# Patient Record
Sex: FEMALE | Race: WHITE | Employment: OTHER | URBAN - METROPOLITAN AREA
[De-identification: names, ages, dates, MRNs, and addresses within clinical notes are randomized per-mention and may not be internally consistent; named-entity substitution may affect disease eponyms.]

---

## 2017-08-24 PROBLEM — M17.0 PRIMARY OSTEOARTHRITIS OF BOTH KNEES: Status: ACTIVE | Noted: 2017-08-24

## 2018-05-03 ENCOUNTER — OFFICE VISIT (OUTPATIENT)
Dept: ORTHOPEDIC SURGERY | Age: 83
End: 2018-05-03
Payer: MEDICARE

## 2018-05-03 VITALS — HEIGHT: 64 IN | BODY MASS INDEX: 29.19 KG/M2 | WEIGHT: 171 LBS | TEMPERATURE: 97.9 F

## 2018-05-03 DIAGNOSIS — M17.0 PRIMARY OSTEOARTHRITIS OF BOTH KNEES: Primary | ICD-10-CM

## 2018-05-03 PROCEDURE — 99213 OFFICE O/P EST LOW 20 MIN: CPT | Performed by: ORTHOPAEDIC SURGERY

## 2018-05-03 RX ORDER — GABAPENTIN 400 MG/1
CAPSULE ORAL
Refills: 2 | COMMUNITY
Start: 2018-04-17 | End: 2019-12-05 | Stop reason: DRUGHIGH

## 2018-05-03 RX ORDER — AMOXICILLIN 500 MG/1
TABLET, FILM COATED ORAL
Refills: 0 | COMMUNITY
Start: 2018-05-01 | End: 2019-12-05 | Stop reason: ALTCHOICE

## 2018-06-08 ENCOUNTER — NURSE ONLY (OUTPATIENT)
Dept: ORTHOPEDIC SURGERY | Age: 83
End: 2018-06-08
Payer: MEDICARE

## 2018-06-08 DIAGNOSIS — M17.0 PRIMARY OSTEOARTHRITIS OF BOTH KNEES: Primary | ICD-10-CM

## 2018-06-08 PROCEDURE — 20610 DRAIN/INJ JOINT/BURSA W/O US: CPT | Performed by: NURSE PRACTITIONER

## 2018-06-08 RX ORDER — HYALURONATE SODIUM 10 MG/ML
20 SYRINGE (ML) INTRAARTICULAR ONCE
Status: COMPLETED | OUTPATIENT
Start: 2018-06-08 | End: 2018-06-08

## 2018-06-08 RX ADMIN — Medication 20 MG: at 11:24

## 2018-06-15 ENCOUNTER — NURSE ONLY (OUTPATIENT)
Dept: ORTHOPEDIC SURGERY | Age: 83
End: 2018-06-15
Payer: MEDICARE

## 2018-06-15 DIAGNOSIS — M17.0 PRIMARY OSTEOARTHRITIS OF BOTH KNEES: Primary | ICD-10-CM

## 2018-06-15 PROCEDURE — 20610 DRAIN/INJ JOINT/BURSA W/O US: CPT | Performed by: NURSE PRACTITIONER

## 2018-06-15 RX ORDER — HYALURONATE SODIUM 10 MG/ML
20 SYRINGE (ML) INTRAARTICULAR ONCE
Status: COMPLETED | OUTPATIENT
Start: 2018-06-15 | End: 2018-06-15

## 2018-06-15 RX ADMIN — Medication 20 MG: at 10:30

## 2018-06-15 RX ADMIN — Medication 20 MG: at 10:31

## 2018-06-22 ENCOUNTER — NURSE ONLY (OUTPATIENT)
Dept: ORTHOPEDIC SURGERY | Age: 83
End: 2018-06-22
Payer: MEDICARE

## 2018-06-22 DIAGNOSIS — M17.0 PRIMARY OSTEOARTHRITIS OF BOTH KNEES: Primary | ICD-10-CM

## 2018-06-22 PROCEDURE — 20610 DRAIN/INJ JOINT/BURSA W/O US: CPT | Performed by: NURSE PRACTITIONER

## 2018-06-22 RX ORDER — HYALURONATE SODIUM 10 MG/ML
20 SYRINGE (ML) INTRAARTICULAR ONCE
Status: COMPLETED | OUTPATIENT
Start: 2018-06-22 | End: 2018-06-22

## 2018-06-22 RX ADMIN — Medication 20 MG: at 11:13

## 2018-11-21 ENCOUNTER — HOSPITAL ENCOUNTER (EMERGENCY)
Age: 83
Discharge: HOME | End: 2018-11-21
Attending: EMERGENCY MEDICINE
Payer: MEDICARE

## 2018-11-21 VITALS
SYSTOLIC BLOOD PRESSURE: 161 MMHG | OXYGEN SATURATION: 97 % | HEIGHT: 64 IN | BODY MASS INDEX: 28.68 KG/M2 | WEIGHT: 168 LBS | HEART RATE: 88 BPM | TEMPERATURE: 98.1 F | RESPIRATION RATE: 20 BRPM | DIASTOLIC BLOOD PRESSURE: 70 MMHG

## 2018-11-21 DIAGNOSIS — I83.892 BLEEDING FROM VARICOSE VEINS OF LEFT LOWER EXTREMITY: Primary | ICD-10-CM

## 2018-11-21 PROCEDURE — 99283 EMERGENCY DEPT VISIT LOW MDM: CPT

## 2018-11-21 PROCEDURE — 12001 RPR S/N/AX/GEN/TRNK 2.5CM/<: CPT

## 2018-11-21 PROCEDURE — 6370000000 HC RX 637 (ALT 250 FOR IP): Performed by: EMERGENCY MEDICINE

## 2018-11-21 RX ADMIN — Medication: at 09:36

## 2018-11-21 ASSESSMENT — ENCOUNTER SYMPTOMS: SHORTNESS OF BREATH: 0

## 2019-04-15 ENCOUNTER — TELEPHONE (OUTPATIENT)
Dept: ADMINISTRATIVE | Age: 84
End: 2019-04-15

## 2019-04-17 ENCOUNTER — OFFICE VISIT (OUTPATIENT)
Dept: ORTHOPEDIC SURGERY | Age: 84
End: 2019-04-17
Payer: MEDICARE

## 2019-04-17 VITALS — BODY MASS INDEX: 29.02 KG/M2 | WEIGHT: 170 LBS | HEIGHT: 64 IN | TEMPERATURE: 98 F

## 2019-04-17 DIAGNOSIS — M17.0 PRIMARY OSTEOARTHRITIS OF BOTH KNEES: Primary | ICD-10-CM

## 2019-04-17 PROCEDURE — 99213 OFFICE O/P EST LOW 20 MIN: CPT | Performed by: NURSE PRACTITIONER

## 2019-04-17 NOTE — PROGRESS NOTES
Chief Complaint   Patient presents with    Knee Pain     Left Knee, had a fall x 1 month ago. Finished Euflexxa series on 6/22/18 with good results. Subjective:     Patient ID: Iman Desai is a 80 y.o..  female    Knee Pain  Patient complains of left knee pain. This is evaluated as a personal injury. Patient state she fell out of bed 3 weeks ago onto both knees. She states she does not have pain to right knee at this time. She did have good results with euiflexxa which finished in June 2018. Past Medical History:   Diagnosis Date    Dental caries     Diabetes mellitus ([address]     Gall bladder stones     Hernia     Hypertension     Kidney stone      Past Surgical History:   Procedure Laterality Date    APPENDECTOMY         Current Outpatient Medications:     Amoxicillin 500 MG TABS, TAKE ONE TABLET BY MOUTH EVERY 8 HOURS, Disp: , Rfl: 0    gabapentin (NEURONTIN) 400 MG capsule, TAKE ONE CAPSULE BY MOUTH FOUR TIMES A DAY, Disp: , Rfl: 2    oxyCODONE-acetaminophen (PERCOCET) 5-325 MG per tablet, Take 1 tablet by mouth every 4 hours as needed for Pain ., Disp: , Rfl:     docusate sodium (COLACE) 100 MG capsule, Take 100 mg by mouth 2 times daily, Disp: , Rfl:     carvedilol (COREG) 3.125 MG tablet, Take 3.125 mg by mouth 2 times daily (with meals), Disp: , Rfl:     fluticasone (FLONASE) 50 MCG/ACT nasal spray, Two sprays to each nostril daily x 1 week (for congestion). , Disp: 1 Bottle, Rfl: 0    diphenhydrAMINE (BENADRYL) 25 MG tablet, Take 25 mg by mouth every 6 hours as needed.   , Disp: , Rfl:   Allergies   Allergen Reactions    Alginate [Alginic Acid]     Novocain [Procaine]     Tape Rapides Ditto Tape]      Social History     Socioeconomic History    Marital status:      Spouse name: Not on file    Number of children: Not on file    Years of education: Not on file    Highest education level: Not on file   Occupational History    Not on file   Social Needs    Financial resource strain: Not on file    Food insecurity:     Worry: Not on file     Inability: Not on file    Transportation needs:     Medical: Not on file     Non-medical: Not on file   Tobacco Use    Smoking status: Never Smoker    Smokeless tobacco: Never Used   Substance and Sexual Activity    Alcohol use: No    Drug use: Not on file    Sexual activity: Not on file   Lifestyle    Physical activity:     Days per week: Not on file     Minutes per session: Not on file    Stress: Not on file   Relationships    Social connections:     Talks on phone: Not on file     Gets together: Not on file     Attends Gnosticist service: Not on file     Active member of club or organization: Not on file     Attends meetings of clubs or organizations: Not on file     Relationship status: Not on file    Intimate partner violence:     Fear of current or ex partner: Not on file     Emotionally abused: Not on file     Physically abused: Not on file     Forced sexual activity: Not on file   Other Topics Concern    Not on file   Social History Narrative    Not on file     History reviewed. No pertinent family history. REVIEW OF SYSTEMS:     General/Constitution:  (-)weight loss, (-)fever, (-)chills, (-)weakness. Skin: (-) rash,(-) psoriasis,(-) eczema, (-)skin cancer. Musculoskeletal: (-) fractures,  (-) dislocations,(-) collagen vascular disease, (-) fibromyalgia, (-) multiple sclerosis, (-) muscular dystrophy, (-) RSD,(-) joint pain (-)swelling, (-) joint pain,swelling. Neurologic: (-) epilepsy, (-)seizures,(-) brain tumor,(-) TIA, (-)stroke, (-)headaches, (-)Parkinson disease,(-) memory loss, (-) LOC. Cardiovascular: (-) Chest pain, (-) swelling in legs/feet, (-) SOB, (-) cramping in legs/feet with walking. Respiratory: (-) SOB, (-) Coughing, (-) night sweats. GI: (-) nausea, (-) vomiting, (-) diarrhea, (-) blood in stool, (-) gastric ulcer.   Psychiatric: (-) Depression, (-) Anxiety, (-) bipolar disease, (-) Alzheimer's Disease  Allergic/Immunologic: (-) allergies latex, (-) allergies metal, (-) skin sensitivity. Hematlogic: (-) anemia, (-) blood transfusion, (-) DVT/PE, (-) Clotting disorders      Subjective:    Constitution:    Temp 98 °F (36.7 °C)   Ht 5' 4\" (1.626 m)   Wt 170 lb (77.1 kg)   BMI 29.18 kg/m²       Psycihatric:    The patient is alert and oriented x 3, appears to be stated age and in no distress. Respiratory:    Respiratory effort is not labored. Patient is not gasping. Palpation of the chest reveals no tactile fremitus. Skin:    Upon inspection: the skin appears warm, dry and intact. There is  a previous scar over the affected area. There is any cellulitis, lymphedema or cutaneous lesions noted in the lower extremities. Upon palpation there is no induration noted. Neurologic:    Gait: normal;  Motor exam of the lower extremities show ; quadriceps, hamstrings, foot dorsi and plantar flexors intact R.  5/5 and L. 5/5. Deep tendon reflexes are 2/4 at the knees and 2/4 at the ankles with strong extensor hallicus longus motor strength bilaterally. Sensory to both feet is intact to all sensory root. Cardiovascular: The vascular exam is normal and is well perfused to distal extremities. Distal pulses DP/PT: R. 2+; L. 2+. There is cap refill noted less than two seconds in all digits. There is not edema of the bilateral lower extremities. There is not varicosities noted in the distal extremities. Lymph:    Upon palpation,  there is no lymphadenopathy noted in bilateral lower extremities. Musculoskeletal:      Gait: antalgic; examination of the nails and digits reveal no cyanosis or clubbing. Lumbar exam:    On visual inspection, there is not deformity of the spine. full range of motion, no tenderness, palpable spasm or pain on motion. Special tests: Straight Leg Raise negative, Basil test negative.       Hip exam-     Upon inspection, there is not

## 2019-05-07 ENCOUNTER — OFFICE VISIT (OUTPATIENT)
Dept: ORTHOPEDIC SURGERY | Age: 84
End: 2019-05-07
Payer: MEDICARE

## 2019-05-07 VITALS — BODY MASS INDEX: 29.02 KG/M2 | HEIGHT: 64 IN | WEIGHT: 170 LBS | TEMPERATURE: 98 F

## 2019-05-07 DIAGNOSIS — M17.0 PRIMARY OSTEOARTHRITIS OF BOTH KNEES: Primary | ICD-10-CM

## 2019-05-07 PROCEDURE — 99213 OFFICE O/P EST LOW 20 MIN: CPT | Performed by: ORTHOPAEDIC SURGERY

## 2019-05-07 NOTE — PROGRESS NOTES
Chief Complaint   Patient presents with    Knee Pain     Bilateral Knee, x 1 week,        Subjective:     Patient ID: Jae Rodrigues is a 80 y.o..  female    Knee Pain  Patient complains of bilateral knee pain. She had euflexxa in the past with good results. Past Medical History:   Diagnosis Date    Dental caries     Diabetes mellitus ([address]     Gall bladder stones     Hernia     Hypertension     Kidney stone      Past Surgical History:   Procedure Laterality Date    APPENDECTOMY         Current Outpatient Medications:     Amoxicillin 500 MG TABS, TAKE ONE TABLET BY MOUTH EVERY 8 HOURS, Disp: , Rfl: 0    gabapentin (NEURONTIN) 400 MG capsule, TAKE ONE CAPSULE BY MOUTH FOUR TIMES A DAY, Disp: , Rfl: 2    oxyCODONE-acetaminophen (PERCOCET) 5-325 MG per tablet, Take 1 tablet by mouth every 4 hours as needed for Pain ., Disp: , Rfl:     docusate sodium (COLACE) 100 MG capsule, Take 100 mg by mouth 2 times daily, Disp: , Rfl:     carvedilol (COREG) 3.125 MG tablet, Take 3.125 mg by mouth 2 times daily (with meals), Disp: , Rfl:     fluticasone (FLONASE) 50 MCG/ACT nasal spray, Two sprays to each nostril daily x 1 week (for congestion). , Disp: 1 Bottle, Rfl: 0    diphenhydrAMINE (BENADRYL) 25 MG tablet, Take 25 mg by mouth every 6 hours as needed.   , Disp: , Rfl:   Allergies   Allergen Reactions    Alginate [Alginic Acid]     Novocain [Procaine]     Tape Stephany Buerger Tape]      Social History     Socioeconomic History    Marital status:      Spouse name: Not on file    Number of children: Not on file    Years of education: Not on file    Highest education level: Not on file   Occupational History    Not on file   Social Needs    Financial resource strain: Not on file    Food insecurity:     Worry: Not on file     Inability: Not on file    Transportation needs:     Medical: Not on file     Non-medical: Not on file   Tobacco Use    Smoking status: Never Smoker    Smokeless tobacco: Never Used   Substance and Sexual Activity    Alcohol use: No    Drug use: Not on file    Sexual activity: Not on file   Lifestyle    Physical activity:     Days per week: Not on file     Minutes per session: Not on file    Stress: Not on file   Relationships    Social connections:     Talks on phone: Not on file     Gets together: Not on file     Attends Latter-day service: Not on file     Active member of club or organization: Not on file     Attends meetings of clubs or organizations: Not on file     Relationship status: Not on file    Intimate partner violence:     Fear of current or ex partner: Not on file     Emotionally abused: Not on file     Physically abused: Not on file     Forced sexual activity: Not on file   Other Topics Concern    Not on file   Social History Narrative    Not on file     No family history on file. REVIEW OF SYSTEMS:     General/Constitution:  (-)weight loss, (-)fever, (-)chills, (-)weakness. Skin: (-) rash,(-) psoriasis,(-) eczema, (-)skin cancer. Musculoskeletal: (-) fractures,  (-) dislocations,(-) collagen vascular disease, (-) fibromyalgia, (-) multiple sclerosis, (-) muscular dystrophy, (-) RSD,(-) joint pain (-)swelling, (-) joint pain,swelling. Neurologic: (-) epilepsy, (-)seizures,(-) brain tumor,(-) TIA, (-)stroke, (-)headaches, (-)Parkinson disease,(-) memory loss, (-) LOC. Cardiovascular: (-) Chest pain, (-) swelling in legs/feet, (-) SOB, (-) cramping in legs/feet with walking. Respiratory: (-) SOB, (-) Coughing, (-) night sweats. GI: (-) nausea, (-) vomiting, (-) diarrhea, (-) blood in stool, (-) gastric ulcer. Psychiatric: (-) Depression, (-) Anxiety, (-) bipolar disease, (-) Alzheimer's Disease  Allergic/Immunologic: (-) allergies latex, (-) allergies metal, (-) skin sensitivity.   Hematlogic: (-) anemia, (-) blood transfusion, (-) DVT/PE, (-) Clotting disorders      Subjective:    Constitution:    Temp 98 °F (36.7 °C)   Ht 5' 4\" (1.626 m) Wt 170 lb (77.1 kg)   BMI 29.18 kg/m²       Psycihatric:    The patient is alert and oriented x 3, appears to be stated age and in no distress. Respiratory:    Respiratory effort is not labored. Patient is not gasping. Palpation of the chest reveals no tactile fremitus. Skin:    Upon inspection: the skin appears warm, dry and intact. There is  a previous scar over the affected area. There is any cellulitis, lymphedema or cutaneous lesions noted in the lower extremities. Upon palpation there is no induration noted. Neurologic:    Gait: normal;  Motor exam of the lower extremities show ; quadriceps, hamstrings, foot dorsi and plantar flexors intact R.  5/5 and L. 5/5. Deep tendon reflexes are 2/4 at the knees and 2/4 at the ankles with strong extensor hallicus longus motor strength bilaterally. Sensory to both feet is intact to all sensory root. Cardiovascular: The vascular exam is normal and is well perfused to distal extremities. Distal pulses DP/PT: R. 2+; L. 2+. There is cap refill noted less than two seconds in all digits. There is not edema of the bilateral lower extremities. There is not varicosities noted in the distal extremities. Lymph:    Upon palpation,  there is no lymphadenopathy noted in bilateral lower extremities. Musculoskeletal:      Gait: antalgic; examination of the nails and digits reveal no cyanosis or clubbing. Lumbar exam:    On visual inspection, there is not deformity of the spine. full range of motion, no tenderness, palpable spasm or pain on motion. Special tests: Straight Leg Raise negative, Basil test negative. Hip exam-     Upon inspection, there is not deformity noted. Upon palpation there is not tenderness. ROM: is  full and symmetrical.   Strength: Hip Flexors 5/5; Hip Abductors 5/5; Hip Adduction 5/5. Knee exam    Left knee exam shows;  range of motion of R. Knee is 0 to 110, and L. Knee is 0 to 110.  The patient does have  pain on motion, effusion is moderate, there is tenderness over the  global region, there are not any masses, there is not ligamentous instability, there is  deformity noted. Knee exam: bilateral positive for moderate crepitations, some mild tenderness laxity is not noted with any stress. There is not a popliteal cyst.    R. Knee:  Lachman's negative, Anterior Drawer negative, Posterior Drawer negative  Yareli's negative, Thallasy  negative,   PF grind test positive, Apprehension test negative, Patellar J sign  negative  L. Knee:  Lachman's negative, Anterior Drawer negative, Posterior Drawer negative  Yareli's negative, Thallasy  negative,   PF grind test positive, Apprehension test negative,  Patellar J sign  negative      Xray Exam:    There is diffuse demineralization osseous structures. There is no   evidence of acute fracture or dislocation. There is advanced medial   and mild patellofemoral compartment joint space narrowing. There is no   large joint effusion. Overlying soft tissues are grossly unremarkable. Radiographic findings reviewed with patient      Assessment:  Encounter Diagnoses   Name Primary?  Primary osteoarthritis of both knees Yes           Plan: Will obtain new xr today as we have not had any since 2017  I had a lengthy discussion with the patient regarding their diagnosis. I explained treatment options including surgical vs non surgical treatment. I reviewed in detail the risks and benefits and outlined the procedure in detail with expected outcomes and possible complications. I also discussed non surgical treatment such as injections (CSI and visco supplementation), physical therapy, topical creams and NSAID's. They have elected for conservative management at this time. The patient has failed conservative measures such as NSAIDS, HEP, and cortisone injections.   She is an excellent candidate for viscous supplementation injections including euflexxa in the

## 2019-05-16 ENCOUNTER — NURSE ONLY (OUTPATIENT)
Dept: ORTHOPEDIC SURGERY | Age: 84
End: 2019-05-16
Payer: MEDICARE

## 2019-05-16 DIAGNOSIS — M17.0 PRIMARY OSTEOARTHRITIS OF BOTH KNEES: Primary | ICD-10-CM

## 2019-05-16 PROCEDURE — 99999 PR OFFICE/OUTPT VISIT,PROCEDURE ONLY: CPT | Performed by: NURSE PRACTITIONER

## 2019-05-16 PROCEDURE — 20610 DRAIN/INJ JOINT/BURSA W/O US: CPT | Performed by: NURSE PRACTITIONER

## 2019-05-16 RX ORDER — HYALURONATE SODIUM 10 MG/ML
20 SYRINGE (ML) INTRAARTICULAR ONCE
Status: COMPLETED | OUTPATIENT
Start: 2019-05-16 | End: 2019-05-16

## 2019-05-16 RX ADMIN — Medication 20 MG: at 15:25

## 2019-05-23 ENCOUNTER — TELEPHONE (OUTPATIENT)
Dept: ADMINISTRATIVE | Age: 84
End: 2019-05-23

## 2019-06-03 ENCOUNTER — TELEPHONE (OUTPATIENT)
Dept: ADMINISTRATIVE | Age: 84
End: 2019-06-03

## 2019-12-05 ENCOUNTER — APPOINTMENT (OUTPATIENT)
Dept: GENERAL RADIOLOGY | Age: 84
DRG: 871 | End: 2019-12-05
Payer: MEDICARE

## 2019-12-05 ENCOUNTER — HOSPITAL ENCOUNTER (INPATIENT)
Age: 84
LOS: 18 days | Discharge: NURSING, REHAB OR LONG-TERM CARE | DRG: 871 | End: 2019-12-23
Attending: EMERGENCY MEDICINE | Admitting: INTERNAL MEDICINE
Payer: MEDICARE

## 2019-12-05 ENCOUNTER — APPOINTMENT (OUTPATIENT)
Dept: CT IMAGING | Age: 84
DRG: 871 | End: 2019-12-05
Payer: MEDICARE

## 2019-12-05 ENCOUNTER — APPOINTMENT (OUTPATIENT)
Dept: INTERVENTIONAL RADIOLOGY/VASCULAR | Age: 84
DRG: 871 | End: 2019-12-05
Payer: MEDICARE

## 2019-12-05 PROBLEM — G93.41 ACUTE METABOLIC ENCEPHALOPATHY: Status: ACTIVE | Noted: 2019-12-05

## 2019-12-05 PROBLEM — A41.9 SEPSIS DUE TO PNEUMONIA (HCC): Status: ACTIVE | Noted: 2019-12-05

## 2019-12-05 PROBLEM — N17.9 AKI (ACUTE KIDNEY INJURY) (HCC): Status: ACTIVE | Noted: 2019-12-05

## 2019-12-05 PROBLEM — R79.89 ELEVATED BRAIN NATRIURETIC PEPTIDE (BNP) LEVEL: Status: ACTIVE | Noted: 2019-12-05

## 2019-12-05 PROBLEM — J18.9 SEPSIS DUE TO PNEUMONIA (HCC): Status: ACTIVE | Noted: 2019-12-05

## 2019-12-05 PROBLEM — J96.01 ACUTE RESPIRATORY FAILURE WITH HYPOXIA (HCC): Status: ACTIVE | Noted: 2019-12-05

## 2019-12-05 LAB
ALBUMIN SERPL-MCNC: 3.1 G/DL (ref 3.5–5.2)
ALP BLD-CCNC: 90 U/L (ref 35–104)
ALT SERPL-CCNC: 14 U/L (ref 0–32)
ANION GAP SERPL CALCULATED.3IONS-SCNC: 19 MMOL/L (ref 7–16)
AST SERPL-CCNC: 26 U/L (ref 0–31)
B.E.: -8 MMOL/L (ref -3–3)
BASOPHILS ABSOLUTE: 0 E9/L (ref 0–0.2)
BASOPHILS RELATIVE PERCENT: 0 % (ref 0–2)
BILIRUB SERPL-MCNC: 0.7 MG/DL (ref 0–1.2)
BILIRUBIN URINE: ABNORMAL
BLOOD, URINE: NEGATIVE
BUN BLDV-MCNC: 47 MG/DL (ref 8–23)
CALCIUM SERPL-MCNC: 8.4 MG/DL (ref 8.6–10.2)
CHLORIDE BLD-SCNC: 95 MMOL/L (ref 98–107)
CLARITY: CLEAR
CO2: 20 MMOL/L (ref 22–29)
COHB: 0.6 % (ref 0–1.5)
COLOR: ABNORMAL
CREAT SERPL-MCNC: 2.6 MG/DL (ref 0.5–1)
CRITICAL: ABNORMAL
DATE ANALYZED: ABNORMAL
DATE OF COLLECTION: ABNORMAL
EOSINOPHILS ABSOLUTE: 0 E9/L (ref 0.05–0.5)
EOSINOPHILS RELATIVE PERCENT: 0 % (ref 0–6)
GFR AFRICAN AMERICAN: 21
GFR NON-AFRICAN AMERICAN: 18 ML/MIN/1.73
GLUCOSE BLD-MCNC: 69 MG/DL (ref 74–99)
GLUCOSE URINE: NEGATIVE MG/DL
HCO3: 17.1 MMOL/L (ref 22–26)
HCT VFR BLD CALC: 42 % (ref 34–48)
HEMOGLOBIN: 13.4 G/DL (ref 11.5–15.5)
HHB: 11.3 % (ref 0–5)
INFLUENZA A BY PCR: NOT DETECTED
INFLUENZA B BY PCR: NOT DETECTED
INR BLD: 1.6
KETONES, URINE: ABNORMAL MG/DL
LAB: ABNORMAL
LACTIC ACID: 2.9 MMOL/L (ref 0.5–2.2)
LEUKOCYTE ESTERASE, URINE: NEGATIVE
LYMPHOCYTES ABSOLUTE: 0.5 E9/L (ref 1.5–4)
LYMPHOCYTES RELATIVE PERCENT: 2 % (ref 20–42)
Lab: ABNORMAL
MCH RBC QN AUTO: 29.5 PG (ref 26–35)
MCHC RBC AUTO-ENTMCNC: 31.9 % (ref 32–34.5)
MCV RBC AUTO: 92.5 FL (ref 80–99.9)
METAMYELOCYTES RELATIVE PERCENT: 1 % (ref 0–1)
METHB: 0.3 % (ref 0–1.5)
MODE: ABNORMAL
MONOCYTES ABSOLUTE: 0.25 E9/L (ref 0.1–0.95)
MONOCYTES RELATIVE PERCENT: 1 % (ref 2–12)
NEUTROPHILS ABSOLUTE: 24.06 E9/L (ref 1.8–7.3)
NEUTROPHILS RELATIVE PERCENT: 96 % (ref 43–80)
NITRITE, URINE: NEGATIVE
O2 CONTENT: 17 ML/DL
O2 SATURATION: 88.6 % (ref 92–98.5)
O2HB: 87.8 % (ref 94–97)
OPERATOR ID: 377
PATIENT TEMP: 37 C
PCO2: 34 MMHG (ref 35–45)
PDW BLD-RTO: 13.4 FL (ref 11.5–15)
PH BLOOD GAS: 7.32 (ref 7.35–7.45)
PH UA: 5 (ref 5–9)
PLATELET # BLD: 232 E9/L (ref 130–450)
PMV BLD AUTO: 11.6 FL (ref 7–12)
PO2: 57.1 MMHG (ref 60–100)
POTASSIUM REFLEX MAGNESIUM: 4 MMOL/L (ref 3.5–5)
PRO-BNP: ABNORMAL PG/ML (ref 0–450)
PROTEIN UA: NEGATIVE MG/DL
PROTHROMBIN TIME: 17.9 SEC (ref 9.3–12.4)
RBC # BLD: 4.54 E12/L (ref 3.5–5.5)
REASON FOR REJECTION: NORMAL
REASON FOR REJECTION: NORMAL
REJECTED TEST: NORMAL
REJECTED TEST: NORMAL
SODIUM BLD-SCNC: 134 MMOL/L (ref 132–146)
SOURCE, BLOOD GAS: ABNORMAL
SPECIFIC GRAVITY UA: 1.02 (ref 1–1.03)
THB: 13.8 G/DL (ref 11.5–16.5)
TIME ANALYZED: 1714
TOTAL PROTEIN: 6.7 G/DL (ref 6.4–8.3)
TROPONIN: <0.01 NG/ML (ref 0–0.03)
UROBILINOGEN, URINE: 0.2 E.U./DL
WBC # BLD: 24.8 E9/L (ref 4.5–11.5)

## 2019-12-05 PROCEDURE — 72125 CT NECK SPINE W/O DYE: CPT

## 2019-12-05 PROCEDURE — 81003 URINALYSIS AUTO W/O SCOPE: CPT

## 2019-12-05 PROCEDURE — 6370000000 HC RX 637 (ALT 250 FOR IP): Performed by: EMERGENCY MEDICINE

## 2019-12-05 PROCEDURE — 80053 COMPREHEN METABOLIC PANEL: CPT

## 2019-12-05 PROCEDURE — 6360000002 HC RX W HCPCS: Performed by: NURSE PRACTITIONER

## 2019-12-05 PROCEDURE — 36556 INSERT NON-TUNNEL CV CATH: CPT | Performed by: INTERNAL MEDICINE

## 2019-12-05 PROCEDURE — 85610 PROTHROMBIN TIME: CPT

## 2019-12-05 PROCEDURE — 2580000003 HC RX 258: Performed by: NURSE PRACTITIONER

## 2019-12-05 PROCEDURE — 93005 ELECTROCARDIOGRAM TRACING: CPT | Performed by: EMERGENCY MEDICINE

## 2019-12-05 PROCEDURE — 99223 1ST HOSP IP/OBS HIGH 75: CPT | Performed by: INTERNAL MEDICINE

## 2019-12-05 PROCEDURE — 06HM33Z INSERTION OF INFUSION DEVICE INTO RIGHT FEMORAL VEIN, PERCUTANEOUS APPROACH: ICD-10-PCS | Performed by: INTERNAL MEDICINE

## 2019-12-05 PROCEDURE — 83880 ASSAY OF NATRIURETIC PEPTIDE: CPT

## 2019-12-05 PROCEDURE — 94640 AIRWAY INHALATION TREATMENT: CPT

## 2019-12-05 PROCEDURE — 2580000003 HC RX 258: Performed by: INTERNAL MEDICINE

## 2019-12-05 PROCEDURE — 6360000002 HC RX W HCPCS: Performed by: INTERNAL MEDICINE

## 2019-12-05 PROCEDURE — 87502 INFLUENZA DNA AMP PROBE: CPT

## 2019-12-05 PROCEDURE — 94760 N-INVAS EAR/PLS OXIMETRY 1: CPT

## 2019-12-05 PROCEDURE — 6370000000 HC RX 637 (ALT 250 FOR IP): Performed by: NURSE PRACTITIONER

## 2019-12-05 PROCEDURE — 2500000003 HC RX 250 WO HCPCS: Performed by: INTERNAL MEDICINE

## 2019-12-05 PROCEDURE — 2000000000 HC ICU R&B

## 2019-12-05 PROCEDURE — 82805 BLOOD GASES W/O2 SATURATION: CPT

## 2019-12-05 PROCEDURE — 71046 X-RAY EXAM CHEST 2 VIEWS: CPT

## 2019-12-05 PROCEDURE — 85025 COMPLETE CBC W/AUTO DIFF WBC: CPT

## 2019-12-05 PROCEDURE — 84484 ASSAY OF TROPONIN QUANT: CPT

## 2019-12-05 PROCEDURE — APPSS45 APP SPLIT SHARED TIME 31-45 MINUTES: Performed by: NURSE PRACTITIONER

## 2019-12-05 PROCEDURE — 2580000003 HC RX 258: Performed by: EMERGENCY MEDICINE

## 2019-12-05 PROCEDURE — 6360000002 HC RX W HCPCS: Performed by: EMERGENCY MEDICINE

## 2019-12-05 PROCEDURE — 87450 HC DIRECT STREP B ANTIGEN: CPT

## 2019-12-05 PROCEDURE — 36415 COLL VENOUS BLD VENIPUNCTURE: CPT

## 2019-12-05 PROCEDURE — 70450 CT HEAD/BRAIN W/O DYE: CPT

## 2019-12-05 PROCEDURE — 71250 CT THORAX DX C-: CPT

## 2019-12-05 PROCEDURE — 99285 EMERGENCY DEPT VISIT HI MDM: CPT

## 2019-12-05 PROCEDURE — 83605 ASSAY OF LACTIC ACID: CPT

## 2019-12-05 PROCEDURE — 6370000000 HC RX 637 (ALT 250 FOR IP): Performed by: INTERNAL MEDICINE

## 2019-12-05 PROCEDURE — 87088 URINE BACTERIA CULTURE: CPT

## 2019-12-05 RX ORDER — HEPARIN SODIUM 5000 [USP'U]/ML
5000 INJECTION, SOLUTION INTRAVENOUS; SUBCUTANEOUS EVERY 8 HOURS SCHEDULED
Status: DISCONTINUED | OUTPATIENT
Start: 2019-12-05 | End: 2019-12-07

## 2019-12-05 RX ORDER — IBUPROFEN 200 MG
400 TABLET ORAL EVERY 6 HOURS PRN
Status: ON HOLD | COMMUNITY
End: 2019-12-23 | Stop reason: HOSPADM

## 2019-12-05 RX ORDER — SODIUM CHLORIDE 9 MG/ML
INJECTION, SOLUTION INTRAVENOUS CONTINUOUS
Status: DISCONTINUED | OUTPATIENT
Start: 2019-12-05 | End: 2019-12-05

## 2019-12-05 RX ORDER — FLUTICASONE PROPIONATE 50 MCG
1 SPRAY, SUSPENSION (ML) NASAL DAILY
COMMUNITY

## 2019-12-05 RX ORDER — IPRATROPIUM BROMIDE AND ALBUTEROL SULFATE 2.5; .5 MG/3ML; MG/3ML
1 SOLUTION RESPIRATORY (INHALATION) EVERY 4 HOURS
Status: DISCONTINUED | OUTPATIENT
Start: 2019-12-05 | End: 2019-12-08

## 2019-12-05 RX ORDER — DEXTROSE, SODIUM CHLORIDE, SODIUM LACTATE, POTASSIUM CHLORIDE, AND CALCIUM CHLORIDE 5; .6; .31; .03; .02 G/100ML; G/100ML; G/100ML; G/100ML; G/100ML
INJECTION, SOLUTION INTRAVENOUS CONTINUOUS
Status: DISCONTINUED | OUTPATIENT
Start: 2019-12-05 | End: 2019-12-13

## 2019-12-05 RX ORDER — SODIUM CHLORIDE 0.9 % (FLUSH) 0.9 %
10 SYRINGE (ML) INJECTION EVERY 12 HOURS SCHEDULED
Status: DISCONTINUED | OUTPATIENT
Start: 2019-12-05 | End: 2019-12-23 | Stop reason: SDUPTHER

## 2019-12-05 RX ORDER — IPRATROPIUM BROMIDE AND ALBUTEROL SULFATE 2.5; .5 MG/3ML; MG/3ML
1 SOLUTION RESPIRATORY (INHALATION)
Status: DISCONTINUED | OUTPATIENT
Start: 2019-12-05 | End: 2019-12-05

## 2019-12-05 RX ORDER — LEVOFLOXACIN 5 MG/ML
500 INJECTION, SOLUTION INTRAVENOUS
Status: DISCONTINUED | OUTPATIENT
Start: 2019-12-05 | End: 2019-12-09

## 2019-12-05 RX ORDER — CARVEDILOL 6.25 MG/1
6.25 TABLET ORAL 2 TIMES DAILY WITH MEALS
Status: DISCONTINUED | OUTPATIENT
Start: 2019-12-05 | End: 2019-12-09

## 2019-12-05 RX ORDER — SODIUM CHLORIDE 0.9 % (FLUSH) 0.9 %
10 SYRINGE (ML) INJECTION PRN
Status: DISCONTINUED | OUTPATIENT
Start: 2019-12-05 | End: 2019-12-23 | Stop reason: SDUPTHER

## 2019-12-05 RX ORDER — GABAPENTIN 600 MG/1
600 TABLET ORAL 4 TIMES DAILY
Status: ON HOLD | COMMUNITY
End: 2019-12-23 | Stop reason: HOSPADM

## 2019-12-05 RX ORDER — AZITHROMYCIN 250 MG/1
500 TABLET, FILM COATED ORAL ONCE
Status: COMPLETED | OUTPATIENT
Start: 2019-12-05 | End: 2019-12-05

## 2019-12-05 RX ORDER — ACETAMINOPHEN 325 MG/1
650 TABLET ORAL EVERY 4 HOURS PRN
Status: DISCONTINUED | OUTPATIENT
Start: 2019-12-05 | End: 2019-12-23 | Stop reason: HOSPADM

## 2019-12-05 RX ORDER — 0.9 % SODIUM CHLORIDE 0.9 %
1000 INTRAVENOUS SOLUTION INTRAVENOUS ONCE
Status: COMPLETED | OUTPATIENT
Start: 2019-12-05 | End: 2019-12-05

## 2019-12-05 RX ORDER — CARVEDILOL 6.25 MG/1
6.25 TABLET ORAL 2 TIMES DAILY WITH MEALS
Status: ON HOLD | COMMUNITY
End: 2019-12-23 | Stop reason: HOSPADM

## 2019-12-05 RX ORDER — ONDANSETRON 2 MG/ML
4 INJECTION INTRAMUSCULAR; INTRAVENOUS EVERY 6 HOURS PRN
Status: DISCONTINUED | OUTPATIENT
Start: 2019-12-05 | End: 2019-12-23 | Stop reason: HOSPADM

## 2019-12-05 RX ORDER — SODIUM CHLORIDE 9 MG/ML
INJECTION, SOLUTION INTRAVENOUS CONTINUOUS
Status: DISCONTINUED | OUTPATIENT
Start: 2019-12-05 | End: 2019-12-05 | Stop reason: SDUPTHER

## 2019-12-05 RX ADMIN — Medication 10 ML: at 22:12

## 2019-12-05 RX ADMIN — SODIUM CHLORIDE 1000 ML: 900 INJECTION, SOLUTION INTRAVENOUS at 13:55

## 2019-12-05 RX ADMIN — AZITHROMYCIN 500 MG: 250 TABLET, FILM COATED ORAL at 13:55

## 2019-12-05 RX ADMIN — SODIUM CHLORIDE, SODIUM LACTATE, POTASSIUM CHLORIDE, CALCIUM CHLORIDE AND DEXTROSE MONOHYDRATE: 5; 600; 310; 30; 20 INJECTION, SOLUTION INTRAVENOUS at 22:08

## 2019-12-05 RX ADMIN — IPRATROPIUM BROMIDE AND ALBUTEROL SULFATE 1 AMPULE: .5; 3 SOLUTION RESPIRATORY (INHALATION) at 16:52

## 2019-12-05 RX ADMIN — SODIUM CHLORIDE, SODIUM LACTATE, POTASSIUM CHLORIDE, CALCIUM CHLORIDE AND DEXTROSE MONOHYDRATE: 5; 600; 310; 30; 20 INJECTION, SOLUTION INTRAVENOUS at 18:28

## 2019-12-05 RX ADMIN — CEFTRIAXONE SODIUM 1 G: 1 INJECTION, POWDER, FOR SOLUTION INTRAMUSCULAR; INTRAVENOUS at 13:56

## 2019-12-05 RX ADMIN — IPRATROPIUM BROMIDE AND ALBUTEROL SULFATE 1 AMPULE: .5; 3 SOLUTION RESPIRATORY (INHALATION) at 21:45

## 2019-12-05 RX ADMIN — PIPERACILLIN AND TAZOBACTAM 3.38 G: 3; .375 INJECTION, POWDER, FOR SOLUTION INTRAVENOUS at 23:54

## 2019-12-05 RX ADMIN — ASCORBIC ACID: 500 INJECTION INTRAVENOUS at 18:33

## 2019-12-05 RX ADMIN — Medication 10 ML: at 18:28

## 2019-12-05 RX ADMIN — THIAMINE HYDROCHLORIDE 200 MG: 100 INJECTION, SOLUTION INTRAMUSCULAR; INTRAVENOUS at 18:38

## 2019-12-05 RX ADMIN — HYDROCORTISONE SODIUM SUCCINATE 100 MG: 100 INJECTION, POWDER, FOR SOLUTION INTRAMUSCULAR; INTRAVENOUS at 18:28

## 2019-12-05 RX ADMIN — HEPARIN SODIUM 5000 UNITS: 5000 INJECTION INTRAVENOUS; SUBCUTANEOUS at 19:46

## 2019-12-05 RX ADMIN — VANCOMYCIN HYDROCHLORIDE 1250 MG: 5 INJECTION, POWDER, LYOPHILIZED, FOR SOLUTION INTRAVENOUS at 22:46

## 2019-12-05 RX ADMIN — LEVOFLOXACIN 500 MG: 5 INJECTION, SOLUTION INTRAVENOUS at 18:40

## 2019-12-05 RX ADMIN — HEPARIN SODIUM 5000 UNITS: 5000 INJECTION INTRAVENOUS; SUBCUTANEOUS at 23:19

## 2019-12-05 ASSESSMENT — ENCOUNTER SYMPTOMS
NAUSEA: 0
ABDOMINAL PAIN: 0
RHINORRHEA: 0
COUGH: 0
VOMITING: 0
DIARRHEA: 0
SHORTNESS OF BREATH: 1

## 2019-12-05 ASSESSMENT — PAIN SCALES - GENERAL: PAINLEVEL_OUTOF10: 0

## 2019-12-05 NOTE — ED NOTES
Bed: 02  Expected date:   Expected time:   Means of arrival:   Comments:  213 Maynor Alston RN  12/05/19 9829

## 2019-12-05 NOTE — CONSULTS
Pulmonary/Critical Care Consult Note    CHIEF COMPLAINT: Acute respiratory failure, large left lung pneumonia, recent viral illness, acute kidney injury    HISTORY OF PRESENT ILLNESS: The patient is an 44-year-old female who lives at home with her  in [place]. Over the last 5 days she has become increasingly ill with shortness of breath cough and lethargy. Her granddaughter with whom I spoke at the bedside (Alta Benton), says that she is not normally this confused and generally is very clear and capable. Apparently, family members have been ill particularly her  and the patient then began to be ill herself over the last several days. The patient came to the emergency room and a rapid influenza test was negative. She was admitted to the [facility] and I was called to see her. However, it became very apparent that she was in significant difficulty and now is being moved to the intensive care unit. I reviewed the chest x-ray and CT scans of her chest which shows a very dense consolidated pneumonia originating in the left upper lobe but also probably extending into parts of the left lower lobe. I do not see any evidence of a mucous plug or mass or significant pleural effusion. I have requested a stat blood gas but this is not yet been done. ALLERGY:  Alginate [alginic acid]; Novocain [procaine]; and Tape [adhesive tape]    FAMILY HISTORY:  History reviewed. No pertinent family history.     SOCIAL HISTORY:  Social History     Socioeconomic History    Marital status:      Spouse name: Not on file    Number of children: Not on file    Years of education: Not on file    Highest education level: Not on file   Occupational History    Not on file   Social Needs    Financial resource strain: Not on file    Food insecurity:     Worry: Not on file     Inability: Not on file    Transportation needs:     Medical: Not on file     Non-medical: Not on file   Tobacco Use    Smoking status: Never

## 2019-12-05 NOTE — PROGRESS NOTES
Winnie Smith,    Your patient is on a medication that requires a renal dose adjustment. Renal Function Assessment:    Date Body Weight IBW Adj. Body Weight Scr CrCl Dialysis status   12/5/2019 81.4 kg 57 kg 66.8 kg 2.6 17 ml/min N/A       Pharmacy has renally dose-adjusted the following medication(s):    Date Medication Original Dosing Regimen New Dosing Regimen   12/5/2019 Zosyn 3.375 g  every 8 hours Every 12 hours           These changes were made per protocol according to the [facility] Renal Function-Based Dose Adjustments Policy    *Please note this dose may need readjusted if your patient's renal function significantly improves. Please contact pharmacy with any questions regarding these changes.     Yung Gale RPh 12/5/2019 4:19 PM

## 2019-12-05 NOTE — H&P
[address] [place], [place] [address]ist Group   History and Physical      CHIEF COMPLAINT:   fall    History of Present Illness:  80 y.o. female with a history of  HTN, DM2 who  presents from home after a mechanical fall this morning. Patient is confused, restless, and is a poor historian.  states patient has a 2 year history of lung and thyroid nodules that is being monitored by PCP. Patient denies SOB but is dyspneic with conversation. Abdomen noted to be softly distended,  reports history of hernia with old repair and mesh. Patient was to have thoracentesis done in ER but  states they did not feel she has a fluid on her lung, rather what is showing on xray as a pleural effusion is actually a lung mass verus consolidation from the pneumonia. Patient has skin tear left forearm from fall. Informant(s) for H&P:  and granddaughter    REVIEW OF SYSTEMS:  no fevers, chills, cp, sob, n/v, ha, vision/hearing changes, diarrhea, constipation, dysuria/hematuria unless noted in HPI. Complete ROS performed with the patient and is otherwise negative. PMH:  Past Medical History:   Diagnosis Date    Dental caries     Diabetes mellitus ([facility] [address]     Gall bladder stones     Hernia     Hypertension     Kidney stone        Surgical History:  Past Surgical History:   Procedure Laterality Date    APPENDECTOMY         Medications Prior to Admission:    Prior to Admission medications    Medication Sig Start Date End Date Taking? Authorizing Provider   carvedilol (COREG) 6.25 MG tablet Take 6.25 mg by mouth 2 times daily (with meals)   Yes Historical Provider, MD   fluticasone (FLONASE) 50 MCG/ACT nasal spray 1 spray by Each Nostril route daily   Yes Historical Provider, MD   gabapentin (NEURONTIN) 600 MG tablet Take 600 mg by mouth 4 times daily.    Yes Historical Provider, MD   ibuprofen (ADVIL;MOTRIN) 200 MG tablet Take 400 mg by mouth every 6 hours as needed for Pain   Yes Historical Provider, MD   oxyCODONE-acetaminophen (PERCOCET) 5-325 MG per tablet Take 1 tablet by mouth 4 times daily. Yes Historical Provider, MD       Allergies:    Alginate [alginic acid]; Novocain [procaine]; and Tape [adhesive tape]    Social History:    reports that she has never smoked. She has never used smokeless tobacco. She reports that she does not drink alcohol. Family History:  family history is not on file. PHYSICAL EXAM:  Vitals:  BP (!) 110/48   Pulse 89   Temp 98 °F (36.7 °C)   Resp 20   Ht 5' 5\" (1.651 m)   Wt 181 lb (82.1 kg)   SpO2 94%   BMI 30.12 kg/m²      General Appearance: alert and oriented to person,  in no acute distress  Skin: warm and dry. Skin tear to left forearm. Head: normocephalic and atraumatic  Eyes: pupils equal, round, and reactive to light, extraocular eye movements intact, conjunctivae normal  Neck: neck supple and non tender without mass   Pulmonary/Chest: crackles right base, diminished left lobe, no respiratory distress  Cardiovascular: normal rate, normal S1 and S2 and no carotid bruits  Abdomen: soft, non-tender, non-distended, normal bowel sounds, no masses or organomegaly  Extremities: no cyanosis, no clubbing and no edema  Neurologic: no cranial nerve deficit and speech normal    LABS:  Recent Labs     12/05/19  0915      K 4.0   CL 95*   CO2 20*   BUN 47*   CREATININE 2.6*   GLUCOSE 69*   CALCIUM 8.4*       Recent Labs     12/05/19  1056   WBC 24.8*   RBC 4.54   HGB 13.4   HCT 42.0   MCV 92.5   MCH 29.5   MCHC 31.9*   RDW 13.4      MPV 11.6       No results for input(s): POCGLU in the last 72 hours. Radiology: Xr Chest Standard (2 Vw)    Result Date: 12/5/2019  Reading location: [facility] [place] Indication: Shortness of breath Comparison: Prior chest radiograph from 1/12/2016 Technique: Portable AP upright and lateral chest radiographs were obtained. Findings: The left cardiac border is obscured.  There is a large left pleural effusion with left lung patchy infiltrate developing pneumonia right middle lobe. Ct Cervical Spine Wo Contrast    Result Date: 12/5/2019  Location: 200 Indication: Pain post fall Comparison: None available. Technique: Multidetector CT imaging of the cervical spine was performed without administration of intravenous contrast. Coronal and sagittal reformatted images were obtained. Automated dose exposure control was used for this exam. FINDINGS: There is nonspecific straightening of cervical lordosis. Vertebral bodies maintain normal height. Alignment is maintained. Atlantodental distance is preserved. The craniocervical junction is normal in appearance. No acute cervical spine fracture is identified. There is no prevertebral soft tissue edema. There is multilevel intervertebral disc space narrowing with hypertrophic endplate changes. There is multilevel uncovertebral and facet hypertrophy without high grade osseous encroachment of the central canal or neural foramina. Visualized portions of bilateral lung apices are grossly clear. 1. No acute cervical spine fracture. 2. Mild multilevel cervical spondylosis. EKG: Interpreted by emergency department physician     Rhythm: normal sinus   Rate: normal  Axis: normal  Ectopy: none  Conduction: normal  ST Segments: normal  T Waves: normal  Q Waves: none     Clinical Impression: normal sinus rhythm, normal ECG       ASSESSMENT:      Principal Problem:    Sepsis due to pneumonia [facility])  Active Problems:    Acute metabolic encephalopathy    Acute respiratory failure with hypoxia (HCC)    JOVANI (acute kidney injury) (HCC)    Elevated brain natriuretic peptide (BNP) level  Resolved Problems:    * No resolved hospital problems. *      PLAN:    1. Acute hypoxic respiratory  failure secondary to PNA: CXR with left lung infiltrate and large left pleural effusion. CT chest with large consolidative process left lung secondary to PNA per IR. Continue supplemental oxygen. 94% on 5 liters NC.  Does

## 2019-12-05 NOTE — ED PROVIDER NOTES
Brought in by EMS for fall that occurred this morning. EMS report that the  states that she was feeling dizzy this morning and when trying to the bed had slid onto the floor. EMS report that she was complaining of neck pain but the  states that this is been present for the past 8 days. Patient is confused about the events. She thinks that she fell at 10 AM this morning. I did discuss with her that it is only 8:45 AM this morning. I asked her if she fell yesterday at 10 AM but she cannot give me a clear answer. She is however alert and oriented x3 (place, month, and name). She complains of some chest discomfort and shortness of breath which occurred after the fall today. She states she did not have any of these episodes prior. She states she is still dizzy but cannot describe the quality of the dizziness. Denies any other complaints. Review of Systems   Constitutional: Positive for fatigue. Negative for chills, diaphoresis and fever. HENT: Negative for congestion and rhinorrhea. Eyes: Negative for visual disturbance. Respiratory: Positive for shortness of breath. Negative for cough. Cardiovascular: Positive for chest pain. Negative for palpitations and leg swelling. Gastrointestinal: Negative for abdominal pain, diarrhea, nausea and vomiting. Genitourinary: Negative for difficulty urinating, dysuria, frequency and hematuria. Musculoskeletal: Positive for gait problem. Neurological: Positive for dizziness and light-headedness. Negative for syncope and numbness. Hematological: Does not bruise/bleed easily. Psychiatric/Behavioral: Positive for confusion. All other systems reviewed and are negative. Physical Exam  Vitals signs and nursing note reviewed. Constitutional:       General: She is not in acute distress. Appearance: She is well-developed and normal weight. She is not ill-appearing, toxic-appearing or diaphoretic.    HENT:      Head: mmol/L   Brain Natriuretic Peptide   Result Value Ref Range    Pro-BNP 20,940 (H) 0 - 450 pg/mL   SPECIMEN REJECTION   Result Value Ref Range    Rejected Test cbcwd     Reason for Rejection see below    SPECIMEN REJECTION   Result Value Ref Range    Rejected Test cbcwd     Reason for Rejection see below    CBC auto differential   Result Value Ref Range    WBC 24.8 (H) 4.5 - 11.5 E9/L    RBC 4.54 3.50 - 5.50 E12/L    Hemoglobin 13.4 11.5 - 15.5 g/dL    Hematocrit 42.0 34.0 - 48.0 %    MCV 92.5 80.0 - 99.9 fL    MCH 29.5 26.0 - 35.0 pg    MCHC 31.9 (L) 32.0 - 34.5 %    RDW 13.4 11.5 - 15.0 fL    Platelets 656 684 - 724 E9/L    MPV 11.6 7.0 - 12.0 fL    Neutrophils % 96.0 (H) 43.0 - 80.0 %    Lymphocytes % 2.0 (L) 20.0 - 42.0 %    Monocytes % 1.0 (L) 2.0 - 12.0 %    Eosinophils % 0.0 0.0 - 6.0 %    Basophils % 0.0 0.0 - 2.0 %    Neutrophils Absolute 24.06 (H) 1.80 - 7.30 E9/L    Lymphocytes Absolute 0.50 (L) 1.50 - 4.00 E9/L    Monocytes Absolute 0.25 0.10 - 0.95 E9/L    Eosinophils Absolute 0.00 (L) 0.05 - 0.50 E9/L    Basophils Absolute 0.00 0.00 - 0.20 E9/L    Metamyelocytes Relative 1.0 0.0 - 1.0 %   Protime-INR   Result Value Ref Range    Protime 17.9 (H) 9.3 - 12.4 sec    INR 1.6        RADIOLOGY:  CT CHEST WO CONTRAST   Final Result   Large left lung infiltrate compatible pneumonia. Minimal   patchy infiltrate developing pneumonia right middle lobe. XR CHEST STANDARD (2 VW)   Final Result   Large left pleural effusion with left lung airspace disease. CT Head WO Contrast   Final Result   No acute intracranial hemorrhage or mass effect. CT CERVICAL SPINE WO CONTRAST   Final Result   1. No acute cervical spine fracture. 2. Mild multilevel cervical spondylosis.                IR GUIDED THORACENTESIS PLEURAL    (Results Pending)         ------------------------- NURSING NOTES AND VITALS REVIEWED ---------------------------  Date / Time Roomed:  12/5/2019  8:48 AM  ED Bed Assignment: 02/02    The nursing notes within the ED encounter and vital signs as below have been reviewed. Patient Vitals for the past 24 hrs:   BP Temp Temp src Pulse Resp SpO2 Height Weight   12/05/19 1038 (!) 99/40 -- -- 78 20 90 % -- --   12/05/19 0859 (!) 122/53 98 °F (36.7 °C) Oral 83 30 90 % 5' 5\" (1.651 m) 181 lb (82.1 kg)       Oxygen Saturation Interpretation: Abnormal and Improved after treatment    ------------------------------------------ PROGRESS NOTES ------------------------------------------  Re-evaluation(s):  See ED course    Counseling:  I have spoken with the patient and spouse and discussed todays results, in addition to providing specific details for the plan of care and counseling regarding the diagnosis and prognosis. Their questions are answered at this time and they are agreeable with the plan of admission.    --------------------------------- ADDITIONAL PROVIDER NOTES ---------------------------------  Consultations:  Time: 1350. Spoke with Dr. Hermelindo Jimenez. Discussed case. He will admit the patient. This patient's ED course included: a personal history and physicial examination, re-evaluation prior to disposition, multiple bedside re-evaluations, IV medications, cardiac monitoring and continuous pulse oximetry    This patient has remained hemodynamically stable during their ED course. Diagnosis:  1. acute kidney injury  2. community acquired pneumonia of left lung  3. Disorientation  4. Hypoxia    Disposition:  Patient's disposition: Admit to monitored bed  Patient's condition is fair.            Whitney Purvis, DO  12/05/19 140

## 2019-12-06 ENCOUNTER — APPOINTMENT (OUTPATIENT)
Dept: GENERAL RADIOLOGY | Age: 84
DRG: 871 | End: 2019-12-06
Payer: MEDICARE

## 2019-12-06 LAB
ANION GAP SERPL CALCULATED.3IONS-SCNC: 19 MMOL/L (ref 7–16)
B.E.: -6 MMOL/L (ref -3–3)
BASOPHILS ABSOLUTE: 0 E9/L (ref 0–0.2)
BASOPHILS RELATIVE PERCENT: 0.7 % (ref 0–2)
BUN BLDV-MCNC: 63 MG/DL (ref 8–23)
BURR CELLS: ABNORMAL
CALCIUM SERPL-MCNC: 8 MG/DL (ref 8.6–10.2)
CHLORIDE BLD-SCNC: 96 MMOL/L (ref 98–107)
CO2: 16 MMOL/L (ref 22–29)
COHB: 0.3 % (ref 0–1.5)
CREAT SERPL-MCNC: 2.6 MG/DL (ref 0.5–1)
CRITICAL: ABNORMAL
DATE ANALYZED: ABNORMAL
DATE OF COLLECTION: ABNORMAL
EOSINOPHILS ABSOLUTE: 0 E9/L (ref 0.05–0.5)
EOSINOPHILS RELATIVE PERCENT: 0 % (ref 0–6)
GFR AFRICAN AMERICAN: 21
GFR NON-AFRICAN AMERICAN: 18 ML/MIN/1.73
GLUCOSE BLD-MCNC: 139 MG/DL (ref 74–99)
HCO3: 19 MMOL/L (ref 22–26)
HCT VFR BLD CALC: 38.6 % (ref 34–48)
HEMOGLOBIN: 12.6 G/DL (ref 11.5–15.5)
HHB: 12.7 % (ref 0–5)
L. PNEUMOPHILA SEROGP 1 UR AG: NORMAL
LAB: ABNORMAL
LACTIC ACID: 1.9 MMOL/L (ref 0.5–2.2)
LV EF: 58 %
LVEF MODALITY: NORMAL
LYMPHOCYTES ABSOLUTE: 0.54 E9/L (ref 1.5–4)
LYMPHOCYTES RELATIVE PERCENT: 1.7 % (ref 20–42)
Lab: ABNORMAL
MAGNESIUM: 1.7 MG/DL (ref 1.6–2.6)
MCH RBC QN AUTO: 30 PG (ref 26–35)
MCHC RBC AUTO-ENTMCNC: 32.6 % (ref 32–34.5)
MCV RBC AUTO: 91.9 FL (ref 80–99.9)
METAMYELOCYTES RELATIVE PERCENT: 0.9 % (ref 0–1)
METHB: 0.3 % (ref 0–1.5)
MODE: ABNORMAL
MONOCYTES ABSOLUTE: 0.27 E9/L (ref 0.1–0.95)
MONOCYTES RELATIVE PERCENT: 0.9 % (ref 2–12)
NEUTROPHILS ABSOLUTE: 26.46 E9/L (ref 1.8–7.3)
NEUTROPHILS RELATIVE PERCENT: 96.6 % (ref 43–80)
NUCLEATED RED BLOOD CELLS: 0.9 /100 WBC
O2 CONTENT: 16.8 ML/DL
O2 SATURATION: 87.2 % (ref 92–98.5)
O2HB: 86.7 % (ref 94–97)
OPERATOR ID: ABNORMAL
OVALOCYTES: ABNORMAL
PATIENT TEMP: 37 C
PCO2: 36.2 MMHG (ref 35–45)
PDW BLD-RTO: 13.9 FL (ref 11.5–15)
PEEP/CPAP: 8 CMH2O
PH BLOOD GAS: 7.34 (ref 7.35–7.45)
PHOSPHORUS: 4 MG/DL (ref 2.5–4.5)
PLATELET # BLD: 220 E9/L (ref 130–450)
PMV BLD AUTO: 11.6 FL (ref 7–12)
PO2: 50 MMHG (ref 60–100)
POIKILOCYTES: ABNORMAL
POLYCHROMASIA: ABNORMAL
POTASSIUM SERPL-SCNC: 3.8 MMOL/L (ref 3.5–5)
PS: 14 CMH20
RBC # BLD: 4.2 E12/L (ref 3.5–5.5)
SODIUM BLD-SCNC: 131 MMOL/L (ref 132–146)
SOURCE, BLOOD GAS: ABNORMAL
STREP PNEUMONIAE ANTIGEN, URINE: NORMAL
THB: 13.8 G/DL (ref 11.5–16.5)
TIME ANALYZED: 530
VACUOLATED NEUTROPHILS: ABNORMAL
WBC # BLD: 27 E9/L (ref 4.5–11.5)

## 2019-12-06 PROCEDURE — 6370000000 HC RX 637 (ALT 250 FOR IP): Performed by: INTERNAL MEDICINE

## 2019-12-06 PROCEDURE — 93306 TTE W/DOPPLER COMPLETE: CPT

## 2019-12-06 PROCEDURE — 86738 MYCOPLASMA ANTIBODY: CPT

## 2019-12-06 PROCEDURE — 2580000003 HC RX 258: Performed by: INTERNAL MEDICINE

## 2019-12-06 PROCEDURE — 2000000000 HC ICU R&B

## 2019-12-06 PROCEDURE — 92526 ORAL FUNCTION THERAPY: CPT

## 2019-12-06 PROCEDURE — 36592 COLLECT BLOOD FROM PICC: CPT

## 2019-12-06 PROCEDURE — 92610 EVALUATE SWALLOWING FUNCTION: CPT

## 2019-12-06 PROCEDURE — 94660 CPAP INITIATION&MGMT: CPT

## 2019-12-06 PROCEDURE — C9113 INJ PANTOPRAZOLE SODIUM, VIA: HCPCS | Performed by: INTERNAL MEDICINE

## 2019-12-06 PROCEDURE — 36556 INSERT NON-TUNNEL CV CATH: CPT

## 2019-12-06 PROCEDURE — 82805 BLOOD GASES W/O2 SATURATION: CPT

## 2019-12-06 PROCEDURE — 99233 SBSQ HOSP IP/OBS HIGH 50: CPT | Performed by: INTERNAL MEDICINE

## 2019-12-06 PROCEDURE — 87081 CULTURE SCREEN ONLY: CPT

## 2019-12-06 PROCEDURE — 2580000003 HC RX 258: Performed by: NURSE PRACTITIONER

## 2019-12-06 PROCEDURE — 83605 ASSAY OF LACTIC ACID: CPT

## 2019-12-06 PROCEDURE — 2500000003 HC RX 250 WO HCPCS: Performed by: INTERNAL MEDICINE

## 2019-12-06 PROCEDURE — 6360000002 HC RX W HCPCS: Performed by: NURSE PRACTITIONER

## 2019-12-06 PROCEDURE — 36415 COLL VENOUS BLD VENIPUNCTURE: CPT

## 2019-12-06 PROCEDURE — 97530 THERAPEUTIC ACTIVITIES: CPT | Performed by: PHYSICAL THERAPIST

## 2019-12-06 PROCEDURE — 84100 ASSAY OF PHOSPHORUS: CPT

## 2019-12-06 PROCEDURE — 85025 COMPLETE CBC W/AUTO DIFF WBC: CPT

## 2019-12-06 PROCEDURE — 97162 PT EVAL MOD COMPLEX 30 MIN: CPT | Performed by: PHYSICAL THERAPIST

## 2019-12-06 PROCEDURE — 83735 ASSAY OF MAGNESIUM: CPT

## 2019-12-06 PROCEDURE — 80048 BASIC METABOLIC PNL TOTAL CA: CPT

## 2019-12-06 PROCEDURE — 6360000002 HC RX W HCPCS: Performed by: INTERNAL MEDICINE

## 2019-12-06 PROCEDURE — 71045 X-RAY EXAM CHEST 1 VIEW: CPT

## 2019-12-06 PROCEDURE — 94640 AIRWAY INHALATION TREATMENT: CPT

## 2019-12-06 RX ORDER — MAGNESIUM SULFATE 1 G/100ML
1 INJECTION INTRAVENOUS ONCE
Status: COMPLETED | OUTPATIENT
Start: 2019-12-06 | End: 2019-12-06

## 2019-12-06 RX ORDER — PANTOPRAZOLE SODIUM 40 MG/10ML
40 INJECTION, POWDER, LYOPHILIZED, FOR SOLUTION INTRAVENOUS DAILY
Status: DISCONTINUED | OUTPATIENT
Start: 2019-12-06 | End: 2019-12-08

## 2019-12-06 RX ORDER — SODIUM CHLORIDE 9 MG/ML
10 INJECTION INTRAVENOUS DAILY
Status: DISCONTINUED | OUTPATIENT
Start: 2019-12-06 | End: 2019-12-09

## 2019-12-06 RX ORDER — POTASSIUM CHLORIDE 29.8 MG/ML
20 INJECTION INTRAVENOUS ONCE
Status: COMPLETED | OUTPATIENT
Start: 2019-12-06 | End: 2019-12-06

## 2019-12-06 RX ADMIN — PANTOPRAZOLE SODIUM 40 MG: 40 INJECTION, POWDER, FOR SOLUTION INTRAVENOUS at 09:55

## 2019-12-06 RX ADMIN — HEPARIN SODIUM 5000 UNITS: 5000 INJECTION INTRAVENOUS; SUBCUTANEOUS at 21:16

## 2019-12-06 RX ADMIN — MAGNESIUM SULFATE 1 G: 1 INJECTION INTRAVENOUS at 09:54

## 2019-12-06 RX ADMIN — IPRATROPIUM BROMIDE AND ALBUTEROL SULFATE 1 AMPULE: .5; 3 SOLUTION RESPIRATORY (INHALATION) at 07:20

## 2019-12-06 RX ADMIN — HEPARIN SODIUM 5000 UNITS: 5000 INJECTION INTRAVENOUS; SUBCUTANEOUS at 16:14

## 2019-12-06 RX ADMIN — THIAMINE HYDROCHLORIDE 200 MG: 100 INJECTION, SOLUTION INTRAMUSCULAR; INTRAVENOUS at 20:21

## 2019-12-06 RX ADMIN — SODIUM CHLORIDE, SODIUM LACTATE, POTASSIUM CHLORIDE, CALCIUM CHLORIDE AND DEXTROSE MONOHYDRATE: 5; 600; 310; 30; 20 INJECTION, SOLUTION INTRAVENOUS at 13:12

## 2019-12-06 RX ADMIN — Medication 10 ML: at 09:55

## 2019-12-06 RX ADMIN — POTASSIUM CHLORIDE 20 MEQ: 400 INJECTION, SOLUTION INTRAVENOUS at 09:54

## 2019-12-06 RX ADMIN — HYDROCORTISONE SODIUM SUCCINATE 50 MG: 100 INJECTION, POWDER, FOR SOLUTION INTRAMUSCULAR; INTRAVENOUS at 20:14

## 2019-12-06 RX ADMIN — HYDROCORTISONE SODIUM SUCCINATE 50 MG: 100 INJECTION, POWDER, FOR SOLUTION INTRAMUSCULAR; INTRAVENOUS at 09:54

## 2019-12-06 RX ADMIN — Medication 10 ML: at 10:00

## 2019-12-06 RX ADMIN — ASCORBIC ACID: 500 INJECTION, SOLUTION INTRAMUSCULAR; INTRAVENOUS; SUBCUTANEOUS at 05:59

## 2019-12-06 RX ADMIN — ASCORBIC ACID: 500 INJECTION, SOLUTION INTRAMUSCULAR; INTRAVENOUS; SUBCUTANEOUS at 13:17

## 2019-12-06 RX ADMIN — THIAMINE HYDROCHLORIDE 200 MG: 100 INJECTION, SOLUTION INTRAMUSCULAR; INTRAVENOUS at 05:59

## 2019-12-06 RX ADMIN — HEPARIN SODIUM 5000 UNITS: 5000 INJECTION INTRAVENOUS; SUBCUTANEOUS at 05:59

## 2019-12-06 RX ADMIN — ASCORBIC ACID: 500 INJECTION, SOLUTION INTRAMUSCULAR; INTRAVENOUS; SUBCUTANEOUS at 20:21

## 2019-12-06 RX ADMIN — IPRATROPIUM BROMIDE AND ALBUTEROL SULFATE 1 AMPULE: .5; 3 SOLUTION RESPIRATORY (INHALATION) at 13:30

## 2019-12-06 RX ADMIN — IPRATROPIUM BROMIDE AND ALBUTEROL SULFATE 1 AMPULE: .5; 3 SOLUTION RESPIRATORY (INHALATION) at 22:29

## 2019-12-06 RX ADMIN — HYDROCORTISONE SODIUM SUCCINATE 100 MG: 100 INJECTION, POWDER, FOR SOLUTION INTRAMUSCULAR; INTRAVENOUS at 02:11

## 2019-12-06 RX ADMIN — IPRATROPIUM BROMIDE AND ALBUTEROL SULFATE 1 AMPULE: .5; 3 SOLUTION RESPIRATORY (INHALATION) at 00:46

## 2019-12-06 RX ADMIN — IPRATROPIUM BROMIDE AND ALBUTEROL SULFATE 1 AMPULE: .5; 3 SOLUTION RESPIRATORY (INHALATION) at 09:31

## 2019-12-06 RX ADMIN — Medication 10 ML: at 20:27

## 2019-12-06 RX ADMIN — ASCORBIC ACID: 500 INJECTION, SOLUTION INTRAMUSCULAR; INTRAVENOUS; SUBCUTANEOUS at 00:22

## 2019-12-06 RX ADMIN — IPRATROPIUM BROMIDE AND ALBUTEROL SULFATE 1 AMPULE: .5; 3 SOLUTION RESPIRATORY (INHALATION) at 17:34

## 2019-12-06 RX ADMIN — PIPERACILLIN AND TAZOBACTAM 3.38 G: 3; .375 INJECTION, POWDER, FOR SOLUTION INTRAVENOUS at 10:36

## 2019-12-06 ASSESSMENT — PAIN SCALES - GENERAL
PAINLEVEL_OUTOF10: 0

## 2019-12-06 NOTE — PROGRESS NOTES
Patient crying and frantically trying to get BiPAP off. She states she has a lot of mucus coming out of her mouth. Thick, dark brown mucus visible in mouth. I took her bipap off and let her spit it out. Copious amount expectorated. Another nurse paged respiratory to have them bring ordered vapotherm asap. Patient's O2 sat staying in range of 89-92%.

## 2019-12-06 NOTE — PROGRESS NOTES
[place] [address] Hospitalist   Progress Note    Admitting Date and Time: 12/5/2019  8:48 AM  Admit Dx: Acute respiratory failure with hypoxia (HCC) [J96.01]    Subjective:    Pt feels better and is more awake today.  at bedside. Per RN: speech to come and do bedside evaluation.  hydrocortisone sodium succinate PF  50 mg Intravenous Q8H    pantoprazole  40 mg Intravenous Daily    And    sodium chloride (PF)  10 mL Intravenous Daily    carvedilol  6.25 mg Oral BID WC    sodium chloride flush  10 mL Intravenous 2 times per day    heparin (porcine)  5,000 Units Subcutaneous 3 times per day    levofloxacin  500 mg Intravenous Q48H    ipratropium-albuterol  1 ampule Inhalation Q4H    thiamine (VITAMIN B1) IVPB  200 mg Intravenous Q12H    vancomycin (VANCOCIN) intermittent dosing (placeholder)   Other RX Placeholder    IVPB builder   Intravenous Q6H    piperacillin-tazobactam  3.375 g Intravenous Q12H     sodium chloride flush, 10 mL, PRN  magnesium hydroxide, 30 mL, Daily PRN  ondansetron, 4 mg, Q6H PRN  acetaminophen, 650 mg, Q4H PRN  perflutren lipid microspheres, 1.5 mL, ONCE PRN         Objective:    BP (!) 109/56   Pulse 78   Temp 98.3 °F (36.8 °C) (Axillary)   Resp 18   Ht 5' 5\" (1.651 m)   Wt 179 lb (81.2 kg)   SpO2 97%   BMI 29.79 kg/m²   General Appearance: alert and in NAD. She on vapotherm at 60%  Skin: warm and dry  Head: normocephalic and atraumatic  Eyes: pupils equal, round, and reactive to light, extraocular eye movements intact, conjunctivae normal  Neck: supple and non-tender without mass, no cervical lymphadenopathy  Pulmonary/Chest: diminished breath sounds on left.   Cardiovascular: normal rate, regular rhythm, normal S1 and S2, no murmurs, rubs, clicks, or gallops  Abdomen: soft, non-tender, non-distended, normal bowel sounds, no masses or organomegaly  Extremities: no cyanosis, clubbing or edema  Musculoskeletal: normal range of motion, no joint swelling, multilevel cervical spondylosis. XR CHEST PORTABLE    (Results Pending)       Assessment:  Principal Problem:    Sepsis due to pneumonia [facility])  Active Problems:    Acute respiratory failure with hypoxia (HCC)    JOVANI (acute kidney injury) ([facility]    Elevated brain natriuretic peptide (BNP) level    Acute metabolic encephalopathy    Community acquired pneumonia of left lung    Hypoxia  Resolved Problems:    * No resolved hospital problems. *      Plan:    1. Acute hypoxic respiratory failure in the setting of dense left lobar streptococcal pneumonia--received ceftriaxone and azithromycin in ER. Given severity of pneumonia, we switched to Zosyn. Pulmonary consulted and they felt patient has high potential for decompensation and recommended she be transferred to ICU whih was done the evening of admission 12/5. Vancomycin added as there was concern that family member had recent viral illness and if patient had same, would be at risk for staph pneumonia. Levaquin added by pulmonary to cover atypicals. Will deescalate antibiotics to Vanco and Levaquin. Zosyn to be discontinued today after today's dose. 2. Sepsis due to streptococcal pneumonia--intensivist recommended Marik protocol of hydrocortisone, vitamin C and thiamine. This was instituted but today dose of Solucortef decreased to 50 mg because of increasing BUN. 3. Acute metabolic/toxic encephalopathy--related to hypoxia and pneumonia. 4. JOVANI--do not know recent baseline but was 0.7 back in 2017. IVF hydration. Repeat BMP shows creatinine stable at 2.6.  5. Elevated BNP--may all be related to pneumonia. However, will check Echo to evaluate LV function. This was done this morning; will follow up results. Case discussed with Dr. Sheryle Chinchilla of pulmonary/critical care during ICU rounds. NOTE: This report was transcribed using voice recognition software.  Every effort was made to ensure accuracy; however, inadvertent computerized transcription errors may

## 2019-12-06 NOTE — PLAN OF CARE
Demonstrates accurate environmental perceptions  Description  Demonstrates accurate environmental perceptions  Outcome: Met This Shift  Goal: Able to distinguish between reality-based and nonreality-based thinking  Description  Able to distinguish between reality-based and nonreality-based thinking  Outcome: Met This Shift  Goal: Able to interrupt nonreality-based thinking  Description  Able to interrupt nonreality-based thinking  Outcome: Met This Shift     Problem: Sleep Pattern Disturbance:  Goal: Appears well-rested  Description  Appears well-rested  Outcome: Met This Shift     Problem: Risk for Impaired Skin Integrity  Goal: Tissue integrity - skin and mucous membranes  Description  Structural intactness and normal physiological function of skin and  mucous membranes.   12/6/2019 0156 by Bautista Barnes RN  Outcome: Met This Shift  12/5/2019 1909 by Ines Hall RN  Outcome: Met This Shift     Problem: Falls - Risk of:  Goal: Absence of physical injury  Description  Absence of physical injury  12/6/2019 0156 by Bautista Barnes RN  Outcome: Met This Shift  12/5/2019 1909 by Ines Hall RN  Outcome: Met This Shift  Goal: Will remain free from falls  Description  Will remain free from falls  12/6/2019 0156 by Bautista Barnes RN  Outcome: Met This Shift  12/5/2019 1909 by Ines Hall RN  Outcome: Met This Shift     Problem: Confusion - Acute:  Goal: Mental status will be restored to baseline  Description  Mental status will be restored to baseline  12/6/2019 0156 by Bautista Barnes RN  Outcome: Not Met This Shift  12/5/2019 1909 by Ines Hall RN  Outcome: Not Met This Shift     Problem: Discharge Planning:  Goal: Ability to perform activities of daily living will improve  Description  Ability to perform activities of daily living will improve  Outcome: Not Met This Shift  Goal: Participates in care planning  Description  Participates in care planning  Outcome: Not Met This Shift     Problem: Mood - Altered:  Goal: Mood stable  Description  Mood stable  12/6/2019 0156 by Bo Jacob RN  Outcome: Not Met This Shift  12/5/2019 1909 by Earl Crawford RN  Outcome: Met This Shift

## 2019-12-06 NOTE — PROGRESS NOTES
education with the patient regarding results of evaluation. Explained that Speech Pathology intervention is not warranted at this time, Prognosis for improvements is fair+. This plan will be re-evaluated and revised in 1 week  if warranted. Patient stated goals: Agreed with above, Treatment goals discussed with Patient and Family, The Patient and Family understand the diagnosis, prognosis and plan of care. [x]The admitting diagnosis and active problem list, as listed below have been reviewed prior to initiation of this evaluation. ADMITTING DIAGNOSIS: Acute respiratory failure with hypoxia ([address] [J96.01]     ACTIVE PROBLEM LIST:   Patient Active Problem List   Diagnosis    Primary osteoarthritis of both knees    Acute respiratory failure with hypoxia ([address]    Sepsis due to pneumonia ([address]    JOVANI (acute kidney injury) ([address]    Elevated brain natriuretic peptide (BNP) level    Acute metabolic encephalopathy    Community acquired pneumonia of left lung    Hypoxia     Jose Carlos Vyas M.Ed. Jessica Dia [id number]

## 2019-12-06 NOTE — CARE COORDINATION
12-6-  note / dc plan- met with pt and  at bedside, introduced myself and my role , pt is wearing oxygen, she does not wear 02 at home, no DME hx, no SNF hx.  states the patient will discharge home and he doesn't anticipate any needs. Pt /  want to get a new physician as her PCP is in [place], they live in [place], I gave them a list of physicians to look at. SS/CM will follow for dc needs.  Thank you, Electronically signed by Oneil Mariee RN on 12/6/2019 at 1:18 PM

## 2019-12-06 NOTE — PROGRESS NOTES
Procedure Laterality Date    APPENDECTOMY         Nursing cleared patient for PT evaluation and patient agreeable. The admitting diagnosis and active problem list as listed above have been reviewed prior to the initiation of this evaluation. Last time out of bed: prior to admit    Precautions: falls , slight confusion; vapotherm  Social history: Patient lives with spouse  in a split level home  with 7 steps to enter with [address]: ,  none    Mentation: alert, cooperative, oriented x person and follows directions,  One step    ROM: wfl    STRENGTH: 3+/5  PAIN: (measured on a visual analog scale with 0=no pain and 10=excruciating pain) 0/10. FUNCTIONAL ASSESSMENT   Bed Mobility- Supine to sit- Maximal assist          Scooting- Maximal assist       Sit to supine- Maximal assist     Patient able to dangle on edge of bed for 15 minutes with mod assist      Transfers-Sit to stand- Maximal assist     Gait: not assessed     Balance: sit-poor        stand poor      Edema: yes -    Endurance: poor vapotherm    Treatment: eval , dangle and pre gait  Therapist educated and facilitated patient on techniques to increase safety and independence with bed mobility, balance, functional transfers, and functional mobility. Sat edge of bed to increase dynamic sitting balance and activity tolerance. Patient demonstrating fair   understanding of education/techniques, requiring additional training/education. At end of session, patient in bed with  alarm call light and phone within reach,   all lines and tubes intact, nursing notified. Pt would benefit from continued skilled PT to increase functional independence and quality of life. Patients Goal: none stated      ASSESSMENT  Patient exhibits decreased strength, balance, coordination impairing functional mobility. GOALS to be met in 2 days.    Bed mobility-  Moderate assist         Transfers-Sit to stand-Moderate assist     Gait: Patient to ambulate 15 feet using wheeled walker with Moderate assist        Increase strength in affected mm groups by 1/3 grade  Increase balance to allow for improvement towards functional goals. Increase endurance to allow for improvement towards functional goals.         Ruthie Cano, PT

## 2019-12-06 NOTE — PROGRESS NOTES
acetaminophen, perflutren lipid microspheres      REVIEW OF SYSTEMS:  Remains confused enough so that she cannot give a review of systems response  OBJECTIVE:  Vitals:    12/06/19 0800   BP: (!) 119/58   Pulse:    Resp:    Temp: 98.3 °F (36.8 °C)   SpO2:      FiO2 : 60 %  O2 Flow Rate (L/min): 6 L/min  O2 Device: PAP (positive airway pressure)    PHYSICAL EXAM:  Constitutional: No fever, chills, diaphoresis  Skin: No skin rash, no skin breakdown  HEENT: Unremarkable  Neck: No JVD, lymphadenopathy, thyromegaly  Cardiovascular: S1, S2 normal.  No S3, S4 murmurs or rubs  Respiratory: Crackles over left anterior and posterior lung field. There is definite egophony anterolaterally (\"E to A\") changes  Gastrointestinal: Soft, flat, nontender  Genitourinary: No CVA tenderness  Extremities: No clubbing, cyanosis, or edema  Neurological: Still somewhat confused but not as much as last evening and moves all extremities.   No focal deficits  Psychological: Intact for the most part-appropriate affect    LABS:  WBC   Date Value Ref Range Status   12/06/2019 27.0 (H) 4.5 - 11.5 E9/L Final   12/05/2019 24.8 (H) 4.5 - 11.5 E9/L Final   05/07/2017 8.3 4.5 - 11.5 E9/L Final     Hemoglobin   Date Value Ref Range Status   12/06/2019 12.6 11.5 - 15.5 g/dL Final   12/05/2019 13.4 11.5 - 15.5 g/dL Final   05/07/2017 13.3 11.5 - 15.5 g/dL Final     Hematocrit   Date Value Ref Range Status   12/06/2019 38.6 34.0 - 48.0 % Final   12/05/2019 42.0 34.0 - 48.0 % Final   05/07/2017 40.9 34.0 - 48.0 % Final     MCV   Date Value Ref Range Status   12/06/2019 91.9 80.0 - 99.9 fL Final   12/05/2019 92.5 80.0 - 99.9 fL Final   05/07/2017 91.1 80.0 - 99.9 fL Final     Platelets   Date Value Ref Range Status   12/06/2019 220 130 - 450 E9/L Final   12/05/2019 232 130 - 450 E9/L Final   05/07/2017 304 130 - 450 E9/L Final     Sodium   Date Value Ref Range Status   12/06/2019 131 (L) 132 - 146 mmol/L Final   12/05/2019 134 132 - 146 mmol/L Final

## 2019-12-07 ENCOUNTER — APPOINTMENT (OUTPATIENT)
Dept: GENERAL RADIOLOGY | Age: 84
DRG: 871 | End: 2019-12-07
Payer: MEDICARE

## 2019-12-07 LAB
ANION GAP SERPL CALCULATED.3IONS-SCNC: 16 MMOL/L (ref 7–16)
B.E.: -3.5 MMOL/L (ref -3–3)
BASOPHILS ABSOLUTE: 0 E9/L (ref 0–0.2)
BASOPHILS RELATIVE PERCENT: 0.4 % (ref 0–2)
BUN BLDV-MCNC: 57 MG/DL (ref 8–23)
BURR CELLS: ABNORMAL
CALCIUM SERPL-MCNC: 8.7 MG/DL (ref 8.6–10.2)
CHLORIDE BLD-SCNC: 103 MMOL/L (ref 98–107)
CO2: 21 MMOL/L (ref 22–29)
COHB: 0.2 % (ref 0–1.5)
CREAT SERPL-MCNC: 1.7 MG/DL (ref 0.5–1)
CRITICAL: ABNORMAL
DATE ANALYZED: ABNORMAL
DATE OF COLLECTION: ABNORMAL
EKG ATRIAL RATE: 82 BPM
EKG P AXIS: 76 DEGREES
EKG P-R INTERVAL: 146 MS
EKG Q-T INTERVAL: 384 MS
EKG QRS DURATION: 92 MS
EKG QTC CALCULATION (BAZETT): 448 MS
EKG R AXIS: -27 DEGREES
EKG T AXIS: 39 DEGREES
EKG VENTRICULAR RATE: 82 BPM
EOSINOPHILS ABSOLUTE: 0 E9/L (ref 0.05–0.5)
EOSINOPHILS RELATIVE PERCENT: 0 % (ref 0–6)
FIO2: 60 %
GFR AFRICAN AMERICAN: 35
GFR NON-AFRICAN AMERICAN: 29 ML/MIN/1.73
GLUCOSE BLD-MCNC: 179 MG/DL (ref 74–99)
HCO3: 20.2 MMOL/L (ref 22–26)
HCT VFR BLD CALC: 38 % (ref 34–48)
HEMOGLOBIN: 12.5 G/DL (ref 11.5–15.5)
HHB: 2.8 % (ref 0–5)
LAB: ABNORMAL
LYMPHOCYTES ABSOLUTE: 0.52 E9/L (ref 1.5–4)
LYMPHOCYTES RELATIVE PERCENT: 1.7 % (ref 20–42)
Lab: ABNORMAL
MAGNESIUM: 2.3 MG/DL (ref 1.6–2.6)
MCH RBC QN AUTO: 29.3 PG (ref 26–35)
MCHC RBC AUTO-ENTMCNC: 32.9 % (ref 32–34.5)
MCV RBC AUTO: 89.2 FL (ref 80–99.9)
METHB: 0.4 % (ref 0–1.5)
MODE: ABNORMAL
MONOCYTES ABSOLUTE: 1.04 E9/L (ref 0.1–0.95)
MONOCYTES RELATIVE PERCENT: 4.3 % (ref 2–12)
MRSA CULTURE ONLY: NORMAL
NEUTROPHILS ABSOLUTE: 24.35 E9/L (ref 1.8–7.3)
NEUTROPHILS RELATIVE PERCENT: 93.9 % (ref 43–80)
O2 CONTENT: 18 ML/DL
O2 SATURATION: 97.2 % (ref 92–98.5)
O2HB: 96.6 % (ref 94–97)
OPERATOR ID: 901
PATIENT TEMP: 37
PCO2: 32.5 MMHG (ref 35–45)
PDW BLD-RTO: 13.8 FL (ref 11.5–15)
PFO2: 1.59 MMHG/%
PH BLOOD GAS: 7.41 (ref 7.35–7.45)
PHOSPHORUS: 2.4 MG/DL (ref 2.5–4.5)
PLATELET # BLD: 213 E9/L (ref 130–450)
PMV BLD AUTO: 12.1 FL (ref 7–12)
PO2: 95.5 MMHG (ref 60–100)
POIKILOCYTES: ABNORMAL
POTASSIUM SERPL-SCNC: 3 MMOL/L (ref 3.5–5)
RBC # BLD: 4.26 E12/L (ref 3.5–5.5)
SODIUM BLD-SCNC: 140 MMOL/L (ref 132–146)
SOURCE, BLOOD GAS: ABNORMAL
T4 TOTAL: 2.9 MCG/DL (ref 4.5–11.7)
THB: 13.2 G/DL (ref 11.5–16.5)
TIME ANALYZED: 524
TOXIC GRANULATION: ABNORMAL
TSH SERPL DL<=0.05 MIU/L-ACNC: 0.72 UIU/ML (ref 0.27–4.2)
VANCOMYCIN RANDOM: 8 MCG/ML (ref 5–40)
WBC # BLD: 25.9 E9/L (ref 4.5–11.5)

## 2019-12-07 PROCEDURE — 85025 COMPLETE CBC W/AUTO DIFF WBC: CPT

## 2019-12-07 PROCEDURE — 80202 ASSAY OF VANCOMYCIN: CPT

## 2019-12-07 PROCEDURE — 82805 BLOOD GASES W/O2 SATURATION: CPT

## 2019-12-07 PROCEDURE — 36415 COLL VENOUS BLD VENIPUNCTURE: CPT

## 2019-12-07 PROCEDURE — 2580000003 HC RX 258: Performed by: INTERNAL MEDICINE

## 2019-12-07 PROCEDURE — 2580000003 HC RX 258

## 2019-12-07 PROCEDURE — 94660 CPAP INITIATION&MGMT: CPT

## 2019-12-07 PROCEDURE — 2000000000 HC ICU R&B

## 2019-12-07 PROCEDURE — 84443 ASSAY THYROID STIM HORMONE: CPT

## 2019-12-07 PROCEDURE — 6370000000 HC RX 637 (ALT 250 FOR IP): Performed by: INTERNAL MEDICINE

## 2019-12-07 PROCEDURE — 2500000003 HC RX 250 WO HCPCS: Performed by: INTERNAL MEDICINE

## 2019-12-07 PROCEDURE — 99233 SBSQ HOSP IP/OBS HIGH 50: CPT | Performed by: INTERNAL MEDICINE

## 2019-12-07 PROCEDURE — 71045 X-RAY EXAM CHEST 1 VIEW: CPT

## 2019-12-07 PROCEDURE — 6360000002 HC RX W HCPCS: Performed by: INTERNAL MEDICINE

## 2019-12-07 PROCEDURE — 93005 ELECTROCARDIOGRAM TRACING: CPT | Performed by: INTERNAL MEDICINE

## 2019-12-07 PROCEDURE — 2700000000 HC OXYGEN THERAPY PER DAY

## 2019-12-07 PROCEDURE — 36592 COLLECT BLOOD FROM PICC: CPT

## 2019-12-07 PROCEDURE — 84100 ASSAY OF PHOSPHORUS: CPT

## 2019-12-07 PROCEDURE — 2580000003 HC RX 258: Performed by: NURSE PRACTITIONER

## 2019-12-07 PROCEDURE — 83735 ASSAY OF MAGNESIUM: CPT

## 2019-12-07 PROCEDURE — 6370000000 HC RX 637 (ALT 250 FOR IP): Performed by: NURSE PRACTITIONER

## 2019-12-07 PROCEDURE — 84436 ASSAY OF TOTAL THYROXINE: CPT

## 2019-12-07 PROCEDURE — 94640 AIRWAY INHALATION TREATMENT: CPT

## 2019-12-07 PROCEDURE — 6360000002 HC RX W HCPCS

## 2019-12-07 PROCEDURE — 80048 BASIC METABOLIC PNL TOTAL CA: CPT

## 2019-12-07 PROCEDURE — 6360000002 HC RX W HCPCS: Performed by: NURSE PRACTITIONER

## 2019-12-07 PROCEDURE — C9113 INJ PANTOPRAZOLE SODIUM, VIA: HCPCS | Performed by: INTERNAL MEDICINE

## 2019-12-07 RX ORDER — DILTIAZEM HYDROCHLORIDE 5 MG/ML
10 INJECTION INTRAVENOUS ONCE
Status: COMPLETED | OUTPATIENT
Start: 2019-12-07 | End: 2019-12-07

## 2019-12-07 RX ORDER — METHYLPREDNISOLONE SODIUM SUCCINATE 40 MG/ML
40 INJECTION, POWDER, LYOPHILIZED, FOR SOLUTION INTRAMUSCULAR; INTRAVENOUS EVERY 8 HOURS
Status: DISCONTINUED | OUTPATIENT
Start: 2019-12-07 | End: 2019-12-09

## 2019-12-07 RX ADMIN — HEPARIN SODIUM 5000 UNITS: 5000 INJECTION INTRAVENOUS; SUBCUTANEOUS at 13:30

## 2019-12-07 RX ADMIN — THIAMINE HYDROCHLORIDE 200 MG: 100 INJECTION, SOLUTION INTRAMUSCULAR; INTRAVENOUS at 05:40

## 2019-12-07 RX ADMIN — ACETAMINOPHEN 650 MG: 325 TABLET, FILM COATED ORAL at 19:41

## 2019-12-07 RX ADMIN — THIAMINE HYDROCHLORIDE 200 MG: 100 INJECTION, SOLUTION INTRAMUSCULAR; INTRAVENOUS at 19:00

## 2019-12-07 RX ADMIN — LEVOFLOXACIN 500 MG: 5 INJECTION, SOLUTION INTRAVENOUS at 16:45

## 2019-12-07 RX ADMIN — IPRATROPIUM BROMIDE AND ALBUTEROL SULFATE 1 AMPULE: .5; 3 SOLUTION RESPIRATORY (INHALATION) at 22:08

## 2019-12-07 RX ADMIN — Medication 10 ML: at 09:45

## 2019-12-07 RX ADMIN — IPRATROPIUM BROMIDE AND ALBUTEROL SULFATE 1 AMPULE: .5; 3 SOLUTION RESPIRATORY (INHALATION) at 16:39

## 2019-12-07 RX ADMIN — ASCORBIC ACID: 500 INJECTION, SOLUTION INTRAMUSCULAR; INTRAVENOUS; SUBCUTANEOUS at 12:37

## 2019-12-07 RX ADMIN — CARVEDILOL 6.25 MG: 6.25 TABLET, FILM COATED ORAL at 08:29

## 2019-12-07 RX ADMIN — METHYLPREDNISOLONE SODIUM SUCCINATE 40 MG: 40 INJECTION, POWDER, FOR SOLUTION INTRAMUSCULAR; INTRAVENOUS at 21:50

## 2019-12-07 RX ADMIN — SODIUM CHLORIDE, SODIUM LACTATE, POTASSIUM CHLORIDE, CALCIUM CHLORIDE AND DEXTROSE MONOHYDRATE: 5; 600; 310; 30; 20 INJECTION, SOLUTION INTRAVENOUS at 00:29

## 2019-12-07 RX ADMIN — HEPARIN SODIUM 5000 UNITS: 5000 INJECTION INTRAVENOUS; SUBCUTANEOUS at 05:40

## 2019-12-07 RX ADMIN — METHYLPREDNISOLONE SODIUM SUCCINATE 40 MG: 40 INJECTION, POWDER, FOR SOLUTION INTRAMUSCULAR; INTRAVENOUS at 13:30

## 2019-12-07 RX ADMIN — ASCORBIC ACID: 500 INJECTION, SOLUTION INTRAMUSCULAR; INTRAVENOUS; SUBCUTANEOUS at 19:00

## 2019-12-07 RX ADMIN — ASCORBIC ACID: 500 INJECTION, SOLUTION INTRAMUSCULAR; INTRAVENOUS; SUBCUTANEOUS at 23:53

## 2019-12-07 RX ADMIN — IPRATROPIUM BROMIDE AND ALBUTEROL SULFATE 1 AMPULE: .5; 3 SOLUTION RESPIRATORY (INHALATION) at 06:16

## 2019-12-07 RX ADMIN — HYDROCORTISONE SODIUM SUCCINATE 50 MG: 100 INJECTION, POWDER, FOR SOLUTION INTRAMUSCULAR; INTRAVENOUS at 02:24

## 2019-12-07 RX ADMIN — SODIUM CHLORIDE, SODIUM LACTATE, POTASSIUM CHLORIDE, CALCIUM CHLORIDE AND DEXTROSE MONOHYDRATE: 5; 600; 310; 30; 20 INJECTION, SOLUTION INTRAVENOUS at 13:50

## 2019-12-07 RX ADMIN — Medication 10 ML: at 21:00

## 2019-12-07 RX ADMIN — IPRATROPIUM BROMIDE AND ALBUTEROL SULFATE 1 AMPULE: .5; 3 SOLUTION RESPIRATORY (INHALATION) at 02:13

## 2019-12-07 RX ADMIN — PANTOPRAZOLE SODIUM 40 MG: 40 INJECTION, POWDER, FOR SOLUTION INTRAVENOUS at 09:45

## 2019-12-07 RX ADMIN — ASCORBIC ACID: 500 INJECTION, SOLUTION INTRAMUSCULAR; INTRAVENOUS; SUBCUTANEOUS at 05:40

## 2019-12-07 RX ADMIN — DILTIAZEM HYDROCHLORIDE 5 MG/HR: 5 INJECTION INTRAVENOUS at 14:39

## 2019-12-07 RX ADMIN — ENOXAPARIN SODIUM 60 MG: 60 INJECTION SUBCUTANEOUS at 16:30

## 2019-12-07 RX ADMIN — ASCORBIC ACID: 500 INJECTION, SOLUTION INTRAMUSCULAR; INTRAVENOUS; SUBCUTANEOUS at 00:29

## 2019-12-07 RX ADMIN — VANCOMYCIN HYDROCHLORIDE 1250 MG: 5 INJECTION, POWDER, LYOPHILIZED, FOR SOLUTION INTRAVENOUS at 12:37

## 2019-12-07 RX ADMIN — DILTIAZEM HYDROCHLORIDE 15 MG/HR: 5 INJECTION INTRAVENOUS at 23:10

## 2019-12-07 RX ADMIN — DILTIAZEM HYDROCHLORIDE 10 MG: 5 INJECTION INTRAVENOUS at 14:36

## 2019-12-07 RX ADMIN — CARVEDILOL 6.25 MG: 6.25 TABLET, FILM COATED ORAL at 16:30

## 2019-12-07 ASSESSMENT — PAIN SCALES - GENERAL
PAINLEVEL_OUTOF10: 0
PAINLEVEL_OUTOF10: 0
PAINLEVEL_OUTOF10: 4
PAINLEVEL_OUTOF10: 0
PAINLEVEL_OUTOF10: 0

## 2019-12-07 ASSESSMENT — ENCOUNTER SYMPTOMS
ALLERGIC/IMMUNOLOGIC NEGATIVE: 1
EYES NEGATIVE: 1
SHORTNESS OF BREATH: 1
GASTROINTESTINAL NEGATIVE: 1

## 2019-12-07 NOTE — PROGRESS NOTES
[address] [place], [place] [address]ist   ICU Progress Note    Admitting Date and Time: 12/5/2019  8:48 AM  Admit Dx: Acute respiratory failure with hypoxia (HCC) [J96.01]    Subjective:    Pt feels better; she is sitting up in bed. Per RN: more coherent today. Did not eat much for breakfast     vancomycin  1,250 mg Intravenous Q24H    pantoprazole  40 mg Intravenous Daily    And    sodium chloride (PF)  10 mL Intravenous Daily    carvedilol  6.25 mg Oral BID WC    sodium chloride flush  10 mL Intravenous 2 times per day    heparin (porcine)  5,000 Units Subcutaneous 3 times per day    levofloxacin  500 mg Intravenous Q48H    ipratropium-albuterol  1 ampule Inhalation Q4H    thiamine (VITAMIN B1) IVPB  200 mg Intravenous Q12H    vancomycin (VANCOCIN) intermittent dosing (placeholder)   Other RX Placeholder    IVPB builder   Intravenous Q6H     sodium chloride flush, 10 mL, PRN  magnesium hydroxide, 30 mL, Daily PRN  ondansetron, 4 mg, Q6H PRN  acetaminophen, 650 mg, Q4H PRN  perflutren lipid microspheres, 1.5 mL, ONCE PRN         Objective:    /64   Pulse 76   Temp 98.4 °F (36.9 °C) (Oral)   Resp 26   Ht 5' 5\" (1.651 m)   Wt 179 lb (81.2 kg)   SpO2 98%   BMI 29.79 kg/m²   General Appearance: alert and in NAD. She on vapotherm at 60% FIO2 at 30 LPM.  Skin: warm and dry  Head: normocephalic and atraumatic  Eyes: pupils equal, round, and reactive to light, extraocular eye movements intact, conjunctivae normal  Neck: supple and non-tender without mass, no cervical lymphadenopathy  Pulmonary/Chest: diminished breath sounds on left.   Cardiovascular: normal rate, regular rhythm, normal S1 and S2, no murmurs, rubs, clicks, or gallops  Abdomen: soft, non-tender, non-distended, normal bowel sounds, no masses or organomegaly  Extremities: no cyanosis, clubbing or edema  Musculoskeletal: normal range of motion, no joint swelling, deformity or tenderness  Neurologic: reflexes normal and symmetric, no or mass effect. CT CERVICAL SPINE WO CONTRAST   Final Result   1. No acute cervical spine fracture. 2. Mild multilevel cervical spondylosis. XR CHEST PORTABLE    (Results Pending)       Assessment:  Principal Problem:    Sepsis due to pneumonia [facility])  Active Problems:    Acute respiratory failure with hypoxia (HCC)    JOVANI (acute kidney injury) ([facility] [address]    Elevated brain natriuretic peptide (BNP) level    Acute metabolic encephalopathy    Community acquired pneumonia of left lung    Hypoxia  Resolved Problems:    * No resolved hospital problems. *      Plan:    1. Acute hypoxic respiratory failure in the setting of dense left lobar streptococcal pneumonia--received ceftriaxone and azithromycin in ER. Given severity of pneumonia, we switched to Zosyn. Pulmonary consulted and they felt patient has high potential for decompensation and recommended she be transferred to ICU which was done the evening of admission 12/5. Vancomycin added as there was concern that family member had recent viral illness and if patient had same, would be at risk for staph pneumonia. Levaquin added by pulmonary to cover atypicals. Yesterday, antibiotics deescalated to Vanco and Levaquin. Zosyn to be discontinued after AM dose 12/6.  2. Sepsis due to streptococcal pneumonia--intensivist recommended Marik protocol of hydrocortisone, vitamin C and thiamine. This was instituted on admission but dose of Solucortef decreased to 50 mg yesterday because of increasing BUN. 3. Acute metabolic/toxic encephalopathy(resolving)--related to hypoxia and pneumonia. 4. Dysphagia--seen by speech therapy and was placed on pureed solid with thin liquids. 5. JOVANI--do not know recent baseline but was 0.7 back in 2017. IVF hydration. Repeat BMP yesterday showed creatinine unchanged at 2.6 but today down to 1.7  6. Elevated BNP--may all be related to pneumonia. However,checked Echo to evaluate LV function.   This was done yesterday 12/6 and showed EF of 55-60% and indeterminate diastolic function. 7. Disposition--okay to transfer to [address] [place] [address]. Case discussed with Dr. Leonard Barcenas of pulmonary/critical care during ICU rounds. NOTE: This report was transcribed using voice recognition software.  Every effort was made to ensure accuracy; however, inadvertent computerized transcription errors may be present.     Electronically signed by Jovany Colon MD on 12/7/2019 at 10:46 AM

## 2019-12-08 ENCOUNTER — APPOINTMENT (OUTPATIENT)
Dept: GENERAL RADIOLOGY | Age: 84
DRG: 871 | End: 2019-12-08
Payer: MEDICARE

## 2019-12-08 LAB
ANION GAP SERPL CALCULATED.3IONS-SCNC: 16 MMOL/L (ref 7–16)
B.E.: 0.6 MMOL/L (ref -3–3)
BASOPHILS ABSOLUTE: 0 E9/L (ref 0–0.2)
BASOPHILS RELATIVE PERCENT: 0.1 % (ref 0–2)
BUN BLDV-MCNC: 32 MG/DL (ref 8–23)
BURR CELLS: ABNORMAL
CALCIUM SERPL-MCNC: 8.7 MG/DL (ref 8.6–10.2)
CHLORIDE BLD-SCNC: 105 MMOL/L (ref 98–107)
CO2: 21 MMOL/L (ref 22–29)
CREAT SERPL-MCNC: 0.9 MG/DL (ref 0.5–1)
DELIVERY SYSTEMS: ABNORMAL
DEVICE: ABNORMAL
EKG ATRIAL RATE: 159 BPM
EKG Q-T INTERVAL: 344 MS
EKG QRS DURATION: 102 MS
EKG QTC CALCULATION (BAZETT): 469 MS
EKG R AXIS: -17 DEGREES
EKG T AXIS: 33 DEGREES
EKG VENTRICULAR RATE: 112 BPM
EOSINOPHILS ABSOLUTE: 0 E9/L (ref 0.05–0.5)
EOSINOPHILS RELATIVE PERCENT: 0 % (ref 0–6)
FIO2 ARTERIAL: 60
GFR AFRICAN AMERICAN: >60
GFR NON-AFRICAN AMERICAN: 60 ML/MIN/1.73
GLUCOSE BLD-MCNC: 238 MG/DL (ref 74–99)
HCO3 ARTERIAL: 22.8 MMOL/L (ref 22–26)
HCT VFR BLD CALC: 40.1 % (ref 34–48)
HEMOGLOBIN: 13.3 G/DL (ref 11.5–15.5)
LYMPHOCYTES ABSOLUTE: 0.71 E9/L (ref 1.5–4)
LYMPHOCYTES RELATIVE PERCENT: 3.5 % (ref 20–42)
MAGNESIUM: 2 MG/DL (ref 1.6–2.6)
MCH RBC QN AUTO: 29.4 PG (ref 26–35)
MCHC RBC AUTO-ENTMCNC: 33.2 % (ref 32–34.5)
MCV RBC AUTO: 88.7 FL (ref 80–99.9)
METER GLUCOSE: 239 MG/DL (ref 74–99)
METER GLUCOSE: 277 MG/DL (ref 74–99)
MONOCYTES ABSOLUTE: 0.18 E9/L (ref 0.1–0.95)
MONOCYTES RELATIVE PERCENT: 0.9 % (ref 2–12)
MYELOCYTE PERCENT: 2.7 % (ref 0–0)
NEUTROPHILS ABSOLUTE: 17.09 E9/L (ref 1.8–7.3)
NEUTROPHILS RELATIVE PERCENT: 92.9 % (ref 43–80)
O2 SATURATION: 96.7 % (ref 92–98.5)
OPERATOR ID: 9
OVALOCYTES: ABNORMAL
PCO2 ARTERIAL: 30.5 MMHG (ref 35–45)
PDW BLD-RTO: 14 FL (ref 11.5–15)
PH BLOOD GAS: 7.48 (ref 7.35–7.45)
PHOSPHORUS: 1.3 MG/DL (ref 2.5–4.5)
PLATELET # BLD: 192 E9/L (ref 130–450)
PMV BLD AUTO: 12.3 FL (ref 7–12)
PO2 ARTERIAL: 79.6 MMHG (ref 60–80)
POIKILOCYTES: ABNORMAL
POTASSIUM SERPL-SCNC: 3.1 MMOL/L (ref 3.5–5)
RBC # BLD: 4.52 E12/L (ref 3.5–5.5)
SODIUM BLD-SCNC: 142 MMOL/L (ref 132–146)
SOURCE, BLOOD GAS: ABNORMAL
URINE CULTURE, ROUTINE: NORMAL
VANCOMYCIN TROUGH: 11.7 MCG/ML (ref 5–16)
WBC # BLD: 17.8 E9/L (ref 4.5–11.5)

## 2019-12-08 PROCEDURE — 6360000002 HC RX W HCPCS

## 2019-12-08 PROCEDURE — 94640 AIRWAY INHALATION TREATMENT: CPT

## 2019-12-08 PROCEDURE — 6360000002 HC RX W HCPCS: Performed by: INTERNAL MEDICINE

## 2019-12-08 PROCEDURE — 6370000000 HC RX 637 (ALT 250 FOR IP): Performed by: NURSE PRACTITIONER

## 2019-12-08 PROCEDURE — 99233 SBSQ HOSP IP/OBS HIGH 50: CPT | Performed by: INTERNAL MEDICINE

## 2019-12-08 PROCEDURE — 2500000003 HC RX 250 WO HCPCS: Performed by: INTERNAL MEDICINE

## 2019-12-08 PROCEDURE — 71045 X-RAY EXAM CHEST 1 VIEW: CPT

## 2019-12-08 PROCEDURE — 6370000000 HC RX 637 (ALT 250 FOR IP): Performed by: INTERNAL MEDICINE

## 2019-12-08 PROCEDURE — 85025 COMPLETE CBC W/AUTO DIFF WBC: CPT

## 2019-12-08 PROCEDURE — 2000000000 HC ICU R&B

## 2019-12-08 PROCEDURE — 80202 ASSAY OF VANCOMYCIN: CPT

## 2019-12-08 PROCEDURE — 82962 GLUCOSE BLOOD TEST: CPT

## 2019-12-08 PROCEDURE — 2580000003 HC RX 258: Performed by: NURSE PRACTITIONER

## 2019-12-08 PROCEDURE — 36415 COLL VENOUS BLD VENIPUNCTURE: CPT

## 2019-12-08 PROCEDURE — 36592 COLLECT BLOOD FROM PICC: CPT

## 2019-12-08 PROCEDURE — C9113 INJ PANTOPRAZOLE SODIUM, VIA: HCPCS | Performed by: INTERNAL MEDICINE

## 2019-12-08 PROCEDURE — 2580000003 HC RX 258: Performed by: INTERNAL MEDICINE

## 2019-12-08 PROCEDURE — 83735 ASSAY OF MAGNESIUM: CPT

## 2019-12-08 PROCEDURE — 94660 CPAP INITIATION&MGMT: CPT

## 2019-12-08 PROCEDURE — 84165 PROTEIN E-PHORESIS SERUM: CPT

## 2019-12-08 PROCEDURE — 2580000003 HC RX 258

## 2019-12-08 PROCEDURE — 84100 ASSAY OF PHOSPHORUS: CPT

## 2019-12-08 PROCEDURE — 80048 BASIC METABOLIC PNL TOTAL CA: CPT

## 2019-12-08 PROCEDURE — 82803 BLOOD GASES ANY COMBINATION: CPT

## 2019-12-08 RX ORDER — OXYCODONE HYDROCHLORIDE AND ACETAMINOPHEN 5; 325 MG/1; MG/1
1 TABLET ORAL EVERY 6 HOURS PRN
Status: DISCONTINUED | OUTPATIENT
Start: 2019-12-08 | End: 2019-12-23 | Stop reason: HOSPADM

## 2019-12-08 RX ORDER — NICOTINE POLACRILEX 4 MG
15 LOZENGE BUCCAL PRN
Status: DISCONTINUED | OUTPATIENT
Start: 2019-12-08 | End: 2019-12-23 | Stop reason: HOSPADM

## 2019-12-08 RX ORDER — HYDRALAZINE HYDROCHLORIDE 20 MG/ML
10 INJECTION INTRAMUSCULAR; INTRAVENOUS EVERY 6 HOURS PRN
Status: DISCONTINUED | OUTPATIENT
Start: 2019-12-08 | End: 2019-12-23 | Stop reason: HOSPADM

## 2019-12-08 RX ORDER — PANTOPRAZOLE SODIUM 40 MG/1
40 TABLET, DELAYED RELEASE ORAL
Status: DISCONTINUED | OUTPATIENT
Start: 2019-12-09 | End: 2019-12-23 | Stop reason: HOSPADM

## 2019-12-08 RX ORDER — LEVALBUTEROL INHALATION SOLUTION 0.31 MG/3ML
0.31 SOLUTION RESPIRATORY (INHALATION) EVERY 8 HOURS
Status: DISCONTINUED | OUTPATIENT
Start: 2019-12-08 | End: 2019-12-23 | Stop reason: HOSPADM

## 2019-12-08 RX ORDER — GABAPENTIN 300 MG/1
300 CAPSULE ORAL
Status: DISCONTINUED | OUTPATIENT
Start: 2019-12-08 | End: 2019-12-11

## 2019-12-08 RX ORDER — DEXTROSE MONOHYDRATE 25 G/50ML
12.5 INJECTION, SOLUTION INTRAVENOUS PRN
Status: DISCONTINUED | OUTPATIENT
Start: 2019-12-08 | End: 2019-12-23 | Stop reason: HOSPADM

## 2019-12-08 RX ORDER — METOPROLOL TARTRATE 5 MG/5ML
5 INJECTION INTRAVENOUS ONCE
Status: COMPLETED | OUTPATIENT
Start: 2019-12-08 | End: 2019-12-08

## 2019-12-08 RX ORDER — DEXTROSE MONOHYDRATE 50 MG/ML
100 INJECTION, SOLUTION INTRAVENOUS PRN
Status: DISCONTINUED | OUTPATIENT
Start: 2019-12-08 | End: 2019-12-23 | Stop reason: HOSPADM

## 2019-12-08 RX ADMIN — Medication 10 ML: at 08:47

## 2019-12-08 RX ADMIN — POTASSIUM PHOSPHATE, MONOBASIC AND POTASSIUM PHOSPHATE, DIBASIC 30 MMOL: 224; 236 INJECTION, SOLUTION, CONCENTRATE INTRAVENOUS at 13:17

## 2019-12-08 RX ADMIN — Medication 10 ML: at 21:31

## 2019-12-08 RX ADMIN — GABAPENTIN 300 MG: 300 CAPSULE ORAL at 17:29

## 2019-12-08 RX ADMIN — PANTOPRAZOLE SODIUM 40 MG: 40 INJECTION, POWDER, FOR SOLUTION INTRAVENOUS at 08:46

## 2019-12-08 RX ADMIN — IPRATROPIUM BROMIDE AND ALBUTEROL SULFATE 1 AMPULE: .5; 3 SOLUTION RESPIRATORY (INHALATION) at 01:56

## 2019-12-08 RX ADMIN — OXYCODONE HYDROCHLORIDE AND ACETAMINOPHEN 1 TABLET: 5; 325 TABLET ORAL at 17:28

## 2019-12-08 RX ADMIN — THIAMINE HYDROCHLORIDE 200 MG: 100 INJECTION, SOLUTION INTRAMUSCULAR; INTRAVENOUS at 17:29

## 2019-12-08 RX ADMIN — ACETAMINOPHEN 650 MG: 325 TABLET, FILM COATED ORAL at 00:01

## 2019-12-08 RX ADMIN — INSULIN LISPRO 2 UNITS: 100 INJECTION, SOLUTION INTRAVENOUS; SUBCUTANEOUS at 20:21

## 2019-12-08 RX ADMIN — ASCORBIC ACID: 500 INJECTION, SOLUTION INTRAMUSCULAR; INTRAVENOUS; SUBCUTANEOUS at 17:56

## 2019-12-08 RX ADMIN — CARVEDILOL 6.25 MG: 6.25 TABLET, FILM COATED ORAL at 17:29

## 2019-12-08 RX ADMIN — ENOXAPARIN SODIUM 60 MG: 60 INJECTION SUBCUTANEOUS at 08:47

## 2019-12-08 RX ADMIN — ASCORBIC ACID: 500 INJECTION, SOLUTION INTRAMUSCULAR; INTRAVENOUS; SUBCUTANEOUS at 06:04

## 2019-12-08 RX ADMIN — METHYLPREDNISOLONE SODIUM SUCCINATE 40 MG: 40 INJECTION, POWDER, FOR SOLUTION INTRAMUSCULAR; INTRAVENOUS at 12:30

## 2019-12-08 RX ADMIN — VANCOMYCIN HYDROCHLORIDE 1250 MG: 5 INJECTION, POWDER, LYOPHILIZED, FOR SOLUTION INTRAVENOUS at 12:30

## 2019-12-08 RX ADMIN — METHYLPREDNISOLONE SODIUM SUCCINATE 40 MG: 40 INJECTION, POWDER, FOR SOLUTION INTRAMUSCULAR; INTRAVENOUS at 21:26

## 2019-12-08 RX ADMIN — ASCORBIC ACID: 500 INJECTION, SOLUTION INTRAMUSCULAR; INTRAVENOUS; SUBCUTANEOUS at 11:31

## 2019-12-08 RX ADMIN — SODIUM CHLORIDE, SODIUM LACTATE, POTASSIUM CHLORIDE, CALCIUM CHLORIDE AND DEXTROSE MONOHYDRATE: 5; 600; 310; 30; 20 INJECTION, SOLUTION INTRAVENOUS at 02:46

## 2019-12-08 RX ADMIN — THIAMINE HYDROCHLORIDE 200 MG: 100 INJECTION, SOLUTION INTRAMUSCULAR; INTRAVENOUS at 05:27

## 2019-12-08 RX ADMIN — INSULIN LISPRO 1 UNITS: 100 INJECTION, SOLUTION INTRAVENOUS; SUBCUTANEOUS at 20:23

## 2019-12-08 RX ADMIN — GABAPENTIN 300 MG: 300 CAPSULE ORAL at 21:26

## 2019-12-08 RX ADMIN — DILTIAZEM HYDROCHLORIDE 10 MG/HR: 5 INJECTION INTRAVENOUS at 17:23

## 2019-12-08 RX ADMIN — IPRATROPIUM BROMIDE AND ALBUTEROL SULFATE 1 AMPULE: .5; 3 SOLUTION RESPIRATORY (INHALATION) at 06:59

## 2019-12-08 RX ADMIN — GABAPENTIN 300 MG: 300 CAPSULE ORAL at 11:18

## 2019-12-08 RX ADMIN — IPRATROPIUM BROMIDE AND ALBUTEROL SULFATE 1 AMPULE: .5; 3 SOLUTION RESPIRATORY (INHALATION) at 10:45

## 2019-12-08 RX ADMIN — METHYLPREDNISOLONE SODIUM SUCCINATE 40 MG: 40 INJECTION, POWDER, FOR SOLUTION INTRAMUSCULAR; INTRAVENOUS at 05:24

## 2019-12-08 RX ADMIN — LEVALBUTEROL HYDROCHLORIDE 0.31 MG: 0.31 SOLUTION RESPIRATORY (INHALATION) at 15:35

## 2019-12-08 RX ADMIN — LEVALBUTEROL HYDROCHLORIDE 0.31 MG: 0.31 SOLUTION RESPIRATORY (INHALATION) at 22:45

## 2019-12-08 RX ADMIN — METOPROLOL TARTRATE 5 MG: 5 INJECTION INTRAVENOUS at 19:27

## 2019-12-08 RX ADMIN — DILTIAZEM HYDROCHLORIDE 15 MG/HR: 5 INJECTION INTRAVENOUS at 07:24

## 2019-12-08 RX ADMIN — CARVEDILOL 6.25 MG: 6.25 TABLET, FILM COATED ORAL at 08:46

## 2019-12-08 RX ADMIN — SODIUM CHLORIDE, SODIUM LACTATE, POTASSIUM CHLORIDE, CALCIUM CHLORIDE AND DEXTROSE MONOHYDRATE: 5; 600; 310; 30; 20 INJECTION, SOLUTION INTRAVENOUS at 15:53

## 2019-12-08 ASSESSMENT — PAIN DESCRIPTION - LOCATION: LOCATION: BACK

## 2019-12-08 ASSESSMENT — PAIN SCALES - GENERAL
PAINLEVEL_OUTOF10: 0
PAINLEVEL_OUTOF10: 4
PAINLEVEL_OUTOF10: 6

## 2019-12-08 ASSESSMENT — PAIN DESCRIPTION - PAIN TYPE: TYPE: CHRONIC PAIN

## 2019-12-08 ASSESSMENT — PAIN DESCRIPTION - ONSET: ONSET: ON-GOING

## 2019-12-08 ASSESSMENT — PAIN DESCRIPTION - DESCRIPTORS: DESCRIPTORS: ACHING;DISCOMFORT

## 2019-12-08 ASSESSMENT — PAIN DESCRIPTION - ORIENTATION: ORIENTATION: MID;LOWER

## 2019-12-08 ASSESSMENT — PAIN DESCRIPTION - FREQUENCY: FREQUENCY: INTERMITTENT

## 2019-12-08 ASSESSMENT — PAIN DESCRIPTION - PROGRESSION: CLINICAL_PROGRESSION: GRADUALLY WORSENING

## 2019-12-08 NOTE — PROGRESS NOTES
Patient having rhythm change. 's-130's. Appears to be afib/rvr. I called Dr. Hernán Galarza who was attending to another patient at the time. Dr. Danny Bonner on floor and examined patient. EKG ordered and performed. Cardizem ordered. Patient status transferred back to ICU. No s/s of distress. Patient denies any pain, discomfort, or dizziness. Orders performed. Continuing to monitor closely.

## 2019-12-08 NOTE — PROGRESS NOTES
25.9 (H) 4.5 - 11.5 E9/L Final   12/06/2019 27.0 (H) 4.5 - 11.5 E9/L Final     Hemoglobin   Date Value Ref Range Status   12/08/2019 13.3 11.5 - 15.5 g/dL Final   12/07/2019 12.5 11.5 - 15.5 g/dL Final   12/06/2019 12.6 11.5 - 15.5 g/dL Final     Hematocrit   Date Value Ref Range Status   12/08/2019 40.1 34.0 - 48.0 % Final   12/07/2019 38.0 34.0 - 48.0 % Final   12/06/2019 38.6 34.0 - 48.0 % Final     MCV   Date Value Ref Range Status   12/08/2019 88.7 80.0 - 99.9 fL Final   12/07/2019 89.2 80.0 - 99.9 fL Final   12/06/2019 91.9 80.0 - 99.9 fL Final     Platelets   Date Value Ref Range Status   12/08/2019 192 130 - 450 E9/L Final   12/07/2019 213 130 - 450 E9/L Final   12/06/2019 220 130 - 450 E9/L Final     Sodium   Date Value Ref Range Status   12/08/2019 142 132 - 146 mmol/L Final   12/07/2019 140 132 - 146 mmol/L Final   12/06/2019 131 (L) 132 - 146 mmol/L Final     Potassium   Date Value Ref Range Status   12/08/2019 3.1 (L) 3.5 - 5.0 mmol/L Final   12/07/2019 3.0 (L) 3.5 - 5.0 mmol/L Final   12/06/2019 3.8 3.5 - 5.0 mmol/L Final     Potassium reflex Magnesium   Date Value Ref Range Status   12/05/2019 4.0 3.5 - 5.0 mmol/L Final     Chloride   Date Value Ref Range Status   12/08/2019 105 98 - 107 mmol/L Final   12/07/2019 103 98 - 107 mmol/L Final   12/06/2019 96 (L) 98 - 107 mmol/L Final     CO2   Date Value Ref Range Status   12/08/2019 21 (L) 22 - 29 mmol/L Final   12/07/2019 21 (L) 22 - 29 mmol/L Final   12/06/2019 16 (L) 22 - 29 mmol/L Final     BUN   Date Value Ref Range Status   12/08/2019 32 (H) 8 - 23 mg/dL Final   12/07/2019 57 (H) 8 - 23 mg/dL Final   12/06/2019 63 (H) 8 - 23 mg/dL Final     CREATININE   Date Value Ref Range Status   12/08/2019 0.9 0.5 - 1.0 mg/dL Final   12/07/2019 1.7 (H) 0.5 - 1.0 mg/dL Final   12/06/2019 2.6 (H) 0.5 - 1.0 mg/dL Final     Glucose   Date Value Ref Range Status   12/08/2019 238 (H) 74 - 99 mg/dL Final   12/07/2019 179 (H) 74 - 99 mg/dL Final   12/06/2019 139 (H) 74 - 99 mg/dL Final     Calcium   Date Value Ref Range Status   12/08/2019 8.7 8.6 - 10.2 mg/dL Final   12/07/2019 8.7 8.6 - 10.2 mg/dL Final   12/06/2019 8.0 (L) 8.6 - 10.2 mg/dL Final     Total Protein   Date Value Ref Range Status   12/05/2019 6.7 6.4 - 8.3 g/dL Final   09/14/2015 7.6 6.4 - 8.3 g/dL Final     Alb   Date Value Ref Range Status   12/05/2019 3.1 (L) 3.5 - 5.2 g/dL Final   09/14/2015 4.0 3.5 - 5.2 g/dL Final     Total Bilirubin   Date Value Ref Range Status   12/05/2019 0.7 0.0 - 1.2 mg/dL Final   09/14/2015 0.5 0.0 - 1.2 mg/dL Final     Alkaline Phosphatase   Date Value Ref Range Status   12/05/2019 90 35 - 104 U/L Final   09/14/2015 88 40 - 129 U/L Final     AST   Date Value Ref Range Status   12/05/2019 26 0 - 31 U/L Final   09/14/2015 17 0 - 39 U/L Final     ALT   Date Value Ref Range Status   12/05/2019 14 0 - 32 U/L Final   09/14/2015 9 0 - 40 U/L Final     GFR Non-   Date Value Ref Range Status   12/08/2019 60 >=60 mL/min/1.73 Final     Comment:     Chronic Kidney Disease: less than 60 ml/min/1.73 sq.m. Kidney Failure: less than 15 ml/min/1.73 sq.m. Results valid for patients 18 years and older. 12/07/2019 29 >=60 mL/min/1.73 Final     Comment:     Chronic Kidney Disease: less than 60 ml/min/1.73 sq.m. Kidney Failure: less than 15 ml/min/1.73 sq.m. Results valid for patients 18 years and older. 12/06/2019 18 >=60 mL/min/1.73 Final     Comment:     Chronic Kidney Disease: less than 60 ml/min/1.73 sq.m. Kidney Failure: less than 15 ml/min/1.73 sq.m. Results valid for patients 18 years and older.        GFR    Date Value Ref Range Status   12/08/2019 >60  Final   12/07/2019 35  Final   12/06/2019 21  Final     Magnesium   Date Value Ref Range Status   12/08/2019 2.0 1.6 - 2.6 mg/dL Final   12/07/2019 2.3 1.6 - 2.6 mg/dL Final   12/06/2019 1.7 1.6 - 2.6 mg/dL Final     Phosphorus   Date Value Ref Range Status   12/08/2019 1.3 (L) now  8. Try to wean diltiazem if and when atrial fibrillation converts to sinus rhythm  9. Consider DC cardioversion  10. Echocardiogram in a.m. ATTESTATION:  ICU Staff Physician note of personal involvement in Care  As the attending physician, I certify that I personally reviewed the patients history and personally examined the patient to confirm the physical findings described above,  And that I reviewed the relevant imaging studies and available reports. I also discussed the differential diagnosis and all of the proposed management plans with the patient and individuals accompanying the patient to this visit. They had the opportunity to ask questions about the proposed management plans and to have those questions answered. This patient has a high probability of sudden, clinically significant deterioration, which requires the highest level of physician preparedness to intervene urgently. I managed/supervised life or organ supporting interventions that required frequent physician assessment. I devoted my full attention to the direct care of this patient for the amount of time indicated below. Time I spent with the family or surrogate(s) is included only if the patient was incapable of providing the necessary information or participating in medical decisions - Time devoted to teaching and to any procedures I billed separately is not included.     CRITICAL CARE TIME:  37 minutes    Electronically signed by Sherry Carson MD on 12/8/2019 at 11:56 AM

## 2019-12-08 NOTE — PROGRESS NOTES
Pharmacy Consultation Note  (Antibiotic Dosing and Monitoring)    Initial consult date:   Consulting physician: Noelle Buchanan  Drug(s): Vancomycin  Indication: PNA/sepsis; positive S pneumo Urine antigen  Age/Gender IBW DW  Allergy Information   84 y.o./F; 165.1 cm, 81.2 kg 57.5 kg 67 kg  Alginate [alginic acid]; Novocain [procaine]; and Tape [adhesive tape]              Date  WBC BUN/CR Drug/Dose Time   Given Level(s)   (Time) Comments     (#1) 24.8 47/2.6 Vanco 1250 mg IV x1 2246       (#2) 27 63/2.6 No dose X      25.9 57/1.7 vanco 1250 mg q24h 1237 vanco random level = 8 mCg/mL @ 0544     17.8 32/0.9 vanco 1250 mg q24h 1230 vanc Tr @ 1120 = 11.7 mcg/mL      Estimated Creatinine Clearance: 49 mL/min (based on SCr of 0.9 mg/dL). UOP = 1 mL/kg hr (1900 mL) over the past 24 hours:     Intake/Output Summary (Last 24 hours) at 2019 1249  Last data filed at 2019 0529  Gross per 24 hour   Intake 2940.5 ml   Output 2400 ml   Net 540.5 ml       Temp max: Temp (24hrs), Av.4 °F (36.9 °C), Min:98.1 °F (36.7 °C), Max:98.7 °F (37.1 °C)      Cultures:  available culture and sensitivity results were reviewed in EPIC  Cultures sent and are pending. Culture Date Result    Urine antigens  POSITIVE Strep Pneumo   Body Fluid     Gram stain     Rapid flu  negative   Urine  Growth not present   MRSA screen  negative     Assessment:  · Pt is a 81 y/o female who presented with acute respiratory failure. · Consulted by Dr. Noelle Buchanan to dose/monitor vancomycin. · Goal trough level:  15-20 mCg/mL. · Patient received Azithro and Rocephin in ER   · Strep Pneumo positive urine antigen. · : Rm trough = 11.7 mcg/mL. Not yet at steady state    Plan:  · Continue vancomycin 1250 mg q24h  · Pharmacist will follow and monitor/adjust dosing as necessary.     Rickey Fan, PharmD  2019  12:49 PM  Pager: [phone]

## 2019-12-08 NOTE — PROGRESS NOTES
Final Result   Stable dense left lung consolidation. CT CHEST WO CONTRAST   Final Result   Large left lung infiltrate compatible pneumonia. Minimal   patchy infiltrate developing pneumonia right middle lobe. XR CHEST STANDARD (2 VW)   Final Result   Large left pleural effusion with left lung airspace disease. CT Head WO Contrast   Final Result   No acute intracranial hemorrhage or mass effect. CT CERVICAL SPINE WO CONTRAST   Final Result   1. No acute cervical spine fracture. 2. Mild multilevel cervical spondylosis. Assessment:  Principal Problem:    Sepsis due to pneumonia [facility])  Active Problems:    Acute respiratory failure with hypoxia (HCC)    JOVANI (acute kidney injury) ([facility] [address]    Elevated brain natriuretic peptide (BNP) level    Acute metabolic encephalopathy    Community acquired pneumonia of left lung    Hypoxia  Resolved Problems:    * No resolved hospital problems. *      Plan:    1. Acute hypoxic respiratory failure in the setting of dense left lobar streptococcal pneumonia--received ceftriaxone and azithromycin in ER. Given severity of pneumonia, we switched to Zosyn. Pulmonary consulted and they felt patient has high potential for decompensation and recommended she be transferred to ICU which was done the evening of admission 12/5. Vancomycin added as there was concern that family member had recent viral illness and if patient had same, would be at risk for staph pneumonia. Levaquin added by pulmonary to cover atypicals. On 12/6, antibiotics deescalated to Vanco and Levaquin. Zosyn discontinued after AM dose 12/6.  2. Sepsis due to streptococcal pneumonia--intensivist recommended Marik protocol of hydrocortisone, vitamin C and thiamine. Day #3/4. This was instituted on admission but dose of Solucortef decreased to 50 mg 12/6 because of increasing BUN and last dose 12/7 AM  3.  Acute metabolic/toxic encephalopathy(resolving)--related to hypoxia and pneumonia. 4. Dysphagia--seen by speech therapy and was placed on pureed solid with thin liquids. More awake and alert will change diet to mechanical soft. No lesions in mouth to explain discomfort. 5. JOVANI (resolved)-did not know recent baseline but was 0.7 back in 2017. With hydration, creatinine normalized 2.6-->2.6-->1.7-->0.9.  6. Atrial fibrillation with RVR--likely triggered by pulmonary process. Currently, on Cardizem drip at 15 mg/hr. 7. Hypokalemia/hypophosphatemia--will give Kphos 30 millimoles today. 8. OA left knee/diabetic peripheral neuropathy--resume Percocet 5/325 q6 prn and gabapentin but at half home dose ie 300 mg 4x/day. 9. Elevated BNP--may all be related to pneumonia. However,checked Echo to evaluate LV function. This was done12/6 and showed EF of 55-60% and indeterminate diastolic function. 10. Disposition--changed back to ICU last night because of atrial fibrillation. If HR remains controlled, possible transfer to [address] later today. Case discussed with Dr. Brissa Sousa of pulmonary/critical care during ICU rounds. NOTE: This report was transcribed using voice recognition software.  Every effort was made to ensure accuracy; however, inadvertent computerized transcription errors may be present.     Electronically signed by Nikolas Dunbar MD on 12/8/2019 at 9:52 AM

## 2019-12-09 ENCOUNTER — APPOINTMENT (OUTPATIENT)
Dept: GENERAL RADIOLOGY | Age: 84
DRG: 871 | End: 2019-12-09
Payer: MEDICARE

## 2019-12-09 LAB
ALBUMIN SERPL-MCNC: 2 G/DL (ref 3.5–4.7)
ALBUMIN SERPL-MCNC: 2.4 G/DL (ref 3.5–5.2)
ALP BLD-CCNC: 93 U/L (ref 35–104)
ALPHA-1-GLOBULIN: 0.4 G/DL (ref 0.2–0.4)
ALPHA-2-GLOBULIN: 1 G/FL (ref 0.5–1)
ALT SERPL-CCNC: 14 U/L (ref 0–32)
ANION GAP SERPL CALCULATED.3IONS-SCNC: 14 MMOL/L (ref 7–16)
AST SERPL-CCNC: 19 U/L (ref 0–31)
BASOPHILS ABSOLUTE: 0 E9/L (ref 0–0.2)
BASOPHILS RELATIVE PERCENT: 0.9 % (ref 0–2)
BETA GLOBULIN: 1.1 G/DL (ref 0.8–1.3)
BILIRUB SERPL-MCNC: 0.5 MG/DL (ref 0–1.2)
BUN BLDV-MCNC: 20 MG/DL (ref 8–23)
BURR CELLS: ABNORMAL
CALCIUM SERPL-MCNC: 8.6 MG/DL (ref 8.6–10.2)
CHLORIDE BLD-SCNC: 100 MMOL/L (ref 98–107)
CO2: 26 MMOL/L (ref 22–29)
CREAT SERPL-MCNC: 0.6 MG/DL (ref 0.5–1)
ELECTROPHORESIS: ABNORMAL
EOSINOPHILS ABSOLUTE: 0 E9/L (ref 0.05–0.5)
EOSINOPHILS RELATIVE PERCENT: 0 % (ref 0–6)
GAMMA GLOBULIN: 1 G/DL (ref 0.7–1.6)
GFR AFRICAN AMERICAN: >60
GFR NON-AFRICAN AMERICAN: >60 ML/MIN/1.73
GLUCOSE BLD-MCNC: 249 MG/DL (ref 74–99)
HBA1C MFR BLD: 6 % (ref 4–5.6)
HCT VFR BLD CALC: 42.8 % (ref 34–48)
HEMOGLOBIN: 14.3 G/DL (ref 11.5–15.5)
LYMPHOCYTES ABSOLUTE: 0.34 E9/L (ref 1.5–4)
LYMPHOCYTES RELATIVE PERCENT: 1.7 % (ref 20–42)
MCH RBC QN AUTO: 29.6 PG (ref 26–35)
MCHC RBC AUTO-ENTMCNC: 33.4 % (ref 32–34.5)
MCV RBC AUTO: 88.6 FL (ref 80–99.9)
METER GLUCOSE: 204 MG/DL (ref 74–99)
METER GLUCOSE: 213 MG/DL (ref 74–99)
METER GLUCOSE: 228 MG/DL (ref 74–99)
METER GLUCOSE: 231 MG/DL (ref 74–99)
MONOCYTES ABSOLUTE: 2.86 E9/L (ref 0.1–0.95)
MONOCYTES RELATIVE PERCENT: 16.5 % (ref 2–12)
MYELOCYTE PERCENT: 6.1 % (ref 0–0)
NEUTROPHILS ABSOLUTE: 13.78 E9/L (ref 1.8–7.3)
NEUTROPHILS RELATIVE PERCENT: 75.7 % (ref 43–80)
PDW BLD-RTO: 14 FL (ref 11.5–15)
PLATELET # BLD: 183 E9/L (ref 130–450)
PMV BLD AUTO: 11.9 FL (ref 7–12)
POIKILOCYTES: ABNORMAL
POTASSIUM SERPL-SCNC: 3 MMOL/L (ref 3.5–5)
RBC # BLD: 4.83 E12/L (ref 3.5–5.5)
SODIUM BLD-SCNC: 140 MMOL/L (ref 132–146)
TOTAL PROTEIN: 5.5 G/DL (ref 6.4–8.3)
TOTAL PROTEIN: 6.1 G/DL (ref 6.4–8.3)
WBC # BLD: 16.8 E9/L (ref 4.5–11.5)

## 2019-12-09 PROCEDURE — 2500000003 HC RX 250 WO HCPCS: Performed by: INTERNAL MEDICINE

## 2019-12-09 PROCEDURE — 92526 ORAL FUNCTION THERAPY: CPT | Performed by: SPEECH-LANGUAGE PATHOLOGIST

## 2019-12-09 PROCEDURE — 97530 THERAPEUTIC ACTIVITIES: CPT

## 2019-12-09 PROCEDURE — 2000000000 HC ICU R&B

## 2019-12-09 PROCEDURE — 82962 GLUCOSE BLOOD TEST: CPT

## 2019-12-09 PROCEDURE — 94660 CPAP INITIATION&MGMT: CPT

## 2019-12-09 PROCEDURE — 97110 THERAPEUTIC EXERCISES: CPT

## 2019-12-09 PROCEDURE — 36415 COLL VENOUS BLD VENIPUNCTURE: CPT

## 2019-12-09 PROCEDURE — 6370000000 HC RX 637 (ALT 250 FOR IP): Performed by: INTERNAL MEDICINE

## 2019-12-09 PROCEDURE — 80053 COMPREHEN METABOLIC PANEL: CPT

## 2019-12-09 PROCEDURE — 6360000002 HC RX W HCPCS: Performed by: INTERNAL MEDICINE

## 2019-12-09 PROCEDURE — 2700000000 HC OXYGEN THERAPY PER DAY

## 2019-12-09 PROCEDURE — 71045 X-RAY EXAM CHEST 1 VIEW: CPT

## 2019-12-09 PROCEDURE — 6370000000 HC RX 637 (ALT 250 FOR IP): Performed by: NURSE PRACTITIONER

## 2019-12-09 PROCEDURE — 2580000003 HC RX 258: Performed by: NURSE PRACTITIONER

## 2019-12-09 PROCEDURE — 99233 SBSQ HOSP IP/OBS HIGH 50: CPT | Performed by: INTERNAL MEDICINE

## 2019-12-09 PROCEDURE — 85025 COMPLETE CBC W/AUTO DIFF WBC: CPT

## 2019-12-09 PROCEDURE — 2580000003 HC RX 258: Performed by: INTERNAL MEDICINE

## 2019-12-09 PROCEDURE — 94640 AIRWAY INHALATION TREATMENT: CPT

## 2019-12-09 PROCEDURE — 83036 HEMOGLOBIN GLYCOSYLATED A1C: CPT

## 2019-12-09 RX ORDER — METOPROLOL TARTRATE 50 MG/1
50 TABLET, FILM COATED ORAL 2 TIMES DAILY
Status: DISCONTINUED | OUTPATIENT
Start: 2019-12-09 | End: 2019-12-10

## 2019-12-09 RX ORDER — POTASSIUM CHLORIDE 20 MEQ/1
20 TABLET, EXTENDED RELEASE ORAL ONCE
Status: COMPLETED | OUTPATIENT
Start: 2019-12-09 | End: 2019-12-09

## 2019-12-09 RX ORDER — LEVOFLOXACIN 5 MG/ML
500 INJECTION, SOLUTION INTRAVENOUS EVERY 24 HOURS
Status: DISCONTINUED | OUTPATIENT
Start: 2019-12-09 | End: 2019-12-10

## 2019-12-09 RX ADMIN — LEVOFLOXACIN 500 MG: 5 INJECTION, SOLUTION INTRAVENOUS at 12:38

## 2019-12-09 RX ADMIN — INSULIN LISPRO 2 UNITS: 100 INJECTION, SOLUTION INTRAVENOUS; SUBCUTANEOUS at 12:54

## 2019-12-09 RX ADMIN — METHYLPREDNISOLONE SODIUM SUCCINATE 40 MG: 40 INJECTION, POWDER, FOR SOLUTION INTRAMUSCULAR; INTRAVENOUS at 05:41

## 2019-12-09 RX ADMIN — Medication 10 ML: at 21:07

## 2019-12-09 RX ADMIN — LEVALBUTEROL HYDROCHLORIDE 0.31 MG: 0.31 SOLUTION RESPIRATORY (INHALATION) at 22:56

## 2019-12-09 RX ADMIN — INSULIN LISPRO 1 UNITS: 100 INJECTION, SOLUTION INTRAVENOUS; SUBCUTANEOUS at 21:10

## 2019-12-09 RX ADMIN — POTASSIUM BICARBONATE 40 MEQ: 782 TABLET, EFFERVESCENT ORAL at 09:27

## 2019-12-09 RX ADMIN — GABAPENTIN 300 MG: 300 CAPSULE ORAL at 17:41

## 2019-12-09 RX ADMIN — SODIUM CHLORIDE, SODIUM LACTATE, POTASSIUM CHLORIDE, CALCIUM CHLORIDE AND DEXTROSE MONOHYDRATE: 5; 600; 310; 30; 20 INJECTION, SOLUTION INTRAVENOUS at 18:43

## 2019-12-09 RX ADMIN — Medication 10 ML: at 09:28

## 2019-12-09 RX ADMIN — METOPROLOL TARTRATE 50 MG: 50 TABLET, FILM COATED ORAL at 21:16

## 2019-12-09 RX ADMIN — ASCORBIC ACID: 500 INJECTION, SOLUTION INTRAMUSCULAR; INTRAVENOUS; SUBCUTANEOUS at 05:51

## 2019-12-09 RX ADMIN — POTASSIUM CHLORIDE 20 MEQ: 20 TABLET, EXTENDED RELEASE ORAL at 17:41

## 2019-12-09 RX ADMIN — GABAPENTIN 300 MG: 300 CAPSULE ORAL at 21:16

## 2019-12-09 RX ADMIN — SODIUM CHLORIDE, SODIUM LACTATE, POTASSIUM CHLORIDE, CALCIUM CHLORIDE AND DEXTROSE MONOHYDRATE: 5; 600; 310; 30; 20 INJECTION, SOLUTION INTRAVENOUS at 05:13

## 2019-12-09 RX ADMIN — CARVEDILOL 6.25 MG: 6.25 TABLET, FILM COATED ORAL at 09:27

## 2019-12-09 RX ADMIN — ASCORBIC ACID: 500 INJECTION, SOLUTION INTRAMUSCULAR; INTRAVENOUS; SUBCUTANEOUS at 00:16

## 2019-12-09 RX ADMIN — VANCOMYCIN HYDROCHLORIDE 1250 MG: 10 INJECTION, POWDER, LYOPHILIZED, FOR SOLUTION INTRAVENOUS at 12:52

## 2019-12-09 RX ADMIN — THIAMINE HYDROCHLORIDE 200 MG: 100 INJECTION, SOLUTION INTRAMUSCULAR; INTRAVENOUS at 05:43

## 2019-12-09 RX ADMIN — HYDRALAZINE HYDROCHLORIDE 10 MG: 20 INJECTION INTRAMUSCULAR; INTRAVENOUS at 18:45

## 2019-12-09 RX ADMIN — GABAPENTIN 300 MG: 300 CAPSULE ORAL at 07:05

## 2019-12-09 RX ADMIN — INSULIN LISPRO 2 UNITS: 100 INJECTION, SOLUTION INTRAVENOUS; SUBCUTANEOUS at 17:42

## 2019-12-09 RX ADMIN — INSULIN LISPRO 2 UNITS: 100 INJECTION, SOLUTION INTRAVENOUS; SUBCUTANEOUS at 09:29

## 2019-12-09 RX ADMIN — OXYCODONE HYDROCHLORIDE AND ACETAMINOPHEN 1 TABLET: 5; 325 TABLET ORAL at 09:35

## 2019-12-09 RX ADMIN — DILTIAZEM HYDROCHLORIDE 10 MG/HR: 5 INJECTION INTRAVENOUS at 18:51

## 2019-12-09 RX ADMIN — GABAPENTIN 300 MG: 300 CAPSULE ORAL at 12:37

## 2019-12-09 RX ADMIN — METOPROLOL TARTRATE 50 MG: 50 TABLET, FILM COATED ORAL at 12:37

## 2019-12-09 RX ADMIN — ASCORBIC ACID: 500 INJECTION, SOLUTION INTRAMUSCULAR; INTRAVENOUS; SUBCUTANEOUS at 12:38

## 2019-12-09 RX ADMIN — ENOXAPARIN SODIUM 60 MG: 60 INJECTION SUBCUTANEOUS at 09:35

## 2019-12-09 RX ADMIN — LEVALBUTEROL HYDROCHLORIDE 0.31 MG: 0.31 SOLUTION RESPIRATORY (INHALATION) at 06:19

## 2019-12-09 RX ADMIN — LEVALBUTEROL HYDROCHLORIDE 0.31 MG: 0.31 SOLUTION RESPIRATORY (INHALATION) at 15:08

## 2019-12-09 RX ADMIN — ASCORBIC ACID: 500 INJECTION, SOLUTION INTRAMUSCULAR; INTRAVENOUS; SUBCUTANEOUS at 19:26

## 2019-12-09 RX ADMIN — DILTIAZEM HYDROCHLORIDE 10 MG/HR: 5 INJECTION INTRAVENOUS at 05:13

## 2019-12-09 RX ADMIN — PANTOPRAZOLE SODIUM 40 MG: 40 TABLET, DELAYED RELEASE ORAL at 06:35

## 2019-12-09 ASSESSMENT — PAIN SCALES - GENERAL
PAINLEVEL_OUTOF10: 0
PAINLEVEL_OUTOF10: 4

## 2019-12-09 NOTE — PROGRESS NOTES
Pulmonary/Critical Care Progress Note    We are following patient for pneumococcal pneumonia, acute respiratory failure, new onset atrial fibrillation, hypophosphatemia (resolved), JOVANI resolved, persistent hypokalemia    SUBJECTIVE:  Patient continues to do well. When I looked back in the records, no blood cultures were drawn on her initial presentation. Therefore, it will be difficult to know what the sensitivities of her presumed pneumococcus are; however, we will give 1 more dose of vancomycin today and increase her Levaquin to 500 mg every day now that her acute kidney injury has resolved. She needs to be up in the chair and wishes to begin eating more. However, there is still evidence of atrial fibrillation on the monitor although the ventricular response is blunted because of her carvedilol. Nevertheless,, it may be worth changing the carvedilol to metoprolol. We will request Dr. Rod Dugan from the cardiology service to assess her for DC cardioversion, which she should tolerate well, considering how much better she is from her primary respiratory problem. The echocardiogram shows preserved ejection fraction and indeterminate diastolic function. Therefore stopping the carvedilol should not be a problem.   Potassium level is low and needs to be repleted    MEDICATIONS:   potassium bicarb-citric acid  20 mEq Oral Once    levofloxacin  500 mg Intravenous Q24H    vancomycin  1,250 mg Intravenous Q24H    gabapentin  300 mg Oral 4x Daily AC & HS    levalbuterol  0.31 mg Nebulization Q8H    pantoprazole  40 mg Oral QAM AC    insulin lispro  0-6 Units Subcutaneous TID WC    insulin lispro  0-3 Units Subcutaneous Nightly    potassium bicarb-citric acid  40 mEq Oral Once    enoxaparin  1 mg/kg (Ideal) Subcutaneous Daily    sodium chloride (PF)  10 mL Intravenous Daily    carvedilol  6.25 mg Oral BID WC    sodium chloride flush  10 mL Intravenous 2 times per day    vancomycin (VANCOCIN) intermittent dosing (placeholder)   Other RX Placeholder    IVPB builder   Intravenous Q6H      dextrose      diltiazem (CARDIZEM) 125 mg in dextrose 5% 125 mL infusion 10 mg/hr (12/09/19 0513)    dextrose 5% in lactated ringers 50 mL/hr at 12/09/19 0513     oxyCODONE-acetaminophen, perflutren lipid microspheres, glucose, dextrose, glucagon (rDNA), dextrose, hydrALAZINE, sodium chloride flush, magnesium hydroxide, ondansetron, acetaminophen      REVIEW OF SYSTEMS:  Constitutional: Denies fever, weight loss, night sweats, and fatigue  Skin: Denies pigmentation, dark lesions, and rashes   HEENT: Denies hearing loss, tinnitus, ear drainage, epistaxis, sore throat, and hoarseness. Cardiovascular: Denies palpitations, chest pain, and chest pressure. Respiratory: Denies cough, dyspnea at rest, hemoptysis, apnea, and choking. Gastrointestinal: Denies nausea, vomiting, poor appetite, diarrhea, heartburn or reflux  Genitourinary: Denies dysuria, frequency, urgency or hematuria  Musculoskeletal: Denies myalgias, muscle weakness, and bone pain  Neurological: Denies dizziness, vertigo, headache, and focal weakness  Psychological: Denies anxiety and depression  Endocrine: Denies heat intolerance and cold intolerance  Hematopoietic/Lymphatic: Denies bleeding problems and blood transfusions    OBJECTIVE:  Vitals:    12/09/19 0800   BP: (!) 166/101   Pulse: 114   Resp: 22   Temp: 97.5 °F (36.4 °C)   SpO2: 95%     FiO2 : 60 %  O2 Flow Rate (L/min): 5 L/min  O2 Device: Nasal cannula    PHYSICAL EXAM:  Constitutional: No fever, chills, diaphoresis  Skin: No skin rash, no skin breakdown  HEENT: Unremarkable  Neck: No JVD, lymphadenopathy, thyromegaly  Cardiovascular: S1, S2 irregular.   No S3 murmurs or rubs are heard except for grade 2/6 systolic ejection murmur at left sternal border  Respiratory: Bronchial breath sounds left anterior and posterior chest but there are more crackles now indicative of slow resolution of the dense consolidated valid for patients 18 years and older. 12/07/2019 29 >=60 mL/min/1.73 Final     Comment:     Chronic Kidney Disease: less than 60 ml/min/1.73 sq.m. Kidney Failure: less than 15 ml/min/1.73 sq.m. Results valid for patients 18 years and older. GFR    Date Value Ref Range Status   12/09/2019 >60  Final   12/08/2019 >60  Final   12/07/2019 35  Final     Magnesium   Date Value Ref Range Status   12/08/2019 2.0 1.6 - 2.6 mg/dL Final   12/07/2019 2.3 1.6 - 2.6 mg/dL Final   12/06/2019 1.7 1.6 - 2.6 mg/dL Final     Phosphorus   Date Value Ref Range Status   12/08/2019 1.3 (L) 2.5 - 4.5 mg/dL Final   12/07/2019 2.4 (L) 2.5 - 4.5 mg/dL Final   12/06/2019 4.0 2.5 - 4.5 mg/dL Final     Recent Labs     12/07/19  0524 12/08/19  0524   PH 7.411 7.481*   PO2 95.5  --    PCO2 32.5*  --    HCO3 20.2*  --    BE -3.5* 0.6   O2SAT 97.2 96.7       RADIOLOGY:  XR CHEST PORTABLE   Final Result      1. Stable chest x-ray with extensive left lung airspace disease. XR CHEST PORTABLE   Final Result   1. No change from XR CHEST PORTABLE with report dated 12/7/2019 8:30   AM.          XR CHEST PORTABLE   Final Result   No change left lung pneumonia. XR CHEST PORTABLE   Final Result   Stable dense left lung consolidation. CT CHEST WO CONTRAST   Final Result   Large left lung infiltrate compatible pneumonia. Minimal   patchy infiltrate developing pneumonia right middle lobe. XR CHEST STANDARD (2 VW)   Final Result   Large left pleural effusion with left lung airspace disease. CT Head WO Contrast   Final Result   No acute intracranial hemorrhage or mass effect. CT CERVICAL SPINE WO CONTRAST   Final Result   1. No acute cervical spine fracture. 2. Mild multilevel cervical spondylosis.                XR CHEST PORTABLE    (Results Pending)           PROBLEM LIST:  Principal Problem:    Sepsis due to pneumonia [facility]  Active Problems:    Acute respiratory failure with hypoxia procedures I billed separately is not included.     CRITICAL CARE TIME:  32 minutes    Electronically signed by Billie Rossi MD on 12/9/2019 at 9:57 AM

## 2019-12-09 NOTE — PROGRESS NOTES
Pharmacy Consultation Note  (Antibiotic Dosing and Monitoring)    Initial consult date:   Consulting physician: Jasvir Soto  Drug(s): Vancomycin  Indication: PNA/sepsis; positive S pneumo Urine antigen  Age/Gender IBW DW  Allergy Information   84 y.o./F; 165.1 cm, 81.2 kg 57.5 kg 67 kg  Alginate [alginic acid]; Novocain [procaine]; and Tape [adhesive tape]              Date  WBC BUN/CR Drug/Dose Time   Given Level(s)   (Time) Comments    24.8 47/2.6 Vanco 1250 mg IV x1 2246      27 63/2.6 No dose X      25.9 57/1.7 vanco 1250 mg q24h 1237 vanco random level = 8 mCg/mL @ 0544     17.8 32/0.9 vanco 1250 mg q24h 1230 vanc Tr @ 1120 = 11.7 mcg/mL     16.8 20/0.6 vanco 1250 mg q24h <1200>                         Estimated Creatinine Clearance: 73 mL/min (based on SCr of 0.6 mg/dL). UOP = 1 mL/kg hr (1900 mL) over the past 24 hours:     Intake/Output Summary (Last 24 hours) at 2019 1007  Last data filed at 2019 0554  Gross per 24 hour   Intake 3104.5 ml   Output 2450 ml   Net 654.5 ml       Temp max: Temp (24hrs), Av.6 °F (36.4 °C), Min:97.5 °F (36.4 °C), Max:97.9 °F (36.6 °C)      Cultures:  available culture and sensitivity results were reviewed in EPIC  Cultures sent and are pending. Culture Date Result    Urine antigens  POSITIVE Strep Pneumo   Body Fluid     Gram stain     Rapid flu  negative   Urine  Growth not present   MRSA screen  negative     Assessment:  · Pt is a 81 y/o female who presented with acute respiratory failure. · Consulted by Dr. Jasvir Soto to dose/monitor vancomycin. · Goal trough level:  15-20 mCg/mL. · Patient received Azithro and Rocephin in ER   · Strep Pneumo positive urine antigen. · : Rm trough = 11.7 mcg/mL. Not yet at steady state    Plan:  · Continue vancomycin 1250 mg q24h  · Pharmacist will follow and monitor/adjust dosing as necessary.     Brian GodinezD, BCPS 2019 10:12 AM   Pager: [phone]  Ext: [phone]

## 2019-12-09 NOTE — PROGRESS NOTES
[address] [place], [place] [address]ist   ICU Progress Note    Admitting Date and Time: 12/5/2019  8:48 AM  Admit Dx: Acute respiratory failure with hypoxia (HCC) [J96.01]    Subjective:    Patient states that she feels that her breathing has improved and denies any associated chest tightness, wheezing, dyspnea at rest.    Per RN: No acute issues overnight.  potassium bicarb-citric acid  20 mEq Oral Once    levofloxacin  500 mg Intravenous Q24H    vancomycin  1,250 mg Intravenous Q24H    metoprolol tartrate  50 mg Oral BID    gabapentin  300 mg Oral 4x Daily AC & HS    levalbuterol  0.31 mg Nebulization Q8H    pantoprazole  40 mg Oral QAM AC    insulin lispro  0-6 Units Subcutaneous TID WC    insulin lispro  0-3 Units Subcutaneous Nightly    potassium bicarb-citric acid  40 mEq Oral Once    enoxaparin  1 mg/kg (Ideal) Subcutaneous Daily    sodium chloride (PF)  10 mL Intravenous Daily    sodium chloride flush  10 mL Intravenous 2 times per day    IVPB builder   Intravenous Q6H     oxyCODONE-acetaminophen, 1 tablet, Q6H PRN  perflutren lipid microspheres, 1.5 mL, ONCE PRN  glucose, 15 g, PRN  dextrose, 12.5 g, PRN  glucagon (rDNA), 1 mg, PRN  dextrose, 100 mL/hr, PRN  hydrALAZINE, 10 mg, Q6H PRN  sodium chloride flush, 10 mL, PRN  magnesium hydroxide, 30 mL, Daily PRN  ondansetron, 4 mg, Q6H PRN  acetaminophen, 650 mg, Q4H PRN         Objective:    BP (!) 162/107   Pulse 107   Temp 97.5 °F (36.4 °C) (Oral)   Resp 24   Ht 5' 5\" (1.651 m)   Wt 179 lb (81.2 kg)   SpO2 94%   BMI 29.79 kg/m²   General Appearance: alert and in NAD.  She on vapotherm at 60% FIO2 at 25 LPM.  Skin: warm and dry  Head: normocephalic and atraumatic  Eyes: pupils equal, round, and reactive to light, extraocular eye movements intact, conjunctivae normal  Neck: supple and non-tender without mass, no cervical lymphadenopathy  Pulmonary/Chest: Basilar crackles on left side- no use of accessory muscles  Cardiovascular: normal continue Levaquin 500 mg daily. IV steroids discontinued. Pulmonary/critical care following. 2. Sepsis due to streptococcal pneumonia--intensivist recommended Marik protocol of hydrocortisone, vitamin C and thiamine. Day #4/4. This was instituted on admission but dose of Solucortef decreased to 50 mg 12/6 because of increasing BUN and last dose 12/7 AM.  Steroids discontinued. On antibiotic therapy. 3. Acute metabolic/toxic encephalopathy(resolving)--related to hypoxia and pneumonia. Mentation much improved and at baseline. 4. Dysphagia--seen by speech therapy and was placed on pureed solid with thin liquids. More awake and alert will change diet to mechanical soft. No lesions in mouth to explain discomfort. 5. JOVANI (resolved)-did not know recent baseline but was 0.7 back in 2017. With hydration, creatinine normalized 2.6-->2.6-->1.7-->0.9-->0.6  6. Atrial fibrillation with RVR--likely triggered by pulmonary process. Currently, on Cardizem drip and will intend to wean. Cardiology following and as per intensivist plans to discuss DC cardioversion. On beta-blocker therapy. 7. Hypokalemia-K 3.0. Replaced and will continue to monitor. 8. OA left knee/diabetic peripheral neuropathy--On Percocet 5/325 q6 prn and gabapentin but at half home dose ie 300 mg 4x/day. 9. Elevated BNP--may all be related to pneumonia. However,checked Echo to evaluate LV function. This was done12/6 and showed EF of 55-60% and indeterminate diastolic function. Case discussed with MICU RN. NOTE: This report was transcribed using voice recognition software.  Every effort was made to ensure accuracy; however, inadvertent computerized transcription errors may be present.     Electronically signed by Mo Fischer MD on 12/9/2019 at 10:37 AM

## 2019-12-09 NOTE — PROGRESS NOTES
Speech Language Pathology      NAME:  Mauro Hinds  :  1935  DATE: 2019  ROOM:  [facility]/IC-    Patient seen for swallow therapy 15 minutes. Patient with dry cough at start of session which did not change in frequency or quality with PO intake. Slow but functional mastication. She continues to complain of some pain due to dentition but not enough to hinder her eating soft solids.   Will continue POC    Acute respiratory failure with hypoxia ([facility] [address] [J96.01]    Ramonita Razo MSCCC/SLP  Speech Language Pathologist  [phone]

## 2019-12-09 NOTE — CONSULTS
apical impulse, regular rate and rhythm, normal S1 and S2, no S3 or S4, and no murmur noted and no JVD, no carotid bruit, no pedal edema, good carotid upstroke bilaterally. ABDOMEN:  Soft, nontender, no masses, no hepatomegaly or splenomegaly, BS+  CHEST: nontender to palpation, expands symmetrically  MUSCULOSKELETAL:  No clubbing no cyanosis. there is no redness, warmth, or swelling of the joints  full range of motion noted  NEUROLOGIC:  Alert, awake,oriented x3, no focal neurologic deficit was appreciated  SKIN:  no bruising or bleeding, normal skin color, texture, turgor and no redness, warmth, or swelling        BP (!) 162/107   Pulse 107   Temp 97.5 °F (36.4 °C) (Oral)   Resp 24   Ht 5' 5\" (1.651 m)   Wt 179 lb (81.2 kg)   SpO2 94%   BMI 29.79 kg/m²     DATA:   I personally reviewed the admission EKG with the following interpretation: Atrial fibrillation with a rapid ventricular response    ECHO: 12/6/2019,Normal left ventricle size and systolic function. Ejection fraction is visually estimated at 55-60%. Indeterminate diastolic function. Can not definitively rule out wall motion abnormalities on the basis of   this study due to limited visualization of the endocardial borders. Mild left ventricular concentric hypertrophy noted. Normal right ventricle structure and function. Mild mitral annular calcification. Mild thickening and calcification of the mitral valve leaflets.        Stress Test: Not performed to date  Angiography: Not performed to date  Cardiology Labs:   BMP:    Lab Results   Component Value Date     12/15/2019    K 3.9 12/15/2019    K 4.0 12/05/2019    CL 96 12/15/2019    CO2 27 12/15/2019    BUN 14 12/15/2019     CMP:    Lab Results   Component Value Date     12/15/2019    K 3.9 12/15/2019    K 4.0 12/05/2019    CL 96 12/15/2019    CO2 27 12/15/2019    BUN 14 12/15/2019    PROT 5.5 12/14/2019     CBC:    Lab Results   Component Value Date    WBC 23.6 12/14/2019 congestive heart failure: Acute on chronic, decompensated, diastolic, will gently diurese  3. Left lower lobe pneumonia    RECOMMENDATIONS:   1. Will start amiodarone  2.  will continue the rest of medications  3. Lasix 20 mg IV once  4. Further cardiac recommendations will be forthcoming pending her clinical course and diagnostic test findings  I have reviewed my findings and recommendations with patient    Electronically signed by Evna Meléndez MD on 12/9/2019 at 11:17 AM  NOTE: This report was transcribed using voice recognition software.  Every effort was made to ensure accuracy; however, inadvertent computerized transcription errors may be present

## 2019-12-09 NOTE — PROGRESS NOTES
Nutrition Assessment    Type and Reason for Visit: Initial    Nutrition Recommendations: Continue current diet, Start ONS(Glucerna BID)    Nutrition Assessment: Pt appears adequately nourished however at nutritional compromise r/t poor po 2/2 issues chewing d/t poor dentition and oral blisters per d/w pt. Pt admit w/ encephalopathy however improving. Continue current diet. Will send Glucerna BID for additional nutrition support. Malnutrition Assessment:  · Malnutrition Status: At risk for malnutrition  · Context: Chronic illness  · Findings of the 6 clinical characteristics of malnutrition (Minimum of 2 out of 6 clinical characteristics is required to make the diagnosis of moderate or severe Protein Calorie Malnutrition based on AND/ASPEN Guidelines):  1. Energy Intake-Less than or equal to 75% of estimated energy requirement, Greater than or equal to 5 days    2. Weight Loss-Unable to assess, unable to assess  3. Fat Loss-No significant subcutaneous fat loss   4. Muscle Loss-No significant muscle mass loss   5. Fluid Accumulation-No significant fluid accumulation   6.  Strength-Not measured    Nutrition Risk Level: Moderate    Nutrient Needs:  · Estimated Daily Total Kcal: 4036-0309(MJ REE: 1270 x 1.2=1524)  · Estimated Daily Protein (g): (1.5-1.8 g pro/kg IBW)  · Estimated Daily Total Fluid (ml/day): 5227-4651 or as per ICU(1ml/kcal)    Nutrition Diagnosis:   · Problem: Inadequate oral intake  · Etiology: related to Pain(2/2 mouth sores)     Signs and symptoms:  as evidenced by Intake 0-25%, Diet history of poor intake    Objective Information:  · Nutrition-Focused Physical Findings: and distended, hypoactive bs, +2 edema, +I/O, [place], poor dentition, AMS resolved.    · Wound Type: None  · Current Nutrition Therapies:  · Oral Diet Orders: Carb Control 4 Carbs/Meal, Dental Soft   · Oral Diet intake: 1-25%  · Oral Nutrition Supplement (ONS) Orders: None  · Anthropometric Measures:  · Ht: 5' 5\" (165.1

## 2019-12-09 NOTE — PLAN OF CARE
Problem: Inadequate energy intake (NI-1.4)  Goal: Food and/or Nutrient Delivery  ONS BID  Description  Individualized approach for food/nutrient provision.   Outcome: Met This Shift

## 2019-12-10 ENCOUNTER — APPOINTMENT (OUTPATIENT)
Dept: GENERAL RADIOLOGY | Age: 84
DRG: 871 | End: 2019-12-10
Payer: MEDICARE

## 2019-12-10 ENCOUNTER — APPOINTMENT (OUTPATIENT)
Dept: CT IMAGING | Age: 84
DRG: 871 | End: 2019-12-10
Payer: MEDICARE

## 2019-12-10 LAB
ALBUMIN SERPL-MCNC: 2.6 G/DL (ref 3.5–5.2)
ALP BLD-CCNC: 98 U/L (ref 35–104)
ALT SERPL-CCNC: 14 U/L (ref 0–32)
ANION GAP SERPL CALCULATED.3IONS-SCNC: 12 MMOL/L (ref 7–16)
AST SERPL-CCNC: 19 U/L (ref 0–31)
BASOPHILS ABSOLUTE: 0 E9/L (ref 0–0.2)
BASOPHILS RELATIVE PERCENT: 0 % (ref 0–2)
BILIRUB SERPL-MCNC: 0.7 MG/DL (ref 0–1.2)
BUN BLDV-MCNC: 18 MG/DL (ref 8–23)
CALCIUM SERPL-MCNC: 8 MG/DL (ref 8.6–10.2)
CHLORIDE BLD-SCNC: 97 MMOL/L (ref 98–107)
CO2: 29 MMOL/L (ref 22–29)
CREAT SERPL-MCNC: 0.6 MG/DL (ref 0.5–1)
EOSINOPHILS ABSOLUTE: 0 E9/L (ref 0.05–0.5)
EOSINOPHILS RELATIVE PERCENT: 0 % (ref 0–6)
GFR AFRICAN AMERICAN: >60
GFR NON-AFRICAN AMERICAN: >60 ML/MIN/1.73
GLUCOSE BLD-MCNC: 259 MG/DL (ref 74–99)
HCT VFR BLD CALC: 44.5 % (ref 34–48)
HEMOGLOBIN: 14.8 G/DL (ref 11.5–15.5)
LYMPHOCYTES ABSOLUTE: 2.25 E9/L (ref 1.5–4)
LYMPHOCYTES RELATIVE PERCENT: 9 % (ref 20–42)
MAGNESIUM: 1.7 MG/DL (ref 1.6–2.6)
MCH RBC QN AUTO: 29.2 PG (ref 26–35)
MCHC RBC AUTO-ENTMCNC: 33.3 % (ref 32–34.5)
MCV RBC AUTO: 87.9 FL (ref 80–99.9)
METAMYELOCYTES RELATIVE PERCENT: 3 % (ref 0–1)
METER GLUCOSE: 168 MG/DL (ref 74–99)
METER GLUCOSE: 187 MG/DL (ref 74–99)
METER GLUCOSE: 199 MG/DL (ref 74–99)
METER GLUCOSE: 213 MG/DL (ref 74–99)
MONOCYTES ABSOLUTE: 2 E9/L (ref 0.1–0.95)
MONOCYTES RELATIVE PERCENT: 8 % (ref 2–12)
MYELOCYTE PERCENT: 4 % (ref 0–0)
NEUTROPHILS ABSOLUTE: 20.75 E9/L (ref 1.8–7.3)
NEUTROPHILS RELATIVE PERCENT: 76 % (ref 43–80)
PDW BLD-RTO: 13.9 FL (ref 11.5–15)
PHOSPHORUS: 1.7 MG/DL (ref 2.5–4.5)
PLATELET # BLD: 210 E9/L (ref 130–450)
PMV BLD AUTO: 12 FL (ref 7–12)
POTASSIUM SERPL-SCNC: 3.2 MMOL/L (ref 3.5–5)
RBC # BLD: 5.06 E12/L (ref 3.5–5.5)
SODIUM BLD-SCNC: 138 MMOL/L (ref 132–146)
TOTAL PROTEIN: 6.2 G/DL (ref 6.4–8.3)
WBC # BLD: 25 E9/L (ref 4.5–11.5)

## 2019-12-10 PROCEDURE — 97530 THERAPEUTIC ACTIVITIES: CPT | Performed by: PHYSICAL THERAPIST

## 2019-12-10 PROCEDURE — 2500000003 HC RX 250 WO HCPCS: Performed by: INTERNAL MEDICINE

## 2019-12-10 PROCEDURE — 94660 CPAP INITIATION&MGMT: CPT

## 2019-12-10 PROCEDURE — 6360000002 HC RX W HCPCS: Performed by: INTERNAL MEDICINE

## 2019-12-10 PROCEDURE — 2580000003 HC RX 258: Performed by: INTERNAL MEDICINE

## 2019-12-10 PROCEDURE — 6360000002 HC RX W HCPCS: Performed by: NURSE PRACTITIONER

## 2019-12-10 PROCEDURE — 80053 COMPREHEN METABOLIC PANEL: CPT

## 2019-12-10 PROCEDURE — 94761 N-INVAS EAR/PLS OXIMETRY MLT: CPT

## 2019-12-10 PROCEDURE — 82962 GLUCOSE BLOOD TEST: CPT

## 2019-12-10 PROCEDURE — 85025 COMPLETE CBC W/AUTO DIFF WBC: CPT

## 2019-12-10 PROCEDURE — 71250 CT THORAX DX C-: CPT

## 2019-12-10 PROCEDURE — 36415 COLL VENOUS BLD VENIPUNCTURE: CPT

## 2019-12-10 PROCEDURE — 97166 OT EVAL MOD COMPLEX 45 MIN: CPT

## 2019-12-10 PROCEDURE — 2000000000 HC ICU R&B

## 2019-12-10 PROCEDURE — 71045 X-RAY EXAM CHEST 1 VIEW: CPT

## 2019-12-10 PROCEDURE — 99233 SBSQ HOSP IP/OBS HIGH 50: CPT | Performed by: INTERNAL MEDICINE

## 2019-12-10 PROCEDURE — 2580000003 HC RX 258: Performed by: NURSE PRACTITIONER

## 2019-12-10 PROCEDURE — 36592 COLLECT BLOOD FROM PICC: CPT

## 2019-12-10 PROCEDURE — 2700000000 HC OXYGEN THERAPY PER DAY

## 2019-12-10 PROCEDURE — 84100 ASSAY OF PHOSPHORUS: CPT

## 2019-12-10 PROCEDURE — 6370000000 HC RX 637 (ALT 250 FOR IP): Performed by: INTERNAL MEDICINE

## 2019-12-10 PROCEDURE — 97530 THERAPEUTIC ACTIVITIES: CPT

## 2019-12-10 PROCEDURE — 83735 ASSAY OF MAGNESIUM: CPT

## 2019-12-10 PROCEDURE — 94640 AIRWAY INHALATION TREATMENT: CPT

## 2019-12-10 RX ORDER — HEPARIN SODIUM (PORCINE) LOCK FLUSH IV SOLN 100 UNIT/ML 100 UNIT/ML
3 SOLUTION INTRAVENOUS PRN
Status: DISCONTINUED | OUTPATIENT
Start: 2019-12-10 | End: 2019-12-23 | Stop reason: HOSPADM

## 2019-12-10 RX ORDER — LIDOCAINE HYDROCHLORIDE 10 MG/ML
5 INJECTION, SOLUTION EPIDURAL; INFILTRATION; INTRACAUDAL; PERINEURAL ONCE
Status: DISCONTINUED | OUTPATIENT
Start: 2019-12-10 | End: 2019-12-23 | Stop reason: HOSPADM

## 2019-12-10 RX ORDER — SODIUM CHLORIDE 0.9 % (FLUSH) 0.9 %
10 SYRINGE (ML) INJECTION PRN
Status: DISCONTINUED | OUTPATIENT
Start: 2019-12-10 | End: 2019-12-23 | Stop reason: SDUPTHER

## 2019-12-10 RX ORDER — SALIVA STIMULANT COMB. NO.3
SPRAY, NON-AEROSOL (ML) MUCOUS MEMBRANE PRN
Status: DISCONTINUED | OUTPATIENT
Start: 2019-12-10 | End: 2019-12-23 | Stop reason: HOSPADM

## 2019-12-10 RX ORDER — AMIODARONE HYDROCHLORIDE 200 MG/1
200 TABLET ORAL DAILY
Status: DISCONTINUED | OUTPATIENT
Start: 2019-12-18 | End: 2019-12-23 | Stop reason: HOSPADM

## 2019-12-10 RX ORDER — DIGOXIN 0.25 MG/ML
250 INJECTION INTRAMUSCULAR; INTRAVENOUS EVERY 4 HOURS
Status: COMPLETED | OUTPATIENT
Start: 2019-12-10 | End: 2019-12-10

## 2019-12-10 RX ORDER — HEPARIN SODIUM (PORCINE) LOCK FLUSH IV SOLN 100 UNIT/ML 100 UNIT/ML
3 SOLUTION INTRAVENOUS EVERY 12 HOURS SCHEDULED
Status: DISCONTINUED | OUTPATIENT
Start: 2019-12-10 | End: 2019-12-23 | Stop reason: HOSPADM

## 2019-12-10 RX ORDER — METOPROLOL TARTRATE 50 MG/1
100 TABLET, FILM COATED ORAL 2 TIMES DAILY
Status: DISCONTINUED | OUTPATIENT
Start: 2019-12-10 | End: 2019-12-23 | Stop reason: HOSPADM

## 2019-12-10 RX ORDER — FUROSEMIDE 10 MG/ML
20 INJECTION INTRAMUSCULAR; INTRAVENOUS ONCE
Status: COMPLETED | OUTPATIENT
Start: 2019-12-10 | End: 2019-12-10

## 2019-12-10 RX ORDER — AMIODARONE HYDROCHLORIDE 200 MG/1
200 TABLET ORAL 2 TIMES DAILY
Status: COMPLETED | OUTPATIENT
Start: 2019-12-10 | End: 2019-12-17

## 2019-12-10 RX ADMIN — ONDANSETRON 4 MG: 2 INJECTION INTRAMUSCULAR; INTRAVENOUS at 20:03

## 2019-12-10 RX ADMIN — DIGOXIN 250 MCG: 250 INJECTION, SOLUTION INTRAMUSCULAR; INTRAVENOUS; PARENTERAL at 16:00

## 2019-12-10 RX ADMIN — Medication 10 ML: at 20:03

## 2019-12-10 RX ADMIN — GABAPENTIN 300 MG: 300 CAPSULE ORAL at 17:07

## 2019-12-10 RX ADMIN — DILTIAZEM HYDROCHLORIDE 15 MG/HR: 5 INJECTION INTRAVENOUS at 23:47

## 2019-12-10 RX ADMIN — GABAPENTIN 300 MG: 300 CAPSULE ORAL at 06:45

## 2019-12-10 RX ADMIN — INSULIN LISPRO 1 UNITS: 100 INJECTION, SOLUTION INTRAVENOUS; SUBCUTANEOUS at 16:59

## 2019-12-10 RX ADMIN — SODIUM CHLORIDE, SODIUM LACTATE, POTASSIUM CHLORIDE, CALCIUM CHLORIDE AND DEXTROSE MONOHYDRATE: 5; 600; 310; 30; 20 INJECTION, SOLUTION INTRAVENOUS at 15:15

## 2019-12-10 RX ADMIN — LEVALBUTEROL HYDROCHLORIDE 0.31 MG: 0.31 SOLUTION RESPIRATORY (INHALATION) at 14:12

## 2019-12-10 RX ADMIN — AMIODARONE HYDROCHLORIDE 200 MG: 200 TABLET ORAL at 23:49

## 2019-12-10 RX ADMIN — LEVALBUTEROL HYDROCHLORIDE 0.31 MG: 0.31 SOLUTION RESPIRATORY (INHALATION) at 06:40

## 2019-12-10 RX ADMIN — FUROSEMIDE 20 MG: 10 INJECTION INTRAMUSCULAR; INTRAVENOUS at 13:01

## 2019-12-10 RX ADMIN — LEVALBUTEROL HYDROCHLORIDE 0.31 MG: 0.31 SOLUTION RESPIRATORY (INHALATION) at 21:21

## 2019-12-10 RX ADMIN — ENOXAPARIN SODIUM 80 MG: 100 INJECTION SUBCUTANEOUS at 20:50

## 2019-12-10 RX ADMIN — METOPROLOL TARTRATE 50 MG: 50 TABLET, FILM COATED ORAL at 09:00

## 2019-12-10 RX ADMIN — PANTOPRAZOLE SODIUM 40 MG: 40 TABLET, DELAYED RELEASE ORAL at 06:45

## 2019-12-10 RX ADMIN — VANCOMYCIN HYDROCHLORIDE 1000 MG: 1 INJECTION, POWDER, LYOPHILIZED, FOR SOLUTION INTRAVENOUS at 20:51

## 2019-12-10 RX ADMIN — LEVALBUTEROL HYDROCHLORIDE 0.31 MG: 0.31 SOLUTION RESPIRATORY (INHALATION) at 09:46

## 2019-12-10 RX ADMIN — DILTIAZEM HYDROCHLORIDE 10 MG/HR: 5 INJECTION INTRAVENOUS at 04:38

## 2019-12-10 RX ADMIN — DILTIAZEM HYDROCHLORIDE 15 MG/HR: 5 INJECTION INTRAVENOUS at 15:22

## 2019-12-10 RX ADMIN — METOPROLOL TARTRATE 100 MG: 50 TABLET ORAL at 20:50

## 2019-12-10 RX ADMIN — Medication 10 ML: at 09:16

## 2019-12-10 RX ADMIN — INSULIN LISPRO 1 UNITS: 100 INJECTION, SOLUTION INTRAVENOUS; SUBCUTANEOUS at 12:52

## 2019-12-10 RX ADMIN — INSULIN LISPRO 2 UNITS: 100 INJECTION, SOLUTION INTRAVENOUS; SUBCUTANEOUS at 09:00

## 2019-12-10 RX ADMIN — POTASSIUM PHOSPHATE, MONOBASIC AND POTASSIUM PHOSPHATE, DIBASIC 30 MMOL: 224; 236 INJECTION, SOLUTION, CONCENTRATE INTRAVENOUS at 09:47

## 2019-12-10 RX ADMIN — ENOXAPARIN SODIUM 60 MG: 60 INJECTION SUBCUTANEOUS at 09:13

## 2019-12-10 RX ADMIN — DIGOXIN 250 MCG: 250 INJECTION, SOLUTION INTRAMUSCULAR; INTRAVENOUS; PARENTERAL at 12:58

## 2019-12-10 RX ADMIN — GABAPENTIN 300 MG: 300 CAPSULE ORAL at 20:50

## 2019-12-10 RX ADMIN — GABAPENTIN 300 MG: 300 CAPSULE ORAL at 12:50

## 2019-12-10 RX ADMIN — INSULIN LISPRO 1 UNITS: 100 INJECTION, SOLUTION INTRAVENOUS; SUBCUTANEOUS at 21:03

## 2019-12-10 RX ADMIN — SODIUM CHLORIDE, PRESERVATIVE FREE 2 G: 5 INJECTION INTRAVENOUS at 11:36

## 2019-12-10 ASSESSMENT — PAIN SCALES - GENERAL
PAINLEVEL_OUTOF10: 0

## 2019-12-10 NOTE — PLAN OF CARE
Problem: Risk for Impaired Skin Integrity  Goal: Tissue integrity - skin and mucous membranes  Description  Structural intactness and normal physiological function of skin and  mucous membranes. Outcome: Met This Shift     Problem: Pain:  Goal: Pain level will decrease  Description  Pain level will decrease  Outcome: Met This Shift     Problem: Pain:  Goal: Control of acute pain  Description  Control of acute pain  Outcome: Met This Shift     Problem: Pain:  Goal: Control of chronic pain  Description  Control of chronic pain  Outcome: Met This Shift     Problem: Inadequate energy intake (NI-1.4)  Goal: Food and/or Nutrient Delivery  Description  Individualized approach for food/nutrient provision.   12/9/2019 1518 by Jeffrey Parra, MS, RD, LD  Outcome: Met This Shift

## 2019-12-10 NOTE — PROGRESS NOTES
Physical Therapy    IC11/IC11-01    Patient unavailable for physical therapy treatment due to OOR this AM for a test, will check back PM.

## 2019-12-10 NOTE — PROGRESS NOTES
Occupational Therapy  OCCUPATIONAL THERAPY INITIAL EVALUATION      Date:12/10/2019  Patient Name: Erik Amaral  MRN: [id number]  : 1935  Room: [place] [address]    Evaluating OT: Polly Randolph OTR/L #[id number]     AM-PAC Daily Activity Raw Score:     Recommended Adaptive Equipment:  TBD     Diagnosis: acute resp failure with hypoxia    Patient presented to ED for fall, SOB and increased lethargy      Pertinent Medical History:    Past Medical History:   Diagnosis Date    Dental caries     Diabetes mellitus ([address]     Gall bladder stones     Hernia     Hypertension     Kidney stone       Precautions:  Falls, O2, [place]      Home Living: Pt lives with  in a raised ranch home with 6 ROC with 1 hand rail + 8 interior steps with B  Hand rails  Bathroom setup: walk-in shower   Equipment owned: none    Prior Level of Function: Independent with ADLs , Independent with IADLs; ambulated without AD  Driving: yes  Occupation: na    Pain Level: Pt denies pain this session    Cognition: A&O: 4/4; Follows 2 step directions   Memory:  good   Sequencing: fair   Problem solving:  fair   Judgement/safety:  fair     Functional Assessment:   Initial Eval Status  Date: 12/10/19 Treatment Status  Date: Short Term Goals  Treatment frequency: PRN   Feeding Set-up      Grooming Minimal Assist   Seated at EOB  Modified [place]    UB Dressing Minimal Assist   Modified [place]    LB Dressing Dependent   Minimal Assist    Bathing Maximal Assist  Minimal Assist    Toileting Dependent   (carrillo)  Minimal Assist    Bed Mobility  Supine to sit: Maximal Assist   Sit to supine: Maximal Assist   Supine to sit: Minimal Assist   Sit to supine: Minimal Assist    Functional Transfers NT     Deferred this session due to c/o dizziness and tachycardia   Moderate Assist    Functional Mobility NT   Moderate Assist    Balance Sitting:     Static:  Min A    Dynamic:min A  ([place] No / light reaching 2x5)    Standing: NT     Activity Tolerance P+  F+   Visual/  Perceptual wfl                  Hand dominance: right      Strength ROM Additional Info:    RUE   4-/5 wfl good  and wfl FMC/dexterity noted during ADL tasks     LUE 4-/5 wfl good  and wfl FMC/dexterity noted during ADL tasks     Hearing: [place]  Sensation: denies numbness and tingling   Tone: wfl  Edema: B LE / UE edema noted    Vitals:  Blood Pressure at rest 162/111 Blood Pressure seated at /127   Heart Rate at rest 111 Heart Rate seated at EOB 98   SPO2 at rest 94% SPO2 seated at EOB 90%      prior to return to supine                               Comments/Treatment: Upon arrival, patient supine in bed and agreeable to OT session with PT collaboration. Therapist facilitated bed mobility, sitting balance at EOB  (addressing weightshifting (forward./backward; laterally) and light functional unilateral reaching in diagonal planes 2x10)  - skilled cuing on hand placement, sequencing,  posture, body mechanics and safety. Sit to stand attempted deferred this date due to c/o dizziness and elevated HR. Therapist facilitated self-care retraining: simulated UB/LB self-care tasks and simulated grooming task while seated at EOB while educating pt on modified techniques, posture, safety and energy conservation techniques. Skilled monitoring of HR, O2 sats, BP and pts response to treatment. Pt demonstrating fair understanding of education/techniques, requiring additional training / education. Vascular present at end of session for picc line placement. At end of session, patient supine in bed with call light and phone within reach, all lines and tubes intact. Pt would benefit from continued skilled OT to increase functional independence and quality of life.     Eval Complexity: Low    (Evaluation time includes thorough review of current medical information, gathering information on past medical history/social history and prior level of function, completion of standardized

## 2019-12-10 NOTE — CARE COORDINATION
12/10/19  Transition of care planning: The Plan for Transition of Care is related to the following treatment goals: physical and occupational therapy needs    The Patient /  was provided with a choice of providers and will discuss further the need for [facility]- versus HHC as a tentative discharge plan. [x] Yes [] No    Freedom of choice list was provided with basic dialogue that supports the patient's individualized plan of care/goals, treatment preferences and shares the quality data associated with the providers. [x] Yes [] No  HHC agencies that accept [employer] coverage and [employer] for [facility] as well were given to patient to share with her .       Electronically signed by ARIANE Fischer on 12/10/2019 at 8:37 AM

## 2019-12-10 NOTE — ADT AUTH CERT
Respiratory Failure GRG - Care Day 5 (12/9/2019) by Lakshmi Vasquez RN         Review Status Review Entered   Completed 12/10/2019 09:30       Criteria Review      Care Day: 5 Care Date: 12/9/2019 Level of Care:    Guideline Day 2    Level Of Care    (X) ICU or ventilator-capable area    Clinical Status    ( ) * Weaning assessment performed    Interventions    (X) Inpatient interventions continue    * Milestone   Additional Notes   12/9 remains in icu   Pulmonary/Critical Care Progress Note       We are following patient for pneumococcal pneumonia, acute respiratory failure, new onset atrial fibrillation, hypophosphatemia (resolved), JOVANI resolved, persistent hypokalemia       SUBJECTIVE:   Patient continues to do well.  When I looked back in the records, no blood cultures were drawn on her initial presentation.  Therefore, it will be difficult to know what the sensitivities of her presumed pneumococcus are; however, we will give 1 more dose of vancomycin today and increase her Levaquin to 500 mg every day now that her acute kidney injury has resolved.  She needs to be up in the chair and wishes to begin eating more. Aditi Heena, there is still evidence of atrial fibrillation on the monitor although the ventricular response is blunted because of her carvedilol.  Nevertheless,, it may be worth changing the carvedilol to metoprolol.  We will request Dr. Colleen Zamorano from the cardiology service to assess her for DC cardioversion, which she should tolerate well, considering how much better she is from her primary respiratory problem.       The echocardiogram shows preserved ejection fraction and indeterminate diastolic function.  Therefore stopping the carvedilol should not be a problem.  Potassium level is low and needs to be repleted       MEDICATIONS:      Scheduled Medications   · potassium bicarb-citric acid 20 mEq Oral Once   · levofloxacin 500 mg Intravenous Q24H   · vancomycin 1,250 mg Intravenous Q24H   · gabapentin 300 (36.4 °C)   SpO2: 95%       FiO2 : 60 %   O2 Flow Rate (L/min): 5 L/min   O2 Device: Nasal cannula       PHYSICAL EXAM:   Constitutional: No fever, chills, diaphoresis   Skin: No skin rash, no skin breakdown   HEENT: Unremarkable   Neck: No JVD, lymphadenopathy, thyromegaly   Cardiovascular: S1, S2 irregular.  No S3 murmurs or rubs are heard except for grade 2/6 systolic ejection murmur at left sternal border   Respiratory: Bronchial breath sounds left anterior and posterior chest but there are more crackles now indicative of slow resolution of the dense consolidated infiltrate   Gastrointestinal: Soft, flat, nontender   Genitourinary: No CVA tenderness   Extremities: No clubbing, cyanosis, or edema   Neurological: Awake, alert, oriented x3.  No evidence of previous toxic metabolic encephalopathy   Psychological: Appropriate affect.  In relatively good spirits.       LABS:      WBC   Date Value Ref Range Status   12/09/2019 16.8 (H) 4.5 - 11.5 E9/L Final   12/08/2019 17.8 (H) 4.5 - 11.5 E9/L Final   12/07/2019 25.9 (H) 4.5 - 11.5 E9/L Final          Hemoglobin   Date Value Ref Range Status   12/09/2019 14.3 11.5 - 15.5 g/dL Final   12/08/2019 13.3 11.5 - 15.5 g/dL Final   12/07/2019 12.5 11.5 - 15.5 g/dL Final          Hematocrit   Date Value Ref Range Status   12/09/2019 42.8 34.0 - 48.0 % Final   12/08/2019 40.1 34.0 - 48.0 % Final   12/07/2019 38.0 34.0 - 48.0 % Final          MCV   Date Value Ref Range Status   12/09/2019 88.6 80.0 - 99.9 fL Final   12/08/2019 88.7 80.0 - 99.9 fL Final   12/07/2019 89.2 80.0 - 99.9 fL Final          Platelets   Date Value Ref Range Status   12/09/2019 183 130 - 450 E9/L Final   12/08/2019 192 130 - 450 E9/L Final   12/07/2019 213 130 - 450 E9/L Final          Sodium   Date Value Ref Range Status   12/09/2019 140 132 - 146 mmol/L Final   12/08/2019 142 132 - 146 mmol/L Final   12/07/2019 140 132 - 146 mmol/L Final          Potassium   Date Value Ref Range Status   12/09/2019 3.0 (L) 3.5 - 5.0 mmol/L Final   12/08/2019 3.1 (L) 3.5 - 5.0 mmol/L Final   12/07/2019 3.0 (L) 3.5 - 5.0 mmol/L Final          Potassium reflex Magnesium   Date Value Ref Range Status   12/05/2019 4.0 3.5 - 5.0 mmol/L Final          Chloride   Date Value Ref Range Status   12/09/2019 100 98 - 107 mmol/L Final   12/08/2019 105 98 - 107 mmol/L Final   12/07/2019 103 98 - 107 mmol/L Final          CO2   Date Value Ref Range Status   12/09/2019 26 22 - 29 mmol/L Final   12/08/2019 21 (L) 22 - 29 mmol/L Final   12/07/2019 21 (L) 22 - 29 mmol/L Final          BUN   Date Value Ref Range Status   12/09/2019 20 8 - 23 mg/dL Final   12/08/2019 32 (H) 8 - 23 mg/dL Final   12/07/2019 57 (H) 8 - 23 mg/dL Final          CREATININE   Date Value Ref Range Status   12/09/2019 0.6 0.5 - 1.0 mg/dL Final   12/08/2019 0.9 0.5 - 1.0 mg/dL Final   12/07/2019 1.7 (H) 0.5 - 1.0 mg/dL Final          Glucose   Date Value Ref Range Status   12/09/2019 249 (H) 74 - 99 mg/dL Final   12/08/2019 238 (H) 74 - 99 mg/dL Final   12/07/2019 179 (H) 74 - 99 mg/dL Final          Calcium   Date Value Ref Range Status   12/09/2019 8.6 8.6 - 10.2 mg/dL Final   12/08/2019 8.7 8.6 - 10.2 mg/dL Final   12/07/2019 8.7 8.6 - 10.2 mg/dL Final          Total Protein   Date Value Ref Range Status   12/09/2019 6.1 (L) 6.4 - 8.3 g/dL Final   12/05/2019 6.7 6.4 - 8.3 g/dL Final   09/14/2015 7.6 6.4 - 8.3 g/dL Final          Alb   Date Value Ref Range Status   12/09/2019 2.4 (L) 3.5 - 5.2 g/dL Final   12/05/2019 3.1 (L) 3.5 - 5.2 g/dL Final   09/14/2015 4.0 3.5 - 5.2 g/dL Final          Total Bilirubin   Date Value Ref Range Status   12/09/2019 0.5 0.0 - 1.2 mg/dL Final   12/05/2019 0.7 0.0 - 1.2 mg/dL Final   09/14/2015 0.5 0.0 - 1.2 mg/dL Final          Alkaline Phosphatase   Date Value Ref Range Status   12/09/2019 93 35 - 104 U/L Final   12/05/2019 90 35 - 104 U/L Final   09/14/2015 88 40 - 129 U/L Final          AST   Date Value Ref Range Status   12/09/2019 19 0 - 31 U/L Final   12/05/2019 26 0 - 31 U/L Final   09/14/2015 17 0 - 39 U/L Final          ALT   Date Value Ref Range Status   12/09/2019 14 0 - 32 U/L Final   12/05/2019 14 0 - 32 U/L Final   09/14/2015 9 0 - 40 U/L Final          GFR Non-   Date Value Ref Range Status   12/09/2019 >60 >=60 mL/min/1.73 Final       Comment:       Chronic Kidney Disease: less than 60 ml/min/1.73 sq. m.         Kidney Failure: less than 15 ml/min/1.73 sq.m. Results valid for patients 18 years and older.       12/08/2019 60 >=60 mL/min/1.73 Final       Comment:       Chronic Kidney Disease: less than 60 ml/min/1.73 sq. m.         Kidney Failure: less than 15 ml/min/1.73 sq.m. Results valid for patients 18 years and older.       12/07/2019 29 >=60 mL/min/1.73 Final       Comment:       Chronic Kidney Disease: less than 60 ml/min/1.73 sq. m.         Kidney Failure: less than 15 ml/min/1.73 sq.m. Results valid for patients 18 years and older.              GFR    Date Value Ref Range Status   12/09/2019 >60   Final   12/08/2019 >60   Final   12/07/2019 35   Final          Magnesium   Date Value Ref Range Status   12/08/2019 2.0 1.6 - 2.6 mg/dL Final   12/07/2019 2.3 1.6 - 2.6 mg/dL Final   12/06/2019 1.7 1.6 - 2.6 mg/dL Final          Phosphorus   Date Value Ref Range Status   12/08/2019 1.3 (L) 2.5 - 4.5 mg/dL Final   12/07/2019 2.4 (L) 2.5 - 4.5 mg/dL Final   12/06/2019 4.0 2.5 - 4.5 mg/dL Final          Recent Labs     12/07/19 0524 12/08/19 0524   PH 7.411 7.481*   PO2 95.5 --    PCO2 32.5* --    HCO3 20.2* --    BE -3.5* 0.6   O2SAT 97.2 96.7           RADIOLOGY:      XR CHEST PORTABLE   Final Result       1. Stable chest x-ray with extensive left lung airspace disease.       XR CHEST PORTABLE   Final Result   1.  No change from XR CHEST PORTABLE with report dated 12/7/2019 8:30   AM.           XR CHEST PORTABLE   Final Result   No change left lung pneumonia.       XR CHEST PORTABLE   Final Result   Stable dense left lung consolidation.       CT CHEST WO CONTRAST   Final Result   Large left lung infiltrate compatible pneumonia. Minimal   patchy infiltrate developing pneumonia right middle lobe.       XR CHEST STANDARD (2 VW)   Final Result   Large left pleural effusion with left lung airspace disease.               CT Head WO Contrast   Final Result   No acute intracranial hemorrhage or mass effect.           CT CERVICAL SPINE WO CONTRAST   Final Result   1. No acute cervical spine fracture. 2. Mild multilevel cervical spondylosis.                 XR CHEST PORTABLE    (Results Pending)                   PROBLEM LIST:   Principal Problem:     Sepsis due to pneumonia ([facility] [address]   Active Problems:     Acute respiratory failure with hypoxia (HCC)     JOVANI (acute kidney injury) ([facility] [address]     Elevated brain natriuretic peptide (BNP) level     Acute metabolic encephalopathy     Community acquired pneumonia of left lung     Hypoxia   Resolved Problems:     * No resolved hospital problems. *           IMPRESSION:   1. Pneumococcal pneumonia   2. Septic shock, resolved   3. Acute respiratory failure, improving   4. New onset atrial fibrillation   5. Hypokalemia   6. Acute kidney injury, resolved   7. Osteoarthritis       PLAN:   1. Increase Levaquin to 500 mg daily   2. Discontinue vancomycin after next dose   3. Decrease IV fluids   4. Discontinue IV steroids   5. Mobilize into chair   6. Cardiology consult for possible DC cardioversion   7. DC carvedilol and substitute metoprolol   8. Wean diltiazem   9.  Potassium replacement       ATTESTATION:   ICU Staff Physician note of personal involvement in Care   As the attending physician, I certify that I personally reviewed the patient's history and personally examined the patient to confirm the physical findings described above,   And that I reviewed the relevant imaging studies and available reports.  I also discussed the differential diagnosis and all of the proposed management plans with the patient and individuals accompanying the patient to this visit.  They had the opportunity to ask questions about the proposed management plans and to have those questions answered.       This patient has a high probability of sudden, clinically significant deterioration, which requires the highest level of physician preparedness to intervene urgently.  I managed/supervised life or organ supporting interventions that required frequent physician assessment.   I devoted my full attention to the direct care of this patient for the amount of time indicated below.  Time I spent with the family or surrogate(s) is included only if the patient was incapable of providing the necessary information or participating in medical decisions - Time devoted to teaching and to any procedures I billed separately is not included.       CRITICAL CARE TIME:   32 minutes       Electronically signed by Zane Ford MD on 12/9/2019 at 9:57 AM               Per internal med   Patient states that she feels that her breathing has improved and denies any associated chest tightness, wheezing, dyspnea at rest.       Per RN: No acute issues overnight.          Scheduled Medications   · potassium bicarb-citric acid 20 mEq Oral Once   · levofloxacin 500 mg Intravenous Q24H   · vancomycin 1,250 mg Intravenous Q24H   · metoprolol tartrate 50 mg Oral BID   · gabapentin 300 mg Oral 4x Daily AC & HS   · levalbuterol 0.31 mg Nebulization Q8H   · pantoprazole 40 mg Oral QAM AC   · insulin lispro 0-6 Units Subcutaneous TID WC   · insulin lispro 0-3 Units Subcutaneous Nightly   · potassium bicarb-citric acid 40 mEq Oral Once   · enoxaparin 1 mg/kg (Ideal) Subcutaneous Daily   · sodium chloride (PF) 10 mL Intravenous Daily   · sodium chloride flush 10 mL Intravenous 2 times per day   · IVPB builder Intravenous Q6H                PRN Medications    oxyCODONE-acetaminophen, 1 tablet, Q6H PRN   perflutren lipid microspheres, 1.5 mL, ONCE catheter Updated: 12/06/19 0847       L. pneumophila Serogp 1 Ur Ag --       Presumptive Negative -suggesting no recent or current infections    with Legionella pneumophila serogroup 1. Infection to Legionella cannot be ruled out since other serogroups    and species may cause infection, antigen may not be present in    early infection, or level of antigen may be below the    detection limit. Normal Range: Presumptive Negative      Narrative:       Source: [facility]       Site: Urine&Urine               Strep Pneumoniae Antigen [[id number]] Collected: 12/05/19 1750     Specimen: Urine, clean catch Updated: 12/06/19 0842       STREP PNEUMONIAE ANTIGEN, URINE Urine specimen POSITIVE for Pneumococcal pneumonia.     Narrative:       Source: URINE       Site:                Radiology:       XR CHEST PORTABLE   Final Result       1. Stable chest x-ray with extensive left lung airspace disease.       XR CHEST PORTABLE   Final Result   1. No change from XR CHEST PORTABLE with report dated 12/7/2019 8:30   AM.           XR CHEST PORTABLE   Final Result   No change left lung pneumonia.       XR CHEST PORTABLE   Final Result   Stable dense left lung consolidation.       CT CHEST WO CONTRAST   Final Result   Large left lung infiltrate compatible pneumonia. Minimal   patchy infiltrate developing pneumonia right middle lobe.       XR CHEST STANDARD (2 VW)   Final Result   Large left pleural effusion with left lung airspace disease.               CT Head WO Contrast   Final Result   No acute intracranial hemorrhage or mass effect.           CT CERVICAL SPINE WO CONTRAST   Final Result   1. No acute cervical spine fracture. 2. Mild multilevel cervical spondylosis.                    XR CHEST PORTABLE    (Results Pending)           Assessment:   Principal Problem:     Sepsis due to pneumonia [facility])   Active Problems:     Acute respiratory failure with hypoxia (HCC)     JOVANI (acute kidney injury) ([facility] [address]     Elevated brain natriuretic peptide (BNP) level     Acute metabolic encephalopathy     Community acquired pneumonia of left lung     Hypoxia   Resolved Problems:     * No resolved hospital problems. *           Plan:       1. Acute hypoxic respiratory failure in the setting of dense left lobar streptococcal pneumonia--received ceftriaxone and azithromycin in ER. Given severity of pneumonia, we switched to Zosyn. Pulmonary consulted and they felt patient has high potential for decompensation and recommended she be transferred to ICU which was done the evening of admission 12/5. Vancomycin added as there was concern that family member had recent viral illness and if patient had same, would be at risk for staph pneumonia. Levaquin added by pulmonary to cover atypicals. On 12/6, antibiotics deescalated to Vanco and Levaquin. Zosyn discontinued after AM dose 12/6. Afebrile overnight; WBC 17.8--> 16.8 trending down.  Plan for vancomycin to be discontinued after next dose and to continue Levaquin 500 mg daily.  IV steroids discontinued.  Pulmonary/critical care following. 2. Sepsis due to streptococcal pneumonia--intensivist recommended [facility] protocol of hydrocortisone, vitamin C and thiamine. Day #4/4. This was instituted on admission but dose of Solucortef decreased to 50 mg 12/6 because of increasing BUN and last dose 12/7 AM.  Steroids discontinued.  On antibiotic therapy. 3. Acute metabolic/toxic encephalopathy(resolving)--related to hypoxia and pneumonia.  Mentation much improved and at baseline. 4. Dysphagia--seen by speech therapy and was placed on pureed solid with thin liquids. More awake and alert will change diet to mechanical soft. No lesions in mouth to explain discomfort. 5. JOVANI (resolved)-did not know recent baseline but was 0.7 back in 2017. With hydration, creatinine normalized 2.6-->2.6-->1.7-->0.9-->0.6   6. Atrial fibrillation with RVR--likely triggered by pulmonary process.  Currently, on Cardizem drip and will intend to

## 2019-12-10 NOTE — PROGRESS NOTES
Date:12/10/2019  Patient Name: Mariana Randolph  MRN: [id number]  : 1935  ROOM #: [id number]/IC11-01    Occupational Therapy order received, chart reviewed and evaluation attempted this date. Patient is unavailable for OT evaluation due to being out of the room for testing. Will attempt OT evaluation at a later time. Thank you.    Tameka Marin OTR/L #[id number]

## 2019-12-10 NOTE — PROGRESS NOTES
base with crackles noted- no use of accessory muscles  Cardiovascular: normal rate, regular rhythm, normal S1 and S2, no murmurs, rubs, clicks, or gallops  Abdomen: soft, non-tender, non-distended, normal bowel sounds, no masses or organomegaly  Extremities: no cyanosis, clubbing or edema  Musculoskeletal: normal range of motion, no joint swelling, deformity or tenderness  Neurologic: AAOx3;  no cranial nerve deficit, gait, coordination and speech normal. Moves limbs spontaneously and follows commands         Recent Labs     12/08/19 0510 12/09/19  0714 12/10/19  0536    140 138   K 3.1* 3.0* 3.2*    100 97*   CO2 21* 26 29   BUN 32* 20 18   CREATININE 0.9 0.6 0.6   GLUCOSE 238* 249* 259*   CALCIUM 8.7 8.6 8.0*       Recent Labs     12/08/19 0510 12/09/19  0630 12/10/19  0536   WBC 17.8* 16.8* 25.0*   RBC 4.52 4.83 5.06   HGB 13.3 14.3 14.8   HCT 40.1 42.8 44.5   MCV 88.7 88.6 87.9   MCH 29.4 29.6 29.2   MCHC 33.2 33.4 33.3   RDW 14.0 14.0 13.9    183 210   MPV 12.3* 11.9 12.0         LEGIONELLA ANTIGEN, URINE [[id number]] Collected: 12/05/19 1750     Specimen: Urine, indwelling catheter Updated: 12/06/19 0847      L. pneumophila Serogp 1 Ur Ag --      Presumptive Negative -suggesting no recent or current infections   with Legionella pneumophila serogroup 1. Infection to Legionella cannot be ruled out since other serogroups   and species may cause infection, antigen may not be present in   early infection, or level of antigen may be below the   detection limit. Normal Range: Presumptive Negative      Narrative:       Source: [facility]       Site: Urine&Urine               Strep Pneumoniae Antigen [[id number]] Collected: 12/05/19 1750     Specimen: Urine, clean catch Updated: 12/06/19 0842      STREP PNEUMONIAE ANTIGEN, URINE Urine specimen POSITIVE for Pneumococcal pneumonia.     Narrative:       Source: URINE       Site:          Radiology:   XR CHEST PORTABLE   Final Result   1.  Stable left lung airspace disease and effusions. XR CHEST PORTABLE   Final Result      1. Stable chest x-ray with extensive left lung airspace disease. XR CHEST PORTABLE   Final Result   1. No change from XR CHEST PORTABLE with report dated 12/7/2019 8:30   AM.          XR CHEST PORTABLE   Final Result   No change left lung pneumonia. XR CHEST PORTABLE   Final Result   Stable dense left lung consolidation. CT CHEST WO CONTRAST   Final Result   Large left lung infiltrate compatible pneumonia. Minimal   patchy infiltrate developing pneumonia right middle lobe. XR CHEST STANDARD (2 VW)   Final Result   Large left pleural effusion with left lung airspace disease. CT Head WO Contrast   Final Result   No acute intracranial hemorrhage or mass effect. CT CERVICAL SPINE WO CONTRAST   Final Result   1. No acute cervical spine fracture. 2. Mild multilevel cervical spondylosis. XR CHEST PORTABLE    (Results Pending)   CT Chest WO Contrast    (Results Pending)       Assessment:  Principal Problem:    Sepsis due to pneumonia [facility])  Active Problems:    Acute respiratory failure with hypoxia (HCC)    JOVANI (acute kidney injury) (HCC)    Elevated brain natriuretic peptide (BNP) level    Acute metabolic encephalopathy    Community acquired pneumonia of left lung    Hypoxia  Resolved Problems:    * No resolved hospital problems. *      Plan:    1. Acute hypoxic respiratory failure in the setting of dense left lobar streptococcal pneumonia--received ceftriaxone and azithromycin in ER. Given severity of pneumonia, we switched to Zosyn. Pulmonary consulted and they felt patient has high potential for decompensation and recommended she be transferred to ICU which was done the evening of admission 12/5. Vancomycin added as there was concern that family member had recent viral illness and if patient had same, would be at risk for staph pneumonia. Levaquin added by pulmonary to cover atypicals.  On 12/6, antibiotics deescalated to Vanco and Levaquin. Zosyn discontinued after AM dose 12/6. Afebrile overnight; WBC 17.8--> 16.8-->25.0 trending up. Vancomycin to be discontinued yesterday on 12/9 and now significant leukocytosis. Levaquin now discontinued in lieu of ceftriaxone 2 g daily. CT chest ordered to rule out empyema/parapneumonic effusion. IV steroids discontinued. Pulmonary/critical care following. 2. Sepsis due to streptococcal pneumonia--intensivist recommended [facility] protocol of hydrocortisone, vitamin C and thiamine. Day #4/4. This was instituted on admission but dose of Solucortef decreased to 50 mg 12/6 because of increasing BUN and last dose 12/7 AM.  Steroids discontinued. On antibiotic therapy. 3. Acute metabolic/toxic encephalopathy(resolving)--related to hypoxia and pneumonia. Mentation much improved and at baseline. 4. Dysphagia--seen by speech therapy and was placed on pureed solid with thin liquids. More awake and alert will change diet to mechanical soft. No lesions in mouth to explain discomfort. 5. JOVANI (resolved)-did not know recent baseline but was 0.7 back in 2017. With hydration, creatinine normalized 2.6-->2.6-->1.7-->0.9-->0.6  6. Atrial fibrillation with RVR--likely triggered by pulmonary process. Currently, on Cardizem drip and will intend to wean. Cardiology following and as per intensivist plans to discuss DC cardioversion. On beta-blocker therapy. 7. Hypokalemia-K 4.0, resolved. Will continue to replace and will continue to monitor. 8. OA left knee/diabetic peripheral neuropathy--On Percocet 5/325 q6 prn and gabapentin but at half home dose ie 300 mg 4x/day. 9. Elevated BNP--may all be related to pneumonia. However,checked Echo to evaluate LV function. This was done12/6 and showed EF of 55-60% and indeterminate diastolic function. Case discussed with MICU RN. NOTE: This report was transcribed using voice recognition software.  Every effort was made to ensure accuracy; however, inadvertent computerized transcription errors may be present.     Electronically signed by Juana Albert MD on 12/10/2019 at 12:38 PM

## 2019-12-10 NOTE — PROGRESS NOTES
Right arm and left arm attempted, right arm unable to advance catheter, left arm unable to tread guide wire, pressure dressing to right arm, left arm bruised no drainage, pt suleman well, RN aware

## 2019-12-10 NOTE — PLAN OF CARE
Problem: Discharge Planning:  Goal: Participates in care planning  Description  Participates in care planning  Outcome: Met This Shift     Problem: Risk for Impaired Skin Integrity  Goal: Tissue integrity - skin and mucous membranes  Description  Structural intactness and normal physiological function of skin and  mucous membranes.   12/10/2019 0512 by Rene Luna RN  Outcome: Met This Shift     Problem: Pain:  Goal: Pain level will decrease  Description  Pain level will decrease  12/10/2019 0512 by Rene Luna RN  Outcome: Met This Shift     Problem: Pain:  Goal: Control of acute pain  Description  Control of acute pain  12/10/2019 0512 by Rene Luna RN  Outcome: Met This Shift     Problem: Pain:  Goal: Control of chronic pain  Description  Control of chronic pain  12/10/2019 0512 by Rene Luna RN  Outcome: Met This Shift     Problem: Breathing Pattern - Ineffective:  Goal: Ability to achieve and maintain a regular respiratory rate will improve  Description  Ability to achieve and maintain a regular respiratory rate will improve  Outcome: Met This Shift     Problem: Gas Exchange - Impaired:  Goal: Levels of oxygenation will improve  Description  Levels of oxygenation will improve  Outcome: Met This Shift

## 2019-12-10 NOTE — PROGRESS NOTES
oxyCODONE-acetaminophen, perflutren lipid microspheres, glucose, dextrose, glucagon (rDNA), dextrose, hydrALAZINE, sodium chloride flush, magnesium hydroxide, ondansetron, acetaminophen      REVIEW OF SYSTEMS:  Constitutional: Denies fever, weight loss, night sweats, and fatigue  Skin: Denies pigmentation, dark lesions, and rashes   HEENT: Denies hearing loss, tinnitus, ear drainage, epistaxis, sore throat, and hoarseness. Cardiovascular: Denies palpitations, chest pain, and chest pressure. Respiratory: Denies cough, dyspnea at rest, hemoptysis, apnea, and choking. Gastrointestinal: Denies nausea, vomiting, poor appetite, diarrhea, heartburn or reflux  Genitourinary: Denies dysuria, frequency, urgency or hematuria  Musculoskeletal: Denies myalgias, muscle weakness, and bone pain  Neurological: Denies dizziness, vertigo, headache, and focal weakness  Psychological: Denies anxiety and depression  Endocrine: Denies heat intolerance and cold intolerance  Hematopoietic/Lymphatic: Denies bleeding problems and blood transfusions    OBJECTIVE:  Vitals:    12/10/19 0900   BP: (!) 135/117   Pulse: 119   Resp: 20   Temp:    SpO2: 96%     FiO2 : 60 %  O2 Flow Rate (L/min): 5 L/min  O2 Device: Nasal cannula    PHYSICAL EXAM:  Constitutional: No fever, chills, diaphoresis  Skin: No skin rash, no skin breakdown  HEENT: Unremarkable  Neck: No JVD, lymphadenopathy, thyromegaly  Cardiovascular: S1, S2 irregular. No S3 murmurs rubs present  Respiratory: Decreased breath sounds over left hemithorax. There is no more definite egophony heard. More inspiratory crackles are present. Gastrointestinal: Soft, flat, nontender  Genitourinary: No CVA tenderness  Extremities: No clubbing, cyanosis, or edema  Neurological: Intact. Moves all extremities.   No focal deficits  Psychological: Excellent affect    LABS:  WBC   Date Value Ref Range Status   12/10/2019 25.0 (H) 4.5 - 11.5 E9/L Final   12/09/2019 16.8 (H) 4.5 - 11.5 E9/L Final 12/08/2019 17.8 (H) 4.5 - 11.5 E9/L Final     Hemoglobin   Date Value Ref Range Status   12/10/2019 14.8 11.5 - 15.5 g/dL Final   12/09/2019 14.3 11.5 - 15.5 g/dL Final   12/08/2019 13.3 11.5 - 15.5 g/dL Final     Hematocrit   Date Value Ref Range Status   12/10/2019 44.5 34.0 - 48.0 % Final   12/09/2019 42.8 34.0 - 48.0 % Final   12/08/2019 40.1 34.0 - 48.0 % Final     MCV   Date Value Ref Range Status   12/10/2019 87.9 80.0 - 99.9 fL Final   12/09/2019 88.6 80.0 - 99.9 fL Final   12/08/2019 88.7 80.0 - 99.9 fL Final     Platelets   Date Value Ref Range Status   12/10/2019 210 130 - 450 E9/L Final   12/09/2019 183 130 - 450 E9/L Final   12/08/2019 192 130 - 450 E9/L Final     Sodium   Date Value Ref Range Status   12/10/2019 138 132 - 146 mmol/L Final   12/09/2019 140 132 - 146 mmol/L Final   12/08/2019 142 132 - 146 mmol/L Final     Potassium   Date Value Ref Range Status   12/10/2019 3.2 (L) 3.5 - 5.0 mmol/L Final   12/09/2019 3.0 (L) 3.5 - 5.0 mmol/L Final   12/08/2019 3.1 (L) 3.5 - 5.0 mmol/L Final     Potassium reflex Magnesium   Date Value Ref Range Status   12/05/2019 4.0 3.5 - 5.0 mmol/L Final     Chloride   Date Value Ref Range Status   12/10/2019 97 (L) 98 - 107 mmol/L Final   12/09/2019 100 98 - 107 mmol/L Final   12/08/2019 105 98 - 107 mmol/L Final     CO2   Date Value Ref Range Status   12/10/2019 29 22 - 29 mmol/L Final   12/09/2019 26 22 - 29 mmol/L Final   12/08/2019 21 (L) 22 - 29 mmol/L Final     BUN   Date Value Ref Range Status   12/10/2019 18 8 - 23 mg/dL Final   12/09/2019 20 8 - 23 mg/dL Final   12/08/2019 32 (H) 8 - 23 mg/dL Final     CREATININE   Date Value Ref Range Status   12/10/2019 0.6 0.5 - 1.0 mg/dL Final   12/09/2019 0.6 0.5 - 1.0 mg/dL Final   12/08/2019 0.9 0.5 - 1.0 mg/dL Final     Glucose   Date Value Ref Range Status   12/10/2019 259 (H) 74 - 99 mg/dL Final   12/09/2019 249 (H) 74 - 99 mg/dL Final   12/08/2019 238 (H) 74 - 99 mg/dL Final     Calcium   Date Value Ref Range Status   12/10/2019 8.0 (L) 8.6 - 10.2 mg/dL Final   12/09/2019 8.6 8.6 - 10.2 mg/dL Final   12/08/2019 8.7 8.6 - 10.2 mg/dL Final     Total Protein   Date Value Ref Range Status   12/10/2019 6.2 (L) 6.4 - 8.3 g/dL Final   12/09/2019 6.1 (L) 6.4 - 8.3 g/dL Final   12/08/2019 5.5 (L) 6.4 - 8.3 g/dL Final     Alb   Date Value Ref Range Status   12/10/2019 2.6 (L) 3.5 - 5.2 g/dL Final   12/09/2019 2.4 (L) 3.5 - 5.2 g/dL Final   12/08/2019 2.0 (L) 3.5 - 4.7 g/dL Final     Total Bilirubin   Date Value Ref Range Status   12/10/2019 0.7 0.0 - 1.2 mg/dL Final   12/09/2019 0.5 0.0 - 1.2 mg/dL Final   12/05/2019 0.7 0.0 - 1.2 mg/dL Final     Alkaline Phosphatase   Date Value Ref Range Status   12/10/2019 98 35 - 104 U/L Final   12/09/2019 93 35 - 104 U/L Final   12/05/2019 90 35 - 104 U/L Final     AST   Date Value Ref Range Status   12/10/2019 19 0 - 31 U/L Final   12/09/2019 19 0 - 31 U/L Final   12/05/2019 26 0 - 31 U/L Final     ALT   Date Value Ref Range Status   12/10/2019 14 0 - 32 U/L Final   12/09/2019 14 0 - 32 U/L Final   12/05/2019 14 0 - 32 U/L Final     GFR Non-   Date Value Ref Range Status   12/10/2019 >60 >=60 mL/min/1.73 Final     Comment:     Chronic Kidney Disease: less than 60 ml/min/1.73 sq.m. Kidney Failure: less than 15 ml/min/1.73 sq.m. Results valid for patients 18 years and older. 12/09/2019 >60 >=60 mL/min/1.73 Final     Comment:     Chronic Kidney Disease: less than 60 ml/min/1.73 sq.m. Kidney Failure: less than 15 ml/min/1.73 sq.m. Results valid for patients 18 years and older. 12/08/2019 60 >=60 mL/min/1.73 Final     Comment:     Chronic Kidney Disease: less than 60 ml/min/1.73 sq.m. Kidney Failure: less than 15 ml/min/1.73 sq.m. Results valid for patients 18 years and older.        GFR    Date Value Ref Range Status   12/10/2019 >60  Final   12/09/2019 >60  Final   12/08/2019 >60  Final     Magnesium   Date Value Ref Range Status   12/10/2019 1.7 1.6 - 2.6 mg/dL Final   12/08/2019 2.0 1.6 - 2.6 mg/dL Final   12/07/2019 2.3 1.6 - 2.6 mg/dL Final     Phosphorus   Date Value Ref Range Status   12/10/2019 1.7 (L) 2.5 - 4.5 mg/dL Final   12/08/2019 1.3 (L) 2.5 - 4.5 mg/dL Final   12/07/2019 2.4 (L) 2.5 - 4.5 mg/dL Final     Recent Labs     12/08/19  0524   PH 7.481*   BE 0.6   O2SAT 96.7       RADIOLOGY:  XR CHEST PORTABLE   Final Result   1. Stable left lung airspace disease and effusions. XR CHEST PORTABLE   Final Result      1. Stable chest x-ray with extensive left lung airspace disease. XR CHEST PORTABLE   Final Result   1. No change from XR CHEST PORTABLE with report dated 12/7/2019 8:30   AM.          XR CHEST PORTABLE   Final Result   No change left lung pneumonia. XR CHEST PORTABLE   Final Result   Stable dense left lung consolidation. CT CHEST WO CONTRAST   Final Result   Large left lung infiltrate compatible pneumonia. Minimal   patchy infiltrate developing pneumonia right middle lobe. XR CHEST STANDARD (2 VW)   Final Result   Large left pleural effusion with left lung airspace disease. CT Head WO Contrast   Final Result   No acute intracranial hemorrhage or mass effect. CT CERVICAL SPINE WO CONTRAST   Final Result   1. No acute cervical spine fracture. 2. Mild multilevel cervical spondylosis. XR CHEST PORTABLE    (Results Pending)   CT Chest WO Contrast    (Results Pending)           PROBLEM LIST:  Principal Problem:    Sepsis due to pneumonia [facility])  Active Problems:    Acute respiratory failure with hypoxia (HCC)    JOVANI (acute kidney injury) ([facility] [address]    Elevated brain natriuretic peptide (BNP) level    Acute metabolic encephalopathy    Community acquired pneumonia of left lung    Hypoxia  Resolved Problems:    * No resolved hospital problems. *      IMPRESSION:  1. Pneumococcal pneumonia  2. Rule out empyema/parapneumonic effusion  3.  Hypokalemia, hypophosphatemia  4. New onset atrial fibrillation  5. Acute respiratory failure, improving  6. Leukocytosis more prominent    PLAN:  1. CT chest today without contrast to rule out formation of parapneumonic effusion/empyema  2. DC Levaquin and initiate ceftriaxone 2 g/day  3. Cardioversion per Dr. Shy Thompson  4. Replete potassium and phosphorus via K-Phos infusion  5. Increase activity to out of bed    ATTESTATION:  ICU Staff Physician note of personal involvement in Care  As the attending physician, I certify that I personally reviewed the patients history and personally examined the patient to confirm the physical findings described above,  And that I reviewed the relevant imaging studies and available reports. I also discussed the differential diagnosis and all of the proposed management plans with the patient and individuals accompanying the patient to this visit. They had the opportunity to ask questions about the proposed management plans and to have those questions answered. This patient has a high probability of sudden, clinically significant deterioration, which requires the highest level of physician preparedness to intervene urgently. I managed/supervised life or organ supporting interventions that required frequent physician assessment. I devoted my full attention to the direct care of this patient for the amount of time indicated below. Time I spent with the family or surrogate(s) is included only if the patient was incapable of providing the necessary information or participating in medical decisions - Time devoted to teaching and to any procedures I billed separately is not included.     CRITICAL CARE TIME:  35 minutes    Electronically signed by Raad Arora MD on 12/10/2019 at 9:25 AM

## 2019-12-11 ENCOUNTER — ANESTHESIA EVENT (OUTPATIENT)
Dept: POSTOP/PACU | Age: 84
End: 2019-12-11

## 2019-12-11 ENCOUNTER — ANESTHESIA (OUTPATIENT)
Dept: POSTOP/PACU | Age: 84
End: 2019-12-11

## 2019-12-11 ENCOUNTER — APPOINTMENT (OUTPATIENT)
Dept: GENERAL RADIOLOGY | Age: 84
DRG: 871 | End: 2019-12-11
Payer: MEDICARE

## 2019-12-11 ENCOUNTER — APPOINTMENT (OUTPATIENT)
Dept: INTERVENTIONAL RADIOLOGY/VASCULAR | Age: 84
DRG: 871 | End: 2019-12-11
Payer: MEDICARE

## 2019-12-11 LAB
ALBUMIN SERPL-MCNC: 2.3 G/DL (ref 3.5–5.2)
ALP BLD-CCNC: 109 U/L (ref 35–104)
ALT SERPL-CCNC: 25 U/L (ref 0–32)
ANION GAP SERPL CALCULATED.3IONS-SCNC: 10 MMOL/L (ref 7–16)
AST SERPL-CCNC: 34 U/L (ref 0–31)
BASOPHILS ABSOLUTE: 0 E9/L (ref 0–0.2)
BASOPHILS RELATIVE PERCENT: 0.2 % (ref 0–2)
BILIRUB SERPL-MCNC: 0.4 MG/DL (ref 0–1.2)
BUN BLDV-MCNC: 18 MG/DL (ref 8–23)
BURR CELLS: ABNORMAL
CALCIUM SERPL-MCNC: 7.9 MG/DL (ref 8.6–10.2)
CHLORIDE BLD-SCNC: 97 MMOL/L (ref 98–107)
CO2: 31 MMOL/L (ref 22–29)
CREAT SERPL-MCNC: 0.7 MG/DL (ref 0.5–1)
EOSINOPHILS ABSOLUTE: 0 E9/L (ref 0.05–0.5)
EOSINOPHILS RELATIVE PERCENT: 0.3 % (ref 0–6)
GFR AFRICAN AMERICAN: >60
GFR NON-AFRICAN AMERICAN: >60 ML/MIN/1.73
GLUCOSE BLD-MCNC: 200 MG/DL (ref 74–99)
HCT VFR BLD CALC: 47.2 % (ref 34–48)
HEMOGLOBIN: 15.4 G/DL (ref 11.5–15.5)
LYMPHOCYTES ABSOLUTE: 0.52 E9/L (ref 1.5–4)
LYMPHOCYTES RELATIVE PERCENT: 2.4 % (ref 20–42)
MAGNESIUM: 1.7 MG/DL (ref 1.6–2.6)
MCH RBC QN AUTO: 28.9 PG (ref 26–35)
MCHC RBC AUTO-ENTMCNC: 32.6 % (ref 32–34.5)
MCV RBC AUTO: 88.7 FL (ref 80–99.9)
METAMYELOCYTES RELATIVE PERCENT: 6.4 % (ref 0–1)
METER GLUCOSE: 143 MG/DL (ref 74–99)
METER GLUCOSE: 157 MG/DL (ref 74–99)
METER GLUCOSE: 196 MG/DL (ref 74–99)
METER GLUCOSE: 235 MG/DL (ref 74–99)
MONOCYTES ABSOLUTE: 2.35 E9/L (ref 0.1–0.95)
MONOCYTES RELATIVE PERCENT: 8.8 % (ref 2–12)
MYELOCYTE PERCENT: 0.8 % (ref 0–0)
NEUTROPHILS ABSOLUTE: 22.97 E9/L (ref 1.8–7.3)
NEUTROPHILS RELATIVE PERCENT: 80.8 % (ref 43–80)
NUCLEATED RED BLOOD CELLS: 0.8 /100 WBC
OVALOCYTES: ABNORMAL
PDW BLD-RTO: 13.9 FL (ref 11.5–15)
PHOSPHORUS: 2.8 MG/DL (ref 2.5–4.5)
PLASMA CELLS PERCENT: 0.8 % (ref 0–0)
PLATELET # BLD: 220 E9/L (ref 130–450)
PMV BLD AUTO: 11.8 FL (ref 7–12)
POIKILOCYTES: ABNORMAL
POLYCHROMASIA: ABNORMAL
POTASSIUM SERPL-SCNC: 3.4 MMOL/L (ref 3.5–5)
RBC # BLD: 5.32 E12/L (ref 3.5–5.5)
SODIUM BLD-SCNC: 138 MMOL/L (ref 132–146)
TOTAL PROTEIN: 5.7 G/DL (ref 6.4–8.3)
TOXIC GRANULATION: ABNORMAL
WBC # BLD: 26.1 E9/L (ref 4.5–11.5)

## 2019-12-11 PROCEDURE — 87070 CULTURE OTHR SPECIMN AEROBIC: CPT

## 2019-12-11 PROCEDURE — 94640 AIRWAY INHALATION TREATMENT: CPT

## 2019-12-11 PROCEDURE — 71045 X-RAY EXAM CHEST 1 VIEW: CPT

## 2019-12-11 PROCEDURE — 36415 COLL VENOUS BLD VENIPUNCTURE: CPT

## 2019-12-11 PROCEDURE — 36592 COLLECT BLOOD FROM PICC: CPT

## 2019-12-11 PROCEDURE — 2580000003 HC RX 258: Performed by: NURSE PRACTITIONER

## 2019-12-11 PROCEDURE — 2700000000 HC OXYGEN THERAPY PER DAY

## 2019-12-11 PROCEDURE — 2000000000 HC ICU R&B

## 2019-12-11 PROCEDURE — 2580000003 HC RX 258: Performed by: INTERNAL MEDICINE

## 2019-12-11 PROCEDURE — 6370000000 HC RX 637 (ALT 250 FOR IP): Performed by: INTERNAL MEDICINE

## 2019-12-11 PROCEDURE — 99233 SBSQ HOSP IP/OBS HIGH 50: CPT | Performed by: INTERNAL MEDICINE

## 2019-12-11 PROCEDURE — 6360000002 HC RX W HCPCS: Performed by: INTERNAL MEDICINE

## 2019-12-11 PROCEDURE — 80053 COMPREHEN METABOLIC PANEL: CPT

## 2019-12-11 PROCEDURE — 84100 ASSAY OF PHOSPHORUS: CPT

## 2019-12-11 PROCEDURE — 2500000003 HC RX 250 WO HCPCS: Performed by: INTERNAL MEDICINE

## 2019-12-11 PROCEDURE — 83735 ASSAY OF MAGNESIUM: CPT

## 2019-12-11 PROCEDURE — 36573 INSJ PICC RS&I 5 YR+: CPT | Performed by: RADIOLOGY

## 2019-12-11 PROCEDURE — 94660 CPAP INITIATION&MGMT: CPT

## 2019-12-11 PROCEDURE — 82962 GLUCOSE BLOOD TEST: CPT

## 2019-12-11 PROCEDURE — 05HY33Z INSERTION OF INFUSION DEVICE INTO UPPER VEIN, PERCUTANEOUS APPROACH: ICD-10-PCS | Performed by: RADIOLOGY

## 2019-12-11 PROCEDURE — C1751 CATH, INF, PER/CENT/MIDLINE: HCPCS

## 2019-12-11 PROCEDURE — 85025 COMPLETE CBC W/AUTO DIFF WBC: CPT

## 2019-12-11 RX ORDER — POTASSIUM BICARBONATE 25 MEQ/1
25 TABLET, EFFERVESCENT ORAL ONCE
Status: DISCONTINUED | OUTPATIENT
Start: 2019-12-11 | End: 2019-12-11 | Stop reason: CLARIF

## 2019-12-11 RX ORDER — POTASSIUM CHLORIDE 7.45 MG/ML
10 INJECTION INTRAVENOUS PRN
Status: DISCONTINUED | OUTPATIENT
Start: 2019-12-11 | End: 2019-12-23 | Stop reason: HOSPADM

## 2019-12-11 RX ORDER — POTASSIUM CHLORIDE 20 MEQ/1
40 TABLET, EXTENDED RELEASE ORAL PRN
Status: DISCONTINUED | OUTPATIENT
Start: 2019-12-11 | End: 2019-12-23 | Stop reason: HOSPADM

## 2019-12-11 RX ORDER — SODIUM CHLORIDE 0.9 % (FLUSH) 0.9 %
10 SYRINGE (ML) INJECTION EVERY 12 HOURS SCHEDULED
Status: DISCONTINUED | OUTPATIENT
Start: 2019-12-11 | End: 2019-12-23 | Stop reason: HOSPADM

## 2019-12-11 RX ORDER — SODIUM CHLORIDE 0.9 % (FLUSH) 0.9 %
10 SYRINGE (ML) INJECTION PRN
Status: DISCONTINUED | OUTPATIENT
Start: 2019-12-11 | End: 2019-12-23 | Stop reason: HOSPADM

## 2019-12-11 RX ORDER — HEPARIN SODIUM (PORCINE) LOCK FLUSH IV SOLN 100 UNIT/ML 100 UNIT/ML
100 SOLUTION INTRAVENOUS PRN
Status: DISCONTINUED | OUTPATIENT
Start: 2019-12-11 | End: 2019-12-23 | Stop reason: SDUPTHER

## 2019-12-11 RX ORDER — GABAPENTIN 300 MG/1
600 CAPSULE ORAL
Status: DISCONTINUED | OUTPATIENT
Start: 2019-12-11 | End: 2019-12-23 | Stop reason: HOSPADM

## 2019-12-11 RX ADMIN — INSULIN LISPRO 1 UNITS: 100 INJECTION, SOLUTION INTRAVENOUS; SUBCUTANEOUS at 21:04

## 2019-12-11 RX ADMIN — METOPROLOL TARTRATE 100 MG: 50 TABLET ORAL at 20:53

## 2019-12-11 RX ADMIN — DILTIAZEM HYDROCHLORIDE 7.5 MG/HR: 5 INJECTION INTRAVENOUS at 09:42

## 2019-12-11 RX ADMIN — Medication: at 03:15

## 2019-12-11 RX ADMIN — PANTOPRAZOLE SODIUM 40 MG: 40 TABLET, DELAYED RELEASE ORAL at 06:30

## 2019-12-11 RX ADMIN — ENOXAPARIN SODIUM 80 MG: 100 INJECTION SUBCUTANEOUS at 20:52

## 2019-12-11 RX ADMIN — GABAPENTIN 600 MG: 300 CAPSULE ORAL at 20:53

## 2019-12-11 RX ADMIN — LEVALBUTEROL HYDROCHLORIDE 0.31 MG: 0.31 SOLUTION RESPIRATORY (INHALATION) at 17:01

## 2019-12-11 RX ADMIN — VANCOMYCIN HYDROCHLORIDE 1000 MG: 1 INJECTION, POWDER, LYOPHILIZED, FOR SOLUTION INTRAVENOUS at 20:52

## 2019-12-11 RX ADMIN — AMIODARONE HYDROCHLORIDE 200 MG: 200 TABLET ORAL at 20:53

## 2019-12-11 RX ADMIN — Medication 10 ML: at 09:28

## 2019-12-11 RX ADMIN — INSULIN LISPRO 2 UNITS: 100 INJECTION, SOLUTION INTRAVENOUS; SUBCUTANEOUS at 16:59

## 2019-12-11 RX ADMIN — Medication 300 UNITS: at 20:53

## 2019-12-11 RX ADMIN — METOPROLOL TARTRATE 100 MG: 50 TABLET ORAL at 09:27

## 2019-12-11 RX ADMIN — SODIUM CHLORIDE, PRESERVATIVE FREE 2 G: 5 INJECTION INTRAVENOUS at 09:27

## 2019-12-11 RX ADMIN — Medication 10 ML: at 20:54

## 2019-12-11 RX ADMIN — INSULIN LISPRO 1 UNITS: 100 INJECTION, SOLUTION INTRAVENOUS; SUBCUTANEOUS at 09:30

## 2019-12-11 RX ADMIN — SODIUM CHLORIDE, SODIUM LACTATE, POTASSIUM CHLORIDE, CALCIUM CHLORIDE AND DEXTROSE MONOHYDRATE: 5; 600; 310; 30; 20 INJECTION, SOLUTION INTRAVENOUS at 11:38

## 2019-12-11 RX ADMIN — GABAPENTIN 300 MG: 300 CAPSULE ORAL at 06:30

## 2019-12-11 RX ADMIN — GABAPENTIN 600 MG: 300 CAPSULE ORAL at 11:28

## 2019-12-11 RX ADMIN — AMIODARONE HYDROCHLORIDE 200 MG: 200 TABLET ORAL at 09:26

## 2019-12-11 RX ADMIN — Medication 300 UNITS: at 09:31

## 2019-12-11 RX ADMIN — ENOXAPARIN SODIUM 80 MG: 100 INJECTION SUBCUTANEOUS at 09:27

## 2019-12-11 RX ADMIN — INSULIN LISPRO 1 UNITS: 100 INJECTION, SOLUTION INTRAVENOUS; SUBCUTANEOUS at 11:31

## 2019-12-11 RX ADMIN — Medication 10 ML: at 20:53

## 2019-12-11 RX ADMIN — GABAPENTIN 600 MG: 300 CAPSULE ORAL at 16:57

## 2019-12-11 RX ADMIN — LEVALBUTEROL HYDROCHLORIDE 0.31 MG: 0.31 SOLUTION RESPIRATORY (INHALATION) at 21:25

## 2019-12-11 RX ADMIN — POTASSIUM BICARBONATE 20 MEQ: 782 TABLET, EFFERVESCENT ORAL at 11:28

## 2019-12-11 RX ADMIN — LEVALBUTEROL HYDROCHLORIDE 0.31 MG: 0.31 SOLUTION RESPIRATORY (INHALATION) at 07:28

## 2019-12-11 ASSESSMENT — PAIN SCALES - GENERAL
PAINLEVEL_OUTOF10: 0

## 2019-12-11 ASSESSMENT — LIFESTYLE VARIABLES: SMOKING_STATUS: 1

## 2019-12-11 NOTE — PLAN OF CARE
Problem: Discharge Planning:  Goal: Ability to perform activities of daily living will improve  Description  Ability to perform activities of daily living will improve  Outcome: Met This Shift  Goal: Participates in care planning  Description  Participates in care planning  Outcome: Met This Shift     Problem: Risk for Impaired Skin Integrity  Goal: Tissue integrity - skin and mucous membranes  Description  Structural intactness and normal physiological function of skin and  mucous membranes.   Outcome: Met This Shift     Problem: Pain:  Goal: Pain level will decrease  Description  Pain level will decrease  Outcome: Met This Shift  Goal: Control of acute pain  Description  Control of acute pain  Outcome: Met This Shift  Goal: Control of chronic pain  Description  Control of chronic pain  Outcome: Met This Shift     Problem: Breathing Pattern - Ineffective:  Goal: Ability to achieve and maintain a regular respiratory rate will improve  Description  Ability to achieve and maintain a regular respiratory rate will improve  Outcome: Not Met This Shift     Problem: Gas Exchange - Impaired:  Goal: Levels of oxygenation will improve  Description  Levels of oxygenation will improve  Outcome: Not Met This Shift

## 2019-12-11 NOTE — PROGRESS NOTES
Speech Language Pathology      NAME:  Briseyda Whitten  :  1935  DATE: 2019  ROOM:  [facility]-    Patient NPO for CAROLYN will continue to work with patient to safely increase PO intake of solids given oral discomfort and decreased mastication secondary to dentition.       Acute respiratory failure with hypoxia ([facility] [address] [J96.01]    Jimi Sotomayor MSCCC/SLP  Speech Language Pathologist  [id number]

## 2019-12-11 NOTE — PROGRESS NOTES
Patient is seen in follow-up for atrial fibrillation    Subjective:     Ms. Anthony Almeida feels little bit better, shortness of breath is improving, still coughs  Sitting up in bed no apparent distress    ROS:  CONSTITUTIONAL:  negative for  fevers, chills  HEENT:  negative for earaches, nasal congestion and epistaxis  RESPIRATORY:  + dry cough, cough with sputum,negative for wheezing and hemoptysis  GASTROINTESTINAL:  negative for nausea, vomiting  MUSCULOSKELETAL:  negative for  myalgias, arthralgias  NEUROLOGICAL:  negative for visual disturbance, dysphagia    Medication side effects: none    Scheduled Meds:   furosemide  20 mg Intravenous Daily    [Held by provider] apixaban  5 mg Oral BID    sodium chloride flush  10 mL Intravenous 2 times per day    gabapentin  600 mg Oral 4x Daily AC & HS    cefTRIAXone (ROCEPHIN) IV  2 g Intravenous Q24H    metoprolol tartrate  100 mg Oral BID    lidocaine PF  5 mL Intradermal Once    heparin flush  3 mL Intravenous 2 times per day    amiodarone  200 mg Oral BID    Followed by   Tabitha Clark ON 12/18/2019] amiodarone  200 mg Oral Daily    levalbuterol  0.31 mg Nebulization Q8H    pantoprazole  40 mg Oral QAM AC    insulin lispro  0-6 Units Subcutaneous TID WC    insulin lispro  0-3 Units Subcutaneous Nightly    potassium bicarb-citric acid  40 mEq Oral Once    sodium chloride flush  10 mL Intravenous 2 times per day     Continuous Infusions:   dextrose       PRN Meds:sodium chloride flush, potassium chloride **OR** potassium alternative oral replacement **OR** potassium chloride, heparin flush, sodium chloride flush, heparin flush, biotene dry mouth moisturizing, oxyCODONE-acetaminophen, perflutren lipid microspheres, glucose, dextrose, glucagon (rDNA), dextrose, hydrALAZINE, sodium chloride flush, magnesium hydroxide, ondansetron, acetaminophen      Objective:      Physical Exam:   /76   Pulse 120   Temp 97.9 °F (36.6 °C) (Oral)   Resp 20   Ht 5' 5\" (1.651 m)

## 2019-12-11 NOTE — PROGRESS NOTES
Pulmonary/Critical Care Progress Note    We are following patient for extensive pneumococcal pneumonia, acute respiratory failure, elevated white blood cell count over the last 2 days, likely related to femoral vein catheter, atrial fibrillation with rapid ventricular response, now better controlled    SUBJECTIVE:  The patient's repeat CT yesterday showed no evidence of an empyema. Therefore, it is likely that the elevated white count is secondary to her femoral triple-lumen catheter which will be removed today in favor of a PICC line in IR. The PICC line nurse was unable to place the catheter yesterday. I reviewed the repeat CT scan which shows definite improvement in the extent of the left lung infiltrate. There are small pockets of pleural fluid but none of which is suggestive of an empyema. The patient in general feels much improved but would like her gabapentin increased. Dr. Shy Thompson came in last evening and increase the patient's metoprolol to 100 mg p.o. twice daily and initiated oral amiodarone. A transesophageal echocardiogram is scheduled for today. The dose of vancomycin was given yesterday in case there is evidence of femoral catheter infection.     MEDICATIONS:   sodium chloride flush  10 mL Intravenous 2 times per day    gabapentin  600 mg Oral 4x Daily AC & HS    cefTRIAXone (ROCEPHIN) IV  2 g Intravenous Q24H    metoprolol tartrate  100 mg Oral BID    enoxaparin  1 mg/kg Subcutaneous BID    lidocaine PF  5 mL Intradermal Once    heparin flush  3 mL Intravenous 2 times per day    amiodarone  200 mg Oral BID    Followed by   Chencho Black ON 12/18/2019] amiodarone  200 mg Oral Daily    levalbuterol  0.31 mg Nebulization Q8H    pantoprazole  40 mg Oral QAM AC    insulin lispro  0-6 Units Subcutaneous TID WC    insulin lispro  0-3 Units Subcutaneous Nightly    potassium bicarb-citric acid  40 mEq Oral Once    sodium chloride flush  10 mL Intravenous 2 times per day      dextrose  diltiazem (CARDIZEM) 125 mg in dextrose 5% 125 mL infusion 5 mg/hr (12/11/19 0943)    dextrose 5% in lactated ringers 50 mL/hr at 12/10/19 1515     sodium chloride flush, sodium chloride flush, heparin flush, biotene dry mouth moisturizing, oxyCODONE-acetaminophen, perflutren lipid microspheres, glucose, dextrose, glucagon (rDNA), dextrose, hydrALAZINE, sodium chloride flush, magnesium hydroxide, ondansetron, acetaminophen      REVIEW OF SYSTEMS:  Constitutional: Denies fever, weight loss, night sweats, and fatigue  Skin: Denies pigmentation, dark lesions, and rashes   HEENT: Denies hearing loss, tinnitus, ear drainage, epistaxis, sore throat, and hoarseness. Cardiovascular: Denies palpitations, chest pain, and chest pressure. Respiratory: Denies cough, dyspnea at rest, hemoptysis, apnea, and choking. Gastrointestinal: Denies nausea, vomiting, poor appetite, diarrhea, heartburn or reflux  Genitourinary: Denies dysuria, frequency, urgency or hematuria  Musculoskeletal: Denies myalgias, muscle weakness, and bone pain  Neurological: Denies dizziness, vertigo, headache, and focal weakness  Psychological: Denies anxiety and depression  Endocrine: Denies heat intolerance and cold intolerance  Hematopoietic/Lymphatic: Denies bleeding problems and blood transfusions    OBJECTIVE:  Vitals:    12/11/19 0800   BP: (!) 145/103   Pulse:    Resp:    Temp: 96.2 °F (35.7 °C)   SpO2:      FiO2 : 60 %  O2 Flow Rate (L/min): 5 L/min  O2 Device: Nasal cannula    PHYSICAL EXAM:  Constitutional: No fever, chills, diaphoresis  Skin: No skin rash, no skin breakdown  HEENT: Unremarkable  Neck: No JVD, lymphadenopathy, thyromegaly  Cardiovascular: S1, S2 normal.  No S3-S4 murmurs rubs present  Respiratory: Inspiratory crackles left lung.   Fewer bronchial breath sounds present  Gastrointestinal: Soft, flat, nontender  Genitourinary: No CVA tenderness  Extremities: No clubbing, cyanosis, or edema  Neurological: Awake, alert, oriented x3.  No motor or sensory deficits detected  Psychological: Appropriate affect    LABS:  WBC   Date Value Ref Range Status   12/11/2019 26.1 (H) 4.5 - 11.5 E9/L Final   12/10/2019 25.0 (H) 4.5 - 11.5 E9/L Final   12/09/2019 16.8 (H) 4.5 - 11.5 E9/L Final     Hemoglobin   Date Value Ref Range Status   12/11/2019 15.4 11.5 - 15.5 g/dL Final   12/10/2019 14.8 11.5 - 15.5 g/dL Final   12/09/2019 14.3 11.5 - 15.5 g/dL Final     Hematocrit   Date Value Ref Range Status   12/11/2019 47.2 34.0 - 48.0 % Final   12/10/2019 44.5 34.0 - 48.0 % Final   12/09/2019 42.8 34.0 - 48.0 % Final     MCV   Date Value Ref Range Status   12/11/2019 88.7 80.0 - 99.9 fL Final   12/10/2019 87.9 80.0 - 99.9 fL Final   12/09/2019 88.6 80.0 - 99.9 fL Final     Platelets   Date Value Ref Range Status   12/11/2019 220 130 - 450 E9/L Final   12/10/2019 210 130 - 450 E9/L Final   12/09/2019 183 130 - 450 E9/L Final     Sodium   Date Value Ref Range Status   12/11/2019 138 132 - 146 mmol/L Final   12/10/2019 138 132 - 146 mmol/L Final   12/09/2019 140 132 - 146 mmol/L Final     Potassium   Date Value Ref Range Status   12/11/2019 3.4 (L) 3.5 - 5.0 mmol/L Final   12/10/2019 3.2 (L) 3.5 - 5.0 mmol/L Final   12/09/2019 3.0 (L) 3.5 - 5.0 mmol/L Final     Potassium reflex Magnesium   Date Value Ref Range Status   12/05/2019 4.0 3.5 - 5.0 mmol/L Final     Chloride   Date Value Ref Range Status   12/11/2019 97 (L) 98 - 107 mmol/L Final   12/10/2019 97 (L) 98 - 107 mmol/L Final   12/09/2019 100 98 - 107 mmol/L Final     CO2   Date Value Ref Range Status   12/11/2019 31 (H) 22 - 29 mmol/L Final   12/10/2019 29 22 - 29 mmol/L Final   12/09/2019 26 22 - 29 mmol/L Final     BUN   Date Value Ref Range Status   12/11/2019 18 8 - 23 mg/dL Final   12/10/2019 18 8 - 23 mg/dL Final   12/09/2019 20 8 - 23 mg/dL Final     CREATININE   Date Value Ref Range Status   12/11/2019 0.7 0.5 - 1.0 mg/dL Final   12/10/2019 0.6 0.5 - 1.0 mg/dL Final   12/09/2019 0.6 0.5 - 1.0 mg/dL Final     Glucose   Date Value Ref Range Status   12/11/2019 200 (H) 74 - 99 mg/dL Final   12/10/2019 259 (H) 74 - 99 mg/dL Final   12/09/2019 249 (H) 74 - 99 mg/dL Final     Calcium   Date Value Ref Range Status   12/11/2019 7.9 (L) 8.6 - 10.2 mg/dL Final   12/10/2019 8.0 (L) 8.6 - 10.2 mg/dL Final   12/09/2019 8.6 8.6 - 10.2 mg/dL Final     Total Protein   Date Value Ref Range Status   12/11/2019 5.7 (L) 6.4 - 8.3 g/dL Final   12/10/2019 6.2 (L) 6.4 - 8.3 g/dL Final   12/09/2019 6.1 (L) 6.4 - 8.3 g/dL Final     Alb   Date Value Ref Range Status   12/11/2019 2.3 (L) 3.5 - 5.2 g/dL Final   12/10/2019 2.6 (L) 3.5 - 5.2 g/dL Final   12/09/2019 2.4 (L) 3.5 - 5.2 g/dL Final     Total Bilirubin   Date Value Ref Range Status   12/11/2019 0.4 0.0 - 1.2 mg/dL Final   12/10/2019 0.7 0.0 - 1.2 mg/dL Final   12/09/2019 0.5 0.0 - 1.2 mg/dL Final     Alkaline Phosphatase   Date Value Ref Range Status   12/11/2019 109 (H) 35 - 104 U/L Final   12/10/2019 98 35 - 104 U/L Final   12/09/2019 93 35 - 104 U/L Final     AST   Date Value Ref Range Status   12/11/2019 34 (H) 0 - 31 U/L Final   12/10/2019 19 0 - 31 U/L Final   12/09/2019 19 0 - 31 U/L Final     ALT   Date Value Ref Range Status   12/11/2019 25 0 - 32 U/L Final   12/10/2019 14 0 - 32 U/L Final   12/09/2019 14 0 - 32 U/L Final     GFR Non-   Date Value Ref Range Status   12/11/2019 >60 >=60 mL/min/1.73 Final     Comment:     Chronic Kidney Disease: less than 60 ml/min/1.73 sq.m. Kidney Failure: less than 15 ml/min/1.73 sq.m. Results valid for patients 18 years and older. 12/10/2019 >60 >=60 mL/min/1.73 Final     Comment:     Chronic Kidney Disease: less than 60 ml/min/1.73 sq.m. Kidney Failure: less than 15 ml/min/1.73 sq.m. Results valid for patients 18 years and older. 12/09/2019 >60 >=60 mL/min/1.73 Final     Comment:     Chronic Kidney Disease: less than 60 ml/min/1.73 sq.m.           Kidney Failure: less than 15 ml/min/1.73 sq.m.  Results valid for patients 18 years and older. GFR    Date Value Ref Range Status   12/11/2019 >60  Final   12/10/2019 >60  Final   12/09/2019 >60  Final     Magnesium   Date Value Ref Range Status   12/11/2019 1.7 1.6 - 2.6 mg/dL Final   12/10/2019 1.7 1.6 - 2.6 mg/dL Final   12/08/2019 2.0 1.6 - 2.6 mg/dL Final     Phosphorus   Date Value Ref Range Status   12/11/2019 2.8 2.5 - 4.5 mg/dL Final   12/10/2019 1.7 (L) 2.5 - 4.5 mg/dL Final   12/08/2019 1.3 (L) 2.5 - 4.5 mg/dL Final     No results for input(s): PH, PO2, PCO2, HCO3, BE, O2SAT in the last 72 hours. RADIOLOGY:  XR CHEST PORTABLE   Final Result   1. Stable bilateral parenchymal opacities. CT Chest WO Contrast   Final Result   1. Dense consolidation seen within the left upper lobe consistent with   pneumonia. 2. Significant left lower lobe consolidation favored to represent   atelectasis. Underlying pneumonia cannot be excluded. 3. Patchy infiltrate within the right upper lobe and right middle   lobe. 4. Interval right lung base atelectasis. 5. Very small bilateral pleural effusions. 6. There is no evidence of an empyema. XR CHEST PORTABLE   Final Result   1. Stable left lung airspace disease and effusions. XR CHEST PORTABLE   Final Result      1. Stable chest x-ray with extensive left lung airspace disease. XR CHEST PORTABLE   Final Result   1. No change from XR CHEST PORTABLE with report dated 12/7/2019 8:30   AM.          XR CHEST PORTABLE   Final Result   No change left lung pneumonia. XR CHEST PORTABLE   Final Result   Stable dense left lung consolidation. CT CHEST WO CONTRAST   Final Result   Large left lung infiltrate compatible pneumonia. Minimal   patchy infiltrate developing pneumonia right middle lobe. XR CHEST STANDARD (2 VW)   Final Result   Large left pleural effusion with left lung airspace disease.             CT Head WO Contrast   Final Result   No acute

## 2019-12-11 NOTE — PROGRESS NOTES
[address] [place], [place] [address]ist   ICU Progress Note    Admitting Date and Time: 12/5/2019  8:48 AM  Admit Dx: Acute respiratory failure with hypoxia (HCC) [J96.01]    Subjective:    Patient states that her breathing feels the same as yesterday however her cough has been improving with less sputum production. Review of systems: Unremarkable unless stated above. Per RN: No acute issues overnight and patient for CAROLYN cardioversion today.  sodium chloride flush  10 mL Intravenous 2 times per day    gabapentin  600 mg Oral 4x Daily AC & HS    vancomycin  1,000 mg Intravenous Once    cefTRIAXone (ROCEPHIN) IV  2 g Intravenous Q24H    metoprolol tartrate  100 mg Oral BID    enoxaparin  1 mg/kg Subcutaneous BID    lidocaine PF  5 mL Intradermal Once    heparin flush  3 mL Intravenous 2 times per day    amiodarone  200 mg Oral BID    Followed by   Raul Back ON 12/18/2019] amiodarone  200 mg Oral Daily    levalbuterol  0.31 mg Nebulization Q8H    pantoprazole  40 mg Oral QAM AC    insulin lispro  0-6 Units Subcutaneous TID WC    insulin lispro  0-3 Units Subcutaneous Nightly    potassium bicarb-citric acid  40 mEq Oral Once    sodium chloride flush  10 mL Intravenous 2 times per day     sodium chloride flush, 10 mL, PRN  sodium chloride flush, 10 mL, PRN  heparin flush, 3 mL, PRN  biotene dry mouth moisturizing, , PRN  oxyCODONE-acetaminophen, 1 tablet, Q6H PRN  perflutren lipid microspheres, 1.5 mL, ONCE PRN  glucose, 15 g, PRN  dextrose, 12.5 g, PRN  glucagon (rDNA), 1 mg, PRN  dextrose, 100 mL/hr, PRN  hydrALAZINE, 10 mg, Q6H PRN  sodium chloride flush, 10 mL, PRN  magnesium hydroxide, 30 mL, Daily PRN  ondansetron, 4 mg, Q6H PRN  acetaminophen, 650 mg, Q4H PRN         Objective:    BP (!) 146/98   Pulse 88   Temp 96.2 °F (35.7 °C) (Oral)   Resp 13   Ht 5' 5\" (1.651 m)   Wt 180 lb 11.2 oz (82 kg)   SpO2 97%   BMI 30.07 kg/m²   General Appearance: alert and in NAD.  On 2L supplemental O2 clean catch Updated: 12/06/19 0842      STREP PNEUMONIAE ANTIGEN, URINE Urine specimen POSITIVE for Pneumococcal pneumonia.     Narrative:       Source: URINE       Site:          Radiology:   XR CHEST PORTABLE   Final Result   1. Stable bilateral parenchymal opacities. CT Chest WO Contrast   Final Result   1. Dense consolidation seen within the left upper lobe consistent with   pneumonia. 2. Significant left lower lobe consolidation favored to represent   atelectasis. Underlying pneumonia cannot be excluded. 3. Patchy infiltrate within the right upper lobe and right middle   lobe. 4. Interval right lung base atelectasis. 5. Very small bilateral pleural effusions. 6. There is no evidence of an empyema. XR CHEST PORTABLE   Final Result   1. Stable left lung airspace disease and effusions. XR CHEST PORTABLE   Final Result      1. Stable chest x-ray with extensive left lung airspace disease. XR CHEST PORTABLE   Final Result   1. No change from XR CHEST PORTABLE with report dated 12/7/2019 8:30   AM.          XR CHEST PORTABLE   Final Result   No change left lung pneumonia. XR CHEST PORTABLE   Final Result   Stable dense left lung consolidation. CT CHEST WO CONTRAST   Final Result   Large left lung infiltrate compatible pneumonia. Minimal   patchy infiltrate developing pneumonia right middle lobe. XR CHEST STANDARD (2 VW)   Final Result   Large left pleural effusion with left lung airspace disease. CT Head WO Contrast   Final Result   No acute intracranial hemorrhage or mass effect. CT CERVICAL SPINE WO CONTRAST   Final Result   1. No acute cervical spine fracture. 2. Mild multilevel cervical spondylosis.                IR INSERT PICC VAD W SQ PORT >5 YEARS    (Results Pending)   XR CHEST PORTABLE    (Results Pending)       Assessment:  Principal Problem:    Sepsis due to pneumonia [facility])  Active Problems:    Acute respiratory failure with hypoxia ([address] JOVANI (acute kidney injury) ([facility] [address]    Elevated brain natriuretic peptide (BNP) level    Acute metabolic encephalopathy    Community acquired pneumonia of left lung    Hypoxia  Resolved Problems:    * No resolved hospital problems. *      Plan:    1. Acute hypoxic respiratory failure in the setting of dense left lobar streptococcal pneumonia--received ceftriaxone and azithromycin in ER. Given severity of pneumonia, we switched to Zosyn. Pulmonary consulted and they felt patient has high potential for decompensation and recommended she be transferred to ICU which was done the evening of admission 12/5. Vancomycin added as there was concern that family member had recent viral illness and if patient had same, would be at risk for staph pneumonia. Levaquin added by pulmonary to cover atypicals. On 12/6, antibiotics deescalated to Vanco and Levaquin. Zosyn discontinued after AM dose 12/6. Afebrile overnight; WBC 17.8--> 16.8-->25.0-->26.0 elevated. Femoral central line to be removed in lieu of PICC line placement today. Continue Vancomycin and Rocephin. CT chest ruled out empyema/parapneumonic effusion showed significant consolidation left upper lobe and lower lobe c/w pneumonia. .  IV steroids discontinued. Pulmonary/critical care following. 2. Sepsis due to streptococcal pneumonia--intensivist recommended Marik protocol of hydrocortisone, vitamin C and thiamine. Day #4/4. This was instituted on admission but dose of Solucortef decreased to 50 mg 12/6 because of increasing BUN and last dose 12/7 AM.  Steroids discontinued. On antibiotic therapy. 3. Acute metabolic/toxic encephalopathy(resolving)--related to hypoxia and pneumonia. Mentation much improved and at baseline. 4. Dysphagia--seen by speech therapy and was placed on pureed solid with thin liquids. More awake and alert will change diet to mechanical soft. No lesions in mouth to explain discomfort.   5. JOVANI (resolved)-did not know recent baseline but was 0.7 back in 2017. With hydration, creatinine normalized at 0.7  6. Atrial fibrillation with RVR--likely triggered by pulmonary process. Currently, on Cardizem drip and will intend to wean. Cardiology following and patient increased metoprolol 200 mg twice daily and initiated on p.o. amiodarone. For CAROLYN cardioversion today. 7. Hypokalemia-K 3.4. Will continue to replace and will continue to monitor. 8. OA left knee/diabetic peripheral neuropathy--On Percocet 5/325 q6 prn and gabapentin but at half home dose ie 300 mg 4x/day. 9. Elevated BNP--may all be related to pneumonia. However,checked Echo to evaluate LV function. This was done 12/6 and showed EF of 55-60% and indeterminate diastolic function. Case discussed with MICU RN. NOTE: This report was transcribed using voice recognition software.  Every effort was made to ensure accuracy; however, inadvertent computerized transcription errors may be present.     Electronically signed by Venus Arnett MD on 12/11/2019 at 12:10 PM

## 2019-12-11 NOTE — PROGRESS NOTES
IC11/IC11-01    Patient unavailable for physical therapy treatment due to declined despite maximal encouragement. Pt c/o fatigue and pain in back. Pt also stated she would be more willing to participate after procedure this morning. Will attempt treatment at a later time/date.        [facility], [facility]

## 2019-12-12 ENCOUNTER — APPOINTMENT (OUTPATIENT)
Dept: GENERAL RADIOLOGY | Age: 84
DRG: 871 | End: 2019-12-12
Payer: MEDICARE

## 2019-12-12 LAB
ALBUMIN SERPL-MCNC: 2.3 G/DL (ref 3.5–5.2)
ALP BLD-CCNC: 108 U/L (ref 35–104)
ALT SERPL-CCNC: 17 U/L (ref 0–32)
ANION GAP SERPL CALCULATED.3IONS-SCNC: 10 MMOL/L (ref 7–16)
AST SERPL-CCNC: 15 U/L (ref 0–31)
BASOPHILS ABSOLUTE: 0 E9/L (ref 0–0.2)
BASOPHILS RELATIVE PERCENT: 0.1 % (ref 0–2)
BILIRUB SERPL-MCNC: 0.5 MG/DL (ref 0–1.2)
BUN BLDV-MCNC: 13 MG/DL (ref 8–23)
BURR CELLS: ABNORMAL
CALCIUM SERPL-MCNC: 7.7 MG/DL (ref 8.6–10.2)
CHLORIDE BLD-SCNC: 100 MMOL/L (ref 98–107)
CO2: 30 MMOL/L (ref 22–29)
CREAT SERPL-MCNC: 0.6 MG/DL (ref 0.5–1)
EOSINOPHILS ABSOLUTE: 0.46 E9/L (ref 0.05–0.5)
EOSINOPHILS RELATIVE PERCENT: 1.7 % (ref 0–6)
GFR AFRICAN AMERICAN: >60
GFR NON-AFRICAN AMERICAN: >60 ML/MIN/1.73
GLUCOSE BLD-MCNC: 194 MG/DL (ref 74–99)
HCT VFR BLD CALC: 45.3 % (ref 34–48)
HEMOGLOBIN: 14.7 G/DL (ref 11.5–15.5)
LYMPHOCYTES ABSOLUTE: 0.8 E9/L (ref 1.5–4)
LYMPHOCYTES RELATIVE PERCENT: 2.6 % (ref 20–42)
MAGNESIUM: 1.8 MG/DL (ref 1.6–2.6)
MCH RBC QN AUTO: 29 PG (ref 26–35)
MCHC RBC AUTO-ENTMCNC: 32.5 % (ref 32–34.5)
MCV RBC AUTO: 89.3 FL (ref 80–99.9)
METAMYELOCYTES RELATIVE PERCENT: 6.8 % (ref 0–1)
METER GLUCOSE: 132 MG/DL (ref 74–99)
METER GLUCOSE: 183 MG/DL (ref 74–99)
METER GLUCOSE: 184 MG/DL (ref 74–99)
METER GLUCOSE: 296 MG/DL (ref 74–99)
MONOCYTES ABSOLUTE: 0.8 E9/L (ref 0.1–0.95)
MONOCYTES RELATIVE PERCENT: 3.4 % (ref 2–12)
MYCOPLASMA PNEUMONIAE IGM: NORMAL
NEUTROPHILS ABSOLUTE: 24.66 E9/L (ref 1.8–7.3)
NEUTROPHILS RELATIVE PERCENT: 85.5 % (ref 43–80)
OVALOCYTES: ABNORMAL
PDW BLD-RTO: 13.9 FL (ref 11.5–15)
PHOSPHORUS: 2.7 MG/DL (ref 2.5–4.5)
PLATELET # BLD: 204 E9/L (ref 130–450)
PMV BLD AUTO: 11.8 FL (ref 7–12)
POIKILOCYTES: ABNORMAL
POLYCHROMASIA: ABNORMAL
POTASSIUM SERPL-SCNC: 3.3 MMOL/L (ref 3.5–5)
RBC # BLD: 5.07 E12/L (ref 3.5–5.5)
SODIUM BLD-SCNC: 140 MMOL/L (ref 132–146)
TARGET CELLS: ABNORMAL
TOTAL PROTEIN: 5.4 G/DL (ref 6.4–8.3)
TOXIC GRANULATION: ABNORMAL
WBC # BLD: 26.8 E9/L (ref 4.5–11.5)

## 2019-12-12 PROCEDURE — 6360000002 HC RX W HCPCS: Performed by: NURSE PRACTITIONER

## 2019-12-12 PROCEDURE — 83735 ASSAY OF MAGNESIUM: CPT

## 2019-12-12 PROCEDURE — 94640 AIRWAY INHALATION TREATMENT: CPT

## 2019-12-12 PROCEDURE — 97530 THERAPEUTIC ACTIVITIES: CPT

## 2019-12-12 PROCEDURE — 71045 X-RAY EXAM CHEST 1 VIEW: CPT

## 2019-12-12 PROCEDURE — 2060000000 HC ICU INTERMEDIATE R&B

## 2019-12-12 PROCEDURE — 1200000000 HC SEMI PRIVATE

## 2019-12-12 PROCEDURE — 2580000003 HC RX 258: Performed by: INTERNAL MEDICINE

## 2019-12-12 PROCEDURE — 97110 THERAPEUTIC EXERCISES: CPT

## 2019-12-12 PROCEDURE — 2700000000 HC OXYGEN THERAPY PER DAY

## 2019-12-12 PROCEDURE — 6360000002 HC RX W HCPCS: Performed by: INTERNAL MEDICINE

## 2019-12-12 PROCEDURE — 6370000000 HC RX 637 (ALT 250 FOR IP): Performed by: INTERNAL MEDICINE

## 2019-12-12 PROCEDURE — 36415 COLL VENOUS BLD VENIPUNCTURE: CPT

## 2019-12-12 PROCEDURE — 2580000003 HC RX 258: Performed by: NURSE PRACTITIONER

## 2019-12-12 PROCEDURE — 99233 SBSQ HOSP IP/OBS HIGH 50: CPT | Performed by: INTERNAL MEDICINE

## 2019-12-12 PROCEDURE — 80053 COMPREHEN METABOLIC PANEL: CPT

## 2019-12-12 PROCEDURE — 84100 ASSAY OF PHOSPHORUS: CPT

## 2019-12-12 PROCEDURE — 82962 GLUCOSE BLOOD TEST: CPT

## 2019-12-12 PROCEDURE — 85025 COMPLETE CBC W/AUTO DIFF WBC: CPT

## 2019-12-12 PROCEDURE — 36592 COLLECT BLOOD FROM PICC: CPT

## 2019-12-12 RX ORDER — POTASSIUM CHLORIDE 20 MEQ/1
40 TABLET, EXTENDED RELEASE ORAL DAILY
Status: DISCONTINUED | OUTPATIENT
Start: 2019-12-13 | End: 2019-12-12

## 2019-12-12 RX ADMIN — METOPROLOL TARTRATE 100 MG: 50 TABLET ORAL at 08:14

## 2019-12-12 RX ADMIN — SODIUM CHLORIDE, PRESERVATIVE FREE 2 G: 5 INJECTION INTRAVENOUS at 09:30

## 2019-12-12 RX ADMIN — METOPROLOL TARTRATE 100 MG: 50 TABLET ORAL at 20:50

## 2019-12-12 RX ADMIN — POTASSIUM CHLORIDE 40 MEQ: 20 TABLET, EXTENDED RELEASE ORAL at 06:07

## 2019-12-12 RX ADMIN — ONDANSETRON 4 MG: 2 INJECTION INTRAMUSCULAR; INTRAVENOUS at 22:55

## 2019-12-12 RX ADMIN — Medication 300 UNITS: at 20:51

## 2019-12-12 RX ADMIN — APIXABAN 5 MG: 5 TABLET, FILM COATED ORAL at 20:51

## 2019-12-12 RX ADMIN — LEVALBUTEROL HYDROCHLORIDE 0.31 MG: 0.31 SOLUTION RESPIRATORY (INHALATION) at 21:04

## 2019-12-12 RX ADMIN — SODIUM CHLORIDE, SODIUM LACTATE, POTASSIUM CHLORIDE, CALCIUM CHLORIDE AND DEXTROSE MONOHYDRATE: 5; 600; 310; 30; 20 INJECTION, SOLUTION INTRAVENOUS at 06:58

## 2019-12-12 RX ADMIN — GABAPENTIN 600 MG: 300 CAPSULE ORAL at 10:33

## 2019-12-12 RX ADMIN — LEVALBUTEROL HYDROCHLORIDE 0.31 MG: 0.31 SOLUTION RESPIRATORY (INHALATION) at 13:45

## 2019-12-12 RX ADMIN — INSULIN LISPRO 1 UNITS: 100 INJECTION, SOLUTION INTRAVENOUS; SUBCUTANEOUS at 08:19

## 2019-12-12 RX ADMIN — Medication 10 ML: at 22:56

## 2019-12-12 RX ADMIN — Medication: at 10:33

## 2019-12-12 RX ADMIN — Medication 10 ML: at 20:52

## 2019-12-12 RX ADMIN — OXYCODONE HYDROCHLORIDE AND ACETAMINOPHEN 1 TABLET: 5; 325 TABLET ORAL at 19:27

## 2019-12-12 RX ADMIN — Medication 10 ML: at 09:00

## 2019-12-12 RX ADMIN — GABAPENTIN 600 MG: 300 CAPSULE ORAL at 20:50

## 2019-12-12 RX ADMIN — LEVALBUTEROL HYDROCHLORIDE 0.31 MG: 0.31 SOLUTION RESPIRATORY (INHALATION) at 06:38

## 2019-12-12 RX ADMIN — AMIODARONE HYDROCHLORIDE 200 MG: 200 TABLET ORAL at 20:50

## 2019-12-12 RX ADMIN — INSULIN LISPRO 3 UNITS: 100 INJECTION, SOLUTION INTRAVENOUS; SUBCUTANEOUS at 12:04

## 2019-12-12 RX ADMIN — ENOXAPARIN SODIUM 80 MG: 100 INJECTION SUBCUTANEOUS at 08:15

## 2019-12-12 RX ADMIN — AMIODARONE HYDROCHLORIDE 200 MG: 200 TABLET ORAL at 08:14

## 2019-12-12 RX ADMIN — Medication 10 ML: at 08:16

## 2019-12-12 RX ADMIN — GABAPENTIN 600 MG: 300 CAPSULE ORAL at 06:04

## 2019-12-12 RX ADMIN — PANTOPRAZOLE SODIUM 40 MG: 40 TABLET, DELAYED RELEASE ORAL at 06:04

## 2019-12-12 RX ADMIN — INSULIN LISPRO 1 UNITS: 100 INJECTION, SOLUTION INTRAVENOUS; SUBCUTANEOUS at 21:00

## 2019-12-12 RX ADMIN — GABAPENTIN 600 MG: 300 CAPSULE ORAL at 17:13

## 2019-12-12 ASSESSMENT — PAIN SCALES - GENERAL
PAINLEVEL_OUTOF10: 0
PAINLEVEL_OUTOF10: 6
PAINLEVEL_OUTOF10: 0

## 2019-12-12 ASSESSMENT — PAIN DESCRIPTION - PAIN TYPE: TYPE: CHRONIC PAIN

## 2019-12-12 ASSESSMENT — PAIN DESCRIPTION - FREQUENCY: FREQUENCY: CONTINUOUS

## 2019-12-12 ASSESSMENT — PAIN DESCRIPTION - LOCATION: LOCATION: BACK

## 2019-12-12 ASSESSMENT — PAIN DESCRIPTION - PROGRESSION: CLINICAL_PROGRESSION: GRADUALLY IMPROVING

## 2019-12-12 ASSESSMENT — PAIN - FUNCTIONAL ASSESSMENT: PAIN_FUNCTIONAL_ASSESSMENT: ACTIVITIES ARE NOT PREVENTED

## 2019-12-12 ASSESSMENT — PAIN DESCRIPTION - ONSET: ONSET: ON-GOING

## 2019-12-12 NOTE — PROGRESS NOTES
functional mobility. Pt transferred to side of bed and sat up x 10 minutes to increase dynamic sitting balance and activity tolerance. Pt stood x 2 reps. Pt was returned to supine at end of treatment. Pt performed the following supine BLE exercises with AAROM/AROM: ankle pumps, heel slides, and hip abduction x 10 reps. V/C were needed for correct technique and to perform 2-3x daily. Pt rolled x 2 reps for assistance with hygiene and linen change d/t incontinence of bowel. Pt required moderate assistance for rolling. Pt required maximal assistance of 2 to scoot up in bed, BLE were elevated on a pillow, and head of bed was elevated. Comment: Call light left within reach of patient. Bed alarm was activated. A: Pt was able to progress and stand, however, fatigued quickly. Pt was also fearful of falling where she required encouragement to participate. Decreased strength and endurance limiting function. P: Continue with physical therapy       Carmencita Morrison PTA    GOALS to be met in 2 days. Bed mobility- Moderate assist                                               Transfers-Sit to stand- Moderate assist                Gait: Patient to ambulate 15 feet using wheeled walker with Moderate assist                   Increase strength in affected mm groups by 1/3 grade  Increase balance to allow for improvement towards functional goals. Increase endurance to allow for improvement towards functional goals.

## 2019-12-12 NOTE — PROGRESS NOTES
Pulmonary/Critical Care Progress Note    We are following patient for large left-sided pneumococcal pneumonia without strong evidence of empyema, status post acute respiratory failure improved, new onset atrial fibrillation with rapid ventricular response, now controlled with amiodarone and metoprolol,    SUBJECTIVE:  The patient is continuing to improve. She has less back pain now on the increased dose of gabapentin. She is continuing to receive Rocephin and received an extra dose of vancomycin because of the possibility that her leukocytosis was secondary to right femoral catheter triple-lumen. Her heart rate is much better controlled and she has had no fevers. I believe that she can be transferred up to the floor to a monitored unit for further treatment. Nevertheless, we should still keep monitoring her white blood cell count. With a large pneumococcal pneumonia, the possibility of an empyema still exists even with high dose and aggressive antibiotic treatment.     MEDICATIONS:   sodium chloride flush  10 mL Intravenous 2 times per day    gabapentin  600 mg Oral 4x Daily AC & HS    cefTRIAXone (ROCEPHIN) IV  2 g Intravenous Q24H    metoprolol tartrate  100 mg Oral BID    enoxaparin  1 mg/kg Subcutaneous BID    lidocaine PF  5 mL Intradermal Once    heparin flush  3 mL Intravenous 2 times per day    amiodarone  200 mg Oral BID    Followed by   Barbara Salcedo ON 12/18/2019] amiodarone  200 mg Oral Daily    levalbuterol  0.31 mg Nebulization Q8H    pantoprazole  40 mg Oral QAM AC    insulin lispro  0-6 Units Subcutaneous TID WC    insulin lispro  0-3 Units Subcutaneous Nightly    potassium bicarb-citric acid  40 mEq Oral Once    sodium chloride flush  10 mL Intravenous 2 times per day      dextrose      diltiazem (CARDIZEM) 125 mg in dextrose 5% 125 mL infusion Stopped (12/12/19 0827)    dextrose 5% in lactated ringers 50 mL/hr at 12/12/19 0800     sodium chloride flush, potassium chloride **OR** affect    LABS:  WBC   Date Value Ref Range Status   12/12/2019 26.8 (H) 4.5 - 11.5 E9/L Final   12/11/2019 26.1 (H) 4.5 - 11.5 E9/L Final   12/10/2019 25.0 (H) 4.5 - 11.5 E9/L Final     Hemoglobin   Date Value Ref Range Status   12/12/2019 14.7 11.5 - 15.5 g/dL Final   12/11/2019 15.4 11.5 - 15.5 g/dL Final   12/10/2019 14.8 11.5 - 15.5 g/dL Final     Hematocrit   Date Value Ref Range Status   12/12/2019 45.3 34.0 - 48.0 % Final   12/11/2019 47.2 34.0 - 48.0 % Final   12/10/2019 44.5 34.0 - 48.0 % Final     MCV   Date Value Ref Range Status   12/12/2019 89.3 80.0 - 99.9 fL Final   12/11/2019 88.7 80.0 - 99.9 fL Final   12/10/2019 87.9 80.0 - 99.9 fL Final     Platelets   Date Value Ref Range Status   12/12/2019 204 130 - 450 E9/L Final   12/11/2019 220 130 - 450 E9/L Final   12/10/2019 210 130 - 450 E9/L Final     Sodium   Date Value Ref Range Status   12/12/2019 140 132 - 146 mmol/L Final   12/11/2019 138 132 - 146 mmol/L Final   12/10/2019 138 132 - 146 mmol/L Final     Potassium   Date Value Ref Range Status   12/12/2019 3.3 (L) 3.5 - 5.0 mmol/L Final   12/11/2019 3.4 (L) 3.5 - 5.0 mmol/L Final   12/10/2019 3.2 (L) 3.5 - 5.0 mmol/L Final     Potassium reflex Magnesium   Date Value Ref Range Status   12/05/2019 4.0 3.5 - 5.0 mmol/L Final     Chloride   Date Value Ref Range Status   12/12/2019 100 98 - 107 mmol/L Final   12/11/2019 97 (L) 98 - 107 mmol/L Final   12/10/2019 97 (L) 98 - 107 mmol/L Final     CO2   Date Value Ref Range Status   12/12/2019 30 (H) 22 - 29 mmol/L Final   12/11/2019 31 (H) 22 - 29 mmol/L Final   12/10/2019 29 22 - 29 mmol/L Final     BUN   Date Value Ref Range Status   12/12/2019 13 8 - 23 mg/dL Final   12/11/2019 18 8 - 23 mg/dL Final   12/10/2019 18 8 - 23 mg/dL Final     CREATININE   Date Value Ref Range Status   12/12/2019 0.6 0.5 - 1.0 mg/dL Final   12/11/2019 0.7 0.5 - 1.0 mg/dL Final   12/10/2019 0.6 0.5 - 1.0 mg/dL Final     Glucose   Date Value Ref Range Status   12/12/2019 194 (H) 74 - 99 mg/dL Final   12/11/2019 200 (H) 74 - 99 mg/dL Final   12/10/2019 259 (H) 74 - 99 mg/dL Final     Calcium   Date Value Ref Range Status   12/12/2019 7.7 (L) 8.6 - 10.2 mg/dL Final   12/11/2019 7.9 (L) 8.6 - 10.2 mg/dL Final   12/10/2019 8.0 (L) 8.6 - 10.2 mg/dL Final     Total Protein   Date Value Ref Range Status   12/12/2019 5.4 (L) 6.4 - 8.3 g/dL Final   12/11/2019 5.7 (L) 6.4 - 8.3 g/dL Final   12/10/2019 6.2 (L) 6.4 - 8.3 g/dL Final     Alb   Date Value Ref Range Status   12/12/2019 2.3 (L) 3.5 - 5.2 g/dL Final   12/11/2019 2.3 (L) 3.5 - 5.2 g/dL Final   12/10/2019 2.6 (L) 3.5 - 5.2 g/dL Final     Total Bilirubin   Date Value Ref Range Status   12/12/2019 0.5 0.0 - 1.2 mg/dL Final   12/11/2019 0.4 0.0 - 1.2 mg/dL Final   12/10/2019 0.7 0.0 - 1.2 mg/dL Final     Alkaline Phosphatase   Date Value Ref Range Status   12/12/2019 108 (H) 35 - 104 U/L Final   12/11/2019 109 (H) 35 - 104 U/L Final   12/10/2019 98 35 - 104 U/L Final     AST   Date Value Ref Range Status   12/12/2019 15 0 - 31 U/L Final   12/11/2019 34 (H) 0 - 31 U/L Final   12/10/2019 19 0 - 31 U/L Final     ALT   Date Value Ref Range Status   12/12/2019 17 0 - 32 U/L Final   12/11/2019 25 0 - 32 U/L Final   12/10/2019 14 0 - 32 U/L Final     GFR Non-   Date Value Ref Range Status   12/12/2019 >60 >=60 mL/min/1.73 Final     Comment:     Chronic Kidney Disease: less than 60 ml/min/1.73 sq.m. Kidney Failure: less than 15 ml/min/1.73 sq.m. Results valid for patients 18 years and older. 12/11/2019 >60 >=60 mL/min/1.73 Final     Comment:     Chronic Kidney Disease: less than 60 ml/min/1.73 sq.m. Kidney Failure: less than 15 ml/min/1.73 sq.m. Results valid for patients 18 years and older. 12/10/2019 >60 >=60 mL/min/1.73 Final     Comment:     Chronic Kidney Disease: less than 60 ml/min/1.73 sq.m. Kidney Failure: less than 15 ml/min/1.73 sq.m. Results valid for patients 18 years and older. GFR    Date Value Ref Range Status   12/12/2019 >60  Final   12/11/2019 >60  Final   12/10/2019 >60  Final     Magnesium   Date Value Ref Range Status   12/12/2019 1.8 1.6 - 2.6 mg/dL Final   12/11/2019 1.7 1.6 - 2.6 mg/dL Final   12/10/2019 1.7 1.6 - 2.6 mg/dL Final     Phosphorus   Date Value Ref Range Status   12/12/2019 2.7 2.5 - 4.5 mg/dL Final   12/11/2019 2.8 2.5 - 4.5 mg/dL Final   12/10/2019 1.7 (L) 2.5 - 4.5 mg/dL Final     No results for input(s): PH, PO2, PCO2, HCO3, BE, O2SAT in the last 72 hours. RADIOLOGY:  XR CHEST PORTABLE   Final Result   1. Stable large left effusion and airspace disease. IR FLUORO GUIDED CVA DEVICE PLMT/REPLACE/REMOVAL   Final Result   Successful placement of a 5 [place] double-lumen Power PICC   line using ultrasound guidance via the right brachial  vein. IR ULTRASOUND GUIDANCE VASCULAR ACCESS   Final Result   Ultrasound guidance was provided during placement of a   PICC line. XR CHEST PORTABLE   Final Result   1. Stable bilateral parenchymal opacities. CT Chest WO Contrast   Final Result   1. Dense consolidation seen within the left upper lobe consistent with   pneumonia. 2. Significant left lower lobe consolidation favored to represent   atelectasis. Underlying pneumonia cannot be excluded. 3. Patchy infiltrate within the right upper lobe and right middle   lobe. 4. Interval right lung base atelectasis. 5. Very small bilateral pleural effusions. 6. There is no evidence of an empyema. XR CHEST PORTABLE   Final Result   1. Stable left lung airspace disease and effusions. XR CHEST PORTABLE   Final Result      1. Stable chest x-ray with extensive left lung airspace disease. XR CHEST PORTABLE   Final Result   1. No change from XR CHEST PORTABLE with report dated 12/7/2019 8:30   AM.          XR CHEST PORTABLE   Final Result   No change left lung pneumonia.       XR CHEST PORTABLE   Final Result   Stable

## 2019-12-12 NOTE — PLAN OF CARE
activities of daily living will improve  Description  Ability to perform activities of daily living will improve  12/12/2019 0932 by Janice Boyle RN  Outcome: Not Met This Shift  Note:   Needs to increase PT/OT to return to baseline  12/12/2019 0254 by Kyle Morrell RN  Outcome: Met This Shift     Problem: Risk for Impaired Skin Integrity  Goal: Tissue integrity - skin and mucous membranes  Description  Structural intactness and normal physiological function of skin and  mucous membranes.   12/12/2019 0932 by Janice Boyle RN  Outcome: Not Met This Shift  Note:   Small skin tear, q2h turning  12/12/2019 0254 by Kyle Morrell RN  Outcome: Met This Shift  12/11/2019 2255 by Kyle Morrell RN  Outcome: Met This Shift

## 2019-12-12 NOTE — PROGRESS NOTES
Speech Language Pathology      NAME:  Josephine Perdomo  :  1935  DATE: 2019  [facility]OM:  [facility]    Attempted to see Pt for lunch meal, Pt adamant she would not want anything to eat- multiple alteratives offered without effect.      Acute respiratory failure with hypoxia ([facility] [address] [J96.01]    Lobo Bell M.S., CCC-SLP  Speech-Language Pathologist  [id number]  2019

## 2019-12-12 NOTE — PLAN OF CARE
Problem: Risk for Impaired Skin Integrity  Goal: Tissue integrity - skin and mucous membranes  Description  Structural intactness and normal physiological function of skin and  mucous membranes.   12/11/2019 2255 by Barb Taveras RN  Outcome: Met This Shift     Problem: Discharge Planning:  Goal: Participates in care planning  Description  Participates in care planning  12/11/2019 2255 by Barb Taveras RN  Outcome: Met This Shift     Problem: Pain:  Goal: Pain level will decrease  Description  Pain level will decrease  12/11/2019 2255 by Barb Taveras RN  Outcome: Met This Shift     Problem: Pain:  Goal: Control of acute pain  Description  Control of acute pain  12/11/2019 2255 by Barb Taveras RN  Outcome: Met This Shift     Problem: Breathing Pattern - Ineffective:  Goal: Ability to achieve and maintain a regular respiratory rate will improve  Description  Ability to achieve and maintain a regular respiratory rate will improve  12/11/2019 2255 by Barb Taveras RN  Outcome: Met This Shift     Problem: Gas Exchange - Impaired:  Goal: Levels of oxygenation will improve  Description  Levels of oxygenation will improve  12/11/2019 2255 by Barb Taveras RN  Outcome: Met This Shift

## 2019-12-12 NOTE — CARE COORDINATION
12/12  Referral placed with Soila Thibodeaux with at home with lorraine Rasheed per pt/family request.  At this time they do not want DONOVAN- if they did it would be [facility] only. Eliquis free 30 day copay card placed in soft blue chart with directions to follow up with patients pcp prior to the end of the 30 days for further direction on the eliquis. Pt is a [facility]. CM/SS to follow.  LENNYK   Electronically signed by Federico Warren RN-BC on 12/12/2019 at 11:28 AM

## 2019-12-13 LAB
ALBUMIN SERPL-MCNC: 2.2 G/DL (ref 3.5–5.2)
ALP BLD-CCNC: 105 U/L (ref 35–104)
ALT SERPL-CCNC: 12 U/L (ref 0–32)
ANION GAP SERPL CALCULATED.3IONS-SCNC: 9 MMOL/L (ref 7–16)
AST SERPL-CCNC: 15 U/L (ref 0–31)
BASOPHILS ABSOLUTE: 0 E9/L (ref 0–0.2)
BASOPHILS RELATIVE PERCENT: 0.6 % (ref 0–2)
BILIRUB SERPL-MCNC: 0.6 MG/DL (ref 0–1.2)
BUN BLDV-MCNC: 12 MG/DL (ref 8–23)
CALCIUM SERPL-MCNC: 7.8 MG/DL (ref 8.6–10.2)
CHLORIDE BLD-SCNC: 99 MMOL/L (ref 98–107)
CO2: 31 MMOL/L (ref 22–29)
CREAT SERPL-MCNC: 0.7 MG/DL (ref 0.5–1)
EOSINOPHILS ABSOLUTE: 0.24 E9/L (ref 0.05–0.5)
EOSINOPHILS RELATIVE PERCENT: 0.9 % (ref 0–6)
GFR AFRICAN AMERICAN: >60
GFR NON-AFRICAN AMERICAN: >60 ML/MIN/1.73
GLUCOSE BLD-MCNC: 172 MG/DL (ref 74–99)
HCT VFR BLD CALC: 42.3 % (ref 34–48)
HEMOGLOBIN: 13.6 G/DL (ref 11.5–15.5)
LYMPHOCYTES ABSOLUTE: 0.79 E9/L (ref 1.5–4)
LYMPHOCYTES RELATIVE PERCENT: 2.6 % (ref 20–42)
MAGNESIUM: 1.8 MG/DL (ref 1.6–2.6)
MCH RBC QN AUTO: 29.4 PG (ref 26–35)
MCHC RBC AUTO-ENTMCNC: 32.2 % (ref 32–34.5)
MCV RBC AUTO: 91.4 FL (ref 80–99.9)
METAMYELOCYTES RELATIVE PERCENT: 0.9 % (ref 0–1)
METER GLUCOSE: 147 MG/DL (ref 74–99)
METER GLUCOSE: 158 MG/DL (ref 74–99)
METER GLUCOSE: 225 MG/DL (ref 74–99)
MONOCYTES ABSOLUTE: 0.53 E9/L (ref 0.1–0.95)
MONOCYTES RELATIVE PERCENT: 1.7 % (ref 2–12)
MYELOCYTE PERCENT: 0.9 % (ref 0–0)
NEUTROPHILS ABSOLUTE: 24.99 E9/L (ref 1.8–7.3)
NEUTROPHILS RELATIVE PERCENT: 93 % (ref 43–80)
PDW BLD-RTO: 14 FL (ref 11.5–15)
PHOSPHORUS: 2.5 MG/DL (ref 2.5–4.5)
PLATELET # BLD: 193 E9/L (ref 130–450)
PMV BLD AUTO: 12.2 FL (ref 7–12)
POTASSIUM SERPL-SCNC: 3.3 MMOL/L (ref 3.5–5)
RBC # BLD: 4.63 E12/L (ref 3.5–5.5)
RBC # BLD: NORMAL 10*6/UL
SODIUM BLD-SCNC: 139 MMOL/L (ref 132–146)
TOTAL PROTEIN: 5.2 G/DL (ref 6.4–8.3)
WBC # BLD: 26.3 E9/L (ref 4.5–11.5)

## 2019-12-13 PROCEDURE — 6360000002 HC RX W HCPCS: Performed by: INTERNAL MEDICINE

## 2019-12-13 PROCEDURE — 84100 ASSAY OF PHOSPHORUS: CPT

## 2019-12-13 PROCEDURE — 6370000000 HC RX 637 (ALT 250 FOR IP): Performed by: INTERNAL MEDICINE

## 2019-12-13 PROCEDURE — 82962 GLUCOSE BLOOD TEST: CPT

## 2019-12-13 PROCEDURE — 36415 COLL VENOUS BLD VENIPUNCTURE: CPT

## 2019-12-13 PROCEDURE — 2580000003 HC RX 258: Performed by: NURSE PRACTITIONER

## 2019-12-13 PROCEDURE — 97530 THERAPEUTIC ACTIVITIES: CPT

## 2019-12-13 PROCEDURE — 92526 ORAL FUNCTION THERAPY: CPT | Performed by: SPEECH-LANGUAGE PATHOLOGIST

## 2019-12-13 PROCEDURE — 1200000000 HC SEMI PRIVATE

## 2019-12-13 PROCEDURE — 2580000003 HC RX 258: Performed by: INTERNAL MEDICINE

## 2019-12-13 PROCEDURE — 83735 ASSAY OF MAGNESIUM: CPT

## 2019-12-13 PROCEDURE — 97110 THERAPEUTIC EXERCISES: CPT

## 2019-12-13 PROCEDURE — 85025 COMPLETE CBC W/AUTO DIFF WBC: CPT

## 2019-12-13 PROCEDURE — 94640 AIRWAY INHALATION TREATMENT: CPT

## 2019-12-13 PROCEDURE — 2700000000 HC OXYGEN THERAPY PER DAY

## 2019-12-13 PROCEDURE — 99233 SBSQ HOSP IP/OBS HIGH 50: CPT | Performed by: INTERNAL MEDICINE

## 2019-12-13 PROCEDURE — 80053 COMPREHEN METABOLIC PANEL: CPT

## 2019-12-13 PROCEDURE — 6360000002 HC RX W HCPCS: Performed by: NURSE PRACTITIONER

## 2019-12-13 RX ORDER — FUROSEMIDE 10 MG/ML
20 INJECTION INTRAMUSCULAR; INTRAVENOUS DAILY
Status: DISCONTINUED | OUTPATIENT
Start: 2019-12-14 | End: 2019-12-15

## 2019-12-13 RX ADMIN — SODIUM CHLORIDE, SODIUM LACTATE, POTASSIUM CHLORIDE, CALCIUM CHLORIDE AND DEXTROSE MONOHYDRATE: 5; 600; 310; 30; 20 INJECTION, SOLUTION INTRAVENOUS at 04:06

## 2019-12-13 RX ADMIN — Medication 10 ML: at 20:17

## 2019-12-13 RX ADMIN — APIXABAN 5 MG: 5 TABLET, FILM COATED ORAL at 20:15

## 2019-12-13 RX ADMIN — Medication 10 ML: at 08:36

## 2019-12-13 RX ADMIN — Medication 300 UNITS: at 20:17

## 2019-12-13 RX ADMIN — OXYCODONE HYDROCHLORIDE AND ACETAMINOPHEN 1 TABLET: 5; 325 TABLET ORAL at 16:09

## 2019-12-13 RX ADMIN — PANTOPRAZOLE SODIUM 40 MG: 40 TABLET, DELAYED RELEASE ORAL at 06:40

## 2019-12-13 RX ADMIN — LEVALBUTEROL HYDROCHLORIDE 0.31 MG: 0.31 SOLUTION RESPIRATORY (INHALATION) at 07:20

## 2019-12-13 RX ADMIN — Medication 10 ML: at 08:37

## 2019-12-13 RX ADMIN — METOPROLOL TARTRATE 100 MG: 50 TABLET ORAL at 20:15

## 2019-12-13 RX ADMIN — SODIUM CHLORIDE, PRESERVATIVE FREE 2 G: 5 INJECTION INTRAVENOUS at 09:40

## 2019-12-13 RX ADMIN — AMIODARONE HYDROCHLORIDE 200 MG: 200 TABLET ORAL at 20:15

## 2019-12-13 RX ADMIN — Medication 10 ML: at 20:15

## 2019-12-13 RX ADMIN — INSULIN LISPRO 1 UNITS: 100 INJECTION, SOLUTION INTRAVENOUS; SUBCUTANEOUS at 12:10

## 2019-12-13 RX ADMIN — GABAPENTIN 600 MG: 300 CAPSULE ORAL at 11:12

## 2019-12-13 RX ADMIN — GABAPENTIN 600 MG: 300 CAPSULE ORAL at 20:15

## 2019-12-13 RX ADMIN — APIXABAN 5 MG: 5 TABLET, FILM COATED ORAL at 08:36

## 2019-12-13 RX ADMIN — INSULIN LISPRO 1 UNITS: 100 INJECTION, SOLUTION INTRAVENOUS; SUBCUTANEOUS at 20:14

## 2019-12-13 RX ADMIN — AMIODARONE HYDROCHLORIDE 200 MG: 200 TABLET ORAL at 08:36

## 2019-12-13 RX ADMIN — LEVALBUTEROL HYDROCHLORIDE 0.31 MG: 0.31 SOLUTION RESPIRATORY (INHALATION) at 22:31

## 2019-12-13 RX ADMIN — INSULIN LISPRO 1 UNITS: 100 INJECTION, SOLUTION INTRAVENOUS; SUBCUTANEOUS at 17:03

## 2019-12-13 RX ADMIN — GABAPENTIN 600 MG: 300 CAPSULE ORAL at 17:03

## 2019-12-13 RX ADMIN — METOPROLOL TARTRATE 100 MG: 50 TABLET ORAL at 08:36

## 2019-12-13 RX ADMIN — GABAPENTIN 600 MG: 300 CAPSULE ORAL at 06:40

## 2019-12-13 RX ADMIN — Medication: at 00:15

## 2019-12-13 RX ADMIN — POTASSIUM CHLORIDE 40 MEQ: 20 TABLET, EXTENDED RELEASE ORAL at 11:12

## 2019-12-13 RX ADMIN — ONDANSETRON 4 MG: 2 INJECTION INTRAMUSCULAR; INTRAVENOUS at 08:03

## 2019-12-13 RX ADMIN — INSULIN LISPRO 1 UNITS: 100 INJECTION, SOLUTION INTRAVENOUS; SUBCUTANEOUS at 08:38

## 2019-12-13 ASSESSMENT — PAIN SCALES - GENERAL
PAINLEVEL_OUTOF10: 9
PAINLEVEL_OUTOF10: 0

## 2019-12-13 NOTE — PROGRESS NOTES
mobility. Pt transferred to side of bed and sat up x 10 minutes to increase dynamic sitting balance and activity tolerance. Pt. Ambulated to the chair and performed BLE exercises: ankle pumps, laq and heel raises x 10 reps. V/C were needed for correct technique and to perform 2-3x daily. Pt. Stood a second time and ambulated 3 steps forward and 3 steps backward ,using wheeled walker. Pt. Remains in the chair with family present. Comment: Call light left within reach of patient. A: Pt was able to take steps. Decreased strength and endurance limiting function. P: Continue with physical therapy       Katt Dasilva PTA    GOALS to be met in 2 days. Bed mobility- Moderate assist                                               Transfers-Sit to stand- Moderate assist                Gait: Patient to ambulate 15 feet using wheeled walker with Moderate assist                   Increase strength in affected mm groups by 1/3 grade  Increase balance to allow for improvement towards functional goals. Increase endurance to allow for improvement towards functional goals.

## 2019-12-13 NOTE — PLAN OF CARE
Problem: Risk for Impaired Skin Integrity  Goal: Tissue integrity - skin and mucous membranes  Description  Structural intactness and normal physiological function of skin and  mucous membranes.   12/12/2019 2138 by Portia Mueller RN  Outcome: Met This Shift  12/12/2019 0932 by Nick Chavez RN  Outcome: Not Met This Shift  Note:   Small skin tear, q2h turning     Problem: Pain:  Goal: Pain level will decrease  Description  Pain level will decrease  12/12/2019 2138 by Portia Mueller RN  Outcome: Met This Shift  12/12/2019 0932 by Nick Chavez RN  Outcome: Met This Shift     Problem: Pain:  Goal: Control of acute pain  Description  Control of acute pain  12/12/2019 2138 by Portia Mueller RN  Outcome: Met This Shift  12/12/2019 0932 by Nick Chavez RN  Outcome: Met This Shift     Problem: Pain:  Goal: Control of chronic pain  Description  Control of chronic pain  12/12/2019 2138 by Portia Mueller RN  Outcome: Met This Shift  12/12/2019 0932 by Nick Chavez RN  Outcome: Met This Shift

## 2019-12-13 NOTE — PROGRESS NOTES
Speech Language Pathology      NAME:  Devan York  :  1935  DATE: 2019  ROOM:  5909/6263-12    Pt seen for dysphagia management. Pt pleasant and cooperative. Upon chart review, diet seemingly upgraded to dental soft per MD order. Pt voiced c/o difficulty masticating pasta on lunch tray, stated \"I wish I could get them to send me softer foods and get it through their minds that I can't chew it\". SLP reiterated that SLP staff had recommended puree/ thin liquids and that the next step down would be back to a puree diet however Pt did not want a puree diet again. Pt then stated \"I wish they would fix my diet so I could eat toast\" Pt then edu'd by SLP that if Pt was having difficulty with noodles, she would most likely have more difficulty with toast/ bread products. Pt did voice understanding. Slow but functional mastication of noodles noted with no overt s/s of pen/asp.      Acute respiratory failure with hypoxia ([facility] [address] [J96.01]    Sangeeta Cali M.S., CCC-SLP  Speech-Language Pathologist  [id number]  2019

## 2019-12-13 NOTE — PROGRESS NOTES
Patient still has not voided since carrillo catheter removal. I placed her on the bedpan, but she was unable to urinate. She denies urgency, pressure, or the \"feeling of having to go\". I bladder scanned her at 0428 and got 322ml with bladder in center of window. Will continue to closely monitor.

## 2019-12-13 NOTE — PLAN OF CARE
Problem: Discharge Planning:  Goal: Participates in care planning  Description  Participates in care planning  Outcome: Met This Shift     Problem: Risk for Impaired Skin Integrity  Goal: Tissue integrity - skin and mucous membranes  Description  Structural intactness and normal physiological function of skin and  mucous membranes.   12/13/2019 1016 by Radha Wells RN  Outcome: Met This Shift  12/13/2019 0734 by Norma Bassett RN  Outcome: Met This Shift  12/12/2019 2138 by Thomas Herzog RN  Outcome: Met This Shift     Problem: Pain:  Goal: Pain level will decrease  Description  Pain level will decrease  12/13/2019 1016 by Radha Wells RN  Outcome: Met This Shift  12/12/2019 2138 by Thomas Herzog RN  Outcome: Met This Shift  Goal: Control of acute pain  Description  Control of acute pain  12/13/2019 1016 by Radha Wells RN  Outcome: Met This Shift  12/13/2019 0734 by Norma Bassett RN  Outcome: Met This Shift  12/12/2019 2138 by Thomas Herzog RN  Outcome: Met This Shift  Goal: Control of chronic pain  Description  Control of chronic pain  12/13/2019 1016 by Radha Wells RN  Outcome: Met This Shift  12/13/2019 0734 by Norma Bassett RN  Outcome: Met This Shift  12/12/2019 2138 by Thomas Herzog RN  Outcome: Met This Shift     Problem: Discharge Planning:  Goal: Ability to perform activities of daily living will improve  Description  Ability to perform activities of daily living will improve  Outcome: Not Met This Shift

## 2019-12-13 NOTE — PLAN OF CARE
Problem: Risk for Impaired Skin Integrity  Goal: Tissue integrity - skin and mucous membranes  Description  Structural intactness and normal physiological function of skin and  mucous membranes.   12/13/2019 0734 by Yonis Colleir RN  Outcome: Met This Shift  12/12/2019 2138 by Tawana Hanks RN  Outcome: Met This Shift     Problem: Pain:  Goal: Pain level will decrease  Description  Pain level will decrease  12/12/2019 2138 by Tawana Hanks RN  Outcome: Met This Shift  Goal: Control of acute pain  Description  Control of acute pain  12/13/2019 0734 by Yonis Collier RN  Outcome: Met This Shift  12/12/2019 2138 by Tawana Hanks RN  Outcome: Met This Shift  Goal: Control of chronic pain  Description  Control of chronic pain  12/13/2019 0734 by Yonis Collier RN  Outcome: Met This Shift  12/12/2019 2138 by Tawana Hanks RN  Outcome: Met This Shift

## 2019-12-13 NOTE — PROGRESS NOTES
[address] [place], [place] [address]   ICU Progress Note    Admitting Date and Time: 12/5/2019  8:48 AM  Admit Dx: Acute respiratory failure with hypoxia (HCC) [J96.01]    Subjective:    Patient states that breathing continues improving and that she is happy some of her swelling has improved. She denies any associated chest tightness, wheezing, chest pain, dyspnea at rest, abdominal pain. Review of systems: Unremarkable unless stated above. Per RN: No acute issues overnight and patient is now off Cardizem drip. Rodriguez removed at 4pm yesterday and patient did have some urinary retention but has voided a bit early this morning.      apixaban  5 mg Oral BID    sodium chloride flush  10 mL Intravenous 2 times per day    gabapentin  600 mg Oral 4x Daily AC & HS    cefTRIAXone (ROCEPHIN) IV  2 g Intravenous Q24H    metoprolol tartrate  100 mg Oral BID    lidocaine PF  5 mL Intradermal Once    heparin flush  3 mL Intravenous 2 times per day    amiodarone  200 mg Oral BID    Followed by   Tiffanie Xinog ON 12/18/2019] amiodarone  200 mg Oral Daily    levalbuterol  0.31 mg Nebulization Q8H    pantoprazole  40 mg Oral QAM AC    insulin lispro  0-6 Units Subcutaneous TID WC    insulin lispro  0-3 Units Subcutaneous Nightly    potassium bicarb-citric acid  40 mEq Oral Once    sodium chloride flush  10 mL Intravenous 2 times per day     sodium chloride flush, 10 mL, PRN  potassium chloride, 40 mEq, PRN    Or  potassium alternative oral replacement, 40 mEq, PRN    Or  potassium chloride, 10 mEq, PRN  heparin flush, 100 Units, PRN  sodium chloride flush, 10 mL, PRN  heparin flush, 3 mL, PRN  biotene dry mouth moisturizing, , PRN  oxyCODONE-acetaminophen, 1 tablet, Q6H PRN  perflutren lipid microspheres, 1.5 mL, ONCE PRN  glucose, 15 g, PRN  dextrose, 12.5 g, PRN  glucagon (rDNA), 1 mg, PRN  dextrose, 100 mL/hr, PRN  hydrALAZINE, 10 mg, Q6H PRN  sodium chloride flush, 10 mL, PRN  magnesium hydroxide, 30 mL, Daily PRN  ondansetron, 4 mg, Q6H PRN  acetaminophen, 650 mg, Q4H PRN         Objective:    BP (!) 139/90   Pulse 102   Temp 97.6 °F (36.4 °C) (Oral)   Resp 15   Ht 5' 5\" (1.651 m)   Wt 186 lb 1.6 oz (84.4 kg)   SpO2 92%   BMI 30.97 kg/m²   General Appearance: alert and in NAD. On 2L supplemental O2 via NC  Skin: warm and dry  Head: normocephalic and atraumatic  Eyes: pupils equal, round, and reactive to light, extraocular eye movements intact, conjunctivae normal  Neck: supple and non-tender without mass, no cervical lymphadenopathy  Pulmonary/Chest: Decreased BS at left base with crackles noted- no use of accessory muscles  Cardiovascular: normal rate, regular rhythm, normal S1 and S2, no murmurs, rubs, clicks, or gallops  Abdomen: soft, non-tender, non-distended, normal bowel sounds, no masses or organomegaly  Extremities: no cyanosis, clubbing or edema  Musculoskeletal: normal range of motion, no joint swelling, deformity or tenderness  Neurologic: AAOx3; coordination and speech normal. Moves limbs spontaneously and follows commands         Recent Labs     12/11/19  0525 12/12/19  0514 12/13/19  0645    140 139   K 3.4* 3.3* 3.3*   CL 97* 100 99   CO2 31* 30* 31*   BUN 18 13 12   CREATININE 0.7 0.6 0.7   GLUCOSE 200* 194* 172*   CALCIUM 7.9* 7.7* 7.8*       Recent Labs     12/11/19  0525 12/12/19  0514 12/13/19  0645   WBC 26.1* 26.8* 26.3*   RBC 5.32 5.07 4.63   HGB 15.4 14.7 13.6   HCT 47.2 45.3 42.3   MCV 88.7 89.3 91.4   MCH 28.9 29.0 29.4   MCHC 32.6 32.5 32.2   RDW 13.9 13.9 14.0    204 193   MPV 11.8 11.8 12.2*         LEGIONELLA ANTIGEN, URINE [[id number]] Collected: 12/05/19 1750     Specimen: Urine, indwelling catheter Updated: 12/06/19 0847      L. pneumophila Serogp 1 Ur Ag --      Presumptive Negative -suggesting no recent or current infections   with Legionella pneumophila serogroup 1.    Infection to Legionella cannot be ruled out since other serogroups   and species may cause infection, antigen may not be present in   early infection, or level of antigen may be below the   detection limit. Normal Range: Presumptive Negative      Narrative:       Source: [facility]       Site: Urine&Urine               Strep Pneumoniae Antigen [[id number]] Collected: 12/05/19 1750     Specimen: Urine, clean catch Updated: 12/06/19 0842      STREP PNEUMONIAE ANTIGEN, URINE Urine specimen POSITIVE for Pneumococcal pneumonia.     Narrative:       Source: URINE       Site:          Radiology:   XR CHEST PORTABLE   Final Result   1. Stable large left effusion and airspace disease. IR FLUORO GUIDED CVA DEVICE PLMT/REPLACE/REMOVAL   Final Result   Successful placement of a 5 [place] double-lumen Power PICC   line using ultrasound guidance via the right brachial  vein. IR ULTRASOUND GUIDANCE VASCULAR ACCESS   Final Result   Ultrasound guidance was provided during placement of a   PICC line. XR CHEST PORTABLE   Final Result   1. Stable bilateral parenchymal opacities. CT Chest WO Contrast   Final Result   1. Dense consolidation seen within the left upper lobe consistent with   pneumonia. 2. Significant left lower lobe consolidation favored to represent   atelectasis. Underlying pneumonia cannot be excluded. 3. Patchy infiltrate within the right upper lobe and right middle   lobe. 4. Interval right lung base atelectasis. 5. Very small bilateral pleural effusions. 6. There is no evidence of an empyema. XR CHEST PORTABLE   Final Result   1. Stable left lung airspace disease and effusions. XR CHEST PORTABLE   Final Result      1. Stable chest x-ray with extensive left lung airspace disease. XR CHEST PORTABLE   Final Result   1. No change from XR CHEST PORTABLE with report dated 12/7/2019 8:30   AM.          XR CHEST PORTABLE   Final Result   No change left lung pneumonia. XR CHEST PORTABLE   Final Result   Stable dense left lung consolidation.       CT CHEST WO

## 2019-12-13 NOTE — PROGRESS NOTES
Nutrition Assessment    Type and Reason for Visit: Reassess    Nutrition Recommendations: Continue current diet, Start ONS(Will start Glucerna BID)    Nutrition Assessment: Pt continues to decline from a nutritional standpoint AEB <25% intake of meals r/t poor dentition and oral sores. Pt did consume >75% of 1 Glucerna but this was cancelled. Will start Glucerna BID. Pt requested cereal option for all meals, discussed this with dietary staff. Malnutrition Assessment:  · Malnutrition Status: At risk for malnutrition  · Context: Chronic illness  · Findings of the 6 clinical characteristics of malnutrition (Minimum of 2 out of 6 clinical characteristics is required to make the diagnosis of moderate or severe Protein Calorie Malnutrition based on AND/ASPEN Guidelines):  1. Energy Intake-Less than or equal to 75% of estimated energy requirement, Greater than or equal to 5 days    2. Weight Loss-Unable to assess, unable to assess  3. Fat Loss-No significant subcutaneous fat loss,    4. Muscle Loss-No significant muscle mass loss,    5. Fluid Accumulation-No significant fluid accumulation,    6.  Strength-Not measured    Nutrition Risk Level:  Moderate    Nutrient Needs:  · Estimated Daily Total Kcal: 1786-0195(MSJ REE= 1270 x 1.2 = 1524)  · Estimated Daily Protein (g): (1.5-1.8 gm/kg IBW)  · Estimated Daily Total Fluid (ml/day): 9560-5914(1 ml/kcal)    Nutrition Diagnosis:   · Problem: Inadequate oral intake  · Etiology: related to Difficulty swallowing     Signs and symptoms:  as evidenced by Swallow study results, Intake 0-25%, Ill-fitting, broken, or lack of dentures    Objective Information:  · Nutrition-Focused Physical Findings: abd soft rounded, +BS, -2.7L I/O, +2 BLE edema, generalized weakness, hyperglycemia, hypokalemia, poor dentition  · Wound Type: None  · Current Nutrition Therapies:  · Oral Diet Orders: Dental Soft(No added sugars)   · Oral Diet intake: 1-25%  · Oral Nutrition Supplement

## 2019-12-14 LAB
ALBUMIN SERPL-MCNC: 2.2 G/DL (ref 3.5–5.2)
ALP BLD-CCNC: 108 U/L (ref 35–104)
ALT SERPL-CCNC: 12 U/L (ref 0–32)
ANION GAP SERPL CALCULATED.3IONS-SCNC: 10 MMOL/L (ref 7–16)
AST SERPL-CCNC: 13 U/L (ref 0–31)
BASOPHILS ABSOLUTE: 0 E9/L (ref 0–0.2)
BASOPHILS RELATIVE PERCENT: 0.4 % (ref 0–2)
BILIRUB SERPL-MCNC: 0.6 MG/DL (ref 0–1.2)
BUN BLDV-MCNC: 11 MG/DL (ref 8–23)
CALCIUM SERPL-MCNC: 7.8 MG/DL (ref 8.6–10.2)
CHLORIDE BLD-SCNC: 101 MMOL/L (ref 98–107)
CO2: 28 MMOL/L (ref 22–29)
CREAT SERPL-MCNC: 0.7 MG/DL (ref 0.5–1)
CULTURE CATHETER TIP: ABNORMAL
EOSINOPHILS ABSOLUTE: 0.21 E9/L (ref 0.05–0.5)
EOSINOPHILS RELATIVE PERCENT: 0.9 % (ref 0–6)
GFR AFRICAN AMERICAN: >60
GFR NON-AFRICAN AMERICAN: >60 ML/MIN/1.73
GLUCOSE BLD-MCNC: 132 MG/DL (ref 74–99)
HCT VFR BLD CALC: 41.1 % (ref 34–48)
HEMOGLOBIN: 13.5 G/DL (ref 11.5–15.5)
LYMPHOCYTES ABSOLUTE: 0.94 E9/L (ref 1.5–4)
LYMPHOCYTES RELATIVE PERCENT: 4.3 % (ref 20–42)
MAGNESIUM: 2 MG/DL (ref 1.6–2.6)
MCH RBC QN AUTO: 29.9 PG (ref 26–35)
MCHC RBC AUTO-ENTMCNC: 32.8 % (ref 32–34.5)
MCV RBC AUTO: 91.1 FL (ref 80–99.9)
METER GLUCOSE: 114 MG/DL (ref 74–99)
METER GLUCOSE: 138 MG/DL (ref 74–99)
METER GLUCOSE: 188 MG/DL (ref 74–99)
METER GLUCOSE: 209 MG/DL (ref 74–99)
MONOCYTES ABSOLUTE: 0.94 E9/L (ref 0.1–0.95)
MONOCYTES RELATIVE PERCENT: 3.5 % (ref 2–12)
MYELOCYTE PERCENT: 0.9 % (ref 0–0)
NEUTROPHILS ABSOLUTE: 21.48 E9/L (ref 1.8–7.3)
NEUTROPHILS RELATIVE PERCENT: 90.4 % (ref 43–80)
ORGANISM: ABNORMAL
PDW BLD-RTO: 14.1 FL (ref 11.5–15)
PHOSPHORUS: 2.5 MG/DL (ref 2.5–4.5)
PLATELET # BLD: 205 E9/L (ref 130–450)
PMV BLD AUTO: 12.1 FL (ref 7–12)
POTASSIUM SERPL-SCNC: 3.5 MMOL/L (ref 3.5–5)
RBC # BLD: 4.51 E12/L (ref 3.5–5.5)
RBC # BLD: NORMAL 10*6/UL
SODIUM BLD-SCNC: 139 MMOL/L (ref 132–146)
TOTAL PROTEIN: 5.5 G/DL (ref 6.4–8.3)
WBC # BLD: 23.6 E9/L (ref 4.5–11.5)

## 2019-12-14 PROCEDURE — 6370000000 HC RX 637 (ALT 250 FOR IP): Performed by: INTERNAL MEDICINE

## 2019-12-14 PROCEDURE — 2580000003 HC RX 258: Performed by: NURSE PRACTITIONER

## 2019-12-14 PROCEDURE — 83735 ASSAY OF MAGNESIUM: CPT

## 2019-12-14 PROCEDURE — 80053 COMPREHEN METABOLIC PANEL: CPT

## 2019-12-14 PROCEDURE — 85025 COMPLETE CBC W/AUTO DIFF WBC: CPT

## 2019-12-14 PROCEDURE — 82962 GLUCOSE BLOOD TEST: CPT

## 2019-12-14 PROCEDURE — 6360000002 HC RX W HCPCS: Performed by: INTERNAL MEDICINE

## 2019-12-14 PROCEDURE — 99233 SBSQ HOSP IP/OBS HIGH 50: CPT | Performed by: INTERNAL MEDICINE

## 2019-12-14 PROCEDURE — 84100 ASSAY OF PHOSPHORUS: CPT

## 2019-12-14 PROCEDURE — 2580000003 HC RX 258: Performed by: INTERNAL MEDICINE

## 2019-12-14 PROCEDURE — 94640 AIRWAY INHALATION TREATMENT: CPT

## 2019-12-14 PROCEDURE — 1200000000 HC SEMI PRIVATE

## 2019-12-14 PROCEDURE — 36415 COLL VENOUS BLD VENIPUNCTURE: CPT

## 2019-12-14 RX ORDER — FUROSEMIDE 10 MG/ML
20 INJECTION INTRAMUSCULAR; INTRAVENOUS ONCE
Status: COMPLETED | OUTPATIENT
Start: 2019-12-14 | End: 2019-12-14

## 2019-12-14 RX ADMIN — GABAPENTIN 600 MG: 300 CAPSULE ORAL at 20:20

## 2019-12-14 RX ADMIN — GABAPENTIN 600 MG: 300 CAPSULE ORAL at 11:21

## 2019-12-14 RX ADMIN — FUROSEMIDE 20 MG: 10 INJECTION, SOLUTION INTRAMUSCULAR; INTRAVENOUS at 20:23

## 2019-12-14 RX ADMIN — PANTOPRAZOLE SODIUM 40 MG: 40 TABLET, DELAYED RELEASE ORAL at 05:38

## 2019-12-14 RX ADMIN — LEVALBUTEROL HYDROCHLORIDE 0.31 MG: 0.31 SOLUTION RESPIRATORY (INHALATION) at 15:01

## 2019-12-14 RX ADMIN — GABAPENTIN 600 MG: 300 CAPSULE ORAL at 17:16

## 2019-12-14 RX ADMIN — METOPROLOL TARTRATE 100 MG: 50 TABLET ORAL at 20:20

## 2019-12-14 RX ADMIN — Medication 10 ML: at 20:27

## 2019-12-14 RX ADMIN — INSULIN LISPRO 1 UNITS: 100 INJECTION, SOLUTION INTRAVENOUS; SUBCUTANEOUS at 20:29

## 2019-12-14 RX ADMIN — Medication 10 ML: at 09:07

## 2019-12-14 RX ADMIN — SODIUM CHLORIDE, PRESERVATIVE FREE 2 G: 5 INJECTION INTRAVENOUS at 09:07

## 2019-12-14 RX ADMIN — AMIODARONE HYDROCHLORIDE 200 MG: 200 TABLET ORAL at 20:23

## 2019-12-14 RX ADMIN — LEVALBUTEROL HYDROCHLORIDE 0.31 MG: 0.31 SOLUTION RESPIRATORY (INHALATION) at 06:46

## 2019-12-14 RX ADMIN — LEVALBUTEROL HYDROCHLORIDE 0.31 MG: 0.31 SOLUTION RESPIRATORY (INHALATION) at 21:14

## 2019-12-14 RX ADMIN — AMIODARONE HYDROCHLORIDE 200 MG: 200 TABLET ORAL at 09:07

## 2019-12-14 RX ADMIN — INSULIN LISPRO 2 UNITS: 100 INJECTION, SOLUTION INTRAVENOUS; SUBCUTANEOUS at 12:48

## 2019-12-14 RX ADMIN — GABAPENTIN 600 MG: 300 CAPSULE ORAL at 05:37

## 2019-12-14 RX ADMIN — METOPROLOL TARTRATE 100 MG: 50 TABLET ORAL at 09:06

## 2019-12-14 RX ADMIN — OXYCODONE HYDROCHLORIDE AND ACETAMINOPHEN 1 TABLET: 5; 325 TABLET ORAL at 17:15

## 2019-12-14 RX ADMIN — OXYCODONE HYDROCHLORIDE AND ACETAMINOPHEN 1 TABLET: 5; 325 TABLET ORAL at 00:02

## 2019-12-14 RX ADMIN — Medication 300 UNITS: at 09:08

## 2019-12-14 RX ADMIN — FUROSEMIDE 20 MG: 10 INJECTION, SOLUTION INTRAMUSCULAR; INTRAVENOUS at 09:06

## 2019-12-14 RX ADMIN — OXYCODONE HYDROCHLORIDE AND ACETAMINOPHEN 1 TABLET: 5; 325 TABLET ORAL at 23:19

## 2019-12-14 RX ADMIN — OXYCODONE HYDROCHLORIDE AND ACETAMINOPHEN 1 TABLET: 5; 325 TABLET ORAL at 07:55

## 2019-12-14 ASSESSMENT — PAIN DESCRIPTION - LOCATION
LOCATION: BACK
LOCATION: RIB CAGE

## 2019-12-14 ASSESSMENT — PAIN SCALES - GENERAL
PAINLEVEL_OUTOF10: 0
PAINLEVEL_OUTOF10: 0
PAINLEVEL_OUTOF10: 8
PAINLEVEL_OUTOF10: 6
PAINLEVEL_OUTOF10: 0
PAINLEVEL_OUTOF10: 2
PAINLEVEL_OUTOF10: 0

## 2019-12-14 ASSESSMENT — PAIN DESCRIPTION - PAIN TYPE
TYPE: CHRONIC PAIN
TYPE: CHRONIC PAIN

## 2019-12-14 ASSESSMENT — PAIN DESCRIPTION - FREQUENCY
FREQUENCY: CONTINUOUS
FREQUENCY: INTERMITTENT

## 2019-12-14 ASSESSMENT — PAIN DESCRIPTION - ORIENTATION
ORIENTATION: LEFT
ORIENTATION: MID;LOWER

## 2019-12-14 ASSESSMENT — PAIN - FUNCTIONAL ASSESSMENT
PAIN_FUNCTIONAL_ASSESSMENT: PREVENTS OR INTERFERES SOME ACTIVE ACTIVITIES AND ADLS
PAIN_FUNCTIONAL_ASSESSMENT: ACTIVITIES ARE NOT PREVENTED

## 2019-12-14 ASSESSMENT — PAIN DESCRIPTION - DESCRIPTORS
DESCRIPTORS: ACHING;DISCOMFORT;DULL
DESCRIPTORS: ACHING;SORE

## 2019-12-14 ASSESSMENT — PAIN DESCRIPTION - ONSET
ONSET: ON-GOING
ONSET: ON-GOING

## 2019-12-14 ASSESSMENT — PAIN DESCRIPTION - PROGRESSION
CLINICAL_PROGRESSION: GRADUALLY WORSENING
CLINICAL_PROGRESSION: NOT CHANGED

## 2019-12-14 NOTE — PLAN OF CARE
Problem: Discharge Planning:  Goal: Ability to perform activities of daily living will improve  Description  Ability to perform activities of daily living will improve  Outcome: Met This Shift  Goal: Participates in care planning  Description  Participates in care planning  Outcome: Met This Shift     Problem: Risk for Impaired Skin Integrity  Goal: Tissue integrity - skin and mucous membranes  Description  Structural intactness and normal physiological function of skin and  mucous membranes.   Outcome: Met This Shift     Problem: Pain:  Goal: Pain level will decrease  Description  Pain level will decrease  Outcome: Met This Shift  Goal: Control of acute pain  Description  Control of acute pain  Outcome: Met This Shift  Goal: Control of chronic pain  Description  Control of chronic pain  Outcome: Met This Shift   Continue current treatment

## 2019-12-14 NOTE — PROGRESS NOTES
144/59   Pulse 110   Temp 98.2 °F (36.8 °C) (Oral)   Resp 18   Ht 5' 5\" (1.651 m)   Wt 186 lb 1.6 oz (84.4 kg)   SpO2 92%   BMI 30.97 kg/m²   General Appearance: alert and in NAD. On 2L supplemental O2 via NC  Skin: warm and dry  Head: normocephalic and atraumatic  Eyes: pupils equal, round, and reactive to light, extraocular eye movements intact, conjunctivae normal  Neck: supple and non-tender without mass, no cervical lymphadenopathy  Pulmonary/Chest: Decreased BS at left base with crackles noted- no use of accessory muscles  Cardiovascular: normal rate, regular rhythm, normal S1 and S2, no murmurs, rubs, clicks, or gallops  Abdomen: soft, non-tender, non-distended, normal bowel sounds, no masses or organomegaly  Extremities: RUE with considerable ecchymossis near catheter line site  Musculoskeletal: normal range of motion, no joint swelling, deformity or tenderness  Neurologic: AAOx3; coordination and speech normal. Moves limbs spontaneously and follows commands         Recent Labs     12/12/19  0514 12/13/19  0645 12/14/19  0525    139 139   K 3.3* 3.3* 3.5    99 101   CO2 30* 31* 28   BUN 13 12 11   CREATININE 0.6 0.7 0.7   GLUCOSE 194* 172* 132*   CALCIUM 7.7* 7.8* 7.8*       Recent Labs     12/12/19  0514 12/13/19  0645 12/14/19  0525   WBC 26.8* 26.3* 23.6*   RBC 5.07 4.63 4.51   HGB 14.7 13.6 13.5   HCT 45.3 42.3 41.1   MCV 89.3 91.4 91.1   MCH 29.0 29.4 29.9   MCHC 32.5 32.2 32.8   RDW 13.9 14.0 14.1    193 205   MPV 11.8 12.2* 12.1*         LEGIONELLA ANTIGEN, URINE [[id number]] Collected: 12/05/19 1750     Specimen: Urine, indwelling catheter Updated: 12/06/19 0835      L. pneumophila Serogp 1 Ur Ag --      Presumptive Negative -suggesting no recent or current infections   with Legionella pneumophila serogroup 1.    Infection to Legionella cannot be ruled out since other serogroups   and species may cause infection, antigen may not be present in   early infection, or level of Minimal   patchy infiltrate developing pneumonia right middle lobe. XR CHEST STANDARD (2 VW)   Final Result   Large left pleural effusion with left lung airspace disease. CT Head WO Contrast   Final Result   No acute intracranial hemorrhage or mass effect. CT CERVICAL SPINE WO CONTRAST   Final Result   1. No acute cervical spine fracture. 2. Mild multilevel cervical spondylosis. Assessment:  Principal Problem:    Sepsis due to pneumonia [facility])  Active Problems:    Acute respiratory failure with hypoxia (HCC)    JOVANI (acute kidney injury) ([address]    Elevated brain natriuretic peptide (BNP) level    Acute metabolic encephalopathy    Community acquired pneumonia of left lung    Hypoxia  Resolved Problems:    * No resolved hospital problems. *      Plan:    1. Acute hypoxic respiratory failure in the setting of dense left lobar streptococcal pneumonia--received ceftriaxone and azithromycin in ER. Given severity of pneumonia, we switched to Zosyn. Pulmonary consulted and they felt patient has high potential for decompensation and recommended she be transferred to ICU which was done the evening of admission 12/5. Vancomycin added as there was concern that family member had recent viral illness and if patient had same, would be at risk for staph pneumonia. Levaquin added by pulmonary to cover atypicals. On 12/6, antibiotics deescalated to Vanco and Levaquin. Zosyn discontinued after AM dose 12/6. Afebrile overnight; WBC 17.8--> 16.8-->25.0-->26.0-->26.8-->23.6, trending down. Femoral central line removed in lieu of PICC line placement. Continue Vancomycin and Rocephin. CT chest ruled out empyema/parapneumonic effusion showed significant consolidation left upper lobe and lower lobe c/w pneumonia. IV steroids discontinued. Currently, patient maintaining adequate SPO2 greater than 90% on room air.   2. Sepsis due to streptococcal pneumonia--intensivist recommended [place] protocol of

## 2019-12-14 NOTE — PROGRESS NOTES
DEVICE PLMT/REPLACE/REMOVAL   Final Result   Successful placement of a 5 [place] double-lumen Power PICC   line using ultrasound guidance via the right brachial  vein. IR ULTRASOUND GUIDANCE VASCULAR ACCESS   Final Result   Ultrasound guidance was provided during placement of a   PICC line. XR CHEST PORTABLE   Final Result   1. Stable bilateral parenchymal opacities. CT Chest WO Contrast   Final Result   1. Dense consolidation seen within the left upper lobe consistent with   pneumonia. 2. Significant left lower lobe consolidation favored to represent   atelectasis. Underlying pneumonia cannot be excluded. 3. Patchy infiltrate within the right upper lobe and right middle   lobe. 4. Interval right lung base atelectasis. 5. Very small bilateral pleural effusions. 6. There is no evidence of an empyema. XR CHEST PORTABLE   Final Result   1. Stable left lung airspace disease and effusions. XR CHEST PORTABLE   Final Result      1. Stable chest x-ray with extensive left lung airspace disease. XR CHEST PORTABLE   Final Result   1. No change from XR CHEST PORTABLE with report dated 12/7/2019 8:30   AM.          XR CHEST PORTABLE   Final Result   No change left lung pneumonia. XR CHEST PORTABLE   Final Result   Stable dense left lung consolidation. CT CHEST WO CONTRAST   Final Result   Large left lung infiltrate compatible pneumonia. Minimal   patchy infiltrate developing pneumonia right middle lobe. XR CHEST STANDARD (2 VW)   Final Result   Large left pleural effusion with left lung airspace disease. CT Head WO Contrast   Final Result   No acute intracranial hemorrhage or mass effect. CT CERVICAL SPINE WO CONTRAST   Final Result   1. No acute cervical spine fracture. 2. Mild multilevel cervical spondylosis.                    Lab Review   Lab Results   Component Value Date     12/15/2019    K 3.9 12/15/2019    K 4.0 12/05/2019    CL 96 12/15/2019    CO2 27 12/15/2019    BUN 14 12/15/2019    CREATININE 0.7 12/15/2019    GLUCOSE 223 12/15/2019    CALCIUM 7.6 12/15/2019     Lab Results   Component Value Date    WBC 23.6 12/14/2019    HGB 13.5 12/14/2019    HCT 41.1 12/14/2019    MCV 91.1 12/14/2019     12/14/2019     I have personally reviewed the laboratory, cardiac diagnostic and radiographic testing as outlined above:    Assessment:   1. Atrial fibrillation: Paroxysmal, rapid ventricular response, on amiodarone, declined CAROLYN guided cardioversion, will continue current treatment, continue chronic anticoagulation. 2.  Acute exacerbation of congestive heart failure: Acute on chronic, decompensated, diastolic, will continue gentle diuresis  3. Left lower lobe pneumonia    Recommendations:     1. will continue current treatment  2.  will give an extra dose of Lasix for today  3. Will increase ambulation as tolerated  4. Further cardiac recommendations will be forthcoming pending her clinical course and diagnostic test findings    Discussed with patient, no family at bedside  Electronically signed by Marietta Hwang MD on 12/15/2019 at 6:39 PM  NOTE: This report was transcribed using voice recognition software.  Every effort was made to ensure accuracy; however, inadvertent computerized transcription errors may be present

## 2019-12-15 ENCOUNTER — APPOINTMENT (OUTPATIENT)
Dept: ULTRASOUND IMAGING | Age: 84
DRG: 871 | End: 2019-12-15
Payer: MEDICARE

## 2019-12-15 ENCOUNTER — APPOINTMENT (OUTPATIENT)
Dept: GENERAL RADIOLOGY | Age: 84
DRG: 871 | End: 2019-12-15
Payer: MEDICARE

## 2019-12-15 LAB
ANION GAP SERPL CALCULATED.3IONS-SCNC: 12 MMOL/L (ref 7–16)
BUN BLDV-MCNC: 14 MG/DL (ref 8–23)
CALCIUM SERPL-MCNC: 7.6 MG/DL (ref 8.6–10.2)
CHLORIDE BLD-SCNC: 96 MMOL/L (ref 98–107)
CO2: 27 MMOL/L (ref 22–29)
CREAT SERPL-MCNC: 0.7 MG/DL (ref 0.5–1)
GFR AFRICAN AMERICAN: >60
GFR NON-AFRICAN AMERICAN: >60 ML/MIN/1.73
GLUCOSE BLD-MCNC: 223 MG/DL (ref 74–99)
METER GLUCOSE: 136 MG/DL (ref 74–99)
METER GLUCOSE: 178 MG/DL (ref 74–99)
METER GLUCOSE: 184 MG/DL (ref 74–99)
METER GLUCOSE: 217 MG/DL (ref 74–99)
POTASSIUM SERPL-SCNC: 3.9 MMOL/L (ref 3.5–5)
SODIUM BLD-SCNC: 135 MMOL/L (ref 132–146)

## 2019-12-15 PROCEDURE — 36415 COLL VENOUS BLD VENIPUNCTURE: CPT

## 2019-12-15 PROCEDURE — 6370000000 HC RX 637 (ALT 250 FOR IP): Performed by: NURSE PRACTITIONER

## 2019-12-15 PROCEDURE — 80048 BASIC METABOLIC PNL TOTAL CA: CPT

## 2019-12-15 PROCEDURE — 94761 N-INVAS EAR/PLS OXIMETRY MLT: CPT

## 2019-12-15 PROCEDURE — 6360000002 HC RX W HCPCS: Performed by: INTERNAL MEDICINE

## 2019-12-15 PROCEDURE — 99233 SBSQ HOSP IP/OBS HIGH 50: CPT | Performed by: INTERNAL MEDICINE

## 2019-12-15 PROCEDURE — 1200000000 HC SEMI PRIVATE

## 2019-12-15 PROCEDURE — 71045 X-RAY EXAM CHEST 1 VIEW: CPT

## 2019-12-15 PROCEDURE — 6370000000 HC RX 637 (ALT 250 FOR IP): Performed by: INTERNAL MEDICINE

## 2019-12-15 PROCEDURE — 2580000003 HC RX 258: Performed by: INTERNAL MEDICINE

## 2019-12-15 PROCEDURE — 94640 AIRWAY INHALATION TREATMENT: CPT

## 2019-12-15 PROCEDURE — 82962 GLUCOSE BLOOD TEST: CPT

## 2019-12-15 PROCEDURE — 2700000000 HC OXYGEN THERAPY PER DAY

## 2019-12-15 PROCEDURE — 93971 EXTREMITY STUDY: CPT

## 2019-12-15 RX ORDER — DIGOXIN 0.25 MG/ML
250 INJECTION INTRAMUSCULAR; INTRAVENOUS EVERY 4 HOURS
Status: COMPLETED | OUTPATIENT
Start: 2019-12-15 | End: 2019-12-16

## 2019-12-15 RX ORDER — FUROSEMIDE 10 MG/ML
40 INJECTION INTRAMUSCULAR; INTRAVENOUS DAILY
Status: DISCONTINUED | OUTPATIENT
Start: 2019-12-16 | End: 2019-12-17

## 2019-12-15 RX ORDER — NYSTATIN 100000 U/G
CREAM TOPICAL 2 TIMES DAILY
Status: DISCONTINUED | OUTPATIENT
Start: 2019-12-15 | End: 2019-12-23 | Stop reason: HOSPADM

## 2019-12-15 RX ADMIN — LEVALBUTEROL HYDROCHLORIDE 0.31 MG: 0.31 SOLUTION RESPIRATORY (INHALATION) at 14:25

## 2019-12-15 RX ADMIN — OXYCODONE HYDROCHLORIDE AND ACETAMINOPHEN 1 TABLET: 5; 325 TABLET ORAL at 17:54

## 2019-12-15 RX ADMIN — PANTOPRAZOLE SODIUM 40 MG: 40 TABLET, DELAYED RELEASE ORAL at 07:13

## 2019-12-15 RX ADMIN — AMIODARONE HYDROCHLORIDE 200 MG: 200 TABLET ORAL at 08:53

## 2019-12-15 RX ADMIN — GABAPENTIN 600 MG: 300 CAPSULE ORAL at 17:54

## 2019-12-15 RX ADMIN — GABAPENTIN 600 MG: 300 CAPSULE ORAL at 12:26

## 2019-12-15 RX ADMIN — OXYCODONE HYDROCHLORIDE AND ACETAMINOPHEN 1 TABLET: 5; 325 TABLET ORAL at 08:53

## 2019-12-15 RX ADMIN — LEVALBUTEROL HYDROCHLORIDE 0.31 MG: 0.31 SOLUTION RESPIRATORY (INHALATION) at 21:27

## 2019-12-15 RX ADMIN — INSULIN LISPRO 2 UNITS: 100 INJECTION, SOLUTION INTRAVENOUS; SUBCUTANEOUS at 17:53

## 2019-12-15 RX ADMIN — METOPROLOL TARTRATE 100 MG: 50 TABLET ORAL at 21:15

## 2019-12-15 RX ADMIN — LEVALBUTEROL HYDROCHLORIDE 0.31 MG: 0.31 SOLUTION RESPIRATORY (INHALATION) at 06:07

## 2019-12-15 RX ADMIN — Medication 300 UNITS: at 21:16

## 2019-12-15 RX ADMIN — Medication 10 ML: at 19:49

## 2019-12-15 RX ADMIN — DIGOXIN 250 MCG: 250 INJECTION, SOLUTION INTRAMUSCULAR; INTRAVENOUS; PARENTERAL at 19:50

## 2019-12-15 RX ADMIN — DIGOXIN 250 MCG: 250 INJECTION, SOLUTION INTRAMUSCULAR; INTRAVENOUS; PARENTERAL at 23:56

## 2019-12-15 RX ADMIN — GABAPENTIN 600 MG: 300 CAPSULE ORAL at 07:13

## 2019-12-15 RX ADMIN — METOPROLOL TARTRATE 100 MG: 50 TABLET ORAL at 08:53

## 2019-12-15 RX ADMIN — AMIODARONE HYDROCHLORIDE 200 MG: 200 TABLET ORAL at 21:15

## 2019-12-15 RX ADMIN — INSULIN LISPRO 1 UNITS: 100 INJECTION, SOLUTION INTRAVENOUS; SUBCUTANEOUS at 21:15

## 2019-12-15 RX ADMIN — GABAPENTIN 600 MG: 300 CAPSULE ORAL at 21:15

## 2019-12-15 RX ADMIN — NYSTATIN: 100000 CREAM TOPICAL at 23:55

## 2019-12-15 ASSESSMENT — PAIN SCALES - GENERAL
PAINLEVEL_OUTOF10: 0
PAINLEVEL_OUTOF10: 2
PAINLEVEL_OUTOF10: 0
PAINLEVEL_OUTOF10: 6
PAINLEVEL_OUTOF10: 8
PAINLEVEL_OUTOF10: 0

## 2019-12-15 ASSESSMENT — PAIN DESCRIPTION - LOCATION: LOCATION: RIB CAGE

## 2019-12-15 ASSESSMENT — PAIN DESCRIPTION - ONSET: ONSET: ON-GOING

## 2019-12-15 ASSESSMENT — PAIN DESCRIPTION - PAIN TYPE: TYPE: ACUTE PAIN

## 2019-12-15 ASSESSMENT — PAIN DESCRIPTION - ORIENTATION: ORIENTATION: LEFT

## 2019-12-15 ASSESSMENT — PAIN DESCRIPTION - DESCRIPTORS: DESCRIPTORS: ACHING;SORE;SHOOTING;SPASM

## 2019-12-15 ASSESSMENT — PAIN DESCRIPTION - PROGRESSION: CLINICAL_PROGRESSION: GRADUALLY IMPROVING

## 2019-12-15 ASSESSMENT — PAIN - FUNCTIONAL ASSESSMENT: PAIN_FUNCTIONAL_ASSESSMENT: PREVENTS OR INTERFERES SOME ACTIVE ACTIVITIES AND ADLS

## 2019-12-15 ASSESSMENT — PAIN DESCRIPTION - FREQUENCY: FREQUENCY: INTERMITTENT

## 2019-12-15 NOTE — PROGRESS NOTES
Wt 186 lb 1.6 oz (84.4 kg)   SpO2 93%   BMI 30.97 kg/m²   CONSTITUTIONAL:  awake, alert, cooperative, no apparent distress, and appears stated age  HEAD:  normocepalic, without obvious abnormality, atraumatic  NECK:  Supple, symmetrical, trachea midline, no adenopathy, thyroid symmetric, not enlarged and no tenderness, skin normal  LUNGS:  No increased work of breathing, good air exchange, bilateral rhonchi, decreased breath sounds left base cARDIOVASCULAR:  Normal apical impulse, irregularly irregular, normal S1 and S2, no S3, no murmur noted, no edema, no JVD, no carotid bruit. ABDOMEN:  Soft, nontender, no masses, no hepatomegaly, no splenomegaly, BS+  MUSCULOSKELETAL:  No clubbing no cyanosis. there is no redness, warmth, or swelling of the joints  full range of motion noted  NEUROLOGIC:  Alert, awake,oriented x3  SKIN:  no bruising or bleeding, normal skin color, texture, turgor and no redness, warmth, or swelling    Cardiographics  I personally reviewed the telemetry monitor strips with the following interpretation: Fibrillation with rapid ventricular response    Echocardiogram: 12/6/2019,Normal left ventricle size and systolic function.   Ejection fraction is visually estimated at 55-60%.   Indeterminate diastolic function.   Can not definitively rule out wall motion abnormalities on the basis of   this study due to limited visualization of the endocardial borders.   Mild left ventricular concentric hypertrophy noted.   Normal right ventricle structure and function.   Mild mitral annular calcification.   Mild thickening and calcification of the mitral valve leaflets. Imaging  US DUP UPPER EXTREMITY RIGHT VENOUS   Final Result   1. No evidence of acute venous thrombosis right upper extremity. PICC   line visualized. XR CHEST PORTABLE   Final Result   1. No radiographic change       XR CHEST PORTABLE   Final Result   1. Stable large left effusion and airspace disease.        IR FLUORO GUIDED CVA

## 2019-12-15 NOTE — PLAN OF CARE
Problem: Discharge Planning:  Goal: Ability to perform activities of daily living will improve  Description  Ability to perform activities of daily living will improve  12/15/2019 1106 by Adelia London RN  Outcome: Met This Shift  12/15/2019 0525 by Ren Betancur RN  Outcome: Met This Shift  Goal: Participates in care planning  Description  Participates in care planning  12/15/2019 1106 by Adelia London RN  Outcome: Met This Shift  12/15/2019 0525 by Ren Betancur RN  Outcome: Met This Shift     Problem: Risk for Impaired Skin Integrity  Goal: Tissue integrity - skin and mucous membranes  Description  Structural intactness and normal physiological function of skin and  mucous membranes.   12/15/2019 1106 by Adelia London RN  Outcome: Met This Shift  12/15/2019 0525 by Ren Betancur RN  Outcome: Met This Shift     Problem: Pain:  Goal: Pain level will decrease  Description  Pain level will decrease  12/15/2019 1106 by Adelia London RN  Outcome: Met This Shift  12/15/2019 0525 by Ren Betancur RN  Outcome: Met This Shift  Goal: Control of acute pain  Description  Control of acute pain  12/15/2019 1106 by Adelia London RN  Outcome: Met This Shift  12/15/2019 0525 by Ren Betancur RN  Outcome: Met This Shift  Goal: Control of chronic pain  Description  Control of chronic pain  12/15/2019 1106 by Adelia London RN  Outcome: Met This Shift  12/15/2019 0525 by Ren Betancur RN  Outcome: Met This Shift     Continue current treatment

## 2019-12-15 NOTE — PROGRESS NOTES
°C) (Oral)   Resp 20   Ht 5' 5\" (1.651 m)   Wt 186 lb 1.6 oz (84.4 kg)   SpO2 93%   BMI 30.97 kg/m²   General Appearance: alert and in NAD. On 2L supplemental O2 via NC  Skin: warm and dry  Head: normocephalic and atraumatic  Eyes: pupils equal, round, and reactive to light, extraocular eye movements intact, conjunctivae normal  Neck: supple and non-tender without mass, no cervical lymphadenopathy  Pulmonary/Chest: Decreased BS at left base with crackles noted- no use of accessory muscles  Cardiovascular: normal rate, regular rhythm, normal S1 and S2, no murmurs, rubs, clicks, or gallops  Abdomen: soft, non-tender, non-distended, normal bowel sounds, no masses or organomegaly  Extremities: RUE with considerable ecchymossis near catheter line site  Musculoskeletal: normal range of motion, no joint swelling, deformity or tenderness  Neurologic: AAOx3; coordination and speech normal. Moves limbs spontaneously and follows commands         Recent Labs     12/13/19  0645 12/14/19  0525 12/15/19  1129    139 135   K 3.3* 3.5 3.9   CL 99 101 96*   CO2 31* 28 27   BUN 12 11 14   CREATININE 0.7 0.7 0.7   GLUCOSE 172* 132* 223*   CALCIUM 7.8* 7.8* 7.6*       Recent Labs     12/13/19  0645 12/14/19  0525   WBC 26.3* 23.6*   RBC 4.63 4.51   HGB 13.6 13.5   HCT 42.3 41.1   MCV 91.4 91.1   MCH 29.4 29.9   MCHC 32.2 32.8   RDW 14.0 14.1    205   MPV 12.2* 12.1*         LEGIONELLA ANTIGEN, URINE [[id number]] Collected: 12/05/19 1750     Specimen: Urine, indwelling catheter Updated: 12/06/19 0805      L. pneumophila Serogp 1 Ur Ag --      Presumptive Negative -suggesting no recent or current infections   with Legionella pneumophila serogroup 1. Infection to Legionella cannot be ruled out since other serogroups   and species may cause infection, antigen may not be present in   early infection, or level of antigen may be below the   detection limit.    Normal Range: Presumptive Negative      Narrative:       Source: [facility]       Site: Urine&Urine               Strep Pneumoniae Antigen [[id number]] Collected: 12/05/19 1750     Specimen: Urine, clean catch Updated: 12/06/19 0842      STREP PNEUMONIAE ANTIGEN, URINE Urine specimen POSITIVE for Pneumococcal pneumonia.     Narrative:       Source: URINE       Site:          Radiology:   US DUP UPPER EXTREMITY RIGHT VENOUS   Final Result   1. No evidence of acute venous thrombosis right upper extremity. PICC   line visualized. XR CHEST PORTABLE   Final Result   1. No radiographic change       XR CHEST PORTABLE   Final Result   1. Stable large left effusion and airspace disease. IR FLUORO GUIDED CVA DEVICE PLMT/REPLACE/REMOVAL   Final Result   Successful placement of a 5 Swedish double-lumen Power PICC   line using ultrasound guidance via the right brachial  vein. IR ULTRASOUND GUIDANCE VASCULAR ACCESS   Final Result   Ultrasound guidance was provided during placement of a   PICC line. XR CHEST PORTABLE   Final Result   1. Stable bilateral parenchymal opacities. CT Chest WO Contrast   Final Result   1. Dense consolidation seen within the left upper lobe consistent with   pneumonia. 2. Significant left lower lobe consolidation favored to represent   atelectasis. Underlying pneumonia cannot be excluded. 3. Patchy infiltrate within the right upper lobe and right middle   lobe. 4. Interval right lung base atelectasis. 5. Very small bilateral pleural effusions. 6. There is no evidence of an empyema. XR CHEST PORTABLE   Final Result   1. Stable left lung airspace disease and effusions. XR CHEST PORTABLE   Final Result      1. Stable chest x-ray with extensive left lung airspace disease. XR CHEST PORTABLE   Final Result   1. No change from XR CHEST PORTABLE with report dated 12/7/2019 8:30   AM.          XR CHEST PORTABLE   Final Result   No change left lung pneumonia.       XR CHEST PORTABLE   Final Result   Stable dense left lung consolidation. CT CHEST WO CONTRAST   Final Result   Large left lung infiltrate compatible pneumonia. Minimal   patchy infiltrate developing pneumonia right middle lobe. XR CHEST STANDARD (2 VW)   Final Result   Large left pleural effusion with left lung airspace disease. CT Head WO Contrast   Final Result   No acute intracranial hemorrhage or mass effect. CT CERVICAL SPINE WO CONTRAST   Final Result   1. No acute cervical spine fracture. 2. Mild multilevel cervical spondylosis. Assessment:  Principal Problem:    Sepsis due to pneumonia [facility])  Active Problems:    Acute respiratory failure with hypoxia (HCC)    JOVANI (acute kidney injury) ([address]    Elevated brain natriuretic peptide (BNP) level    Acute metabolic encephalopathy    Community acquired pneumonia of left lung    Hypoxia  Resolved Problems:    * No resolved hospital problems. *      Plan:    1. Acute hypoxic respiratory failure in the setting of dense left lobar streptococcal pneumonia--received ceftriaxone and azithromycin in ER. Given severity of pneumonia, we switched to Zosyn. Pulmonary consulted and they felt patient has high potential for decompensation and recommended she be transferred to ICU which was done the evening of admission 12/5. Vancomycin added as there was concern that family member had recent viral illness and if patient had same, would be at risk for staph pneumonia. Levaquin added by pulmonary to cover atypicals. On 12/6, antibiotics deescalated to Vanco and Levaquin. Zosyn discontinued after AM dose 12/6. Afebrile overnight; WBC 17.8--> 16.8-->25.0-->26.0-->26.8-->23.6, trending down. Femoral central line removed in lieu of PICC line placement. Continue Vancomycin and Rocephin. CT chest ruled out empyema/parapneumonic effusion showed significant consolidation left upper lobe and lower lobe c/w pneumonia. IV steroids discontinued.   Currently, patient maintaining adequate SPO2 greater than 90% on room air. Repeat chest x-ray this morning unchanged. 2. Sepsis due to streptococcal pneumonia--intensivist recommended Marik protocol of hydrocortisone, vitamin C and thiamine. Day #4/4. This was instituted on admission but dose of Solucortef decreased to 50 mg 12/6 because of increasing BUN and last dose 12/7 AM.  Steroids discontinued. On antibiotic therapy with IV Rocephin. 3. Acute metabolic/toxic encephalopathy(resolving)--related to hypoxia and pneumonia. Mentation much improved and at baseline. 4. Dysphagia--seen by speech therapy and was placed on pureed solid with thin liquids. More awake and alert will change diet to mechanical soft. No lesions in mouth to explain discomfort. 5. JOVANI (resolved)-did not know recent baseline but was 0.7 back in 2017. With hydration, creatinine normalized at 0.7  6. Atrial fibrillation with RVR--likely triggered by pulmonary process. Currently, on Cardizem drip and will intend to wean. Cardiology following and patient on metoprolol 200 mg twice daily and on p.o. amiodarone. Patient did not undergo CAROLYN cardioversion, however she was started on Cardizem drip as per cardiology and has been weaned off. Holding Eliquis due to RUE ecchymosis. Right upper extremity ultrasound ordered. 7. Hypokalemia-K 3.5. Replace and will continue to monitor. 8. OA left knee/diabetic peripheral neuropathy--On Percocet 5/325 q6 prn and gabapentin but at half home dose ie 300 mg 4x/day. 9. Elevated BNP--may all be related to pneumonia. However, checked Echo to evaluate LV function. This was done 12/6 and showed EF of 55-60% and indeterminate diastolic function. NOTE: This report was transcribed using voice recognition software.  Every effort was made to ensure accuracy; however, inadvertent computerized transcription errors may be present.     Electronically signed by Sweta Back MD on 12/15/2019 at 2:37 PM

## 2019-12-15 NOTE — PLAN OF CARE
Problem: Discharge Planning:  Goal: Ability to perform activities of daily living will improve  Description  Ability to perform activities of daily living will improve  Outcome: Met This Shift  Goal: Participates in care planning  Description  Participates in care planning  Outcome: Met This Shift     Problem: Risk for Impaired Skin Integrity  Goal: Tissue integrity - skin and mucous membranes  Description  Structural intactness and normal physiological function of skin and  mucous membranes.   Outcome: Met This Shift     Problem: Pain:  Goal: Pain level will decrease  Description  Pain level will decrease  Outcome: Met This Shift  Goal: Control of acute pain  Description  Control of acute pain  Outcome: Met This Shift  Goal: Control of chronic pain  Description  Control of chronic pain  Outcome: Met This Shift

## 2019-12-15 NOTE — PROGRESS NOTES
Patient is seen in follow-up for atrial fibrillation    Subjective:     Ms. Lashawn Padilla feels little bit better, shortness of breath is improving, still coughs  Sitting up in bed no apparent distress    ROS:  CONSTITUTIONAL:  negative for  fevers, chills  HEENT:  negative for earaches, nasal congestion and epistaxis  RESPIRATORY:  + dry cough, cough with sputum,negative for wheezing and hemoptysis  GASTROINTESTINAL:  negative for nausea, vomiting  MUSCULOSKELETAL:  negative for  myalgias, arthralgias  NEUROLOGICAL:  negative for visual disturbance, dysphagia    Medication side effects: none    Scheduled Meds:   furosemide  20 mg Intravenous Daily    [Held by provider] apixaban  5 mg Oral BID    sodium chloride flush  10 mL Intravenous 2 times per day    gabapentin  600 mg Oral 4x Daily AC & HS    cefTRIAXone (ROCEPHIN) IV  2 g Intravenous Q24H    metoprolol tartrate  100 mg Oral BID    lidocaine PF  5 mL Intradermal Once    heparin flush  3 mL Intravenous 2 times per day    amiodarone  200 mg Oral BID    Followed by   Emmanuel Henriquez ON 12/18/2019] amiodarone  200 mg Oral Daily    levalbuterol  0.31 mg Nebulization Q8H    pantoprazole  40 mg Oral QAM AC    insulin lispro  0-6 Units Subcutaneous TID WC    insulin lispro  0-3 Units Subcutaneous Nightly    potassium bicarb-citric acid  40 mEq Oral Once    sodium chloride flush  10 mL Intravenous 2 times per day     Continuous Infusions:   dextrose       PRN Meds:sodium chloride flush, potassium chloride **OR** potassium alternative oral replacement **OR** potassium chloride, heparin flush, sodium chloride flush, heparin flush, biotene dry mouth moisturizing, oxyCODONE-acetaminophen, perflutren lipid microspheres, glucose, dextrose, glucagon (rDNA), dextrose, hydrALAZINE, sodium chloride flush, magnesium hydroxide, ondansetron, acetaminophen      Objective:      Physical Exam:   /76   Pulse 120   Temp 97.9 °F (36.6 °C) (Oral)   Resp 20   Ht 5' 5\" (1.651 m) DEVICE PLMT/REPLACE/REMOVAL   Final Result   Successful placement of a 5 [place]uran double-lumen Power PICC   line using ultrasound guidance via the right brachial  vein. IR ULTRASOUND GUIDANCE VASCULAR ACCESS   Final Result   Ultrasound guidance was provided during placement of a   PICC line. XR CHEST PORTABLE   Final Result   1. Stable bilateral parenchymal opacities. CT Chest WO Contrast   Final Result   1. Dense consolidation seen within the left upper lobe consistent with   pneumonia. 2. Significant left lower lobe consolidation favored to represent   atelectasis. Underlying pneumonia cannot be excluded. 3. Patchy infiltrate within the right upper lobe and right middle   lobe. 4. Interval right lung base atelectasis. 5. Very small bilateral pleural effusions. 6. There is no evidence of an empyema. XR CHEST PORTABLE   Final Result   1. Stable left lung airspace disease and effusions. XR CHEST PORTABLE   Final Result      1. Stable chest x-ray with extensive left lung airspace disease. XR CHEST PORTABLE   Final Result   1. No change from XR CHEST PORTABLE with report dated 12/7/2019 8:30   AM.          XR CHEST PORTABLE   Final Result   No change left lung pneumonia. XR CHEST PORTABLE   Final Result   Stable dense left lung consolidation. CT CHEST WO CONTRAST   Final Result   Large left lung infiltrate compatible pneumonia. Minimal   patchy infiltrate developing pneumonia right middle lobe. XR CHEST STANDARD (2 VW)   Final Result   Large left pleural effusion with left lung airspace disease. CT Head WO Contrast   Final Result   No acute intracranial hemorrhage or mass effect. CT CERVICAL SPINE WO CONTRAST   Final Result   1. No acute cervical spine fracture. 2. Mild multilevel cervical spondylosis.                    Lab Review   Lab Results   Component Value Date     12/15/2019    K 3.9 12/15/2019    K 4.0 12/05/2019    CL 96

## 2019-12-16 LAB
ANION GAP SERPL CALCULATED.3IONS-SCNC: 9 MMOL/L (ref 7–16)
BUN BLDV-MCNC: 10 MG/DL (ref 8–23)
CALCIUM SERPL-MCNC: 8.1 MG/DL (ref 8.6–10.2)
CHLORIDE BLD-SCNC: 98 MMOL/L (ref 98–107)
CO2: 30 MMOL/L (ref 22–29)
CREAT SERPL-MCNC: 0.6 MG/DL (ref 0.5–1)
GFR AFRICAN AMERICAN: >60
GFR NON-AFRICAN AMERICAN: >60 ML/MIN/1.73
GLUCOSE BLD-MCNC: 125 MG/DL (ref 74–99)
HCT VFR BLD CALC: 40.3 % (ref 34–48)
HEMOGLOBIN: 12.9 G/DL (ref 11.5–15.5)
MCH RBC QN AUTO: 29.8 PG (ref 26–35)
MCHC RBC AUTO-ENTMCNC: 32 % (ref 32–34.5)
MCV RBC AUTO: 93.1 FL (ref 80–99.9)
METER GLUCOSE: 131 MG/DL (ref 74–99)
METER GLUCOSE: 139 MG/DL (ref 74–99)
METER GLUCOSE: 170 MG/DL (ref 74–99)
METER GLUCOSE: 94 MG/DL (ref 74–99)
PDW BLD-RTO: 14.3 FL (ref 11.5–15)
PLATELET # BLD: 308 E9/L (ref 130–450)
PMV BLD AUTO: 11.9 FL (ref 7–12)
POTASSIUM SERPL-SCNC: 4.1 MMOL/L (ref 3.5–5)
RBC # BLD: 4.33 E12/L (ref 3.5–5.5)
SODIUM BLD-SCNC: 137 MMOL/L (ref 132–146)
WBC # BLD: 15.1 E9/L (ref 4.5–11.5)

## 2019-12-16 PROCEDURE — 99233 SBSQ HOSP IP/OBS HIGH 50: CPT | Performed by: INTERNAL MEDICINE

## 2019-12-16 PROCEDURE — 2580000003 HC RX 258: Performed by: INTERNAL MEDICINE

## 2019-12-16 PROCEDURE — 6360000002 HC RX W HCPCS: Performed by: INTERNAL MEDICINE

## 2019-12-16 PROCEDURE — 97110 THERAPEUTIC EXERCISES: CPT

## 2019-12-16 PROCEDURE — 36415 COLL VENOUS BLD VENIPUNCTURE: CPT

## 2019-12-16 PROCEDURE — 6370000000 HC RX 637 (ALT 250 FOR IP): Performed by: INTERNAL MEDICINE

## 2019-12-16 PROCEDURE — 1200000000 HC SEMI PRIVATE

## 2019-12-16 PROCEDURE — 97530 THERAPEUTIC ACTIVITIES: CPT

## 2019-12-16 PROCEDURE — 82962 GLUCOSE BLOOD TEST: CPT

## 2019-12-16 PROCEDURE — 92526 ORAL FUNCTION THERAPY: CPT | Performed by: SPEECH-LANGUAGE PATHOLOGIST

## 2019-12-16 PROCEDURE — 80048 BASIC METABOLIC PNL TOTAL CA: CPT

## 2019-12-16 PROCEDURE — 85027 COMPLETE CBC AUTOMATED: CPT

## 2019-12-16 PROCEDURE — 97116 GAIT TRAINING THERAPY: CPT

## 2019-12-16 PROCEDURE — 94640 AIRWAY INHALATION TREATMENT: CPT

## 2019-12-16 RX ORDER — NYSTATIN 100000 U/G
OINTMENT TOPICAL 2 TIMES DAILY
Status: DISCONTINUED | OUTPATIENT
Start: 2019-12-16 | End: 2019-12-23 | Stop reason: HOSPADM

## 2019-12-16 RX ADMIN — Medication 10 ML: at 10:06

## 2019-12-16 RX ADMIN — GABAPENTIN 600 MG: 300 CAPSULE ORAL at 20:20

## 2019-12-16 RX ADMIN — LEVALBUTEROL HYDROCHLORIDE 0.31 MG: 0.31 SOLUTION RESPIRATORY (INHALATION) at 15:18

## 2019-12-16 RX ADMIN — GABAPENTIN 600 MG: 300 CAPSULE ORAL at 06:09

## 2019-12-16 RX ADMIN — LEVALBUTEROL HYDROCHLORIDE 0.31 MG: 0.31 SOLUTION RESPIRATORY (INHALATION) at 22:46

## 2019-12-16 RX ADMIN — METOPROLOL TARTRATE 100 MG: 50 TABLET ORAL at 10:34

## 2019-12-16 RX ADMIN — INSULIN LISPRO 1 UNITS: 100 INJECTION, SOLUTION INTRAVENOUS; SUBCUTANEOUS at 17:19

## 2019-12-16 RX ADMIN — GABAPENTIN 600 MG: 300 CAPSULE ORAL at 10:04

## 2019-12-16 RX ADMIN — DIGOXIN 250 MCG: 250 INJECTION, SOLUTION INTRAMUSCULAR; INTRAVENOUS; PARENTERAL at 04:00

## 2019-12-16 RX ADMIN — METOPROLOL TARTRATE 100 MG: 50 TABLET ORAL at 20:19

## 2019-12-16 RX ADMIN — NYSTATIN: 100000 CREAM TOPICAL at 20:22

## 2019-12-16 RX ADMIN — Medication 10 ML: at 20:21

## 2019-12-16 RX ADMIN — Medication 300 UNITS: at 10:05

## 2019-12-16 RX ADMIN — AMIODARONE HYDROCHLORIDE 200 MG: 200 TABLET ORAL at 10:04

## 2019-12-16 RX ADMIN — PANTOPRAZOLE SODIUM 40 MG: 40 TABLET, DELAYED RELEASE ORAL at 06:09

## 2019-12-16 RX ADMIN — NYSTATIN: 100000 CREAM TOPICAL at 07:44

## 2019-12-16 RX ADMIN — GABAPENTIN 600 MG: 300 CAPSULE ORAL at 17:19

## 2019-12-16 RX ADMIN — AMIODARONE HYDROCHLORIDE 200 MG: 200 TABLET ORAL at 20:20

## 2019-12-16 RX ADMIN — FUROSEMIDE 40 MG: 40 INJECTION, SOLUTION INTRAMUSCULAR; INTRAVENOUS at 10:04

## 2019-12-16 RX ADMIN — LEVALBUTEROL HYDROCHLORIDE 0.31 MG: 0.31 SOLUTION RESPIRATORY (INHALATION) at 06:43

## 2019-12-16 RX ADMIN — OXYCODONE HYDROCHLORIDE AND ACETAMINOPHEN 1 TABLET: 5; 325 TABLET ORAL at 10:34

## 2019-12-16 RX ADMIN — Medication 300 UNITS: at 20:19

## 2019-12-16 RX ADMIN — SODIUM CHLORIDE, PRESERVATIVE FREE 2 G: 5 INJECTION INTRAVENOUS at 10:04

## 2019-12-16 ASSESSMENT — PAIN SCALES - GENERAL
PAINLEVEL_OUTOF10: 4
PAINLEVEL_OUTOF10: 4
PAINLEVEL_OUTOF10: 0
PAINLEVEL_OUTOF10: 6

## 2019-12-16 ASSESSMENT — PAIN DESCRIPTION - LOCATION: LOCATION: BUTTOCKS

## 2019-12-16 ASSESSMENT — PAIN DESCRIPTION - FREQUENCY: FREQUENCY: CONTINUOUS

## 2019-12-16 ASSESSMENT — PAIN DESCRIPTION - PAIN TYPE: TYPE: ACUTE PAIN

## 2019-12-16 ASSESSMENT — PAIN DESCRIPTION - DESCRIPTORS: DESCRIPTORS: DISCOMFORT;BURNING

## 2019-12-16 NOTE — CARE COORDINATION
SS NOTE: SW met with pt today to discuss SNF per PT/OT and Dr Rashad Potter nurse Remedios's recommendation. Pt continues to request going home with [facility]. She wants to return home with [facility]. Pt has 5 step entry with railing and then 6 more steps to her living area in the [facility] level home. SW did leave a SNF providers list with pt today. SS will continue to be involved. Robson Cox. 12/16/2019.4:33 PM.

## 2019-12-16 NOTE — PROGRESS NOTES
fair        Standing: fair  With Min A x 2 with  FWW  -Endurance: fair/fair minus (2* to decreased endurance, strength)  -Edema: yes - B LE's, specifically L knee; nsg aware  -Safety:fair (VC's for walker safety, sequencing, hand placement)  - Pt/family education/training: bed mobility, transfer training, functional mobility, LE/UE dressing, sequencing, hand placement, walker safety,  BUE ROM ex's, ECT's,  ADL retraining  -Pt would benefit from additional ADLs, safety education, balance activities, transfer training, strength exercises, and over all endurance building.     P:  - Plan of care: Patient will be seen by OT PRN for therapeutic activity, ADL re-training, bed mobility,functional transfers, functional mobility, safety and fall prevention, balance and endurance activities, instruction and training in energy conservation principles, and   patient and family education.   - Patient and/or family understands diagnosis, prognosis and plan of care: yes   - ADL retraining, bathroom safety, DME    Treatment minutes: [address] [facility], [address] [facility]

## 2019-12-16 NOTE — PROGRESS NOTES
left effusion and airspace disease. IR FLUORO GUIDED CVA DEVICE PLMT/REPLACE/REMOVAL   Final Result   Successful placement of a 5 Egyptian double-lumen Power PICC   line using ultrasound guidance via the right brachial  vein. IR ULTRASOUND GUIDANCE VASCULAR ACCESS   Final Result   Ultrasound guidance was provided during placement of a   PICC line. XR CHEST PORTABLE   Final Result   1. Stable bilateral parenchymal opacities. CT Chest WO Contrast   Final Result   1. Dense consolidation seen within the left upper lobe consistent with   pneumonia. 2. Significant left lower lobe consolidation favored to represent   atelectasis. Underlying pneumonia cannot be excluded. 3. Patchy infiltrate within the right upper lobe and right middle   lobe. 4. Interval right lung base atelectasis. 5. Very small bilateral pleural effusions. 6. There is no evidence of an empyema. XR CHEST PORTABLE   Final Result   1. Stable left lung airspace disease and effusions. XR CHEST PORTABLE   Final Result      1. Stable chest x-ray with extensive left lung airspace disease. XR CHEST PORTABLE   Final Result   1. No change from XR CHEST PORTABLE with report dated 12/7/2019 8:30   AM.          XR CHEST PORTABLE   Final Result   No change left lung pneumonia. XR CHEST PORTABLE   Final Result   Stable dense left lung consolidation. CT CHEST WO CONTRAST   Final Result   Large left lung infiltrate compatible pneumonia. Minimal   patchy infiltrate developing pneumonia right middle lobe. XR CHEST STANDARD (2 VW)   Final Result   Large left pleural effusion with left lung airspace disease. CT Head WO Contrast   Final Result   No acute intracranial hemorrhage or mass effect. CT CERVICAL SPINE WO CONTRAST   Final Result   1. No acute cervical spine fracture. 2. Mild multilevel cervical spondylosis.                    Lab Review   Lab Results   Component Value Date    NA

## 2019-12-16 NOTE — PROGRESS NOTES
opacities. CT Chest WO Contrast   Final Result   1. Dense consolidation seen within the left upper lobe consistent with   pneumonia. 2. Significant left lower lobe consolidation favored to represent   atelectasis. Underlying pneumonia cannot be excluded. 3. Patchy infiltrate within the right upper lobe and right middle   lobe. 4. Interval right lung base atelectasis. 5. Very small bilateral pleural effusions. 6. There is no evidence of an empyema. XR CHEST PORTABLE   Final Result   1. Stable left lung airspace disease and effusions. XR CHEST PORTABLE   Final Result      1. Stable chest x-ray with extensive left lung airspace disease. XR CHEST PORTABLE   Final Result   1. No change from XR CHEST PORTABLE with report dated 12/7/2019 8:30   AM.          XR CHEST PORTABLE   Final Result   No change left lung pneumonia. XR CHEST PORTABLE   Final Result   Stable dense left lung consolidation. CT CHEST WO CONTRAST   Final Result   Large left lung infiltrate compatible pneumonia. Minimal   patchy infiltrate developing pneumonia right middle lobe. XR CHEST STANDARD (2 VW)   Final Result   Large left pleural effusion with left lung airspace disease. CT Head WO Contrast   Final Result   No acute intracranial hemorrhage or mass effect. CT CERVICAL SPINE WO CONTRAST   Final Result   1. No acute cervical spine fracture. 2. Mild multilevel cervical spondylosis. Assessment:  Principal Problem:    Sepsis due to pneumonia [facility])  Active Problems:    Acute respiratory failure with hypoxia (HCC)    JOVANI (acute kidney injury) ([facility] [address]    Elevated brain natriuretic peptide (BNP) level    Acute metabolic encephalopathy    Community acquired pneumonia of left lung    Hypoxia  Resolved Problems:    * No resolved hospital problems. *      Plan:  1.  Acute hypoxic respiratory failure in the setting of left lobar streptococcal pneumonia: Initially received

## 2019-12-16 NOTE — PROGRESS NOTES
facilitated on techniques to increase safety and independence with bed mobility, balance, functional transfers, and functional mobility. Pt required assist with commode transfers and hygiene. Pt took steps to bedside. Pt was returned to bed at end of treatment. Pt declined further activity at this time d/t family visiting with family and fatigue. Comment: Call light left within reach of patient. Bed alarm was activated. A: Pt required decreased assistance for functional mobility but declined further activity d/t fatigue. Pt is at risk of falls d/t decreased strength and endurance. P: Continue with physical therapy       Carmencita Morrison, PTA    GOALS to be met in 2 days. Bed mobility- Moderate assist                                               Transfers-Sit to stand- Moderate assist                Gait: Patient to ambulate 15 feet using wheeled walker with Moderate assist                   Increase strength in affected mm groups by 1/3 grade  Increase balance to allow for improvement towards functional goals. Increase endurance to allow for improvement towards functional goals.

## 2019-12-16 NOTE — PROGRESS NOTES
PHYSICAL THERAPY TREATMENT NOTE    12/16/2019  [id number]                      Luis Durham   [id number]                              1935    Patient Active Problem List   Diagnosis    Primary osteoarthritis of both knees    [facility] failure with hypoxia [facility] [address]    Sepsis due to pneumonia ([facility] [address]    JOVANI (acute kidney injury) ([facility] [address]    Elevated brain natriuretic peptide (BNP) level    Acute metabolic encephalopathy    Community acquired pneumonia of left lung    Hypoxia       Recommendation for discharge: subacute   Equipment prescriptions needed: to be determined    AM-[facility] Mobility Inpatient   How much difficulty turning over in bed?: A Little  How much difficulty sitting down on / standing up from a chair with arms?: A Lot  How much difficulty moving from lying on back to sitting on side of bed?: A Little  How much help from another person moving to and from a bed to a chair?: A Little  How much help from another person needed to walk in hospital room?: A Lot  How much help from another person for climbing 3-5 steps with a railing?: A Lot  AM-PAC Inpatient Mobility Raw Score : 15  AM-PAC Inpatient T-Scale Score : 39.45  Mobility Inpatient CMS 0-100% Score: 57.7  Mobility Inpatient CMS G-Code Modifier : CK    Precautions: falls, O2, [place]    S: Patient cleared by nursing for treatment. Patient is agreeable to treatment. Pt c/o pain in left knee. Family was present and supportive. Pain status: (measured on a visual analog scale with 0=no pain and 10=excruciating pain) No number assigned to pain/10. O: Pt was instructed in and performed the following:   Bed Mobility- Supine to sit- Supervision   Scooting- Minimal assistance   Sit to supine- Minimal assistance   Transfers-sit to stand- Moderate assistance   Gait: Pt ambulated 20 feet x 2 using wheeled walker with Minimal assistance of 2. Comment: V/C were needed for safety, upright posture, and pacing.    Steps: Not assessed  Treatment: Pt was

## 2019-12-16 NOTE — PLAN OF CARE
Problem: Risk for Impaired Skin Integrity  Goal: Tissue integrity - skin and mucous membranes  Description  Structural intactness and normal physiological function of skin and  mucous membranes.   Outcome: Met This Shift     Problem: Pain:  Goal: Control of acute pain  Description  Control of acute pain  Outcome: Met This Shift     Problem: Pain:  Goal: Control of chronic pain  Description  Control of chronic pain  Outcome: Met This Shift

## 2019-12-16 NOTE — PROGRESS NOTES
airspace disease. IR FLUORO GUIDED CVA DEVICE PLMT/REPLACE/REMOVAL   Final Result   Successful placement of a 5 Khmer double-lumen Power PICC   line using ultrasound guidance via the right brachial  vein. IR ULTRASOUND GUIDANCE VASCULAR ACCESS   Final Result   Ultrasound guidance was provided during placement of a   PICC line. XR CHEST PORTABLE   Final Result   1. Stable bilateral parenchymal opacities. CT Chest WO Contrast   Final Result   1. Dense consolidation seen within the left upper lobe consistent with   pneumonia. 2. Significant left lower lobe consolidation favored to represent   atelectasis. Underlying pneumonia cannot be excluded. 3. Patchy infiltrate within the right upper lobe and right middle   lobe. 4. Interval right lung base atelectasis. 5. Very small bilateral pleural effusions. 6. There is no evidence of an empyema. XR CHEST PORTABLE   Final Result   1. Stable left lung airspace disease and effusions. XR CHEST PORTABLE   Final Result      1. Stable chest x-ray with extensive left lung airspace disease. XR CHEST PORTABLE   Final Result   1. No change from XR CHEST PORTABLE with report dated 12/7/2019 8:30   AM.          XR CHEST PORTABLE   Final Result   No change left lung pneumonia. XR CHEST PORTABLE   Final Result   Stable dense left lung consolidation. CT CHEST WO CONTRAST   Final Result   Large left lung infiltrate compatible pneumonia. Minimal   patchy infiltrate developing pneumonia right middle lobe. XR CHEST STANDARD (2 VW)   Final Result   Large left pleural effusion with left lung airspace disease. CT Head WO Contrast   Final Result   No acute intracranial hemorrhage or mass effect. CT CERVICAL SPINE WO CONTRAST   Final Result   1. No acute cervical spine fracture. 2. Mild multilevel cervical spondylosis.                    Lab Review   Lab Results   Component Value Date     12/15/2019    K

## 2019-12-17 LAB
ANION GAP SERPL CALCULATED.3IONS-SCNC: 10 MMOL/L (ref 7–16)
ANION GAP SERPL CALCULATED.3IONS-SCNC: 11 MMOL/L (ref 7–16)
BUN BLDV-MCNC: 9 MG/DL (ref 8–23)
BUN BLDV-MCNC: 9 MG/DL (ref 8–23)
CALCIUM SERPL-MCNC: 8 MG/DL (ref 8.6–10.2)
CALCIUM SERPL-MCNC: 8.1 MG/DL (ref 8.6–10.2)
CHLORIDE BLD-SCNC: 100 MMOL/L (ref 98–107)
CHLORIDE BLD-SCNC: 98 MMOL/L (ref 98–107)
CO2: 29 MMOL/L (ref 22–29)
CO2: 29 MMOL/L (ref 22–29)
CREAT SERPL-MCNC: 0.6 MG/DL (ref 0.5–1)
CREAT SERPL-MCNC: 0.7 MG/DL (ref 0.5–1)
GFR AFRICAN AMERICAN: >60
GFR AFRICAN AMERICAN: >60
GFR NON-AFRICAN AMERICAN: >60 ML/MIN/1.73
GFR NON-AFRICAN AMERICAN: >60 ML/MIN/1.73
GLUCOSE BLD-MCNC: 138 MG/DL (ref 74–99)
GLUCOSE BLD-MCNC: 142 MG/DL (ref 74–99)
HCT VFR BLD CALC: 37.7 % (ref 34–48)
HEMOGLOBIN: 12.1 G/DL (ref 11.5–15.5)
MCH RBC QN AUTO: 29.6 PG (ref 26–35)
MCHC RBC AUTO-ENTMCNC: 32.1 % (ref 32–34.5)
MCV RBC AUTO: 92.2 FL (ref 80–99.9)
METER GLUCOSE: 127 MG/DL (ref 74–99)
METER GLUCOSE: 137 MG/DL (ref 74–99)
METER GLUCOSE: 252 MG/DL (ref 74–99)
PDW BLD-RTO: 14.2 FL (ref 11.5–15)
PLATELET # BLD: 341 E9/L (ref 130–450)
PMV BLD AUTO: 11.4 FL (ref 7–12)
POTASSIUM SERPL-SCNC: 3.8 MMOL/L (ref 3.5–5)
POTASSIUM SERPL-SCNC: 4.1 MMOL/L (ref 3.5–5)
PRO-BNP: 4003 PG/ML (ref 0–450)
RBC # BLD: 4.09 E12/L (ref 3.5–5.5)
SODIUM BLD-SCNC: 138 MMOL/L (ref 132–146)
SODIUM BLD-SCNC: 139 MMOL/L (ref 132–146)
TROPONIN: <0.01 NG/ML (ref 0–0.03)
WBC # BLD: 12.2 E9/L (ref 4.5–11.5)

## 2019-12-17 PROCEDURE — 6370000000 HC RX 637 (ALT 250 FOR IP): Performed by: INTERNAL MEDICINE

## 2019-12-17 PROCEDURE — 80048 BASIC METABOLIC PNL TOTAL CA: CPT

## 2019-12-17 PROCEDURE — 99233 SBSQ HOSP IP/OBS HIGH 50: CPT | Performed by: INTERNAL MEDICINE

## 2019-12-17 PROCEDURE — 6360000002 HC RX W HCPCS: Performed by: INTERNAL MEDICINE

## 2019-12-17 PROCEDURE — 97110 THERAPEUTIC EXERCISES: CPT

## 2019-12-17 PROCEDURE — APPSS30 APP SPLIT SHARED TIME 16-30 MINUTES: Performed by: NURSE PRACTITIONER

## 2019-12-17 PROCEDURE — 36415 COLL VENOUS BLD VENIPUNCTURE: CPT

## 2019-12-17 PROCEDURE — 2580000003 HC RX 258: Performed by: NURSE PRACTITIONER

## 2019-12-17 PROCEDURE — 2580000003 HC RX 258: Performed by: INTERNAL MEDICINE

## 2019-12-17 PROCEDURE — 84484 ASSAY OF TROPONIN QUANT: CPT

## 2019-12-17 PROCEDURE — 97530 THERAPEUTIC ACTIVITIES: CPT

## 2019-12-17 PROCEDURE — 1200000000 HC SEMI PRIVATE

## 2019-12-17 PROCEDURE — 82962 GLUCOSE BLOOD TEST: CPT

## 2019-12-17 PROCEDURE — 85027 COMPLETE CBC AUTOMATED: CPT

## 2019-12-17 PROCEDURE — 87150 DNA/RNA AMPLIFIED PROBE: CPT

## 2019-12-17 PROCEDURE — 87040 BLOOD CULTURE FOR BACTERIA: CPT

## 2019-12-17 PROCEDURE — 83880 ASSAY OF NATRIURETIC PEPTIDE: CPT

## 2019-12-17 PROCEDURE — 94640 AIRWAY INHALATION TREATMENT: CPT

## 2019-12-17 RX ORDER — FUROSEMIDE 10 MG/ML
40 INJECTION INTRAMUSCULAR; INTRAVENOUS 2 TIMES DAILY
Status: DISCONTINUED | OUTPATIENT
Start: 2019-12-17 | End: 2019-12-19

## 2019-12-17 RX ADMIN — GABAPENTIN 600 MG: 300 CAPSULE ORAL at 20:39

## 2019-12-17 RX ADMIN — PANTOPRAZOLE SODIUM 40 MG: 40 TABLET, DELAYED RELEASE ORAL at 05:33

## 2019-12-17 RX ADMIN — GABAPENTIN 600 MG: 300 CAPSULE ORAL at 05:34

## 2019-12-17 RX ADMIN — LEVALBUTEROL HYDROCHLORIDE 0.31 MG: 0.31 SOLUTION RESPIRATORY (INHALATION) at 06:45

## 2019-12-17 RX ADMIN — GABAPENTIN 600 MG: 300 CAPSULE ORAL at 13:27

## 2019-12-17 RX ADMIN — Medication 300 UNITS: at 09:50

## 2019-12-17 RX ADMIN — Medication 10 ML: at 13:30

## 2019-12-17 RX ADMIN — FUROSEMIDE 40 MG: 10 INJECTION, SOLUTION INTRAMUSCULAR; INTRAVENOUS at 22:48

## 2019-12-17 RX ADMIN — AMIODARONE HYDROCHLORIDE 200 MG: 200 TABLET ORAL at 09:44

## 2019-12-17 RX ADMIN — OXYCODONE HYDROCHLORIDE AND ACETAMINOPHEN 1 TABLET: 5; 325 TABLET ORAL at 20:26

## 2019-12-17 RX ADMIN — METOPROLOL TARTRATE 100 MG: 50 TABLET ORAL at 20:26

## 2019-12-17 RX ADMIN — SODIUM CHLORIDE, PRESERVATIVE FREE 2 G: 5 INJECTION INTRAVENOUS at 09:44

## 2019-12-17 RX ADMIN — OXYCODONE HYDROCHLORIDE AND ACETAMINOPHEN 1 TABLET: 5; 325 TABLET ORAL at 14:37

## 2019-12-17 RX ADMIN — GABAPENTIN 600 MG: 300 CAPSULE ORAL at 17:38

## 2019-12-17 RX ADMIN — Medication 10 ML: at 20:29

## 2019-12-17 RX ADMIN — INSULIN LISPRO 2 UNITS: 100 INJECTION, SOLUTION INTRAVENOUS; SUBCUTANEOUS at 20:29

## 2019-12-17 RX ADMIN — LEVALBUTEROL HYDROCHLORIDE 0.31 MG: 0.31 SOLUTION RESPIRATORY (INHALATION) at 12:52

## 2019-12-17 RX ADMIN — Medication 10 ML: at 13:29

## 2019-12-17 RX ADMIN — OXYCODONE HYDROCHLORIDE AND ACETAMINOPHEN 1 TABLET: 5; 325 TABLET ORAL at 07:14

## 2019-12-17 ASSESSMENT — PAIN SCALES - GENERAL
PAINLEVEL_OUTOF10: 3
PAINLEVEL_OUTOF10: 0
PAINLEVEL_OUTOF10: 0
PAINLEVEL_OUTOF10: 6
PAINLEVEL_OUTOF10: 8
PAINLEVEL_OUTOF10: 0

## 2019-12-17 NOTE — PROGRESS NOTES
PHYSICAL THERAPY TREATMENT NOTE    12/17/2019  [id number]                      Leeanne Lyman   [id number]                              1935    Patient Active Problem List   Diagnosis    Primary osteoarthritis of both knees    [facility] failure with [facility] ([facility] [address]    Sepsis due to pneumonia ([facility] [address]    JOVANI (acute kidney injury) ([facility] [address]    Elevated brain natriuretic peptide (BNP) level    Acute metabolic encephalopathy    Community acquired pneumonia of left lung    Hypoxia       Recommendation for discharge: subacute   Equipment prescriptions needed: to be determined    AM-[facility] Mobility Inpatient   How much difficulty turning over in bed?: A Little  How much difficulty sitting down on / standing up from a chair with arms?: A Lot  How much difficulty moving from lying on back to sitting on side of bed?: A Lot  How much help from another person moving to and from a bed to a chair?: A Little  How much help from another person needed to walk in hospital room?: A Lot  How much help from another person for climbing 3-5 steps with a railing?: A Lot  AM-PAC Inpatient Mobility Raw Score : 14  AM-PAC Inpatient T-Scale Score : 38.1  Mobility Inpatient CMS 0-100% Score: 61.29  Mobility Inpatient CMS G-Code Modifier : CL    Precautions: falls, O2, [place], alarm     S: Patient cleared by nursing for treatment. Patient is agreeable to treatment. Pt c/o pain in left knee. Family was present and supportive. Pt c/o dizziness with stance and being weak. Did not walk due to those reports   Pain status: (measured on a visual analog scale with 0=no pain and 10=excruciating pain) No number assigned to pain/10. O: Pt was instructed in and performed the following:   Bed Mobility- Supine to sit- mod assist    Scooting- Minimal assistance   Sit to supine- not assessed     Transfers-sit to stand- Moderate assistance with cues  Pt ambulated 3 ft  using wheeled walker with Minimal assistance .  Comment: V/C were needed for safety, upright posture, and pacing. Steps: Not assessed  Treatment: as above, pt performed ap, laq, hip flex, standing heel raises, knee bends, marching x 10-20 reps. Comment: Call light left within reach of patient. Chair  alarm was activated. Nursing aware pt in chair. A: Pt with limited activity this session due to dizziness. Pt is at risk of falls d/t decreased strength and endurance. P: Continue with physical therapy       Radha Rodney PTA    GOALS to be met in 2 days. Bed mobility- Moderate assist                                               Transfers-Sit to stand- Moderate assist                Gait: Patient to ambulate 15 feet using wheeled walker with Moderate assist                   Increase strength in affected mm groups by 1/3 grade  Increase balance to allow for improvement towards functional goals. Increase endurance to allow for improvement towards functional goals.

## 2019-12-17 NOTE — PROGRESS NOTES
Result   1. Dense consolidation seen within the left upper lobe consistent with   pneumonia. 2. Significant left lower lobe consolidation favored to represent   atelectasis. Underlying pneumonia cannot be excluded. 3. Patchy infiltrate within the right upper lobe and right middle   lobe. 4. Interval right lung base atelectasis. 5. Very small bilateral pleural effusions. 6. There is no evidence of an empyema. XR CHEST PORTABLE   Final Result   1. Stable left lung airspace disease and effusions. XR CHEST PORTABLE   Final Result      1. Stable chest x-ray with extensive left lung airspace disease. XR CHEST PORTABLE   Final Result   1. No change from XR CHEST PORTABLE with report dated 12/7/2019 8:30   AM.          XR CHEST PORTABLE   Final Result   No change left lung pneumonia. XR CHEST PORTABLE   Final Result   Stable dense left lung consolidation. CT CHEST WO CONTRAST   Final Result   Large left lung infiltrate compatible pneumonia. Minimal   patchy infiltrate developing pneumonia right middle lobe. XR CHEST STANDARD (2 VW)   Final Result   Large left pleural effusion with left lung airspace disease. CT Head WO Contrast   Final Result   No acute intracranial hemorrhage or mass effect. CT CERVICAL SPINE WO CONTRAST   Final Result   1. No acute cervical spine fracture. 2. Mild multilevel cervical spondylosis. Assessment:  Principal Problem:    Sepsis due to pneumonia [facility])  Active Problems:    Acute respiratory failure with hypoxia (HCC)    JOVANI (acute kidney injury) ([facility] [address]    Elevated brain natriuretic peptide (BNP) level    Acute metabolic encephalopathy    Community acquired pneumonia of left lung    Hypoxia  Resolved Problems:    * No resolved hospital problems. *      Plan:  1.  Acute hypoxic respiratory failure in the setting of left lobar streptococcal pneumonia: Initially received ceftriaxone and azithromycin in the emergency department, due to the severity of her pneumonia antibiotic was switched to Zosyn. Pulmonology was consulted and felt that the patient had a risk for decompensation and recommended she be transferred to ICU, which was on the evening of 12/5/2019. Vancomycin was added as there is concern for patient was at high risk for staph pneumonia. Levaquin was added by pulmonology to cover atypicals. Zosyn was stopped on 12/6/2019. Levaquin was stopped on 12/10/2019 ceftriaxone was started. CT of the chest showed significant consolidation left upper lobe and lower lobe. Patient is a 91% on room air at rest.  Will need ambulatory pulse ox prior to discharge. Check respiratory array panel  2. Sepsis due to streptococcal pneumonia: Received hydrocortisone, vitamin C, and thiamine for 4 days. Solu-Cortef was decreased on 12/6/2019 because of increasing BUN. Periods were stopped on 12/7/2019. Continue IV ceftriaxone. 3. Acute metabolic encephalopathy: Improved. Likely related to hypoxia and pneumonia. 4. Dysphagia: Speech therapy evaluated and place patient on a puréed diet with thin liquids, but was changed to mechanical soft diet once patient's mentation improved. 5. Acute kidney injury: Resolved. Patient received IV fluids. 2.6 on admission 0.6 today. 6. Atrial fibrillation with RVR: Patient was on a Cardizem drip and has been weaned off. Currently on metoprolol and was started oral amiodarone by cardiology. Patient declined CAROLYN cardioversion. Eliquis is currently on hold due to right upper extremity ecchymosis and edema at the PICC line site  7. Acute on chronic diastolic heart failure: Echo on 12/6/2019 revealed an EF of 55 to 60%. Continue IV Lasix 40 mg daily. Patient negative 1.9 liters. 8. Osteoarthritis left knee/buttock peripheral neuropathy: Continue Percocet as needed and gabapentin. The gabapentin dose was decreased during this admission. 9. Edema right arm: No evidence of DVT on ultrasound. Elevate. 10. Tip culture positive for coag negative staph: Tip culture from 12/11/2019 was positive for < 15 colonies of coag negative staph. Check blood cultures. 11. PT OT recommending rehab for patient. Patient is declining at this time and wishes to return home with her  at discharge. NOTE: This report was transcribed using voice recognition software.  Every effort was made to ensure accuracy; however, inadvertent computerized transcription errors may be present.     Electronically signed by MEJIA Garcia CNP on 12/17/2019 at 12:14 PM

## 2019-12-17 NOTE — PROGRESS NOTES
P Quality Flow/Interdisciplinary Rounds Progress Note        Quality Flow Rounds held on December 17, 2019    Disciplines Attending:  Bedside Nurse, ,  and Nursing Unit Leadership    Xena Benton was admitted on 12/5/2019  8:48 AM    Anticipated Discharge Date:  Expected Discharge Date: 12/14/19    Disposition:    Ramu Score:  Ramu Scale Score: 18    Readmission Risk              Risk of Unplanned Readmission:        11           Discussed patient goal for the day, patient clinical progression, and barriers to discharge.   The following Goal(s) of the Day/Commitment(s) have been identified:  Activity progression, PT/OT, Plan is AT home with nimo Hutchinson   December 17, 2019

## 2019-12-18 LAB
ANION GAP SERPL CALCULATED.3IONS-SCNC: 10 MMOL/L (ref 7–16)
BUN BLDV-MCNC: 9 MG/DL (ref 8–23)
CALCIUM SERPL-MCNC: 8.1 MG/DL (ref 8.6–10.2)
CHLORIDE BLD-SCNC: 98 MMOL/L (ref 98–107)
CO2: 29 MMOL/L (ref 22–29)
CREAT SERPL-MCNC: 0.7 MG/DL (ref 0.5–1)
GFR AFRICAN AMERICAN: >60
GFR NON-AFRICAN AMERICAN: >60 ML/MIN/1.73
GLUCOSE BLD-MCNC: 116 MG/DL (ref 74–99)
METER GLUCOSE: 158 MG/DL (ref 74–99)
METER GLUCOSE: 175 MG/DL (ref 74–99)
METER GLUCOSE: 200 MG/DL (ref 74–99)
METER GLUCOSE: 96 MG/DL (ref 74–99)
POTASSIUM SERPL-SCNC: 3.8 MMOL/L (ref 3.5–5)
SODIUM BLD-SCNC: 137 MMOL/L (ref 132–146)

## 2019-12-18 PROCEDURE — 97110 THERAPEUTIC EXERCISES: CPT

## 2019-12-18 PROCEDURE — 97530 THERAPEUTIC ACTIVITIES: CPT

## 2019-12-18 PROCEDURE — 1200000000 HC SEMI PRIVATE

## 2019-12-18 PROCEDURE — 36415 COLL VENOUS BLD VENIPUNCTURE: CPT

## 2019-12-18 PROCEDURE — APPSS30 APP SPLIT SHARED TIME 16-30 MINUTES: Performed by: NURSE PRACTITIONER

## 2019-12-18 PROCEDURE — 94640 AIRWAY INHALATION TREATMENT: CPT

## 2019-12-18 PROCEDURE — 6360000002 HC RX W HCPCS: Performed by: INTERNAL MEDICINE

## 2019-12-18 PROCEDURE — 6370000000 HC RX 637 (ALT 250 FOR IP): Performed by: INTERNAL MEDICINE

## 2019-12-18 PROCEDURE — 80048 BASIC METABOLIC PNL TOTAL CA: CPT

## 2019-12-18 PROCEDURE — 36592 COLLECT BLOOD FROM PICC: CPT

## 2019-12-18 PROCEDURE — 0100U HC RESPIRPTHGN MULT REV TRANS & AMP PRB TECH 21 TRGT: CPT

## 2019-12-18 PROCEDURE — 2580000003 HC RX 258: Performed by: INTERNAL MEDICINE

## 2019-12-18 PROCEDURE — 82962 GLUCOSE BLOOD TEST: CPT

## 2019-12-18 PROCEDURE — 99232 SBSQ HOSP IP/OBS MODERATE 35: CPT | Performed by: INTERNAL MEDICINE

## 2019-12-18 RX ADMIN — LEVALBUTEROL HYDROCHLORIDE 0.31 MG: 0.31 SOLUTION RESPIRATORY (INHALATION) at 23:32

## 2019-12-18 RX ADMIN — Medication 300 UNITS: at 14:06

## 2019-12-18 RX ADMIN — INSULIN LISPRO 1 UNITS: 100 INJECTION, SOLUTION INTRAVENOUS; SUBCUTANEOUS at 21:32

## 2019-12-18 RX ADMIN — Medication 300 UNITS: at 21:26

## 2019-12-18 RX ADMIN — GABAPENTIN 600 MG: 300 CAPSULE ORAL at 10:11

## 2019-12-18 RX ADMIN — APIXABAN 5 MG: 5 TABLET, FILM COATED ORAL at 21:25

## 2019-12-18 RX ADMIN — Medication 10 ML: at 21:31

## 2019-12-18 RX ADMIN — INSULIN LISPRO 1 UNITS: 100 INJECTION, SOLUTION INTRAVENOUS; SUBCUTANEOUS at 16:34

## 2019-12-18 RX ADMIN — GABAPENTIN 600 MG: 300 CAPSULE ORAL at 21:25

## 2019-12-18 RX ADMIN — OXYCODONE HYDROCHLORIDE AND ACETAMINOPHEN 1 TABLET: 5; 325 TABLET ORAL at 19:19

## 2019-12-18 RX ADMIN — METOPROLOL TARTRATE 100 MG: 50 TABLET ORAL at 10:11

## 2019-12-18 RX ADMIN — SODIUM CHLORIDE, PRESERVATIVE FREE 2 G: 5 INJECTION INTRAVENOUS at 10:10

## 2019-12-18 RX ADMIN — OXYCODONE HYDROCHLORIDE AND ACETAMINOPHEN 1 TABLET: 5; 325 TABLET ORAL at 10:12

## 2019-12-18 RX ADMIN — GABAPENTIN 600 MG: 300 CAPSULE ORAL at 07:24

## 2019-12-18 RX ADMIN — AMIODARONE HYDROCHLORIDE 200 MG: 200 TABLET ORAL at 13:57

## 2019-12-18 RX ADMIN — INSULIN LISPRO 1 UNITS: 100 INJECTION, SOLUTION INTRAVENOUS; SUBCUTANEOUS at 13:56

## 2019-12-18 RX ADMIN — GABAPENTIN 600 MG: 300 CAPSULE ORAL at 16:33

## 2019-12-18 RX ADMIN — FUROSEMIDE 40 MG: 10 INJECTION, SOLUTION INTRAMUSCULAR; INTRAVENOUS at 14:05

## 2019-12-18 RX ADMIN — NYSTATIN: 100000 CREAM TOPICAL at 21:26

## 2019-12-18 RX ADMIN — METOPROLOL TARTRATE 100 MG: 50 TABLET ORAL at 21:30

## 2019-12-18 RX ADMIN — LEVALBUTEROL HYDROCHLORIDE 0.31 MG: 0.31 SOLUTION RESPIRATORY (INHALATION) at 07:10

## 2019-12-18 RX ADMIN — PANTOPRAZOLE SODIUM 40 MG: 40 TABLET, DELAYED RELEASE ORAL at 07:24

## 2019-12-18 ASSESSMENT — PAIN DESCRIPTION - PROGRESSION: CLINICAL_PROGRESSION: NOT CHANGED

## 2019-12-18 ASSESSMENT — PAIN - FUNCTIONAL ASSESSMENT: PAIN_FUNCTIONAL_ASSESSMENT: PREVENTS OR INTERFERES SOME ACTIVE ACTIVITIES AND ADLS

## 2019-12-18 ASSESSMENT — PAIN SCALES - GENERAL
PAINLEVEL_OUTOF10: 8
PAINLEVEL_OUTOF10: 0
PAINLEVEL_OUTOF10: 2
PAINLEVEL_OUTOF10: 8
PAINLEVEL_OUTOF10: 0
PAINLEVEL_OUTOF10: 9

## 2019-12-18 ASSESSMENT — PAIN DESCRIPTION - FREQUENCY: FREQUENCY: CONTINUOUS

## 2019-12-18 ASSESSMENT — PAIN DESCRIPTION - DESCRIPTORS: DESCRIPTORS: CONSTANT;ACHING

## 2019-12-18 ASSESSMENT — PAIN DESCRIPTION - ONSET: ONSET: ON-GOING

## 2019-12-18 ASSESSMENT — PAIN DESCRIPTION - ORIENTATION: ORIENTATION: LOWER;RIGHT;LEFT

## 2019-12-18 ASSESSMENT — PAIN DESCRIPTION - LOCATION: LOCATION: BACK

## 2019-12-18 ASSESSMENT — PAIN DESCRIPTION - PAIN TYPE: TYPE: CHRONIC PAIN

## 2019-12-18 NOTE — CARE COORDINATION
Ss note:12/18/2019.11:28 AM Met with pt & spouse regarding discharge plan. Pt has 11 steps to do at home & has not done any steps yet here at hospital. Pt would like to discharge home however is now receptive to short term rehab. Pt and spouse prefer [facility] as spouse was there in past; this facility is very close to their home. Referral made to [facility], will await acceptance. Requires a PRECERT for placement.  RENETTA Dupont

## 2019-12-18 NOTE — PROGRESS NOTES
P Quality Flow/Interdisciplinary Rounds Progress Note        Quality Flow Rounds held on December 18, 2019    Disciplines Attending:  Bedside Nurse, ,  and Nursing Unit Leadership    Marcel Luque was admitted on 12/5/2019  8:48 AM    Anticipated Discharge Date:  Expected Discharge Date: 12/14/19    Disposition:    Ramu Score:  Ramu Scale Score: 19    Readmission Risk              Risk of Unplanned Readmission:        11           Discussed patient goal for the day, patient clinical progression, and barriers to discharge.   The following Goal(s) of the Day/Commitment(s) have been identified:  Activity progression, PT/OT, plan Home, will revisit, PICC  IV ATX    Tavares Juarez  December 18, 2019

## 2019-12-18 NOTE — PLAN OF CARE
Problem: Discharge Planning:  Goal: Ability to perform activities of daily living will improve  Description  Ability to perform activities of daily living will improve  Outcome: Met This Shift     Problem: Discharge Planning:  Goal: Participates in care planning  Description  Participates in care planning  Outcome: Met This Shift     Problem: Injury - Risk of, Physical Injury:  Goal: Will remain free from falls  Description  Will remain free from falls   12/18/2019 0847 by Yesenia Clemente RN  Outcome: Met This Shift     Problem: Risk for Impaired Skin Integrity  Goal: Tissue integrity - skin and mucous membranes  Description  Structural intactness and normal physiological function of skin and  mucous membranes.   Outcome: Met This Shift     Problem: Falls - Risk of:  Goal: Will remain free from falls  Description  Will remain free from falls   12/18/2019 0847 by Yesenia Clemente RN  Outcome: Met This Shift

## 2019-12-18 NOTE — PROGRESS NOTES
[place] [address] Hospital[place]   Progress Note    Admitting Date and Time: 12/5/2019  8:48 AM  Admit Dx: Acute respiratory failure with hypoxia (HCC) [J96.01]    Subjective:    Patient was initially refusing Lasix as it caused her to have bladder pressure when she could not get to the bathroom quick enough. Explained the need for Lasix and patient is now agreeable. ROS: denies fever, chills, cp, sob, n/v, HA unless stated above.      furosemide  40 mg Intravenous BID    nystatin   Topical BID    nystatin   Topical BID    [Held by provider] apixaban  5 mg Oral BID    sodium chloride flush  10 mL Intravenous 2 times per day    gabapentin  600 mg Oral 4x Daily AC & HS    cefTRIAXone (ROCEPHIN) IV  2 g Intravenous Q24H    metoprolol tartrate  100 mg Oral BID    lidocaine PF  5 mL Intradermal Once    heparin flush  3 mL Intravenous 2 times per day    amiodarone  200 mg Oral Daily    levalbuterol  0.31 mg Nebulization Q8H    pantoprazole  40 mg Oral QAM AC    insulin lispro  0-6 Units Subcutaneous TID WC    insulin lispro  0-3 Units Subcutaneous Nightly    potassium bicarb-citric acid  40 mEq Oral Once    sodium chloride flush  10 mL Intravenous 2 times per day     sodium chloride flush, 10 mL, PRN  potassium chloride, 40 mEq, PRN    Or  potassium alternative oral replacement, 40 mEq, PRN    Or  potassium chloride, 10 mEq, PRN  heparin flush, 100 Units, PRN  sodium chloride flush, 10 mL, PRN  heparin flush, 3 mL, PRN  biotene dry mouth moisturizing, , PRN  oxyCODONE-acetaminophen, 1 tablet, Q6H PRN  perflutren lipid microspheres, 1.5 mL, ONCE PRN  glucose, 15 g, PRN  dextrose, 12.5 g, PRN  glucagon (rDNA), 1 mg, PRN  dextrose, 100 mL/hr, PRN  hydrALAZINE, 10 mg, Q6H PRN  sodium chloride flush, 10 mL, PRN  magnesium hydroxide, 30 mL, Daily PRN  ondansetron, 4 mg, Q6H PRN  acetaminophen, 650 mg, Q4H PRN         Objective:    /82   Pulse 129   Temp 98.3 °F (36.8 °C) (Oral)   Resp 18 Ht 5' 5\" (1.651 m)   Wt 186 lb 1.6 oz (84.4 kg)   SpO2 91%   BMI 30.97 kg/m²   General Appearance: alert and oriented to person, place and time and in no acute distress  Skin: warm and dry, erythema to jeyson area and buttocks. Head: normocephalic and atraumatic  Eyes: pupils equal, round, and reactive to light, conjunctivae normal  Neck: neck supple and non tender without mass   Pulmonary/Chest: Decreased breath sounds, crackles in bases, no respiratory distress  Cardiovascular: irregularly irregular  Abdomen: soft, non-tender, distended, normal bowel sounds, no masses or organomegaly  Extremities: no cyanosis, no clubbing and erythema and edema right upper extremity, edema bilateral lower extremities  Neurologic: no cranial nerve deficit and speech normal  PICC right arm      Recent Labs     12/17/19  0520 12/17/19  1450 12/18/19  0645    139 137   K 3.8 4.1 3.8   CL 98 100 98   CO2 29 29 29   BUN 9 9 9   CREATININE 0.6 0.7 0.7   GLUCOSE 142* 138* 116*   CALCIUM 8.0* 8.1* 8.1*       Recent Labs     12/16/19  0630 12/17/19  1450   WBC 15.1* 12.2*   RBC 4.33 4.09   HGB 12.9 12.1   HCT 40.3 37.7   MCV 93.1 92.2   MCH 29.8 29.6   MCHC 32.0 32.1   RDW 14.3 14.2    341   MPV 11.9 11.4            Radiology:   US DUP UPPER EXTREMITY RIGHT VENOUS   Final Result   1. No evidence of acute venous thrombosis right upper extremity. PICC   line visualized. XR CHEST PORTABLE   Final Result   1. No radiographic change       XR CHEST PORTABLE   Final Result   1. Stable large left effusion and airspace disease. IR FLUORO GUIDED CVA DEVICE PLMT/REPLACE/REMOVAL   Final Result   Successful placement of a 5 [place] double-lumen Power PICC   line using ultrasound guidance via the right brachial  vein. IR ULTRASOUND GUIDANCE VASCULAR ACCESS   Final Result   Ultrasound guidance was provided during placement of a   PICC line. XR CHEST PORTABLE   Final Result   1.  Stable bilateral parenchymal ceftriaxone and azithromycin in the emergency department, due to the severity of her pneumonia antibiotic was switched to Zosyn. Pulmonology was consulted and felt that the patient had a risk for decompensation and recommended she be transferred to ICU, which was on the evening of 12/5/2019. Vancomycin was added as there is concern for patient was at high risk for staph pneumonia. Levaquin was added by pulmonology to cover atypicals. Zosyn was stopped on 12/6/2019. Levaquin was stopped on 12/10/2019 ceftriaxone was started. CT of the chest showed significant consolidation left upper lobe and lower lobe. Patient is a 91% on room air at rest.  Will need ambulatory pulse ox prior to discharge. 2. Sepsis due to streptococcal pneumonia: Received hydrocortisone, vitamin C, and thiamine for 4 days. Solu-Cortef was decreased on 12/6/2019 because of increasing BUN. Periods were stopped on 12/7/2019. Continue IV ceftriaxone Patient has received 8 doses, dose missed on 12/15/19.  3. Acute metabolic encephalopathy: Improved. Likely related to hypoxia and pneumonia. 4. Dysphagia: Speech therapy evaluated and place patient on a puréed diet with thin liquids, but was changed to mechanical soft diet once patient's mentation improved. 5. Acute kidney injury: Resolved. Patient received IV fluids. 2.6 on admission 0.7 today. 6. Atrial fibrillation with RVR: Patient was on a Cardizem drip and has been weaned off. Currently on metoprolol and was started oral amiodarone by cardiology. Patient declined CAROLYN cardioversion. Restartt Eliquis. 7. Acute on chronic diastolic heart failure: Echo on 12/6/2019 revealed an EF of 55 to 60%. Continue IV Lasix 40 mg daily. Patient negative 2 liters. 8. Osteoarthritis left knee/buttock peripheral neuropathy: Continue Percocet as needed and gabapentin. The gabapentin dose was decreased during this admission. 9. Edema right arm: No evidence of DVT on ultrasound. Elevate. 10. Tip culture positive for coag negative staph: Tip culture from 12/11/2019 was positive for < 15 colonies of coag negative staph. Patient had a right femoral central line. Blood cultures sent. 11. [facility] NOTE: This report was transcribed using voice recognition software.  Every effort was made to ensure accuracy; however, inadvertent computerized transcription errors may be present.     Electronically signed by MEJIA Cheema CNP on 12/18/2019 at 2:36 PM

## 2019-12-18 NOTE — PROGRESS NOTES
PHYSICAL THERAPY TREATMENT NOTE    12/18/2019  [id number]                      Josephine Perdomo   [id number]                              1935    Patient Active Problem List   Diagnosis    Primary osteoarthritis of both knees    [facility] ([facility] [address]    Sepsis due to pneumonia ([facility] [address]    JOVANI (acute kidney injury) ([facility] [address]    Elevated brain natriuretic peptide (BNP) level    Acute metabolic encephalopathy    Community acquired pneumonia of left lung    Hypoxia       Recommendation for discharge: subacute   Equipment prescriptions needed: to be determined    AM-[facility] Mobility Inpatient   How much difficulty turning over in bed?: A Little  How much difficulty sitting down on / standing up from a chair with arms?: A Lot  How much difficulty moving from lying on back to sitting on side of bed?: A Lot  How much help from another person moving to and from a bed to a chair?: A Little  How much help from another person needed to walk in hospital room?: A Little  How much help from another person for climbing 3-5 steps with a railing?: A Lot  AM-PAC Inpatient Mobility Raw Score : 15  AM-PAC Inpatient T-Scale Score : 39.45  Mobility Inpatient CMS 0-100% Score: 57.7  Mobility Inpatient CMS G-Code Modifier : CK    Precautions: falls, O2, [place], alarm     S: Patient cleared by nursing for treatment. Patient is agreeable to treatment. Pt c/o dizziness initially with out of bed and standing . Family was present and supportive. Pain status: (measured on a visual analog scale with 0=no pain and 10=excruciating pain) No number assigned to pain/10. O: Pt was instructed in and performed the following:   Bed Mobility- Supine to sit- mod assist    Scooting- Minimal assistance   Sit to supine- not assessed     Transfers-sit to stand- Moderate assistance with cues  Pt ambulated 3 ft  using wheeled walker with Minimal assistance .  Pt amb 20 ft using wheeled walker with min assist. Comment: V/C were needed for

## 2019-12-18 NOTE — PROGRESS NOTES
Patient is seen in follow-up for atrial fibrillation    Subjective:     Ms. Valente Broderick feels little bit better, shortness of breath is tender  Sitting up in bed no apparent distress    ROS:  CONSTITUTIONAL:  negative for  fevers, chills  HEENT:  negative for earaches, nasal congestion and epistaxis  RESPIRATORY:  + dry cough, cough with sputum,negative for wheezing and hemoptysis  GASTROINTESTINAL:  negative for nausea, vomiting  MUSCULOSKELETAL:  negative for  myalgias, arthralgias  NEUROLOGICAL:  negative for visual disturbance, dysphagia    Medication side effects: none    Scheduled Meds:   nystatin   Topical BID    furosemide  40 mg Intravenous Daily    nystatin   Topical BID    [Held by provider] apixaban  5 mg Oral BID    sodium chloride flush  10 mL Intravenous 2 times per day    gabapentin  600 mg Oral 4x Daily AC & HS    cefTRIAXone (ROCEPHIN) IV  2 g Intravenous Q24H    metoprolol tartrate  100 mg Oral BID    lidocaine PF  5 mL Intradermal Once    heparin flush  3 mL Intravenous 2 times per day    [START ON 12/18/2019] amiodarone  200 mg Oral Daily    levalbuterol  0.31 mg Nebulization Q8H    pantoprazole  40 mg Oral QAM AC    insulin lispro  0-6 Units Subcutaneous TID WC    insulin lispro  0-3 Units Subcutaneous Nightly    potassium bicarb-citric acid  40 mEq Oral Once    sodium chloride flush  10 mL Intravenous 2 times per day     Continuous Infusions:   dextrose       PRN Meds:sodium chloride flush, potassium chloride **OR** potassium alternative oral replacement **OR** potassium chloride, heparin flush, sodium chloride flush, heparin flush, biotene dry mouth moisturizing, oxyCODONE-acetaminophen, perflutren lipid microspheres, glucose, dextrose, glucagon (rDNA), dextrose, hydrALAZINE, sodium chloride flush, magnesium hydroxide, ondansetron, acetaminophen      Objective:      Physical Exam:   BP (!) 140/68   Pulse 89   Temp 98.1 °F (36.7 °C)   Resp 18   Ht 5' 5\" (1.651 m)   Wt 186 lb 1.6 oz (84.4 kg)   SpO2 93%   BMI 30.97 kg/m²   CONSTITUTIONAL:  awake, alert, cooperative, no apparent distress, and appears stated age  HEAD:  normocepalic, without obvious abnormality, atraumatic  NECK:  Supple, symmetrical, trachea midline, no adenopathy, thyroid symmetric, not enlarged and no tenderness, skin normal  LUNGS:  No increased work of breathing, good air exchange, bilateral rhonchi, decreased breath sounds left base cARDIOVASCULAR:  Normal apical impulse, irregularly irregular, normal S1 and S2, no S3, no murmur noted, no edema, no JVD, no carotid bruit. ABDOMEN:  Soft, nontender, no masses, no hepatomegaly, no splenomegaly, BS+  MUSCULOSKELETAL:  No clubbing no cyanosis. there is no redness, warmth, or swelling of the joints  full range of motion noted  NEUROLOGIC:  Alert, awake,oriented x3  SKIN:  no bruising or bleeding, normal skin color, texture, turgor and no redness, warmth, or swelling    Cardiographics  I personally reviewed the telemetry monitor strips with the following interpretation: Fibrillation with controlled ventricular response    Echocardiogram: 12/6/2019,Normal left ventricle size and systolic function.   Ejection fraction is visually estimated at 55-60%.   Indeterminate diastolic function.   Can not definitively rule out wall motion abnormalities on the basis of   this study due to limited visualization of the endocardial borders.   Mild left ventricular concentric hypertrophy noted.   Normal right ventricle structure and function.   Mild mitral annular calcification.   Mild thickening and calcification of the mitral valve leaflets. Imaging  US DUP UPPER EXTREMITY RIGHT VENOUS   Final Result   1. No evidence of acute venous thrombosis right upper extremity. PICC   line visualized. XR CHEST PORTABLE   Final Result   1. No radiographic change       XR CHEST PORTABLE   Final Result   1. Stable large left effusion and airspace disease.        IR FLUORO GUIDED CVA DEVICE PLMT/REPLACE/REMOVAL   Final Result   Successful placement of a 5 [place] double-lumen Power PICC   line using ultrasound guidance via the right brachial  vein. IR ULTRASOUND GUIDANCE VASCULAR ACCESS   Final Result   Ultrasound guidance was provided during placement of a   PICC line. XR CHEST PORTABLE   Final Result   1. Stable bilateral parenchymal opacities. CT Chest WO Contrast   Final Result   1. Dense consolidation seen within the left upper lobe consistent with   pneumonia. 2. Significant left lower lobe consolidation favored to represent   atelectasis. Underlying pneumonia cannot be excluded. 3. Patchy infiltrate within the right upper lobe and right middle   lobe. 4. Interval right lung base atelectasis. 5. Very small bilateral pleural effusions. 6. There is no evidence of an empyema. XR CHEST PORTABLE   Final Result   1. Stable left lung airspace disease and effusions. XR CHEST PORTABLE   Final Result      1. Stable chest x-ray with extensive left lung airspace disease. XR CHEST PORTABLE   Final Result   1. No change from XR CHEST PORTABLE with report dated 12/7/2019 8:30   AM.          XR CHEST PORTABLE   Final Result   No change left lung pneumonia. XR CHEST PORTABLE   Final Result   Stable dense left lung consolidation. CT CHEST WO CONTRAST   Final Result   Large left lung infiltrate compatible pneumonia. Minimal   patchy infiltrate developing pneumonia right middle lobe. XR CHEST STANDARD (2 VW)   Final Result   Large left pleural effusion with left lung airspace disease. CT Head WO Contrast   Final Result   No acute intracranial hemorrhage or mass effect. CT CERVICAL SPINE WO CONTRAST   Final Result   1. No acute cervical spine fracture. 2. Mild multilevel cervical spondylosis.                    Lab Review   Lab Results   Component Value Date     12/17/2019    K 4.1 12/17/2019    K 4.0 12/05/2019     12/17/2019    CO2 29 12/17/2019    BUN 9 12/17/2019    CREATININE 0.7 12/17/2019    GLUCOSE 138 12/17/2019    CALCIUM 8.1 12/17/2019     Lab Results   Component Value Date    WBC 12.2 12/17/2019    HGB 12.1 12/17/2019    HCT 37.7 12/17/2019    MCV 92.2 12/17/2019     12/17/2019     I have personally reviewed the laboratory, cardiac diagnostic and radiographic testing as outlined above:    Assessment:   1. Atrial fibrillation: Paroxysmal, rapid ventricular response, started on amiodarone, declined CAROLYN guided cardioversion, will continue current treatment, continue chronic anticoagulation. 2.  Acute exacerbation of congestive heart failure: Acute on chronic, decompensated, diastolic, will continue uresis  3. Left lower lobe pneumonia    Recommendations:     1. will transition to oral diuretics  2.  will continue the rest of her treatment  3. Will increase ambulation as tolerated  4. Further cardiac recommendations will be forthcoming pending her clinical course and diagnostic test findings    Discussed with patient no family at bedside  Electronically signed by Cecile Knight MD on 12/17/2019 at 9:42 PM     NOTE: This report was transcribed using voice recognition software.  Every effort was made to ensure accuracy; however, inadvertent computerized transcription errors may be present

## 2019-12-19 LAB
ADENOVIRUS BY PCR: NOT DETECTED
ANION GAP SERPL CALCULATED.3IONS-SCNC: 8 MMOL/L (ref 7–16)
BACTERIA: NORMAL /HPF
BILIRUBIN URINE: NEGATIVE
BLOOD, URINE: NEGATIVE
BORDETELLA PARAPERTUSSIS BY PCR: NOT DETECTED
BORDETELLA PERTUSSIS BY PCR: NOT DETECTED
BUN BLDV-MCNC: 12 MG/DL (ref 8–23)
CALCIUM SERPL-MCNC: 8 MG/DL (ref 8.6–10.2)
CHLAMYDOPHILIA PNEUMONIAE BY PCR: NOT DETECTED
CHLORIDE BLD-SCNC: 98 MMOL/L (ref 98–107)
CLARITY: CLEAR
CO2: 31 MMOL/L (ref 22–29)
COLOR: YELLOW
CORONAVIRUS 229E BY PCR: NOT DETECTED
CORONAVIRUS HKU1 BY PCR: NOT DETECTED
CORONAVIRUS NL63 BY PCR: NOT DETECTED
CORONAVIRUS OC43 BY PCR: NOT DETECTED
CREAT SERPL-MCNC: 0.8 MG/DL (ref 0.5–1)
GFR AFRICAN AMERICAN: >60
GFR NON-AFRICAN AMERICAN: >60 ML/MIN/1.73
GLUCOSE BLD-MCNC: 127 MG/DL (ref 74–99)
GLUCOSE URINE: NEGATIVE MG/DL
HUMAN METAPNEUMOVIRUS BY PCR: NOT DETECTED
HUMAN RHINOVIRUS/ENTEROVIRUS BY PCR: NOT DETECTED
INFLUENZA A BY PCR: NOT DETECTED
INFLUENZA B BY PCR: NOT DETECTED
KETONES, URINE: NEGATIVE MG/DL
LEUKOCYTE ESTERASE, URINE: NEGATIVE
METER GLUCOSE: 108 MG/DL (ref 74–99)
METER GLUCOSE: 130 MG/DL (ref 74–99)
METER GLUCOSE: 161 MG/DL (ref 74–99)
METER GLUCOSE: 186 MG/DL (ref 74–99)
MYCOPLASMA PNEUMONIAE BY PCR: NOT DETECTED
NITRITE, URINE: NEGATIVE
PARAINFLUENZA VIRUS 1 BY PCR: NOT DETECTED
PARAINFLUENZA VIRUS 2 BY PCR: NOT DETECTED
PARAINFLUENZA VIRUS 3 BY PCR: NOT DETECTED
PARAINFLUENZA VIRUS 4 BY PCR: NOT DETECTED
PH UA: 6 (ref 5–9)
POTASSIUM SERPL-SCNC: 3.7 MMOL/L (ref 3.5–5)
PROTEIN UA: NEGATIVE MG/DL
RBC UA: NORMAL /HPF (ref 0–2)
RESPIRATORY SYNCYTIAL VIRUS BY PCR: NOT DETECTED
SODIUM BLD-SCNC: 137 MMOL/L (ref 132–146)
SPECIFIC GRAVITY UA: 1.01 (ref 1–1.03)
UROBILINOGEN, URINE: 0.2 E.U./DL
WBC UA: NORMAL /HPF (ref 0–5)

## 2019-12-19 PROCEDURE — 6360000002 HC RX W HCPCS: Performed by: INTERNAL MEDICINE

## 2019-12-19 PROCEDURE — 87088 URINE BACTERIA CULTURE: CPT

## 2019-12-19 PROCEDURE — 36415 COLL VENOUS BLD VENIPUNCTURE: CPT

## 2019-12-19 PROCEDURE — 2580000003 HC RX 258: Performed by: INTERNAL MEDICINE

## 2019-12-19 PROCEDURE — 80048 BASIC METABOLIC PNL TOTAL CA: CPT

## 2019-12-19 PROCEDURE — 81001 URINALYSIS AUTO W/SCOPE: CPT

## 2019-12-19 PROCEDURE — APPSS30 APP SPLIT SHARED TIME 16-30 MINUTES: Performed by: NURSE PRACTITIONER

## 2019-12-19 PROCEDURE — 99232 SBSQ HOSP IP/OBS MODERATE 35: CPT | Performed by: INTERNAL MEDICINE

## 2019-12-19 PROCEDURE — 2580000003 HC RX 258: Performed by: NURSE PRACTITIONER

## 2019-12-19 PROCEDURE — 6370000000 HC RX 637 (ALT 250 FOR IP): Performed by: NURSE PRACTITIONER

## 2019-12-19 PROCEDURE — 6370000000 HC RX 637 (ALT 250 FOR IP): Performed by: INTERNAL MEDICINE

## 2019-12-19 PROCEDURE — 82962 GLUCOSE BLOOD TEST: CPT

## 2019-12-19 PROCEDURE — 97530 THERAPEUTIC ACTIVITIES: CPT

## 2019-12-19 PROCEDURE — 1200000000 HC SEMI PRIVATE

## 2019-12-19 PROCEDURE — 97110 THERAPEUTIC EXERCISES: CPT

## 2019-12-19 PROCEDURE — 94640 AIRWAY INHALATION TREATMENT: CPT

## 2019-12-19 RX ORDER — FUROSEMIDE 40 MG/1
40 TABLET ORAL 2 TIMES DAILY
Status: DISCONTINUED | OUTPATIENT
Start: 2019-12-19 | End: 2019-12-23 | Stop reason: HOSPADM

## 2019-12-19 RX ADMIN — ACETAMINOPHEN 650 MG: 325 TABLET, FILM COATED ORAL at 21:17

## 2019-12-19 RX ADMIN — SODIUM CHLORIDE, PRESERVATIVE FREE 2 G: 5 INJECTION INTRAVENOUS at 09:10

## 2019-12-19 RX ADMIN — PANTOPRAZOLE SODIUM 40 MG: 40 TABLET, DELAYED RELEASE ORAL at 05:57

## 2019-12-19 RX ADMIN — Medication 10 ML: at 21:21

## 2019-12-19 RX ADMIN — APIXABAN 5 MG: 5 TABLET, FILM COATED ORAL at 21:18

## 2019-12-19 RX ADMIN — GABAPENTIN 600 MG: 300 CAPSULE ORAL at 17:25

## 2019-12-19 RX ADMIN — Medication 300 UNITS: at 21:18

## 2019-12-19 RX ADMIN — Medication 10 ML: at 09:12

## 2019-12-19 RX ADMIN — NYSTATIN: 100000 OINTMENT TOPICAL at 21:20

## 2019-12-19 RX ADMIN — GABAPENTIN 600 MG: 300 CAPSULE ORAL at 12:35

## 2019-12-19 RX ADMIN — NYSTATIN: 100000 CREAM TOPICAL at 09:09

## 2019-12-19 RX ADMIN — NYSTATIN: 100000 CREAM TOPICAL at 21:20

## 2019-12-19 RX ADMIN — LEVALBUTEROL HYDROCHLORIDE 0.31 MG: 0.31 SOLUTION RESPIRATORY (INHALATION) at 14:57

## 2019-12-19 RX ADMIN — LEVALBUTEROL HYDROCHLORIDE 0.31 MG: 0.31 SOLUTION RESPIRATORY (INHALATION) at 07:33

## 2019-12-19 RX ADMIN — GABAPENTIN 600 MG: 300 CAPSULE ORAL at 05:57

## 2019-12-19 RX ADMIN — FUROSEMIDE 40 MG: 40 TABLET ORAL at 17:25

## 2019-12-19 RX ADMIN — METOPROLOL TARTRATE 100 MG: 50 TABLET ORAL at 09:10

## 2019-12-19 RX ADMIN — OXYCODONE HYDROCHLORIDE AND ACETAMINOPHEN 1 TABLET: 5; 325 TABLET ORAL at 09:13

## 2019-12-19 RX ADMIN — LEVALBUTEROL HYDROCHLORIDE 0.31 MG: 0.31 SOLUTION RESPIRATORY (INHALATION) at 22:11

## 2019-12-19 RX ADMIN — Medication 300 UNITS: at 09:09

## 2019-12-19 RX ADMIN — AMIODARONE HYDROCHLORIDE 200 MG: 200 TABLET ORAL at 09:10

## 2019-12-19 RX ADMIN — INSULIN LISPRO 1 UNITS: 100 INJECTION, SOLUTION INTRAVENOUS; SUBCUTANEOUS at 12:35

## 2019-12-19 RX ADMIN — FUROSEMIDE 40 MG: 10 INJECTION, SOLUTION INTRAMUSCULAR; INTRAVENOUS at 09:10

## 2019-12-19 RX ADMIN — GABAPENTIN 600 MG: 300 CAPSULE ORAL at 21:18

## 2019-12-19 RX ADMIN — APIXABAN 5 MG: 5 TABLET, FILM COATED ORAL at 09:10

## 2019-12-19 RX ADMIN — NYSTATIN: 100000 OINTMENT TOPICAL at 09:09

## 2019-12-19 RX ADMIN — METOPROLOL TARTRATE 100 MG: 50 TABLET ORAL at 21:18

## 2019-12-19 ASSESSMENT — PAIN SCALES - GENERAL
PAINLEVEL_OUTOF10: 8
PAINLEVEL_OUTOF10: 4
PAINLEVEL_OUTOF10: 0
PAINLEVEL_OUTOF10: 0
PAINLEVEL_OUTOF10: 6
PAINLEVEL_OUTOF10: 0
PAINLEVEL_OUTOF10: 0

## 2019-12-19 NOTE — PROGRESS NOTES
Patient is seen in follow-up for atrial fibrillation    Subjective:     Ms. Yifan Gray feels little bit better, shortness of breath is tender  Sitting up in bed no apparent distress    ROS:  CONSTITUTIONAL:  negative for  fevers, chills  HEENT:  negative for earaches, nasal congestion and epistaxis  RESPIRATORY:  + dry cough, cough with sputum,negative for wheezing and hemoptysis  GASTROINTESTINAL:  negative for nausea, vomiting  MUSCULOSKELETAL:  negative for  myalgias, arthralgias  NEUROLOGICAL:  negative for visual disturbance, dysphagia    Medication side effects: none    Scheduled Meds:   vancomycin  1,250 mg Intravenous Q24H    furosemide  40 mg Oral BID    nystatin   Topical BID    nystatin   Topical BID    apixaban  5 mg Oral BID    sodium chloride flush  10 mL Intravenous 2 times per day    gabapentin  600 mg Oral 4x Daily AC & HS    metoprolol tartrate  100 mg Oral BID    lidocaine PF  5 mL Intradermal Once    heparin flush  3 mL Intravenous 2 times per day    amiodarone  200 mg Oral Daily    levalbuterol  0.31 mg Nebulization Q8H    pantoprazole  40 mg Oral QAM AC    insulin lispro  0-6 Units Subcutaneous TID WC    insulin lispro  0-3 Units Subcutaneous Nightly    potassium bicarb-citric acid  40 mEq Oral Once    sodium chloride flush  10 mL Intravenous 2 times per day     Continuous Infusions:   dextrose       PRN Meds:sodium chloride flush, potassium chloride **OR** potassium alternative oral replacement **OR** potassium chloride, heparin flush, sodium chloride flush, heparin flush, biotene dry mouth moisturizing, oxyCODONE-acetaminophen, perflutren lipid microspheres, glucose, dextrose, glucagon (rDNA), dextrose, hydrALAZINE, sodium chloride flush, magnesium hydroxide, ondansetron, acetaminophen      Objective:      Physical Exam:   /60   Pulse 62   Temp 98.8 °F (37.1 °C) (Oral)   Resp 18   Ht 5' 5\" (1.651 m)   Wt 166 lb (75.3 kg)   SpO2 95%   BMI 27.62 kg/m² CONSTITUTIONAL:  awake, alert, cooperative, no apparent distress, and appears stated age  HEAD:  normocepalic, without obvious abnormality, atraumatic  NECK:  Supple, symmetrical, trachea midline, no adenopathy, thyroid symmetric, not enlarged and no tenderness, skin normal  LUNGS:  No increased work of breathing, good air exchange, bilateral rhonchi, decreased breath sounds left base cARDIOVASCULAR:  Normal apical impulse, irregularly irregular, normal S1 and S2, no S3, no murmur noted, no edema, no JVD, no carotid bruit. ABDOMEN:  Soft, nontender, no masses, no hepatomegaly, no splenomegaly, BS+  MUSCULOSKELETAL:  No clubbing no cyanosis. there is no redness, warmth, or swelling of the joints  full range of motion noted  NEUROLOGIC:  Alert, awake,oriented x3  SKIN:  no bruising or bleeding, normal skin color, texture, turgor and no redness, warmth, or swelling    Cardiographics  I personally reviewed the telemetry monitor strips with the following interpretation: Fibrillation with controlled ventricular response    Echocardiogram: 12/6/2019,Normal left ventricle size and systolic function.   Ejection fraction is visually estimated at 55-60%.   Indeterminate diastolic function.   Can not definitively rule out wall motion abnormalities on the basis of   this study due to limited visualization of the endocardial borders.   Mild left ventricular concentric hypertrophy noted.   Normal right ventricle structure and function.   Mild mitral annular calcification.   Mild thickening and calcification of the mitral valve leaflets. Imaging  US DUP UPPER EXTREMITY RIGHT VENOUS   Final Result   1. No evidence of acute venous thrombosis right upper extremity. PICC   line visualized. XR CHEST PORTABLE   Final Result   1. No radiographic change       XR CHEST PORTABLE   Final Result   1. Stable large left effusion and airspace disease.        IR FLUORO GUIDED CVA DEVICE PLMT/REPLACE/REMOVAL   Final Result   Successful placement of a 5 [place] double-lumen Power PICC   line using ultrasound guidance via the right brachial  vein. IR ULTRASOUND GUIDANCE VASCULAR ACCESS   Final Result   Ultrasound guidance was provided during placement of a   PICC line. XR CHEST PORTABLE   Final Result   1. Stable bilateral parenchymal opacities. CT Chest WO Contrast   Final Result   1. Dense consolidation seen within the left upper lobe consistent with   pneumonia. 2. Significant left lower lobe consolidation favored to represent   atelectasis. Underlying pneumonia cannot be excluded. 3. Patchy infiltrate within the right upper lobe and right middle   lobe. 4. Interval right lung base atelectasis. 5. Very small bilateral pleural effusions. 6. There is no evidence of an empyema. XR CHEST PORTABLE   Final Result   1. Stable left lung airspace disease and effusions. XR CHEST PORTABLE   Final Result      1. Stable chest x-ray with extensive left lung airspace disease. XR CHEST PORTABLE   Final Result   1. No change from XR CHEST PORTABLE with report dated 12/7/2019 8:30   AM.          XR CHEST PORTABLE   Final Result   No change left lung pneumonia. XR CHEST PORTABLE   Final Result   Stable dense left lung consolidation. CT CHEST WO CONTRAST   Final Result   Large left lung infiltrate compatible pneumonia. Minimal   patchy infiltrate developing pneumonia right middle lobe. XR CHEST STANDARD (2 VW)   Final Result   Large left pleural effusion with left lung airspace disease. CT Head WO Contrast   Final Result   No acute intracranial hemorrhage or mass effect. CT CERVICAL SPINE WO CONTRAST   Final Result   1. No acute cervical spine fracture. 2. Mild multilevel cervical spondylosis.                    Lab Review   Lab Results   Component Value Date     12/21/2019    K 3.5 12/21/2019    K 4.0 12/05/2019    CL 94 12/21/2019    CO2 29 12/21/2019    BUN 12 12/21/2019 CREATININE 0.8 12/21/2019    GLUCOSE 97 12/21/2019    CALCIUM 8.0 12/21/2019     Lab Results   Component Value Date    WBC 12.2 12/17/2019    HGB 12.1 12/17/2019    HCT 37.7 12/17/2019    MCV 92.2 12/17/2019     12/17/2019     I have personally reviewed the laboratory, cardiac diagnostic and radiographic testing as outlined above:    Assessment:   1. Atrial fibrillation: Paroxysmal, Converted to sinus rhythm,will continue current treatment, continue chronic anticoagulation. 2.  Acute exacerbation of congestive heart failure: Acute on chronic, decompensated, diastolic,Improving, will continue Diuresis  3. Left lower lobe pneumonia    Recommendations:     1. will Continue current treatment  2.  will increase ambulation as tolerated  3. Further cardiac recommendations will be forthcoming pending her clinical course and diagnostic test findings    Discussed with patient no family at bedside  Electronically signed by Bhavin Pino MD on 12/21/2019 at 11:34 AM     NOTE: This report was transcribed using voice recognition software.  Every effort was made to ensure accuracy; however, inadvertent computerized transcription errors may be present

## 2019-12-19 NOTE — CARE COORDINATION
Ss note:12/19/2019.7:59 AM Per Heladio Mccray at [facility] pt has been accepted (do not anticipate IV push abx need). Liaison submitted for [address] late yesterday. Will await insurance approval. SW to complete GURPREET. Need signed WENDY.  RENETTA Cast

## 2019-12-19 NOTE — PLAN OF CARE
Problem: Discharge Planning:  Goal: Ability to perform activities of daily living will improve  Description  Ability to perform activities of daily living will improve  Outcome: Met This Shift     Problem: Discharge Planning:  Goal: Participates in care planning  Description  Participates in care planning  Outcome: Met This Shift     Problem: Injury - Risk of, Physical Injury:  Goal: Will remain free from falls  Description  Will remain free from falls   Outcome: Met This Shift     Problem: Risk for Impaired Skin Integrity  Goal: Tissue integrity - skin and mucous membranes  Description  Structural intactness and normal physiological function of skin and  mucous membranes. Outcome: Met This Shift     Problem: Falls - Risk of:  Goal: Will remain free from falls  Description  Will remain free from falls   Outcome: Met This Shift     Problem: Pain:  Goal: Pain level will decrease  Description  Pain level will decrease  Outcome: Met This Shift     Problem: Pain:  Goal: Control of acute pain  Description  Control of acute pain  Outcome: Met This Shift     Problem: Pain:  Goal: Control of chronic pain  Description  Control of chronic pain  Outcome: Met This Shift     Problem: Inadequate oral food/beverage intake (NI-2.1)  Goal: Food and/or Nutrient Delivery  Description  Individualized approach for food/nutrient provision.   12/18/2019 1017 by Panchito Preston RD, LD  Outcome: Met This Shift

## 2019-12-19 NOTE — PROGRESS NOTES
[address] [place], [place] [address]ist   Progress Note    Admitting Date and Time: 12/5/2019  8:48 AM  Admit Dx: Acute respiratory failure with hypoxia (HCC) [J96.01]    Subjective:    Patient complains of a burning and hot feeling with urination after receiving IV Lasix. She denies any chest pain or shortness of breath. ROS: denies fever, chills, cp, sob, n/v, HA unless stated above.      furosemide  40 mg Intravenous BID    nystatin   Topical BID    nystatin   Topical BID    apixaban  5 mg Oral BID    sodium chloride flush  10 mL Intravenous 2 times per day    gabapentin  600 mg Oral 4x Daily AC & HS    cefTRIAXone (ROCEPHIN) IV  2 g Intravenous Q24H    metoprolol tartrate  100 mg Oral BID    lidocaine PF  5 mL Intradermal Once    heparin flush  3 mL Intravenous 2 times per day    amiodarone  200 mg Oral Daily    levalbuterol  0.31 mg Nebulization Q8H    pantoprazole  40 mg Oral QAM AC    insulin lispro  0-6 Units Subcutaneous TID WC    insulin lispro  0-3 Units Subcutaneous Nightly    potassium bicarb-citric acid  40 mEq Oral Once    sodium chloride flush  10 mL Intravenous 2 times per day     sodium chloride flush, 10 mL, PRN  potassium chloride, 40 mEq, PRN    Or  potassium alternative oral replacement, 40 mEq, PRN    Or  potassium chloride, 10 mEq, PRN  heparin flush, 100 Units, PRN  sodium chloride flush, 10 mL, PRN  heparin flush, 3 mL, PRN  biotene dry mouth moisturizing, , PRN  oxyCODONE-acetaminophen, 1 tablet, Q6H PRN  perflutren lipid microspheres, 1.5 mL, ONCE PRN  glucose, 15 g, PRN  dextrose, 12.5 g, PRN  glucagon (rDNA), 1 mg, PRN  dextrose, 100 mL/hr, PRN  hydrALAZINE, 10 mg, Q6H PRN  sodium chloride flush, 10 mL, PRN  magnesium hydroxide, 30 mL, Daily PRN  ondansetron, 4 mg, Q6H PRN  acetaminophen, 650 mg, Q4H PRN         Objective:    BP (!) 127/59   Pulse 80   Temp 98.3 °F (36.8 °C) (Oral)   Resp 19   Ht 5' 5\" (1.651 m)   Wt 186 lb 1.6 oz (84.4 kg)   SpO2 90%   BMI Patient had a right femoral central line. Blood cultures show no growth. 11. [facility] NOTE: This report was transcribed using voice recognition software.  Every effort was made to ensure accuracy; however, inadvertent computerized transcription errors may be present.     Electronically signed by MEJIA Martinez CNP on 12/19/2019 at 4:08 PM

## 2019-12-19 NOTE — PROGRESS NOTES
[facility] Quality Flow/Interdisciplinary Rounds Progress Note        Quality Flow Rounds held on December 19, 2019    Disciplines Attending:  Bedside Nurse, ,  and Nursing Unit Leadership    Xena Benton was admitted on 12/5/2019  8:48 AM    Anticipated Discharge Date:  Expected Discharge Date: 12/14/19    Disposition:    Ramu Score:  Ramu Scale Score: 19    Readmission Risk              Risk of Unplanned Readmission:        12           Discussed patient goal for the day, patient clinical progression, and barriers to discharge.   The following Goal(s) of the Day/Commitment(s) have been identified:  Awaiting pre-cert      Jasper   December 19, 2019

## 2019-12-19 NOTE — PROGRESS NOTES
[id number]    Patient unavailable for physical therapy treatment due to declined at this time d/t fatigue from sitting up in chair from \"10 to 2\". Will attempt treatment at a later time/date. Carmencita Morrison, PRASHANTH.

## 2019-12-19 NOTE — PROGRESS NOTES
Called and spoke with Dr. Odessa Farrell as requested by Brittanie Hernandez NP. Per Dr. Odessa Farrell, patient may be transitioned to oral lasix. Sherly Arana on unit and verbally updated.   Electronically signed by Patrizia Cloud RN on 12/19/2019 at 1:33 PM

## 2019-12-19 NOTE — PLAN OF CARE
Problem: Injury - Risk of, Physical Injury:  Goal: Will remain free from falls  Description  Will remain free from falls   Outcome: Met This Shift     Problem: Risk for Impaired Skin Integrity  Goal: Tissue integrity - skin and mucous membranes  Description  Structural intactness and normal physiological function of skin and  mucous membranes.   Outcome: Met This Shift     Problem: Falls - Risk of:  Goal: Will remain free from falls  Description  Will remain free from falls   Outcome: Met This Shift     Problem: Pain:  Goal: Pain level will decrease  Description  Pain level will decrease  Outcome: Met This Shift     Problem: Pain:  Goal: Control of acute pain  Description  Control of acute pain  Outcome: Met This Shift     Problem: Pain:  Goal: Control of chronic pain  Description  Control of chronic pain  Outcome: Met This Shift

## 2019-12-20 LAB
ACINETOBACTER BAUMANNII BY PCR: NOT DETECTED
ANION GAP SERPL CALCULATED.3IONS-SCNC: 7 MMOL/L (ref 7–16)
BOTTLE TYPE: ABNORMAL
BUN BLDV-MCNC: 13 MG/DL (ref 8–23)
CALCIUM SERPL-MCNC: 8.1 MG/DL (ref 8.6–10.2)
CANDIDA ALBICANS BY PCR: NOT DETECTED
CANDIDA GLABRATA BY PCR: NOT DETECTED
CANDIDA KRUSEI BY PCR: NOT DETECTED
CANDIDA PARAPSILOSIS BY PCR: NOT DETECTED
CANDIDA TROPICALIS BY PCR: NOT DETECTED
CHLORIDE BLD-SCNC: 95 MMOL/L (ref 98–107)
CO2: 32 MMOL/L (ref 22–29)
CREAT SERPL-MCNC: 0.9 MG/DL (ref 0.5–1)
ENTEROBACTER CLOACAE COMPLEX BY PCR: NOT DETECTED
ENTEROBACTERALES BY PCR: NOT DETECTED
ENTEROCOCCUS BY PCR: NOT DETECTED
ESCHERICHIA COLI BY PCR: NOT DETECTED
GFR AFRICAN AMERICAN: >60
GFR NON-AFRICAN AMERICAN: 60 ML/MIN/1.73
GLUCOSE BLD-MCNC: 103 MG/DL (ref 74–99)
HAEMOPHILUS INFLUENZAE BY PCR: NOT DETECTED
KLEBSIELLA OXYTOCA BY PCR: NOT DETECTED
KLEBSIELLA PNEUMONIAE GROUP BY PCR: NOT DETECTED
LISTERIA MONOCYTOGENES BY PCR: NOT DETECTED
METER GLUCOSE: 128 MG/DL (ref 74–99)
METER GLUCOSE: 197 MG/DL (ref 74–99)
METER GLUCOSE: 223 MG/DL (ref 74–99)
METER GLUCOSE: 90 MG/DL (ref 74–99)
METHICILLIN RESISTANCE MECA/C  BY PCR: DETECTED
NEISSERIA MENINGITIDIS BY PCR: NOT DETECTED
ORDER NUMBER: ABNORMAL
POTASSIUM SERPL-SCNC: 3.6 MMOL/L (ref 3.5–5)
PROTEUS BY PCR: NOT DETECTED
PSEUDOMONAS AERUGINOSA BY PCR: NOT DETECTED
SERRATIA MARCESCENS BY PCR: NOT DETECTED
SODIUM BLD-SCNC: 134 MMOL/L (ref 132–146)
SOURCE OF BLOOD CULTURE: ABNORMAL
STAPHYLOCOCCUS AUREUS BY PCR: NOT DETECTED
STAPHYLOCOCCUS SPECIES BY PCR: DETECTED
STREPTOCOCCUS AGALACTIAE BY PCR: NOT DETECTED
STREPTOCOCCUS PNEUMONIAE BY PCR: NOT DETECTED
STREPTOCOCCUS PYOGENES  BY PCR: NOT DETECTED
STREPTOCOCCUS SPECIES BY PCR: NOT DETECTED

## 2019-12-20 PROCEDURE — 6360000002 HC RX W HCPCS: Performed by: INTERNAL MEDICINE

## 2019-12-20 PROCEDURE — 87070 CULTURE OTHR SPECIMN AEROBIC: CPT

## 2019-12-20 PROCEDURE — 2580000003 HC RX 258: Performed by: SPECIALIST

## 2019-12-20 PROCEDURE — 6360000002 HC RX W HCPCS: Performed by: SPECIALIST

## 2019-12-20 PROCEDURE — 6370000000 HC RX 637 (ALT 250 FOR IP): Performed by: INTERNAL MEDICINE

## 2019-12-20 PROCEDURE — 94640 AIRWAY INHALATION TREATMENT: CPT

## 2019-12-20 PROCEDURE — 2580000003 HC RX 258: Performed by: NURSE PRACTITIONER

## 2019-12-20 PROCEDURE — 87040 BLOOD CULTURE FOR BACTERIA: CPT

## 2019-12-20 PROCEDURE — 97530 THERAPEUTIC ACTIVITIES: CPT

## 2019-12-20 PROCEDURE — 6370000000 HC RX 637 (ALT 250 FOR IP): Performed by: NURSE PRACTITIONER

## 2019-12-20 PROCEDURE — 99233 SBSQ HOSP IP/OBS HIGH 50: CPT | Performed by: INTERNAL MEDICINE

## 2019-12-20 PROCEDURE — 82962 GLUCOSE BLOOD TEST: CPT

## 2019-12-20 PROCEDURE — 80048 BASIC METABOLIC PNL TOTAL CA: CPT

## 2019-12-20 PROCEDURE — 1200000000 HC SEMI PRIVATE

## 2019-12-20 PROCEDURE — 2580000003 HC RX 258: Performed by: INTERNAL MEDICINE

## 2019-12-20 PROCEDURE — 36415 COLL VENOUS BLD VENIPUNCTURE: CPT

## 2019-12-20 PROCEDURE — 36592 COLLECT BLOOD FROM PICC: CPT

## 2019-12-20 PROCEDURE — APPSS30 APP SPLIT SHARED TIME 16-30 MINUTES: Performed by: NURSE PRACTITIONER

## 2019-12-20 RX ADMIN — Medication 10 ML: at 09:08

## 2019-12-20 RX ADMIN — SODIUM CHLORIDE, PRESERVATIVE FREE 2 G: 5 INJECTION INTRAVENOUS at 09:06

## 2019-12-20 RX ADMIN — GABAPENTIN 600 MG: 300 CAPSULE ORAL at 12:23

## 2019-12-20 RX ADMIN — Medication 10 ML: at 21:43

## 2019-12-20 RX ADMIN — Medication 300 UNITS: at 09:06

## 2019-12-20 RX ADMIN — NYSTATIN: 100000 OINTMENT TOPICAL at 21:42

## 2019-12-20 RX ADMIN — FUROSEMIDE 40 MG: 40 TABLET ORAL at 09:06

## 2019-12-20 RX ADMIN — INSULIN LISPRO 1 UNITS: 100 INJECTION, SOLUTION INTRAVENOUS; SUBCUTANEOUS at 17:03

## 2019-12-20 RX ADMIN — METOPROLOL TARTRATE 100 MG: 50 TABLET ORAL at 09:06

## 2019-12-20 RX ADMIN — Medication 10 ML: at 09:07

## 2019-12-20 RX ADMIN — VANCOMYCIN HYDROCHLORIDE 1250 MG: 10 INJECTION, POWDER, LYOPHILIZED, FOR SOLUTION INTRAVENOUS at 14:27

## 2019-12-20 RX ADMIN — LEVALBUTEROL HYDROCHLORIDE 0.31 MG: 0.31 SOLUTION RESPIRATORY (INHALATION) at 14:39

## 2019-12-20 RX ADMIN — GABAPENTIN 600 MG: 300 CAPSULE ORAL at 05:48

## 2019-12-20 RX ADMIN — NYSTATIN: 100000 OINTMENT TOPICAL at 09:07

## 2019-12-20 RX ADMIN — GABAPENTIN 600 MG: 300 CAPSULE ORAL at 21:38

## 2019-12-20 RX ADMIN — OXYCODONE HYDROCHLORIDE AND ACETAMINOPHEN 1 TABLET: 5; 325 TABLET ORAL at 21:39

## 2019-12-20 RX ADMIN — LEVALBUTEROL HYDROCHLORIDE 0.31 MG: 0.31 SOLUTION RESPIRATORY (INHALATION) at 22:48

## 2019-12-20 RX ADMIN — APIXABAN 5 MG: 5 TABLET, FILM COATED ORAL at 09:06

## 2019-12-20 RX ADMIN — OXYCODONE HYDROCHLORIDE AND ACETAMINOPHEN 1 TABLET: 5; 325 TABLET ORAL at 04:16

## 2019-12-20 RX ADMIN — INSULIN LISPRO 2 UNITS: 100 INJECTION, SOLUTION INTRAVENOUS; SUBCUTANEOUS at 12:24

## 2019-12-20 RX ADMIN — PANTOPRAZOLE SODIUM 40 MG: 40 TABLET, DELAYED RELEASE ORAL at 05:48

## 2019-12-20 RX ADMIN — NYSTATIN: 100000 CREAM TOPICAL at 09:07

## 2019-12-20 RX ADMIN — APIXABAN 5 MG: 5 TABLET, FILM COATED ORAL at 21:40

## 2019-12-20 RX ADMIN — METOPROLOL TARTRATE 100 MG: 50 TABLET ORAL at 21:38

## 2019-12-20 RX ADMIN — GABAPENTIN 600 MG: 300 CAPSULE ORAL at 17:08

## 2019-12-20 RX ADMIN — AMIODARONE HYDROCHLORIDE 200 MG: 200 TABLET ORAL at 09:06

## 2019-12-20 RX ADMIN — NYSTATIN: 100000 CREAM TOPICAL at 21:42

## 2019-12-20 RX ADMIN — FUROSEMIDE 40 MG: 40 TABLET ORAL at 17:08

## 2019-12-20 ASSESSMENT — PAIN DESCRIPTION - PROGRESSION: CLINICAL_PROGRESSION: NOT CHANGED

## 2019-12-20 ASSESSMENT — PAIN DESCRIPTION - DESCRIPTORS: DESCRIPTORS: ACHING;CONSTANT;DISCOMFORT

## 2019-12-20 ASSESSMENT — PAIN DESCRIPTION - ONSET: ONSET: ON-GOING

## 2019-12-20 ASSESSMENT — PAIN SCALES - GENERAL
PAINLEVEL_OUTOF10: 7
PAINLEVEL_OUTOF10: 8
PAINLEVEL_OUTOF10: 0
PAINLEVEL_OUTOF10: 0
PAINLEVEL_OUTOF10: 10
PAINLEVEL_OUTOF10: 8

## 2019-12-20 ASSESSMENT — PAIN DESCRIPTION - PAIN TYPE: TYPE: CHRONIC PAIN

## 2019-12-20 ASSESSMENT — PAIN DESCRIPTION - LOCATION: LOCATION: LEG

## 2019-12-20 ASSESSMENT — PAIN DESCRIPTION - FREQUENCY: FREQUENCY: INTERMITTENT

## 2019-12-20 ASSESSMENT — PAIN - FUNCTIONAL ASSESSMENT: PAIN_FUNCTIONAL_ASSESSMENT: PREVENTS OR INTERFERES SOME ACTIVE ACTIVITIES AND ADLS

## 2019-12-20 ASSESSMENT — PAIN DESCRIPTION - ORIENTATION: ORIENTATION: RIGHT;LEFT

## 2019-12-20 NOTE — PROGRESS NOTES
1. Dense consolidation seen within the left upper lobe consistent with   pneumonia. 2. Significant left lower lobe consolidation favored to represent   atelectasis. Underlying pneumonia cannot be excluded. 3. Patchy infiltrate within the right upper lobe and right middle   lobe. 4. Interval right lung base atelectasis. 5. Very small bilateral pleural effusions. 6. There is no evidence of an empyema. XR CHEST PORTABLE   Final Result   1. Stable left lung airspace disease and effusions. XR CHEST PORTABLE   Final Result      1. Stable chest x-ray with extensive left lung airspace disease. XR CHEST PORTABLE   Final Result   1. No change from XR CHEST PORTABLE with report dated 12/7/2019 8:30   AM.          XR CHEST PORTABLE   Final Result   No change left lung pneumonia. XR CHEST PORTABLE   Final Result   Stable dense left lung consolidation. CT CHEST WO CONTRAST   Final Result   Large left lung infiltrate compatible pneumonia. Minimal   patchy infiltrate developing pneumonia right middle lobe. XR CHEST STANDARD (2 VW)   Final Result   Large left pleural effusion with left lung airspace disease. CT Head WO Contrast   Final Result   No acute intracranial hemorrhage or mass effect. CT CERVICAL SPINE WO CONTRAST   Final Result   1. No acute cervical spine fracture. 2. Mild multilevel cervical spondylosis. Assessment:  Principal Problem:    Sepsis due to pneumonia [facility])  Active Problems:    Acute respiratory failure with hypoxia (HCC)    JOVANI (acute kidney injury) ([facility] [address]    Elevated brain natriuretic peptide (BNP) level    Acute metabolic encephalopathy    Community acquired pneumonia of left lung    Hypoxia    Sepsis due to Streptococcus pneumoniae with acute hypoxic respiratory failure and septic shock (HCC)    Atrial fibrillation with RVR (HCC)    Gram-positive cocci bacteremia  Resolved Problems:    * No resolved hospital problems. *      Plan:  1. Acute hypoxic respiratory failure in the setting of left lobar streptococcal pneumonia: Initially received ceftriaxone and azithromycin in the ED, due to the severity of her pneumonia antibiotic was switched to Zosyn. Pulmonology was consulted and felt patient had a risk for decompensation and recommended transfer to ICU, on 12/5/2019. Vancomycin was added due to high risk for staph pneumonia. Levaquin was added to cover atypicals. Zosyn was stopped on 12/6/2019. Levaquin was stopped on 12/10/2019 ceftriaxone was started. CT of the chest showed significant consolidation left upper lobe and lower lobe. Patient is a 91% on room air at rest.     2. Sepsis due to streptococcal pneumonia: Received hydrocortisone, vitamin C, and thiamine for 4 days. Solu-Cortef was decreased on 12/6/2019 because of increasing BUN. Periods were stopped on 12/7/2019. Continue IV ceftriaxone. Patient has received 9 doses, dose missed on 12/15/19. Stop after tomorrow's dose. 3. Acute metabolic encephalopathy: Improved. Likely related to hypoxia and pneumonia. 4. Dysphagia: Speech therapy evaluated and place patient on a puréed diet with thin liquids, but was changed to mechanical soft diet once patient's mentation improved. 5. Acute kidney injury: Resolved. Patient received IV fluids. 2.6 on admission 0.9 today. 6. Atrial fibrillation with RVR: Patient was on a Cardizem drip and has been weaned off. Currently on metoprolol and was started oral amiodarone by cardiology. Patient declined CAROLYN cardioversion. Continue Eliquis. 7. Acute on chronic diastolic heart failure: Echo on 12/6/2019 revealed an EF of 55 to 60%. Lasix changed to oral 40 mg twice a day. 8. Osteoarthritis left knee/buttock peripheral neuropathy: Continue Percocet as needed and gabapentin. The gabapentin dose was decreased during this admission. 9. Edema right arm: Improving. No evidence of DVT on ultrasound. Elevate.    10. Tip

## 2019-12-20 NOTE — PROGRESS NOTES
BLE   Transfers-sit to stand- Minimal assistance   Gait: Pt ambulated 3 feet using wheeled walker with Minimal assistance. Comment: V/C were needed for safety and upright posture. Steps: Not assessed  Treatment: Pt stood from bedside commode and ambulated to bed. Pt was returned to bed at end of treatment. Comment: Call light left within reach of patient. Bed alarm was activated. A: Pt declined further activity despite maximal encouragement. Pt stated she was dizzy and going for PICC line \"any minute\". P: Continue with physical therapy       Carmencita Morrison, PTA    GOALS to be met in 2 days. Bed mobility- Moderate assist                                               Transfers-Sit to stand- Moderate assist                Gait: Patient to ambulate 15 feet using wheeled walker with Moderate assist                   Increase strength in affected mm groups by 1/3 grade  Increase balance to allow for improvement towards functional goals. Increase endurance to allow for improvement towards functional goals.

## 2019-12-20 NOTE — CONSULTS
Cooper Roberts MD    heparin flush 100 UNIT/ML injection 300 Units, 3 mL, Intravenous, 2 times per day, Gela Hoffmann MD, 300 Units at 12/20/19 0906    heparin flush 100 UNIT/ML injection 300 Units, 3 mL, Intracatheter, PRN, Gela Hoffmann MD    biotene dry mouth moisturizing liquid, , Oral, PRN, Jonah Jimenes MD    [COMPLETED] amiodarone (CORDARONE) tablet 200 mg, 200 mg, Oral, BID, 200 mg at 12/17/19 0944 **FOLLOWED BY** amiodarone (CORDARONE) tablet 200 mg, 200 mg, Oral, Daily, Tiffany Wang MD, 200 mg at 12/20/19 0906    oxyCODONE-acetaminophen (PERCOCET) 5-325 MG per tablet 1 tablet, 1 tablet, Oral, Q6H PRN, Lashawn Torres MD, 1 tablet at 12/20/19 0416    levalbuterol (XOPENEX) nebulization 0.31 mg, 0.31 mg, Nebulization, Q8H, Gela Hoffmann MD, 0.31 mg at 12/19/19 2211    perflutren lipid microspheres (DEFINITY) injection 1.65 mg, 1.5 mL, Intravenous, ONCE PRN, Gela Hoffmann MD    pantoprazole (PROTONIX) tablet 40 mg, 40 mg, Oral, QAM AC, Gela Hoffmann MD, 40 mg at 12/20/19 0548    glucose (GLUTOSE) 40 % oral gel 15 g, 15 g, Oral, PRN, Lashawn Torres MD    dextrose 50 % IV solution, 12.5 g, Intravenous, PRN, Lashawn Torres MD    glucagon (rDNA) injection 1 mg, 1 mg, Intramuscular, PRN, Lashawn Torres MD    dextrose 5 % solution, 100 mL/hr, Intravenous, PRN, Lashawn Torres MD    insulin lispro (HUMALOG) injection vial 0-6 Units, 0-6 Units, Subcutaneous, TID WC, Lashawn Torres MD, 1 Units at 12/19/19 1235    insulin lispro (HUMALOG) injection vial 0-3 Units, 0-3 Units, Subcutaneous, Nightly, Lashawn Torres MD, 1 Units at 12/18/19 2132    hydrALAZINE (APRESOLINE) injection 10 mg, 10 mg, Intravenous, Q6H PRN, Lashawn Torres MD, 10 mg at 12/09/19 1845    potassium bicarb-citric acid (EFFER-K) effervescent tablet 40 mEq, 40 mEq, Oral, Once, Skip Elia, DO    sodium chloride flush 0.9 % injection 10 mL, 10 mL, Intravenous, 2 times per day, Brittney López, MEJIA - CNP, 10 mL at 12/20/19 0908    sodium chloride flush 0.9 % injection 10 mL, 10 mL, Intravenous, PRN, Lori Toby, APRN - CNP, 10 mL at 12/09/19 2107    magnesium hydroxide (MILK OF MAGNESIA) 400 MG/5ML suspension 30 mL, 30 mL, Oral, Daily PRN, Lori McDonald, APRN - CNP    ondansetron [facility]) injection 4 mg, 4 mg, Intravenous, Q6H PRN, Lori McDonald, APRN - CNP, 4 mg at 12/13/19 0803    acetaminophen (TYLENOL) tablet 650 mg, 650 mg, Oral, Q4H PRN, Lori McDonald, APRN - CNP, 650 mg at 12/19/19 2117  ALLERGIES     Influenza vaccines; Alginate [alginic acid]; Novocain [procaine]; and Tape [adhesive tape]    There is no immunization history on file for this patient. PAST MEDICAL HISTORY     Past Medical History:   Diagnosis Date    Dental caries     Diabetes mellitus ([facility] [address]     Gall bladder stones     Hernia     Hypertension     Kidney stone      SURGICAL HISTORY       Past Surgical History:   Procedure Laterality Date    APPENDECTOMY       FAMILY HISTORY     History reviewed. No pertinent family history.   SOCIAL HISTORY       Social History     Socioeconomic History    Marital status:      Spouse name: None    Number of children: None    Years of education: None    Highest education level: None   Occupational History    None   Social Needs    Financial resource strain: None    Food insecurity:     Worry: None     Inability: None    Transportation needs:     Medical: None     Non-medical: None   Tobacco Use    Smoking status: Never Smoker    Smokeless tobacco: Never Used   Substance and Sexual Activity    Alcohol use: No    Drug use: None    Sexual activity: None   Lifestyle    Physical activity:     Days per week: None     Minutes per session: None    Stress: None   Relationships    Social connections:     Talks on phone: None     Gets together: None     Attends Sikhism service: None     Active member of club or organization: None     Attends meetings of clubs or organizations: None     Relationship status: None    Intimate partner violence:     Fear of current or ex partner: None     Emotionally abused: None     Physically abused: None     Forced sexual activity: None   Other Topics Concern    None   Social History Narrative    None     ·  lives with     PHYSICAL EXAM    (up to 7 for level 4, 8 or more forlevel 5)     ED Triage Vitals [12/05/19 0859]   BP Temp Temp Source Pulse Resp SpO2 Height Weight   (!) 122/53 98 °F (36.7 °C) Oral 83 30 90 % 5' 5\" (1.651 m) 181 lb (82.1 kg)     Vitals:    Vitals:    12/19/19 0815 12/19/19 1955 12/19/19 2117 12/20/19 0800   BP: (!) 127/59 130/63 (!) 140/60 (!) 164/74   Pulse: 80 71  71   Resp: 19 18 21   Temp: 98.3 °F (36.8 °C) 98.3 °F (36.8 °C)  98.5 °F (36.9 °C)   TempSrc: Oral Axillary  Oral   SpO2: 90% 90%  91%   Weight:       Height:         Physical Exam   Constitutional/General: Alert and oriented, NAD thin   Head: NC/AT  Eyes: PERRL, EOMI  Mouth: Normal mucosa, no thrush   Neck: Supple, full ROM,    Pulmonary: Lungs dec  to auscultation bilaterally. Not in respiratory distress  Cardiovascular:  Regular rate and rhythm, no murmurs, gallops, or rubs. Abdomen: Soft, + BS. No distension. Nontender. Extremities: Moves all extremities x 4. Warm and well perfused  Pulses:  Distal pulses intact  Skin: Warm and dry without rash scral ucler about 1cm ulcer periwound red  Neurologic:    No focal deficits plesant  Psych: Normal Affect     DIAGNOSTICRESULTS   RADIOLOGY:   Xr Chest Standard (2 Vw)    Result Date: 12/5/2019  Reading location: 200 Indication: Shortness of breath Comparison: Prior chest radiograph from 1/12/2016 Technique: Portable AP upright and lateral chest radiographs were obtained. Findings: The left cardiac border is obscured. There is a large left pleural effusion with left lung airspace opacity. There is no evidence of pneumothorax. Large left pleural effusion with left lung airspace disease.      Ct Head Wo Contrast    Result Date: 12/5/2019  Location: 200 Indication: Fall, dizziness Comparison: None available. Technique: Multidetector CT imaging was obtained from the skull base to vertex without the administration of intravenous contrast. Coronal and sagittal reformatted images were obtained. Automated dose exposure control was used for this exam. FINDINGS: Prominence of ventricles and sulci is compatible with age-related volume loss. No extra-axial collection. No acute intracranial hemorrhage. No cerebral edema or mass effect. No CT evidence of acute, large territorial infarction. Visualized paranasal sinuses are well aerated. Visualized mastoid air cells are well aerated. The calvarium is intact. No acute intracranial hemorrhage or mass effect. Ct Chest Wo Contrast    Result Date: 12/10/2019  READING LOCATION: 200 HISTORY: Shortness of breath, evaluate for empyema. TECHNIQUE: Serial axial images of the thorax were obtained without the use of IV contrast. Reformatted sagittal and coronal images were obtained. Automated dose exposure control was utilized for this examination. FINDINGS: There is a large area of consolidation seen within the left upper lobe extending into the lingula. There is dense consolidation seen within the left lower lobe which could represent either atelectasis or pneumonia. There is a patchy infiltrate seen within the inferior aspect of the right middle lobe. There is also a very small patchy infiltrate seen within the right upper lobe laterally. There is consolidation within the right lung base favored to represent  atelectasis. Very small bilateral pleural effusions are noted. The thoracic aorta is normal in caliber. There is no pericardial effusion. Gross pathological lymphadenopathy is not appreciated. The airway is widely patent. Images through the upper abdomen are unremarkable. 1. Dense consolidation seen within the left upper lobe consistent with pneumonia.  2. Significant left lower lobe consolidation favored to represent atelectasis. Underlying pneumonia cannot be excluded. 3. Patchy infiltrate within the right upper lobe and right middle lobe. 4. Interval right lung base atelectasis. 5. Very small bilateral pleural effusions. 6. There is no evidence of an empyema. Ct Chest Wo Contrast    Result Date: 12/5/2019  Location:100 Exam: CT CHEST WO CONTRAST Comparison: None History: Short of breath status post thoracentesis Technique: Spiral CT the chest without contrast. Coronal and sagittal reconstructions were obtained. Automated dose exposure control was utilized for this examination. FINDINGS: Near complete white out of the left lung with air bronchograms, compatible with pneumonia. Underlying mass not excluded but not definitely identified. No pleural effusion a left. No pneumothorax. Right lung has minimal patchy subpleural infiltrate involving the right middle lobe. There is shift of the heart mediastinum to the left due to the volume loss. No evidence of adenopathy. . There are no infiltrates effusions masses or areas of pleural thickening. No evidence of hilar or mediastinal adenopathy. The heart and pericardium and mediastinum are within normal limits. The visualized upper abdomen is within normal limits. No fractures identified. Large left lung infiltrate compatible pneumonia. Minimal patchy infiltrate developing pneumonia right middle lobe. Ct Cervical Spine Wo Contrast    Result Date: 12/5/2019  Location: 200 Indication: Pain post fall Comparison: None available. Technique: Multidetector CT imaging of the cervical spine was performed without administration of intravenous contrast. Coronal and sagittal reformatted images were obtained. Automated dose exposure control was used for this exam. FINDINGS: There is nonspecific straightening of cervical lordosis. Vertebral bodies maintain normal height. Alignment is maintained. Atlantodental distance is preserved.  The craniocervical junction is normal in appearance. No acute cervical spine fracture is identified. There is no prevertebral soft tissue edema. There is multilevel intervertebral disc space narrowing with hypertrophic endplate changes. There is multilevel uncovertebral and facet hypertrophy without high grade osseous encroachment of the central canal or neural foramina. Visualized portions of bilateral lung apices are grossly clear. 1. No acute cervical spine fracture. 2. Mild multilevel cervical spondylosis. Ir [place] Device Plmt/replace/removal    Result Date: 12/11/2019  [READING LOCATION: 200 EXAM: IR FLUORO GUIDED CVA DEVICE PLMT/REPLACE/REMOVAL HISTORY: Infection FLUOROSCOPY TIME:  0.6 minutes PROCEDURE:  The procedure was explained to the patient and an informed consent was obtained. The skin over the right arm was prepped and draped in a sterile fashion and anesthetized with lidocaine. Maximal sterile barrier technique was utilized. Under ultrasound guidance, the brachial vein was localized. Using a micropuncture needle, the vein was entered under direct ultrasound visualization. A 0.018 guidewire was advanced into the central venous circulation. A 5 Indonesian peel-away sheath was placed. The 5 [place] Power PICC line catheter was trimmed to 35 cm and was then advanced through the peel-away sheath and positioned at the right atrial/SVC junction. Fluoroscopic spot film was obtained. FINDINGS:  Ultrasound shows the veins to be patent. The fluoroscopic spot film demonstrates the Power PICC line to be at the right atrial/SVC junction. The patient tolerated the procedure well and there were no complications. Successful placement of a 5 Indonesian double-lumen Power PICC line using ultrasound guidance via the right brachial  vein.       Xr Chest Portable    Result Date: 12/15/2019  LOCATION: 200 EXAM: XR CHEST PORTABLE COMPARISON: 12/12/2019 HISTORY: Dyspnea TECHNIQUE: Single frontal view of the chest was obtained. FINDINGS:  SUPPORT DEVICES: PICC line unchanged position. LUNGS: Continued atelectasis and left pleural effusion. PLEURA: Moderate left pleural effusion unchanged. LUNG VOLUMES: Satisfactory inspirator effort. MEDIASTINAL STRUCTURES: No lymphadenopathy. Normal aortic contour. HEART SIZE: Stable. BONES AND SOFT TISSUES: No fracture or soft tissue abnormality. 1. No radiographic change     Xr Chest Portable    Result Date: 12/12/2019  LOCATION: 200 EXAM: XR CHEST PORTABLE COMPARISON: 12/11/2019 HISTORY: Shortness of breath TECHNIQUE: Single frontal view of the chest was obtained. FINDINGS:  SUPPORT DEVICES: None. LUNGS: Left lung airspace disease and effusions are unchanged. PLEURA: No pneumothorax. LUNG VOLUMES: Satisfactory inspirator effort. MEDIASTINAL STRUCTURES: No lymphadenopathy. Normal aortic contour. HEART SIZE: Normal. BONES AND SOFT TISSUES: No fracture or soft tissue abnormality. 1. Stable large left effusion and airspace disease. Xr Chest Portable    Result Date: 12/11/2019  LOCATION: 200 EXAM: XR CHEST PORTABLE COMPARISON: 12/10/2019 HISTORY: Shortness of breath TECHNIQUE: Single frontal view of the chest was obtained. FINDINGS:  SUPPORT DEVICES: None. LUNGS: Left pleural and parenchymal disease is unchanged with prominent interstitial markings. Right lung opacities are also unchanged. PLEURA: No pneumothorax noted. LUNG VOLUMES: Satisfactory inspirator effort. MEDIASTINAL STRUCTURES: No lymphadenopathy. Normal aortic contour. HEART SIZE: Normal. BONES AND SOFT TISSUES: No fracture or soft tissue abnormality. 1. Stable bilateral parenchymal opacities. Xr Chest Portable    Result Date: 12/10/2019  LOCATION: 200 EXAM: XR CHEST PORTABLE COMPARISON: 12/9/2019 HISTORY: Shortness of breath TECHNIQUE: Single frontal view of the chest was obtained. FINDINGS:  SUPPORT DEVICES: None. LUNGS: Left lung consolidation and effusion is similar to the previous study.  Right lung is

## 2019-12-20 NOTE — PROGRESS NOTES
[facility] Quality Flow/Interdisciplinary Rounds Progress Note        Quality Flow Rounds held on December 20, 2019    Disciplines Attending:  Bedside Nurse, ,  and Nursing Unit Leadership    Mirna Combs was admitted on 12/5/2019  8:48 AM    Anticipated Discharge Date:  Expected Discharge Date: 12/14/19    Disposition:    Ramu Score:  Ramu Scale Score: 19    Readmission Risk              Risk of Unplanned Readmission:        12           Discussed patient goal for the day, patient clinical progression, and barriers to discharge.   The following Goal(s) of the Day/Commitment(s) have been identified:  Activity progression, awaiting pre-cert      Arne Canavan  December 20, 2019

## 2019-12-20 NOTE — PROGRESS NOTES
Patient is seen in follow-up for atrial fibrillation    Subjective:     Ms. Jw Michelle feels little bit better, shortness of breath is tender  Sitting up in bed no apparent distress    ROS:  CONSTITUTIONAL:  negative for  fevers, chills  HEENT:  negative for earaches, nasal congestion and epistaxis  RESPIRATORY:  + dry cough, cough with sputum,negative for wheezing and hemoptysis  GASTROINTESTINAL:  negative for nausea, vomiting  MUSCULOSKELETAL:  negative for  myalgias, arthralgias  NEUROLOGICAL:  negative for visual disturbance, dysphagia    Medication side effects: none    Scheduled Meds:   vancomycin  1,250 mg Intravenous Q24H    furosemide  40 mg Oral BID    nystatin   Topical BID    nystatin   Topical BID    apixaban  5 mg Oral BID    sodium chloride flush  10 mL Intravenous 2 times per day    gabapentin  600 mg Oral 4x Daily AC & HS    metoprolol tartrate  100 mg Oral BID    lidocaine PF  5 mL Intradermal Once    heparin flush  3 mL Intravenous 2 times per day    amiodarone  200 mg Oral Daily    levalbuterol  0.31 mg Nebulization Q8H    pantoprazole  40 mg Oral QAM AC    insulin lispro  0-6 Units Subcutaneous TID WC    insulin lispro  0-3 Units Subcutaneous Nightly    potassium bicarb-citric acid  40 mEq Oral Once    sodium chloride flush  10 mL Intravenous 2 times per day     Continuous Infusions:   dextrose       PRN Meds:sodium chloride flush, potassium chloride **OR** potassium alternative oral replacement **OR** potassium chloride, heparin flush, sodium chloride flush, heparin flush, biotene dry mouth moisturizing, oxyCODONE-acetaminophen, perflutren lipid microspheres, glucose, dextrose, glucagon (rDNA), dextrose, hydrALAZINE, sodium chloride flush, magnesium hydroxide, ondansetron, acetaminophen      Objective:      Physical Exam:   /60   Pulse 62   Temp 98.8 °F (37.1 °C) (Oral)   Resp 18   Ht 5' 5\" (1.651 m)   Wt 166 lb (75.3 kg)   SpO2 95%   BMI 27.62 kg/m² Power PICC   line using ultrasound guidance via the right brachial  vein. IR ULTRASOUND GUIDANCE VASCULAR ACCESS   Final Result   Ultrasound guidance was provided during placement of a   PICC line. XR CHEST PORTABLE   Final Result   1. Stable bilateral parenchymal opacities. CT Chest WO Contrast   Final Result   1. Dense consolidation seen within the left upper lobe consistent with   pneumonia. 2. Significant left lower lobe consolidation favored to represent   atelectasis. Underlying pneumonia cannot be excluded. 3. Patchy infiltrate within the right upper lobe and right middle   lobe. 4. Interval right lung base atelectasis. 5. Very small bilateral pleural effusions. 6. There is no evidence of an empyema. XR CHEST PORTABLE   Final Result   1. Stable left lung airspace disease and effusions. XR CHEST PORTABLE   Final Result      1. Stable chest x-ray with extensive left lung airspace disease. XR CHEST PORTABLE   Final Result   1. No change from XR CHEST PORTABLE with report dated 12/7/2019 8:30   AM.          XR CHEST PORTABLE   Final Result   No change left lung pneumonia. XR CHEST PORTABLE   Final Result   Stable dense left lung consolidation. CT CHEST WO CONTRAST   Final Result   Large left lung infiltrate compatible pneumonia. Minimal   patchy infiltrate developing pneumonia right middle lobe. XR CHEST STANDARD (2 VW)   Final Result   Large left pleural effusion with left lung airspace disease. CT Head WO Contrast   Final Result   No acute intracranial hemorrhage or mass effect. CT CERVICAL SPINE WO CONTRAST   Final Result   1. No acute cervical spine fracture. 2. Mild multilevel cervical spondylosis.                    Lab Review   Lab Results   Component Value Date     12/21/2019    K 3.5 12/21/2019    K 4.0 12/05/2019    CL 94 12/21/2019    CO2 29 12/21/2019    BUN 12 12/21/2019    CREATININE 0.8 12/21/2019    GLUCOSE

## 2019-12-20 NOTE — PROGRESS NOTES
vancomycin. · Goal trough level:  15-20 mCg/mL. · Upon admission patient was initially in JOVANI, which has no resolved. SCr today: 0.9, CrCl ~50 mL/min, baseline SCr ~ 0.7  · Patient was initially started on vancomycin for pneumonia, which was discontinued (last dose on 12/11). Pharmacy was re-consulted today to dose vancomycin for staph bacteremia, methicillin resistant by PCR. PICC tip culture from 12/11 did grow CONS. Plan:  · Restart Vancomycin 1,250 mg Q24H  · Check trough at steady state  · Follow renal function  · Pharmacist will follow and monitor/adjust dosing as necessary.     Thank you for the consult,    Martha Serra, PharmD, BCPS 12/20/2019 12:26 PM   [phone]

## 2019-12-20 NOTE — PROGRESS NOTES
Room #:  7535/0071-29    Date: 2019       Patient Name: Maddy Baker  : 1935      MRN: [id number]     Patient unavailable for physical therapy treatment due to pt not wanting to get up due to receiving discouraging health news.  Family present and supportive      Aviva Craft [place]

## 2019-12-21 LAB
ANION GAP SERPL CALCULATED.3IONS-SCNC: 11 MMOL/L (ref 7–16)
BUN BLDV-MCNC: 12 MG/DL (ref 8–23)
C-REACTIVE PROTEIN: 5 MG/DL (ref 0–0.4)
CALCIUM SERPL-MCNC: 8 MG/DL (ref 8.6–10.2)
CHLORIDE BLD-SCNC: 94 MMOL/L (ref 98–107)
CO2: 29 MMOL/L (ref 22–29)
CREAT SERPL-MCNC: 0.8 MG/DL (ref 0.5–1)
GFR AFRICAN AMERICAN: >60
GFR NON-AFRICAN AMERICAN: >60 ML/MIN/1.73
GLUCOSE BLD-MCNC: 97 MG/DL (ref 74–99)
MAGNESIUM: 2 MG/DL (ref 1.6–2.6)
METER GLUCOSE: 116 MG/DL (ref 74–99)
METER GLUCOSE: 116 MG/DL (ref 74–99)
METER GLUCOSE: 151 MG/DL (ref 74–99)
METER GLUCOSE: 169 MG/DL (ref 74–99)
PHOSPHORUS: 3.6 MG/DL (ref 2.5–4.5)
POTASSIUM SERPL-SCNC: 3.5 MMOL/L (ref 3.5–5)
SEDIMENTATION RATE, ERYTHROCYTE: 75 MM/HR (ref 0–20)
SODIUM BLD-SCNC: 134 MMOL/L (ref 132–146)
TROPONIN: <0.01 NG/ML (ref 0–0.03)
URINE CULTURE, ROUTINE: NORMAL

## 2019-12-21 PROCEDURE — 84100 ASSAY OF PHOSPHORUS: CPT

## 2019-12-21 PROCEDURE — 99233 SBSQ HOSP IP/OBS HIGH 50: CPT | Performed by: INTERNAL MEDICINE

## 2019-12-21 PROCEDURE — 80048 BASIC METABOLIC PNL TOTAL CA: CPT

## 2019-12-21 PROCEDURE — 2580000003 HC RX 258: Performed by: INTERNAL MEDICINE

## 2019-12-21 PROCEDURE — 93005 ELECTROCARDIOGRAM TRACING: CPT | Performed by: NURSE PRACTITIONER

## 2019-12-21 PROCEDURE — 1200000000 HC SEMI PRIVATE

## 2019-12-21 PROCEDURE — 84484 ASSAY OF TROPONIN QUANT: CPT

## 2019-12-21 PROCEDURE — 6370000000 HC RX 637 (ALT 250 FOR IP): Performed by: NURSE PRACTITIONER

## 2019-12-21 PROCEDURE — 83735 ASSAY OF MAGNESIUM: CPT

## 2019-12-21 PROCEDURE — 36415 COLL VENOUS BLD VENIPUNCTURE: CPT

## 2019-12-21 PROCEDURE — 85651 RBC SED RATE NONAUTOMATED: CPT

## 2019-12-21 PROCEDURE — 2580000003 HC RX 258: Performed by: SPECIALIST

## 2019-12-21 PROCEDURE — 6370000000 HC RX 637 (ALT 250 FOR IP): Performed by: INTERNAL MEDICINE

## 2019-12-21 PROCEDURE — 86140 C-REACTIVE PROTEIN: CPT

## 2019-12-21 PROCEDURE — APPSS30 APP SPLIT SHARED TIME 16-30 MINUTES: Performed by: NURSE PRACTITIONER

## 2019-12-21 PROCEDURE — 2580000003 HC RX 258: Performed by: NURSE PRACTITIONER

## 2019-12-21 PROCEDURE — 94640 AIRWAY INHALATION TREATMENT: CPT

## 2019-12-21 PROCEDURE — 6360000002 HC RX W HCPCS: Performed by: SPECIALIST

## 2019-12-21 PROCEDURE — 82962 GLUCOSE BLOOD TEST: CPT

## 2019-12-21 PROCEDURE — 6360000002 HC RX W HCPCS: Performed by: INTERNAL MEDICINE

## 2019-12-21 RX ADMIN — METOPROLOL TARTRATE 100 MG: 50 TABLET ORAL at 21:26

## 2019-12-21 RX ADMIN — NYSTATIN: 100000 OINTMENT TOPICAL at 09:39

## 2019-12-21 RX ADMIN — GABAPENTIN 600 MG: 300 CAPSULE ORAL at 17:09

## 2019-12-21 RX ADMIN — OXYCODONE HYDROCHLORIDE AND ACETAMINOPHEN 1 TABLET: 5; 325 TABLET ORAL at 17:09

## 2019-12-21 RX ADMIN — PANTOPRAZOLE SODIUM 40 MG: 40 TABLET, DELAYED RELEASE ORAL at 06:15

## 2019-12-21 RX ADMIN — Medication: at 09:38

## 2019-12-21 RX ADMIN — Medication 10 ML: at 09:38

## 2019-12-21 RX ADMIN — GABAPENTIN 600 MG: 300 CAPSULE ORAL at 06:15

## 2019-12-21 RX ADMIN — NYSTATIN: 100000 CREAM TOPICAL at 09:40

## 2019-12-21 RX ADMIN — INSULIN LISPRO 1 UNITS: 100 INJECTION, SOLUTION INTRAVENOUS; SUBCUTANEOUS at 12:31

## 2019-12-21 RX ADMIN — OXYCODONE HYDROCHLORIDE AND ACETAMINOPHEN 1 TABLET: 5; 325 TABLET ORAL at 23:49

## 2019-12-21 RX ADMIN — OXYCODONE HYDROCHLORIDE AND ACETAMINOPHEN 1 TABLET: 5; 325 TABLET ORAL at 09:37

## 2019-12-21 RX ADMIN — AMIODARONE HYDROCHLORIDE 200 MG: 200 TABLET ORAL at 09:37

## 2019-12-21 RX ADMIN — Medication 10 ML: at 09:40

## 2019-12-21 RX ADMIN — NYSTATIN: 100000 OINTMENT TOPICAL at 21:28

## 2019-12-21 RX ADMIN — FUROSEMIDE 40 MG: 40 TABLET ORAL at 09:38

## 2019-12-21 RX ADMIN — VANCOMYCIN HYDROCHLORIDE 1250 MG: 10 INJECTION, POWDER, LYOPHILIZED, FOR SOLUTION INTRAVENOUS at 14:37

## 2019-12-21 RX ADMIN — LEVALBUTEROL HYDROCHLORIDE 0.31 MG: 0.31 SOLUTION RESPIRATORY (INHALATION) at 15:01

## 2019-12-21 RX ADMIN — APIXABAN 5 MG: 5 TABLET, FILM COATED ORAL at 21:26

## 2019-12-21 RX ADMIN — METOPROLOL TARTRATE 100 MG: 50 TABLET ORAL at 09:37

## 2019-12-21 RX ADMIN — GABAPENTIN 600 MG: 300 CAPSULE ORAL at 21:26

## 2019-12-21 RX ADMIN — Medication 10 ML: at 21:27

## 2019-12-21 RX ADMIN — FUROSEMIDE 40 MG: 40 TABLET ORAL at 17:09

## 2019-12-21 RX ADMIN — LEVALBUTEROL HYDROCHLORIDE 0.31 MG: 0.31 SOLUTION RESPIRATORY (INHALATION) at 23:25

## 2019-12-21 RX ADMIN — GABAPENTIN 600 MG: 300 CAPSULE ORAL at 12:30

## 2019-12-21 RX ADMIN — APIXABAN 5 MG: 5 TABLET, FILM COATED ORAL at 09:37

## 2019-12-21 RX ADMIN — INSULIN LISPRO 1 UNITS: 100 INJECTION, SOLUTION INTRAVENOUS; SUBCUTANEOUS at 21:29

## 2019-12-21 ASSESSMENT — PAIN DESCRIPTION - FREQUENCY: FREQUENCY: CONTINUOUS

## 2019-12-21 ASSESSMENT — PAIN SCALES - GENERAL
PAINLEVEL_OUTOF10: 8
PAINLEVEL_OUTOF10: 9
PAINLEVEL_OUTOF10: 8
PAINLEVEL_OUTOF10: 5

## 2019-12-21 ASSESSMENT — PAIN DESCRIPTION - PAIN TYPE
TYPE: CHRONIC PAIN
TYPE: CHRONIC PAIN

## 2019-12-21 ASSESSMENT — PAIN DESCRIPTION - ONSET: ONSET: GRADUAL

## 2019-12-21 ASSESSMENT — PAIN DESCRIPTION - LOCATION
LOCATION: BACK;HIP
LOCATION: BACK;HIP

## 2019-12-21 ASSESSMENT — PAIN DESCRIPTION - DESCRIPTORS
DESCRIPTORS: ACHING;CONSTANT;DISCOMFORT
DESCRIPTORS: ACHING;DISCOMFORT;DULL

## 2019-12-21 ASSESSMENT — PAIN DESCRIPTION - ORIENTATION: ORIENTATION: RIGHT;LEFT

## 2019-12-21 ASSESSMENT — PAIN DESCRIPTION - PROGRESSION: CLINICAL_PROGRESSION: NOT CHANGED

## 2019-12-21 NOTE — PROGRESS NOTES
[address] [place] [address] [facility]  LAURIEIDA  Progress Note    FU CLABSI     SUBJECTIVE:    Patient is awake, alert , seen at bedside for face to face encounter  Afebrile, no leukocytosis   Sacral ulcer examined  No complaints per patient  Family updated at bedside     ROS:  Negative except as above     OBJECTIVE:    Vitals:    12/21/19 0432 12/21/19 0620 12/21/19 0630 12/21/19 0804   BP:    129/60   Pulse:    62   Resp:    18   Temp: 98.7 °F (37.1 °C)  98.3 °F (36.8 °C) 98.8 °F (37.1 °C)   TempSrc: Oral  Oral Oral   SpO2:    95%   Weight:  176 lb 4.8 oz (80 kg) 166 lb (75.3 kg)    Height:           HEENT :         unremarkable   Heart:             RRR,  No murmurs, no gallops  Lungs:            clear to auscultation, no wheezing , no rales  Abdomen:       soft, non tender, bowel sounds present  Extremites:     No edema, no ulcers,  Skin:                Normal turgor,normal texture  PIV                vancomycin  1,250 mg Intravenous Q24H    furosemide  40 mg Oral BID    nystatin   Topical BID    nystatin   Topical BID    apixaban  5 mg Oral BID    sodium chloride flush  10 mL Intravenous 2 times per day    gabapentin  600 mg Oral 4x Daily AC & HS    metoprolol tartrate  100 mg Oral BID    lidocaine PF  5 mL Intradermal Once    heparin flush  3 mL Intravenous 2 times per day    amiodarone  200 mg Oral Daily    levalbuterol  0.31 mg Nebulization Q8H    pantoprazole  40 mg Oral QAM AC    insulin lispro  0-6 Units Subcutaneous TID WC    insulin lispro  0-3 Units Subcutaneous Nightly    potassium bicarb-citric acid  40 mEq Oral Once    sodium chloride flush  10 mL Intravenous 2 times per day       LABS    CBC:   No results for input(s): WBC, HGB, HCT, PLT in the last 72 hours.     BMP:    Recent Labs     12/19/19  0600 12/20/19  0611 12/21/19  0734    134 134   K 3.7 3.6 3.5   CL 98 95* 94*   CO2 31* 32* 29   BUN 12 13 12   CREATININE 0.8 0.9 0.8   GLUCOSE 127* 103* 97 documented.       Treatment plan as per my recommendation     Shana Doran MD, FACP  12/21/2019  5:02 PM

## 2019-12-21 NOTE — PROGRESS NOTES
Pharmacy Consultation Note  (Antibiotic Dosing and Monitoring)    Initial consult date: ; re-consulted   Consulting physician: Dr. Velia Tesfaye  Drug(s): Vancomycin  Indication: Bacteremia    Age/Gender IBW DW  Allergy Information   80 y.o. 57 kg 68 kg  Influenza vaccines; Alginate [alginic acid]; Novocain [procaine]; and Tape [adhesive tape]                Date  WBC BUN/CR Drug/Dose Time   Given Level(s)   (Time) Comments    24.8 47/2.6 Vanco 1250 mg IV x1 2246      27 63/2.6 No dose X      25.9 57/1.7 vanco 1250 mg q24h 1237 vanco random level = 8 mCg/mL @ 0544     17.8 32/0.9 vanco 1250 mg q24h 1230 vanc Tr @ 1120 = 11.7 mcg/mL     16.8 20/0.6 vanco 1250 mg q24h   Pharmacy signed off   12/10 -- -- Vancomycin 1,000 mg IV x 1  -- -- Vancomycin 1,000 mg IV x 1  -- 13/0.9 Vancomycin 1,250 mg IV Q24H 1427  Pharmacy re-consulted    -- 12/0.8 Vancomycin 1,250 mg IV Q24H <1400>       Estimated Creatinine Clearance: 53 mL/min (based on SCr of 0.8 mg/dL). Intake/Output Summary (Last 24 hours) at 2019 0847  Last data filed at 2019 0157  Gross per 24 hour   Intake 750 ml   Output 1200 ml   Net -450 ml     Urine output over the last 24 hours: incomplete documentation    Temp max: Temp (24hrs), Av.8 °F (37.1 °C), Min:98.3 °F (36.8 °C), Max:99.5 °F (37.5 °C)      Cultures:  available culture and sensitivity results were reviewed in EPIC  Cultures sent and are pending.   Culture Date Result    Urine antigens  POSITIVE Strep Pneumo   Rapid influenza  Negative   Urine  Growth not present   MRSA screen  Negative   Tip cx (PICC)  CONS (<15 colonies)   Blood cx #1  GPC in clusters   Blood cx #2  NGTD   Molecular blood ID  MRSA by PCR   Resp panel  Negative   Urine  NGTD   Blood cx #1  Pending   Blood cx #2  Pending   Tip cx  Pending     Assessment:  · Pt is a 79 y/o female admitted to the hospital on 12/5 for sepsis secondary to pneumococcal pneumonia - received x 10 days of ceftriaxone  · Consulted by Dr. Jennifer Medley to dose/monitor vancomycin. · Goal trough level:  15-20 mCg/mL. · Upon admission patient was initially in JOVANI, which has no resolved. SCr today: 0.8, CrCl ~50 mL/min, baseline SCr ~ 0.7  · Patient was initially started on vancomycin for pneumonia, which was discontinued (last dose on 12/11). Pharmacy was re-consulted 12/20 to dose vancomycin for staph bacteremia, methicillin resistant by PCR. PICC tip culture from 12/11 did grow CONS. Plan:  · Continue Vancomycin 1,250 mg IV Q24H  · Check trough at steady state  · Follow renal function  · Pharmacist will follow and monitor/adjust dosing as necessary.     Martha Reyes, PharmD, BCPS, BCCCP  12/21/2019  8:52 AM  Pager: [phone]

## 2019-12-21 NOTE — PROGRESS NOTES
distress  Skin: warm and dry, erythema to jeyson area and buttocks, coccyx wound. Head: normocephalic and atraumatic  Eyes: pupils equal, round, and reactive to light, conjunctivae normal  Neck: neck supple and non tender without mass   Pulmonary/Chest: Decreased breath sounds, crackles in bases, no respiratory distress  Cardiovascular: irregularly irregular  Abdomen: soft, non-tender, distended, normal bowel sounds, no masses or organomegaly  Extremities: no cyanosis, no clubbing and erythema and edema right upper extremity, edema bilateral lower extremities  Neurologic: no cranial nerve deficit and speech normal        Recent Labs     12/19/19  0600 12/20/19  0611 12/21/19  0734    134 134   K 3.7 3.6 3.5   CL 98 95* 94*   CO2 31* 32* 29   BUN 12 13 12   CREATININE 0.8 0.9 0.8   GLUCOSE 127* 103* 97   CALCIUM 8.0* 8.1* 8.0*       No results for input(s): WBC, RBC, HGB, HCT, MCV, MCH, MCHC, RDW, PLT, MPV in the last 72 hours. Radiology:   US DUP UPPER EXTREMITY RIGHT VENOUS   Final Result   1. No evidence of acute venous thrombosis right upper extremity. PICC   line visualized. XR CHEST PORTABLE   Final Result   1. No radiographic change       XR CHEST PORTABLE   Final Result   1. Stable large left effusion and airspace disease. IR FLUORO GUIDED CVA DEVICE PLMT/REPLACE/REMOVAL   Final Result   Successful placement of a 5 Malay double-lumen Power PICC   line using ultrasound guidance via the right brachial  vein. IR ULTRASOUND GUIDANCE VASCULAR ACCESS   Final Result   Ultrasound guidance was provided during placement of a   PICC line. XR CHEST PORTABLE   Final Result   1. Stable bilateral parenchymal opacities. CT Chest WO Contrast   Final Result   1. Dense consolidation seen within the left upper lobe consistent with   pneumonia. 2. Significant left lower lobe consolidation favored to represent   atelectasis. Underlying pneumonia cannot be excluded.    3. Patchy for gram positive cocci in clusters. Blood culture bottle one shows no growth. Repeat blood cultures sent. ID following. Vancomycin ordered. PICC was removed 12/20/19 and tip was cultured. Tip culture shows no growth to date. Continue IV Vancomycin - Pharmacy to dose. 12. Coccyx wound:  Wound Care consulted. 13. Chest pain: EKG and troponin ordered. Echo from 12/6/19 revealed an EF of 55-60% and indeterminate diastolic dysfunction. NOTE: This report was transcribed using voice recognition software.  Every effort was made to ensure accuracy; however, inadvertent computerized transcription errors may be present.     Electronically signed by MEJIA Alonso CNP on 12/21/2019 at 10:45 AM

## 2019-12-21 NOTE — PROGRESS NOTES
Patient is seen in follow-up for atrial fibrillation    Subjective:     Ms. Matty Butler feels little bit better, shortness of breath is tender  Sitting up in bed no apparent distress    ROS:  CONSTITUTIONAL:  negative for  fevers, chills  HEENT:  negative for earaches, nasal congestion and epistaxis  RESPIRATORY:  + dry cough, cough with sputum,negative for wheezing and hemoptysis  GASTROINTESTINAL:  negative for nausea, vomiting  MUSCULOSKELETAL:  negative for  myalgias, arthralgias  NEUROLOGICAL:  negative for visual disturbance, dysphagia    Medication side effects: none    Scheduled Meds:   vancomycin  1,250 mg Intravenous Q24H    furosemide  40 mg Oral BID    nystatin   Topical BID    nystatin   Topical BID    apixaban  5 mg Oral BID    sodium chloride flush  10 mL Intravenous 2 times per day    gabapentin  600 mg Oral 4x Daily AC & HS    metoprolol tartrate  100 mg Oral BID    lidocaine PF  5 mL Intradermal Once    heparin flush  3 mL Intravenous 2 times per day    amiodarone  200 mg Oral Daily    levalbuterol  0.31 mg Nebulization Q8H    pantoprazole  40 mg Oral QAM AC    insulin lispro  0-6 Units Subcutaneous TID WC    insulin lispro  0-3 Units Subcutaneous Nightly    potassium bicarb-citric acid  40 mEq Oral Once    sodium chloride flush  10 mL Intravenous 2 times per day     Continuous Infusions:   dextrose       PRN Meds:sodium chloride flush, potassium chloride **OR** potassium alternative oral replacement **OR** potassium chloride, heparin flush, sodium chloride flush, heparin flush, biotene dry mouth moisturizing, oxyCODONE-acetaminophen, perflutren lipid microspheres, glucose, dextrose, glucagon (rDNA), dextrose, hydrALAZINE, sodium chloride flush, magnesium hydroxide, ondansetron, acetaminophen      Objective:      Physical Exam:   /60   Pulse 62   Temp 98.8 °F (37.1 °C) (Oral)   Resp 18   Ht 5' 5\" (1.651 m)   Wt 166 lb (75.3 kg)   SpO2 95%   BMI 27.62 kg/m²

## 2019-12-22 LAB
ANION GAP SERPL CALCULATED.3IONS-SCNC: 9 MMOL/L (ref 7–16)
BUN BLDV-MCNC: 10 MG/DL (ref 8–23)
CALCIUM SERPL-MCNC: 8.1 MG/DL (ref 8.6–10.2)
CHLORIDE BLD-SCNC: 95 MMOL/L (ref 98–107)
CO2: 30 MMOL/L (ref 22–29)
CREAT SERPL-MCNC: 0.8 MG/DL (ref 0.5–1)
CULTURE CATHETER TIP: NORMAL
CULTURE, BLOOD 2: NORMAL
EKG ATRIAL RATE: 64 BPM
EKG P AXIS: 53 DEGREES
EKG P-R INTERVAL: 162 MS
EKG Q-T INTERVAL: 404 MS
EKG QRS DURATION: 94 MS
EKG QTC CALCULATION (BAZETT): 416 MS
EKG R AXIS: -12 DEGREES
EKG T AXIS: 47 DEGREES
EKG VENTRICULAR RATE: 64 BPM
GFR AFRICAN AMERICAN: >60
GFR NON-AFRICAN AMERICAN: >60 ML/MIN/1.73
GLUCOSE BLD-MCNC: 99 MG/DL (ref 74–99)
METER GLUCOSE: 149 MG/DL (ref 74–99)
METER GLUCOSE: 164 MG/DL (ref 74–99)
METER GLUCOSE: 169 MG/DL (ref 74–99)
METER GLUCOSE: 99 MG/DL (ref 74–99)
POTASSIUM SERPL-SCNC: 3.6 MMOL/L (ref 3.5–5)
SODIUM BLD-SCNC: 134 MMOL/L (ref 132–146)

## 2019-12-22 PROCEDURE — APPSS30 APP SPLIT SHARED TIME 16-30 MINUTES: Performed by: NURSE PRACTITIONER

## 2019-12-22 PROCEDURE — 93010 ELECTROCARDIOGRAM REPORT: CPT | Performed by: INTERNAL MEDICINE

## 2019-12-22 PROCEDURE — 97116 GAIT TRAINING THERAPY: CPT

## 2019-12-22 PROCEDURE — 97110 THERAPEUTIC EXERCISES: CPT

## 2019-12-22 PROCEDURE — 2580000003 HC RX 258: Performed by: NURSE PRACTITIONER

## 2019-12-22 PROCEDURE — 6370000000 HC RX 637 (ALT 250 FOR IP): Performed by: INTERNAL MEDICINE

## 2019-12-22 PROCEDURE — 2580000003 HC RX 258: Performed by: INTERNAL MEDICINE

## 2019-12-22 PROCEDURE — 2580000003 HC RX 258: Performed by: SPECIALIST

## 2019-12-22 PROCEDURE — 82962 GLUCOSE BLOOD TEST: CPT

## 2019-12-22 PROCEDURE — 6370000000 HC RX 637 (ALT 250 FOR IP): Performed by: NURSE PRACTITIONER

## 2019-12-22 PROCEDURE — 94640 AIRWAY INHALATION TREATMENT: CPT

## 2019-12-22 PROCEDURE — 80048 BASIC METABOLIC PNL TOTAL CA: CPT

## 2019-12-22 PROCEDURE — 36415 COLL VENOUS BLD VENIPUNCTURE: CPT

## 2019-12-22 PROCEDURE — 1200000000 HC SEMI PRIVATE

## 2019-12-22 PROCEDURE — 99232 SBSQ HOSP IP/OBS MODERATE 35: CPT | Performed by: INTERNAL MEDICINE

## 2019-12-22 PROCEDURE — 6360000002 HC RX W HCPCS: Performed by: INTERNAL MEDICINE

## 2019-12-22 PROCEDURE — 6360000002 HC RX W HCPCS: Performed by: SPECIALIST

## 2019-12-22 RX ADMIN — INSULIN LISPRO 1 UNITS: 100 INJECTION, SOLUTION INTRAVENOUS; SUBCUTANEOUS at 18:11

## 2019-12-22 RX ADMIN — OXYCODONE HYDROCHLORIDE AND ACETAMINOPHEN 1 TABLET: 5; 325 TABLET ORAL at 11:19

## 2019-12-22 RX ADMIN — INSULIN LISPRO 1 UNITS: 100 INJECTION, SOLUTION INTRAVENOUS; SUBCUTANEOUS at 20:13

## 2019-12-22 RX ADMIN — METOPROLOL TARTRATE 100 MG: 50 TABLET ORAL at 20:11

## 2019-12-22 RX ADMIN — LEVALBUTEROL HYDROCHLORIDE 0.31 MG: 0.31 SOLUTION RESPIRATORY (INHALATION) at 14:39

## 2019-12-22 RX ADMIN — LEVALBUTEROL HYDROCHLORIDE 0.31 MG: 0.31 SOLUTION RESPIRATORY (INHALATION) at 06:12

## 2019-12-22 RX ADMIN — GABAPENTIN 600 MG: 300 CAPSULE ORAL at 16:45

## 2019-12-22 RX ADMIN — VANCOMYCIN HYDROCHLORIDE 1250 MG: 10 INJECTION, POWDER, LYOPHILIZED, FOR SOLUTION INTRAVENOUS at 14:00

## 2019-12-22 RX ADMIN — FUROSEMIDE 40 MG: 40 TABLET ORAL at 09:24

## 2019-12-22 RX ADMIN — Medication 10 ML: at 20:13

## 2019-12-22 RX ADMIN — METOPROLOL TARTRATE 100 MG: 50 TABLET ORAL at 09:24

## 2019-12-22 RX ADMIN — NYSTATIN: 100000 OINTMENT TOPICAL at 20:13

## 2019-12-22 RX ADMIN — GABAPENTIN 600 MG: 300 CAPSULE ORAL at 05:47

## 2019-12-22 RX ADMIN — APIXABAN 5 MG: 5 TABLET, FILM COATED ORAL at 20:11

## 2019-12-22 RX ADMIN — FUROSEMIDE 40 MG: 40 TABLET ORAL at 16:45

## 2019-12-22 RX ADMIN — GABAPENTIN 600 MG: 300 CAPSULE ORAL at 20:11

## 2019-12-22 RX ADMIN — AMIODARONE HYDROCHLORIDE 200 MG: 200 TABLET ORAL at 09:24

## 2019-12-22 RX ADMIN — NYSTATIN: 100000 OINTMENT TOPICAL at 09:25

## 2019-12-22 RX ADMIN — APIXABAN 5 MG: 5 TABLET, FILM COATED ORAL at 09:24

## 2019-12-22 RX ADMIN — ANORECTAL OINTMENT: 15.7; .44; 24; 20.6 OINTMENT TOPICAL at 20:13

## 2019-12-22 RX ADMIN — GABAPENTIN 600 MG: 300 CAPSULE ORAL at 11:19

## 2019-12-22 RX ADMIN — Medication 10 ML: at 09:25

## 2019-12-22 RX ADMIN — ANORECTAL OINTMENT: 15.7; .44; 24; 20.6 OINTMENT TOPICAL at 16:44

## 2019-12-22 RX ADMIN — PANTOPRAZOLE SODIUM 40 MG: 40 TABLET, DELAYED RELEASE ORAL at 05:48

## 2019-12-22 ASSESSMENT — PAIN DESCRIPTION - DESCRIPTORS: DESCRIPTORS: ACHING

## 2019-12-22 ASSESSMENT — PAIN SCALES - GENERAL
PAINLEVEL_OUTOF10: 10
PAINLEVEL_OUTOF10: 0

## 2019-12-22 ASSESSMENT — PAIN DESCRIPTION - PAIN TYPE: TYPE: CHRONIC PAIN

## 2019-12-22 ASSESSMENT — PAIN DESCRIPTION - PROGRESSION: CLINICAL_PROGRESSION: RESOLVED

## 2019-12-22 ASSESSMENT — PAIN DESCRIPTION - LOCATION: LOCATION: BACK

## 2019-12-22 NOTE — PROGRESS NOTES
[facility] Hospitalist   Progress Note    Admitting Date and Time: 12/5/2019  8:48 AM  Admit Dx: Acute respiratory failure with hypoxia (HCC) [J96.01]    Subjective:    Patient reports feeling better today. She complains of pain in her coccyx area. ROS: denies fever, chills, cp, sob, n/v, HA unless stated above.      vancomycin  1,250 mg Intravenous Q24H    furosemide  40 mg Oral BID    nystatin   Topical BID    nystatin   Topical BID    apixaban  5 mg Oral BID    sodium chloride flush  10 mL Intravenous 2 times per day    gabapentin  600 mg Oral 4x Daily AC & HS    metoprolol tartrate  100 mg Oral BID    lidocaine PF  5 mL Intradermal Once    heparin flush  3 mL Intravenous 2 times per day    amiodarone  200 mg Oral Daily    levalbuterol  0.31 mg Nebulization Q8H    pantoprazole  40 mg Oral QAM AC    insulin lispro  0-6 Units Subcutaneous TID WC    insulin lispro  0-3 Units Subcutaneous Nightly    potassium bicarb-citric acid  40 mEq Oral Once    sodium chloride flush  10 mL Intravenous 2 times per day     sodium chloride flush, 10 mL, PRN  potassium chloride, 40 mEq, PRN    Or  potassium alternative oral replacement, 40 mEq, PRN    Or  potassium chloride, 10 mEq, PRN  heparin flush, 100 Units, PRN  sodium chloride flush, 10 mL, PRN  heparin flush, 3 mL, PRN  biotene dry mouth moisturizing, , PRN  oxyCODONE-acetaminophen, 1 tablet, Q6H PRN  perflutren lipid microspheres, 1.5 mL, ONCE PRN  glucose, 15 g, PRN  dextrose, 12.5 g, PRN  glucagon (rDNA), 1 mg, PRN  dextrose, 100 mL/hr, PRN  hydrALAZINE, 10 mg, Q6H PRN  sodium chloride flush, 10 mL, PRN  magnesium hydroxide, 30 mL, Daily PRN  ondansetron, 4 mg, Q6H PRN  acetaminophen, 650 mg, Q4H PRN         Objective:    BP (!) 155/69   Pulse 72   Temp 98.9 °F (37.2 °C) (Oral)   Resp 16   Ht 5' 5\" (1.651 m)   Wt 171 lb (77.6 kg)   SpO2 96%   BMI 28.46 kg/m²   General Appearance: alert and oriented to person, place and time and

## 2019-12-22 NOTE — PROGRESS NOTES
[address] [place] [address] [facility]  DALE  Progress Note    FU CLABSI     SUBJECTIVE:    Patient is awake, alert , seen at bedside for face to face encounter  Family present   Afebrile, no leukocytosis   No complaints per patient  No nausea, vomiting, diarrhea, fever, chills, or rash     ROS:  Negative except as above     OBJECTIVE:    Vitals:    12/21/19 2124 12/21/19 2346 12/22/19 0644 12/22/19 0923   BP: (!) 119/58   (!) 155/69   Pulse: 68   72   Resp: 14   16   Temp: 98.6 °F (37 °C)   98.9 °F (37.2 °C)   TempSrc: Oral   Oral   SpO2: 93% 95%  96%   Weight:   171 lb (77.6 kg)    Height:           HEENT :         unremarkable   Heart:             RRR,  No murmurs, no gallops  Lungs:            clear to auscultation, no wheezing , no rales  Abdomen:       soft, non tender, bowel sounds present  Extremites:     No edema, no ulcers, SACRAL ULCER RED  Skin:                Normal turgor,normal texture  PIV                vancomycin  1,250 mg Intravenous Q24H    furosemide  40 mg Oral BID    nystatin   Topical BID    nystatin   Topical BID    apixaban  5 mg Oral BID    sodium chloride flush  10 mL Intravenous 2 times per day    gabapentin  600 mg Oral 4x Daily AC & HS    metoprolol tartrate  100 mg Oral BID    lidocaine PF  5 mL Intradermal Once    heparin flush  3 mL Intravenous 2 times per day    amiodarone  200 mg Oral Daily    levalbuterol  0.31 mg Nebulization Q8H    pantoprazole  40 mg Oral QAM AC    insulin lispro  0-6 Units Subcutaneous TID WC    insulin lispro  0-3 Units Subcutaneous Nightly    potassium bicarb-citric acid  40 mEq Oral Once    sodium chloride flush  10 mL Intravenous 2 times per day       LABS    CBC:   No results for input(s): WBC, HGB, HCT, PLT in the last 72 hours.     BMP:    Recent Labs     12/20/19  0611 12/21/19  0734 12/22/19  0724    134 134   K 3.6 3.5 3.6   CL 95* 94* 95*   CO2 32* 29 30*   BUN 13 12 10   CREATININE 0.9 0.8 0.8   GLUCOSE 103* 97 99         Hepatic Function Panel:    Lab Results   Component Value Date    ALKPHOS 108 12/14/2019    ALT 12 12/14/2019    AST 13 12/14/2019    PROT 5.5 12/14/2019    BILITOT 0.6 12/14/2019    LABALBU 2.2 12/14/2019       Lab Results   Component Value Date    SEDRATE 75 (H) 12/21/2019       . Lab Results   Component Value Date    CRP 5.0 (H) 12/21/2019         Microbiology :  Recent Labs     12/20/19  1510   BC 24 Hours- no growth     Recent Labs     12/20/19  1520   BLOODCULT2 24 Hours- no growth     Recent Labs     12/19/19  1400   LABURIN <10,000 CFU/mL  Gram positive organism       No results for input(s): CULTRESP in the last 72 hours. No results for input(s): WNDABS in the last 72 hours. Radiology :  US DUP UPPER EXTREMITY RIGHT VENOUS   Final Result   1. No evidence of acute venous thrombosis right upper extremity. PICC   line visualized. XR CHEST PORTABLE   Final Result   1. No radiographic change       XR CHEST PORTABLE   Final Result   1. Stable large left effusion and airspace disease. IR FLUORO GUIDED CVA DEVICE PLMT/REPLACE/REMOVAL   Final Result   Successful placement of a 5 Bangladeshi double-lumen Power PICC   line using ultrasound guidance via the right brachial  vein. IR ULTRASOUND GUIDANCE VASCULAR ACCESS   Final Result   Ultrasound guidance was provided during placement of a   PICC line. XR CHEST PORTABLE   Final Result   1. Stable bilateral parenchymal opacities. CT Chest WO Contrast   Final Result   1. Dense consolidation seen within the left upper lobe consistent with   pneumonia. 2. Significant left lower lobe consolidation favored to represent   atelectasis. Underlying pneumonia cannot be excluded. 3. Patchy infiltrate within the right upper lobe and right middle   lobe. 4. Interval right lung base atelectasis. 5. Very small bilateral pleural effusions. 6. There is no evidence of an empyema. XR CHEST PORTABLE   Final Result   1.  Stable left encounter have been performed by me. I agree with the assessment, plan and orders as documented.       Treatment plan as per my recommendation     Jermaine Mustafa MD, FACP  12/22/2019  7:31 PM

## 2019-12-22 NOTE — PROGRESS NOTES
assistance with BLE   Transfers-sit to stand- Minimal assistance   Gait: Pt ambulated 15 feet x 2 and 3 feet using wheeled walker with Minimal assistance. Comment: V/C were needed for safety and upright posture. Steps: Not assessed  Treatment: Pt was educated and facilitated on techniques to increase safety and independence with bed mobility, balance, functional transfers, and functional mobility. Pt transferred to side of bed and sat up x 3 minutes to increase dynamic sitting balance and activity tolerance. Pt stood and ambulated to bathroom. Pt required assist with commode transfers. Pt ambulated back to bedside. Pt sat on side of bed x 15 minutes and performed seated BLE ankle pumps, LAQ, hip abd/add, and marching x 10-15 reps. V/C were needed for correct technique and to perform 2-3x daily. Pt stood and ambulated towards head of bed. Pt was returned to bed at end of treatment. Pt required minimal assistance for rolling x 2 for placement of waffle cushion. Pt required maximal assistance of 2 to scoot up in bed, BLE were elevated on a pillow, and head of bed was elevated. Comment: Call light left within reach of patient. Bed alarm was activated. A: Pt was able to progress ambulation distance with no LOB noted. Pt continues to c/o dizziness with all functional mobility that is limiting activity. Pt was motivated to participate as able. P: Continue with physical therapy       Carmencita Morrison PTA    GOALS to be met in 2 days. Bed mobility- Moderate assist                                               Transfers-Sit to stand- Moderate assist                Gait: Patient to ambulate 15 feet using wheeled walker with Moderate assist                   Increase strength in affected mm groups by 1/3 grade  Increase balance to allow for improvement towards functional goals. Increase endurance to allow for improvement towards functional goals.

## 2019-12-22 NOTE — PLAN OF CARE
Problem: Injury - Risk of, Physical Injury:  Goal: Will remain free from falls  Description  Will remain free from falls   Outcome: Met This Shift     Problem: Risk for Impaired Skin Integrity  Goal: Tissue integrity - skin and mucous membranes  Description  Structural intactness and normal physiological function of skin and  mucous membranes.   Outcome: Met This Shift     Problem: Falls - Risk of:  Goal: Will remain free from falls  Description  Will remain free from falls   Outcome: Met This Shift     Problem: Pain:  Goal: Pain level will decrease  Description  Pain level will decrease  Outcome: Met This Shift  Goal: Control of acute pain  Description  Control of acute pain  Outcome: Met This Shift  Goal: Control of chronic pain  Description  Control of chronic pain  Outcome: Met This Shift

## 2019-12-22 NOTE — PROGRESS NOTES
Assessment:  · Pt is a 79 y/o female admitted to the hospital on 12/5 for sepsis secondary to pneumococcal pneumonia - received x 10 days of ceftriaxone  · Consulted by Dr. Liz Samaniego to dose/monitor vancomycin. · Goal trough level:  15-20 mCg/mL. · Upon admission patient was initially in JOVANI, which has no resolved. SCr today: 0.8, CrCl ~50 mL/min, baseline SCr ~ 0.7  · Patient was initially started on vancomycin for pneumonia, which was discontinued (last dose on 12/11). Pharmacy was re-consulted 12/20 to dose vancomycin for staph bacteremia, methicillin resistant by PCR. PICC tip culture from 12/11 did grow CONS. Plan:  · Continue Vancomycin 1,250 mg IV Q24H  · Check trough at steady state - will order level with 14:00 dose tomorrow  · Follow renal function  · Pharmacist will follow and monitor/adjust dosing as necessary.     Cole Benton, Adarsh, BCPS, BCCCP  12/22/2019  10:04 AM  Pager: [phone]

## 2019-12-23 ENCOUNTER — APPOINTMENT (OUTPATIENT)
Dept: INTERVENTIONAL RADIOLOGY/VASCULAR | Age: 84
DRG: 871 | End: 2019-12-23
Payer: MEDICARE

## 2019-12-23 VITALS
BODY MASS INDEX: 28.37 KG/M2 | TEMPERATURE: 98.2 F | RESPIRATION RATE: 18 BRPM | OXYGEN SATURATION: 91 % | HEART RATE: 64 BPM | WEIGHT: 170.3 LBS | HEIGHT: 65 IN | SYSTOLIC BLOOD PRESSURE: 161 MMHG | DIASTOLIC BLOOD PRESSURE: 81 MMHG

## 2019-12-23 LAB
ANION GAP SERPL CALCULATED.3IONS-SCNC: 7 MMOL/L (ref 7–16)
BLOOD CULTURE, ROUTINE: ABNORMAL
BUN BLDV-MCNC: 10 MG/DL (ref 8–23)
CALCIUM SERPL-MCNC: 8.5 MG/DL (ref 8.6–10.2)
CHLORIDE BLD-SCNC: 95 MMOL/L (ref 98–107)
CO2: 31 MMOL/L (ref 22–29)
CREAT SERPL-MCNC: 0.9 MG/DL (ref 0.5–1)
GFR AFRICAN AMERICAN: >60
GFR NON-AFRICAN AMERICAN: 60 ML/MIN/1.73
GLUCOSE BLD-MCNC: 107 MG/DL (ref 74–99)
HCT VFR BLD CALC: 33.9 % (ref 34–48)
HEMOGLOBIN: 10.8 G/DL (ref 11.5–15.5)
MCH RBC QN AUTO: 29.7 PG (ref 26–35)
MCHC RBC AUTO-ENTMCNC: 31.9 % (ref 32–34.5)
MCV RBC AUTO: 93.1 FL (ref 80–99.9)
METER GLUCOSE: 103 MG/DL (ref 74–99)
METER GLUCOSE: 130 MG/DL (ref 74–99)
METER GLUCOSE: 150 MG/DL (ref 74–99)
ORGANISM: ABNORMAL
PDW BLD-RTO: 14.2 FL (ref 11.5–15)
PLATELET # BLD: 370 E9/L (ref 130–450)
PMV BLD AUTO: 10.2 FL (ref 7–12)
POTASSIUM SERPL-SCNC: 3.8 MMOL/L (ref 3.5–5)
RBC # BLD: 3.64 E12/L (ref 3.5–5.5)
SODIUM BLD-SCNC: 133 MMOL/L (ref 132–146)
VANCOMYCIN TROUGH: 14.7 MCG/ML (ref 5–16)
WBC # BLD: 5.2 E9/L (ref 4.5–11.5)

## 2019-12-23 PROCEDURE — 6370000000 HC RX 637 (ALT 250 FOR IP): Performed by: INTERNAL MEDICINE

## 2019-12-23 PROCEDURE — 97110 THERAPEUTIC EXERCISES: CPT

## 2019-12-23 PROCEDURE — 36573 INSJ PICC RS&I 5 YR+: CPT | Performed by: RADIOLOGY

## 2019-12-23 PROCEDURE — 85027 COMPLETE CBC AUTOMATED: CPT

## 2019-12-23 PROCEDURE — 2500000003 HC RX 250 WO HCPCS: Performed by: RADIOLOGY

## 2019-12-23 PROCEDURE — 2580000003 HC RX 258: Performed by: SPECIALIST

## 2019-12-23 PROCEDURE — 6360000002 HC RX W HCPCS: Performed by: SPECIALIST

## 2019-12-23 PROCEDURE — 6370000000 HC RX 637 (ALT 250 FOR IP): Performed by: NURSE PRACTITIONER

## 2019-12-23 PROCEDURE — 80048 BASIC METABOLIC PNL TOTAL CA: CPT

## 2019-12-23 PROCEDURE — APPSS30 APP SPLIT SHARED TIME 16-30 MINUTES: Performed by: NURSE PRACTITIONER

## 2019-12-23 PROCEDURE — 99239 HOSP IP/OBS DSCHRG MGMT >30: CPT | Performed by: INTERNAL MEDICINE

## 2019-12-23 PROCEDURE — 97535 SELF CARE MNGMENT TRAINING: CPT

## 2019-12-23 PROCEDURE — 94640 AIRWAY INHALATION TREATMENT: CPT

## 2019-12-23 PROCEDURE — 6360000002 HC RX W HCPCS: Performed by: INTERNAL MEDICINE

## 2019-12-23 PROCEDURE — 92526 ORAL FUNCTION THERAPY: CPT | Performed by: SPEECH-LANGUAGE PATHOLOGIST

## 2019-12-23 PROCEDURE — 82962 GLUCOSE BLOOD TEST: CPT

## 2019-12-23 PROCEDURE — 80202 ASSAY OF VANCOMYCIN: CPT

## 2019-12-23 PROCEDURE — 97530 THERAPEUTIC ACTIVITIES: CPT

## 2019-12-23 PROCEDURE — 97116 GAIT TRAINING THERAPY: CPT

## 2019-12-23 PROCEDURE — 36415 COLL VENOUS BLD VENIPUNCTURE: CPT

## 2019-12-23 PROCEDURE — C1751 CATH, INF, PER/CENT/MIDLINE: HCPCS

## 2019-12-23 PROCEDURE — 02HV33Z INSERTION OF INFUSION DEVICE INTO SUPERIOR VENA CAVA, PERCUTANEOUS APPROACH: ICD-10-PCS | Performed by: RADIOLOGY

## 2019-12-23 RX ORDER — GABAPENTIN 300 MG/1
600 CAPSULE ORAL
Qty: 90 CAPSULE | Refills: 0 | DISCHARGE
Start: 2019-12-23 | End: 2021-05-26 | Stop reason: ALTCHOICE

## 2019-12-23 RX ORDER — HEPARIN SODIUM (PORCINE) LOCK FLUSH IV SOLN 100 UNIT/ML 100 UNIT/ML
100 SOLUTION INTRAVENOUS PRN
Status: DISCONTINUED | OUTPATIENT
Start: 2019-12-23 | End: 2019-12-23 | Stop reason: HOSPADM

## 2019-12-23 RX ORDER — SODIUM CHLORIDE 0.9 % (FLUSH) 0.9 %
10 SYRINGE (ML) INJECTION PRN
Status: DISCONTINUED | OUTPATIENT
Start: 2019-12-23 | End: 2019-12-23 | Stop reason: HOSPADM

## 2019-12-23 RX ORDER — HEPARIN SODIUM (PORCINE) LOCK FLUSH IV SOLN 100 UNIT/ML 100 UNIT/ML
100 SOLUTION INTRAVENOUS PRN
Status: DISCONTINUED | OUTPATIENT
Start: 2019-12-23 | End: 2019-12-23 | Stop reason: SDUPTHER

## 2019-12-23 RX ORDER — FUROSEMIDE 40 MG/1
40 TABLET ORAL 2 TIMES DAILY
Qty: 60 TABLET | Refills: 0 | Status: ON HOLD | DISCHARGE
Start: 2019-12-23 | End: 2021-03-31 | Stop reason: HOSPADM

## 2019-12-23 RX ORDER — SODIUM CHLORIDE 0.9 % (FLUSH) 0.9 %
10 SYRINGE (ML) INJECTION EVERY 12 HOURS SCHEDULED
Status: DISCONTINUED | OUTPATIENT
Start: 2019-12-23 | End: 2019-12-23 | Stop reason: HOSPADM

## 2019-12-23 RX ORDER — LIDOCAINE HYDROCHLORIDE 10 MG/ML
10 INJECTION, SOLUTION INFILTRATION; PERINEURAL ONCE
Status: COMPLETED | OUTPATIENT
Start: 2019-12-23 | End: 2019-12-23

## 2019-12-23 RX ORDER — OXYCODONE HYDROCHLORIDE AND ACETAMINOPHEN 5; 325 MG/1; MG/1
1 TABLET ORAL EVERY 6 HOURS PRN
Qty: 12 TABLET | Refills: 0 | Status: SHIPPED | OUTPATIENT
Start: 2019-12-23 | End: 2019-12-26

## 2019-12-23 RX ORDER — METOPROLOL TARTRATE 100 MG/1
100 TABLET ORAL 2 TIMES DAILY
Qty: 60 TABLET | Refills: 0 | Status: ON HOLD | DISCHARGE
Start: 2019-12-23 | End: 2021-03-31 | Stop reason: HOSPADM

## 2019-12-23 RX ORDER — LEVALBUTEROL INHALATION SOLUTION 0.31 MG/3ML
0.31 SOLUTION RESPIRATORY (INHALATION) EVERY 8 HOURS
Refills: 0 | DISCHARGE
Start: 2019-12-23 | End: 2021-04-15

## 2019-12-23 RX ORDER — AMIODARONE HYDROCHLORIDE 200 MG/1
200 TABLET ORAL DAILY
Qty: 30 TABLET | Refills: 0 | DISCHARGE
Start: 2019-12-24 | End: 2021-04-15

## 2019-12-23 RX ORDER — SALIVA STIMULANT COMB. NO.3
1 SPRAY, NON-AEROSOL (ML) MUCOUS MEMBRANE PRN
DISCHARGE
Start: 2019-12-23 | End: 2021-08-31

## 2019-12-23 RX ORDER — PANTOPRAZOLE SODIUM 40 MG/1
40 TABLET, DELAYED RELEASE ORAL
Qty: 30 TABLET | Refills: 0 | Status: ON HOLD | DISCHARGE
Start: 2019-12-24 | End: 2021-03-31 | Stop reason: SDUPTHER

## 2019-12-23 RX ORDER — NYSTATIN 100000 U/G
OINTMENT TOPICAL
Refills: 0 | DISCHARGE
Start: 2019-12-23 | End: 2021-10-07

## 2019-12-23 RX ADMIN — ANORECTAL OINTMENT: 15.7; .44; 24; 20.6 OINTMENT TOPICAL at 09:26

## 2019-12-23 RX ADMIN — LIDOCAINE HYDROCHLORIDE 10 ML: 10 INJECTION, SOLUTION INFILTRATION; PERINEURAL at 14:58

## 2019-12-23 RX ADMIN — AMIODARONE HYDROCHLORIDE 200 MG: 200 TABLET ORAL at 09:26

## 2019-12-23 RX ADMIN — PANTOPRAZOLE SODIUM 40 MG: 40 TABLET, DELAYED RELEASE ORAL at 06:07

## 2019-12-23 RX ADMIN — NYSTATIN: 100000 CREAM TOPICAL at 09:26

## 2019-12-23 RX ADMIN — ANORECTAL OINTMENT: 15.7; .44; 24; 20.6 OINTMENT TOPICAL at 15:11

## 2019-12-23 RX ADMIN — METOPROLOL TARTRATE 100 MG: 50 TABLET ORAL at 09:26

## 2019-12-23 RX ADMIN — INSULIN LISPRO 1 UNITS: 100 INJECTION, SOLUTION INTRAVENOUS; SUBCUTANEOUS at 12:50

## 2019-12-23 RX ADMIN — FUROSEMIDE 40 MG: 40 TABLET ORAL at 09:26

## 2019-12-23 RX ADMIN — GABAPENTIN 600 MG: 300 CAPSULE ORAL at 06:07

## 2019-12-23 RX ADMIN — OXYCODONE HYDROCHLORIDE AND ACETAMINOPHEN 1 TABLET: 5; 325 TABLET ORAL at 10:43

## 2019-12-23 RX ADMIN — APIXABAN 5 MG: 5 TABLET, FILM COATED ORAL at 09:26

## 2019-12-23 RX ADMIN — VANCOMYCIN HYDROCHLORIDE 1250 MG: 10 INJECTION, POWDER, LYOPHILIZED, FOR SOLUTION INTRAVENOUS at 15:11

## 2019-12-23 RX ADMIN — GABAPENTIN 600 MG: 300 CAPSULE ORAL at 09:26

## 2019-12-23 RX ADMIN — LEVALBUTEROL HYDROCHLORIDE 0.31 MG: 0.31 SOLUTION RESPIRATORY (INHALATION) at 07:02

## 2019-12-23 RX ADMIN — GABAPENTIN 600 MG: 300 CAPSULE ORAL at 17:28

## 2019-12-23 RX ADMIN — NYSTATIN: 100000 OINTMENT TOPICAL at 09:26

## 2019-12-23 RX ADMIN — FUROSEMIDE 40 MG: 40 TABLET ORAL at 17:28

## 2019-12-23 ASSESSMENT — PAIN SCALES - GENERAL
PAINLEVEL_OUTOF10: 8
PAINLEVEL_OUTOF10: 5
PAINLEVEL_OUTOF10: 5

## 2019-12-23 NOTE — CARE COORDINATION
Ss note:12/23/2019. 2:51 PM Pt can admit to [facility] today once picc line is placed and final abx orders are complete. Sw notified liaison Karoline Drake of anticipated discharge today. [facility] nurse to nurse is [phone] fax discharge orders to [phone]. Hens completed. Will need signed WENDY and facility and family will need notified when discharge is written. Precert remains valid until 12-26-19.  RENETTA Shanks

## 2019-12-23 NOTE — DISCHARGE SUMMARY
[facility] Physician Discharge Summary       An Gregory Toledo [address] [facility]  [address] [facility]  [address] [facility],[address] [place], [place] [id number]  836 [facility], [address] [facility]  45 [facility]  [address] 900 [facility]          Manuel Morales MD  [address] [facility]  [phone]            Activity level: Activity as tolerated    Diet: DIET DENTAL SOFT; Dental Soft  Dietary Nutrition Supplements: Diabetic Oral Supplement    Labs: CBC and BMP weekly on Mondays     Condition at discharge: Stable    Dispo:Discharge to SNF        Patient ID:  Xena Benton  [id number]  58 y.o.  1935    Admit date: 12/5/2019    Discharge date and time:  12/23/2019  2:59 PM    Admission Diagnoses: Principal Problem:    Sepsis due to pneumonia [facility])  Active Problems:    Acute respiratory failure with hypoxia ([facility]    JOVANI (acute kidney injury) ([facility]    Elevated brain natriuretic peptide (BNP) level    Acute metabolic encephalopathy    Community acquired pneumonia of left lung    Hypoxia    Sepsis due to Streptococcus pneumoniae with acute hypoxic respiratory failure and septic shock (HCC)    Atrial fibrillation with RVR ([facility])    Gram-positive cocci bacteremia  Resolved Problems:    * No resolved hospital problems. *      Discharge Diagnoses: Principal Problem:    Sepsis due to pneumonia [facility])  Active Problems:    Acute respiratory failure with hypoxia (HCC)    JOVANI (acute kidney injury) ([facility]    Elevated brain natriuretic peptide (BNP) level    Acute metabolic encephalopathy    Community acquired pneumonia of left lung    Hypoxia    Sepsis due to Streptococcus pneumoniae with acute hypoxic respiratory failure and septic shock (HCC)    Atrial fibrillation with RVR ([facility])    Gram-positive cocci bacteremia  Resolved Problems:    * No resolved hospital problems.  *      Consults:  IP CONSULT TO SOCIAL WORK  IP CONSULT TO PULMONOLOGY  PHARMACY TO DOSE MEDICATION  IP CONSULT TO PHARMACY  IP CONSULT TO CARDIOLOGY  IP CONSULT TO INFECTIOUS DISEASES  IP CONSULT TO PHARMACY    Procedures: TLC insertion, PICC line insertion    Hospital Course: Charley Collins is an 80year old female with a history of HTN, DM, lung and thyroid nodules who presented to the Emergency Department after a mechanical fall. Chest x ray showed a left lung infiltrate with large left pleural effusion. CT of the chest with large consolidative process left lung secondary to pneumonia. Her oxygen sat was only 90% on room air at rest. Patient was requiring oxygen at 5 liters and does not wear oxygen at home. She was admitted with sepsis secondary to pneumonia. Lab work revealed leukocytosis with a white blood cell count of 24.8, lactic acidosis with a lactic acid of 2.6 and she was tachypneic with a respiratory rate of 30. She was given azithromycin and ceftriaxone in the emergency department. The Pharm. D. was consulted and antibiotics were changed to Zosyn. Pulmonology was consulted and felt that the patient had high potential for decompensation and recommended that she be transferred to ICU. Vancomycin was added as that is concerned that a family member had a recent viral illness the patient would be at risk for staph pneumonia. Levaquin was added by pulmonary to cover atypicals. She also had acute metabolic encephalopathy secondary to sepsis and pneumonia. She was found to have acute kidney injury. Creatinine was 2.6 on admission and baseline was less than 1. Lab work also revealed an elevated BNP of 20,940. An echo was ordered. Echo revealed an EF of 55 to 60% and indeterminate diastolic dysfunction. A triple-lumen catheter was inserted via the right femoral vein on 12/5/2019. There was concern that sepsis could be due to streptococcal pneumonia and the Intensivist recommended [facility] protocol of hydrocortisone, Vitamin C and thiamine.   She neck supple and non tender without mass   Pulmonary/Chest: Decreased breath sounds, crackles in bases, no respiratory distress  Cardiovascular: irregularly irregular  Abdomen: soft, non-tender, distended, normal bowel sounds, no masses or organomegaly  Extremities: no cyanosis, no clubbing and erythema and edema right upper extremity, edema bilateral lower extremities  Neurologic: no cranial nerve deficit and speech normal       I/O last 3 completed shifts: In: 360 [P.O.:360]  Out: -   I/O this shift:  In: 120 [P.O.:120]  Out: -       LABS:  Recent Labs     12/21/19  0734 12/22/19  0724 12/23/19  0629    134 133   K 3.5 3.6 3.8   CL 94* 95* 95*   CO2 29 30* 31*   BUN 12 10 10   CREATININE 0.8 0.8 0.9   GLUCOSE 97 99 107*   CALCIUM 8.0* 8.1* 8.5*       Recent Labs     12/23/19  1130   WBC 5.2   RBC 3.64   HGB 10.8*   HCT 33.9*   MCV 93.1   MCH 29.7   MCHC 31.9*   RDW 14.2      MPV 10.2       No results for input(s): POCGLU in the last 72 hours. Imaging:  Ir Onetha Viera Device Plmt/replace/removal    Result Date: 12/11/2019  [READING LOCATION: 200 EXAM: IR FLUORO GUIDED CVA DEVICE PLMT/REPLACE/REMOVAL HISTORY: Infection FLUOROSCOPY TIME:  0.6 minutes PROCEDURE:  The procedure was explained to the patient and an informed consent was obtained. The skin over the right arm was prepped and draped in a sterile fashion and anesthetized with lidocaine. Maximal sterile barrier technique was utilized. Under ultrasound guidance, the brachial vein was localized. Using a micropuncture needle, the vein was entered under direct ultrasound visualization. A 0.018 guidewire was advanced into the central venous circulation. A 5 Urdu peel-away sheath was placed. The 5 Western Riya Power PICC line catheter was trimmed to 35 cm and was then advanced through the peel-away sheath and positioned at the right atrial/SVC junction. Fluoroscopic spot film was obtained. FINDINGS:  Ultrasound shows the veins to be patent. The fluoroscopic spot film demonstrates the Power PICC line to be at the right atrial/SVC junction. The patient tolerated the procedure well and there were no complications. Successful placement of a 5 Irish double-lumen Power PICC line using ultrasound guidance via the right brachial  vein. Xr Chest Portable    Result Date: 12/12/2019  LOCATION: 200 EXAM: XR CHEST PORTABLE COMPARISON: 12/11/2019 HISTORY: Shortness of breath TECHNIQUE: Single frontal view of the chest was obtained. FINDINGS:  SUPPORT DEVICES: None. LUNGS: Left lung airspace disease and effusions are unchanged. PLEURA: No pneumothorax. LUNG VOLUMES: Satisfactory inspirator effort. MEDIASTINAL STRUCTURES: No lymphadenopathy. Normal aortic contour. HEART SIZE: Normal. BONES AND SOFT TISSUES: No fracture or soft tissue abnormality. 1. Stable large left effusion and airspace disease. Xr Chest Portable    Result Date: 12/11/2019  LOCATION: 200 EXAM: XR CHEST PORTABLE COMPARISON: 12/10/2019 HISTORY: Shortness of breath TECHNIQUE: Single frontal view of the chest was obtained. FINDINGS:  SUPPORT DEVICES: None. LUNGS: Left pleural and parenchymal disease is unchanged with prominent interstitial markings. Right lung opacities are also unchanged. PLEURA: No pneumothorax noted. LUNG VOLUMES: Satisfactory inspirator effort. MEDIASTINAL STRUCTURES: No lymphadenopathy. Normal aortic contour. HEART SIZE: Normal. BONES AND SOFT TISSUES: No fracture or soft tissue abnormality. 1. Stable bilateral parenchymal opacities. Ir Ultrasound Guidance Vascular Access    Result Date: 12/11/2019  Location:200 Exam: IR ULTRASOUND GUIDANCE VASCULAR ACCESS History:  PICC line placement, infection Ultrasound evaluation of potential access was performed. After successfully identifying a patent right brachial vein, and following the administration of lidocaine, real-time ultrasound guidance was used to puncture the right brachial   vein.  A permanent recording was created for the patient's record. Ultrasound guidance was provided during placement of a PICC line. Patient Instructions:   Current Discharge Medication List      See discharge medication reconciliation     Medication List      START taking these medications    amiodarone 200 MG tablet  Commonly known as:  CORDARONE  Take 1 tablet by mouth daily  Start taking on:  December 24, 2019     apixaban 5 MG Tabs tablet  Commonly known as:  ELIQUIS  Take 1 tablet by mouth 2 times daily     biotene dry mouth moisturizing Soln liquid  Take 1 applicator by mouth as needed (dry mouth)     dextrose 5 % SOLN 250 mL with vancomycin 1 g SOLR 1,250 mg     furosemide 40 MG tablet  Commonly known as:  LASIX  Take 1 tablet by mouth 2 times daily     gabapentin 300 MG capsule  Commonly known as:  NEURONTIN  Take 2 capsules by mouth 4 times daily (before meals and nightly) for 12 doses. Replaces:  gabapentin 600 MG tablet     levalbuterol 0.31 MG/3ML nebulization  Commonly known as:  XOPENEX  Take 3 mLs by nebulization every 8 hours     menthol-zinc oxide 0.44-20.6 % Oint ointment  Commonly known as:  CALMOSEPTINE  Apply topically 3 times daily for 7 days Max 30 ml per day. metoprolol 100 MG tablet  Commonly known as:  LOPRESSOR  Take 1 tablet by mouth 2 times daily     nystatin 302539 UNIT/GM ointment  Commonly known as:  MYCOSTATIN  Apply topically 2 times daily. pantoprazole 40 MG tablet  Commonly known as:  PROTONIX  Take 1 tablet by mouth every morning (before breakfast)  Start taking on:  December 24, 2019        CHANGE how you take these medications    oxyCODONE-acetaminophen 5-325 MG per tablet  Commonly known as:  PERCOCET  Take 1 tablet by mouth every 6 hours as needed for Pain for up to 3 days.   What changed:    · when to take this  · reasons to take this        CONTINUE taking these medications    fluticasone 50 MCG/ACT nasal spray  Commonly known as:  FLONASE        STOP taking these

## 2019-12-23 NOTE — PROGRESS NOTES
PHYSICAL THERAPY TREATMENT NOTE    12/23/2019  [id number]                      Nery Hitchcock   [id number]                              1935    Patient Active Problem List   Diagnosis    Primary osteoarthritis of both knees    [facility]    Sepsis due to pneumonia ([facility]    JOVANI (acute kidney injury) ([facility]    Elevated brain natriuretic peptide (BNP) level    Acute metabolic encephalopathy    Community acquired pneumonia of left lung    Hypoxia    Sepsis due to Streptococcus pneumoniae with acute hypoxic respiratory failure and septic shock (HCC)    Atrial fibrillation with RVR ([facility])    Gram-positive cocci bacteremia       Recommendation for discharge: subacute vs HHPT with 24/7 assist   Equipment prescriptions needed: to be determined    AM-[facility] Mobility Inpatient   How much difficulty turning over in bed?: A Little  How much difficulty sitting down on / standing up from a chair with arms?: A Little  How much difficulty moving from lying on back to sitting on side of bed?: A Little  How much help from another person moving to and from a bed to a chair?: A Little  How much help from another person needed to walk in hospital room?: A Little  How much help from another person for climbing 3-5 steps with a railing?: A Lot  AM-PAC Inpatient Mobility Raw Score : 17  AM-PAC Inpatient T-Scale Score : 42.13  Mobility Inpatient CMS 0-100% Score: 50.57  Mobility Inpatient CMS G-Code Modifier : CK    Precautions: falls, alarm, O2, Council, alarm    S: Patient cleared by nursing for treatment. Patient is agreeable to treatment. Pt required maximal encouragement to participate. Pain status: (measured on a visual analog scale with 0=no pain and 10=excruciating pain) 0/10.    O: Pt was instructed in and performed the following:   Bed Mobility- Supine to sit- Supervision   Scooting- Supervision   Sit to supine- Supervision   Transfers-sit to stand- Minimal assistance   Gait: Pt ambulated 15 feet x 2 and 10 feet x 2 using wheeled walker with Minimal assistance. Comment: V/C were needed for safety and upright posture. Steps: Not assessed  Treatment: Pt was educated and facilitated on techniques to increase safety and independence with bed mobility, balance, functional transfers, and functional mobility. Pt transferred to side of bed and sat up x 5 minutes to increase dynamic sitting balance and activity tolerance. Pt stood and ambulated in room. Pt ambulated to/from bathroom and required assist with commode transfers. Pt stood at sink to wash hands and ambulated back to bedside. Pt was returned to bed at end of treatment. Comment: Call light left within reach of patient. Bed alarm was activated. A: Pt was able to progress ambulation distance with no LOB noted. Pt required encouragement to participate d/t fatigue. P: Continue with physical therapy       Carmencita Morrison PTA    GOALS to be met in 2 days. Bed mobility- Moderate assist                                               Transfers-Sit to stand- Moderate assist                Gait: Patient to ambulate 15 feet using wheeled walker with Moderate assist                   Increase strength in affected mm groups by 1/3 grade  Increase balance to allow for improvement towards functional goals. Increase endurance to allow for improvement towards functional goals.

## 2019-12-23 NOTE — PROGRESS NOTES
[id number]    Patient unavailable for physical therapy treatment due to declined at this time d/t waiting to go for PICC line and visiting with family. Will attempt treatment at a later time/date.          [facility]

## 2019-12-23 NOTE — PROGRESS NOTES
[address] [place] [address] [facility]  Progress Note    FU CLABSI     SUBJECTIVE:    Patient is awake, alert , seen at bedside for face to face encounter  No family present  She is eating breakfast   Afebrile, no leukocytosis   No complaints per patient  No nausea, vomiting, diarrhea, fever, chills, or rash   To have picc placed today     ROS:  Negative except as above     OBJECTIVE:    Vitals:    12/22/19 2325 12/23/19 0600 12/23/19 0705 12/23/19 0736   BP: (!) 153/70   (!) 161/81   Pulse: 65   64   Resp:   18 18   Temp:    98.2 °F (36.8 °C)   TempSrc:    Oral   SpO2: 91%  92% 91%   Weight:  170 lb 4.8 oz (77.2 kg)     Height:           HEENT :         unremarkable   Heart:             RRR,  No murmurs, no gallops  Lungs:            clear to auscultation, no wheezing , no rales  Abdomen:       soft, non tender, bowel sounds present  Extremites:     No edema, no ulcers, SACRAL ULCER RED - no drainage   Skin:                Normal turgor,normal texture  PIV                sodium chloride flush  10 mL Intravenous 2 times per day    menthol-zinc oxide   Topical TID    pneumococcal 13-valent conjugate  0.5 mL Intramuscular Prior to discharge    vancomycin  1,250 mg Intravenous Q24H    furosemide  40 mg Oral BID    nystatin   Topical BID    nystatin   Topical BID    apixaban  5 mg Oral BID    sodium chloride flush  10 mL Intravenous 2 times per day    gabapentin  600 mg Oral 4x Daily AC & HS    metoprolol tartrate  100 mg Oral BID    lidocaine PF  5 mL Intradermal Once    heparin flush  3 mL Intravenous 2 times per day    amiodarone  200 mg Oral Daily    levalbuterol  0.31 mg Nebulization Q8H    pantoprazole  40 mg Oral QAM AC    insulin lispro  0-6 Units Subcutaneous TID WC    insulin lispro  0-3 Units Subcutaneous Nightly    potassium bicarb-citric acid  40 mEq Oral Once       LABS    CBC:   Recent Labs     12/23/19  1130   WBC 5.2   HGB 10.8*   HCT 33.9*          BMP: including pertinent history and physical  exam findings . I have seen and examined the patient and the key elements of the encounter have been performed by me. I agree with the assessment, plan and orders as documented.       Treatment plan as per my recommendation     Autumn Varghese MD, FACP  12/24/2019  1:37 PM

## 2019-12-23 NOTE — PROGRESS NOTES
[facility] Quality Flow/Interdisciplinary Rounds Progress Note        Quality Flow Rounds held on December 23, 2019    Disciplines Attending:  Bedside Nurse, ,  and Nursing Unit Leadership    Kate Blas was admitted on 12/5/2019  8:48 AM    Anticipated Discharge Date:  Expected Discharge Date: 12/14/19    Disposition:    Ramu Score:  Ramu Scale Score: 17    Readmission Risk              Risk of Unplanned Readmission:        14           Discussed patient goal for the day, patient clinical progression, and barriers to discharge. The following Goal(s) of the Day/Commitment(s) have been identified:  Activity progression, Line?        Vero Quinonez  December 23, 2019

## 2019-12-23 NOTE — PROGRESS NOTES
Speech Language Pathology        Patient seen for 15 minute session this date. Patient upright in bed and alert. She had already finished her noon meal but reports no overt s/s of distress with any of the consistencies. She did tolerate a few sips of thin liquids for slp today with no overt s/s of distress. We discussed/reviewed safe swallow protocol and she expressed understanding. She states she is anxious to be transferred to nursing home for rehab. Instructed to continue with all protocol even once she is discharged. She agreed. Will continue per poc while inpatient. Ehsan Camarillo.  Bindu Villagran MA/[facility]-SLP  [phone]

## 2019-12-23 NOTE — PROGRESS NOTES
[address] [place], [place] [address]ist   Progress Note    Admitting Date and Time: 12/5/2019  8:48 AM  Admit Dx: Acute respiratory failure with hypoxia (HCC) [J96.01]    Subjective:    Patient resting in bed at the time of the exam.  She denies any chest pain or shortness of breath. She has been afebrile. ROS: denies fever, chills, cp, sob, n/v, HA unless stated above.      sodium chloride flush  10 mL Intravenous 2 times per day    menthol-zinc oxide   Topical TID    pneumococcal 13-valent conjugate  0.5 mL Intramuscular Prior to discharge    vancomycin  1,250 mg Intravenous Q24H    furosemide  40 mg Oral BID    nystatin   Topical BID    nystatin   Topical BID    apixaban  5 mg Oral BID    sodium chloride flush  10 mL Intravenous 2 times per day    gabapentin  600 mg Oral 4x Daily AC & HS    metoprolol tartrate  100 mg Oral BID    lidocaine PF  5 mL Intradermal Once    heparin flush  3 mL Intravenous 2 times per day    amiodarone  200 mg Oral Daily    levalbuterol  0.31 mg Nebulization Q8H    pantoprazole  40 mg Oral QAM AC    insulin lispro  0-6 Units Subcutaneous TID WC    insulin lispro  0-3 Units Subcutaneous Nightly    potassium bicarb-citric acid  40 mEq Oral Once     sodium chloride flush, 10 mL, PRN  sodium chloride flush, 10 mL, PRN  potassium chloride, 40 mEq, PRN    Or  potassium alternative oral replacement, 40 mEq, PRN    Or  potassium chloride, 10 mEq, PRN  heparin flush, 100 Units, PRN  heparin flush, 3 mL, PRN  biotene dry mouth moisturizing, , PRN  oxyCODONE-acetaminophen, 1 tablet, Q6H PRN  perflutren lipid microspheres, 1.5 mL, ONCE PRN  glucose, 15 g, PRN  dextrose, 12.5 g, PRN  glucagon (rDNA), 1 mg, PRN  dextrose, 100 mL/hr, PRN  hydrALAZINE, 10 mg, Q6H PRN  magnesium hydroxide, 30 mL, Daily PRN  ondansetron, 4 mg, Q6H PRN  acetaminophen, 650 mg, Q4H PRN         Objective:    BP (!) 161/81   Pulse 64   Temp 98.2 °F (36.8 °C) (Oral)   Resp 18   Ht 5' 5\" (1.651 m) 2. Significant left lower lobe consolidation favored to represent   atelectasis. Underlying pneumonia cannot be excluded. 3. Patchy infiltrate within the right upper lobe and right middle   lobe. 4. Interval right lung base atelectasis. 5. Very small bilateral pleural effusions. 6. There is no evidence of an empyema. XR CHEST PORTABLE   Final Result   1. Stable left lung airspace disease and effusions. XR CHEST PORTABLE   Final Result      1. Stable chest x-ray with extensive left lung airspace disease. XR CHEST PORTABLE   Final Result   1. No change from XR CHEST PORTABLE with report dated 12/7/2019 8:30   AM.          XR CHEST PORTABLE   Final Result   No change left lung pneumonia. XR CHEST PORTABLE   Final Result   Stable dense left lung consolidation. CT CHEST WO CONTRAST   Final Result   Large left lung infiltrate compatible pneumonia. Minimal   patchy infiltrate developing pneumonia right middle lobe. XR CHEST STANDARD (2 VW)   Final Result   Large left pleural effusion with left lung airspace disease. CT Head WO Contrast   Final Result   No acute intracranial hemorrhage or mass effect. CT CERVICAL SPINE WO CONTRAST   Final Result   1. No acute cervical spine fracture. 2. Mild multilevel cervical spondylosis. Assessment:  Principal Problem:    Sepsis due to pneumonia [facility])  Active Problems:    Acute respiratory failure with hypoxia ([facility])    JOVANI (acute kidney injury) ([facility]    Elevated brain natriuretic peptide (BNP) level    Acute metabolic encephalopathy    Community acquired pneumonia of left lung    Hypoxia    Sepsis due to Streptococcus pneumoniae with acute hypoxic respiratory failure and septic shock ([facility])    Atrial fibrillation with RVR ([facility])    Gram-positive cocci bacteremia  Resolved Problems:    * No resolved hospital problems. *      Plan:  1.  Acute hypoxic respiratory failure in the setting of left lobar streptococcal

## 2019-12-23 NOTE — PROGRESS NOTES
endurance, strength)  -Edema: yes - B LE's, specifically L knee; nsg aware  -Safety:fair (VC's for walker safety, sequencing, hand placement)  - Pt/family education/training: bed mobility, transfer training, functional mobility, LE/UE dressing, sequencing, hand placement, walker safety,  Toileting/hygiene, grooming,  ECT's,  ADL retraining  -Pt would benefit from additional ADLs, safety education, balance activities, transfer training, strength exercises, and over all endurance building.     P:  - Plan of care: Patient will be seen by OT PRN for therapeutic activity, ADL re-training, bed mobility,functional transfers, functional mobility, safety and fall prevention, balance and endurance activities, instruction and training in energy conservation principles, and   patient and family education.   - Patient and/or family understands diagnosis, prognosis and plan of care: yes   - ADL retraining, bathroom safety, DME    Treatment minutes: [address]

## 2019-12-24 NOTE — ADT AUTH CERT
mg Nebulization Q8H   · pantoprazole 40 mg Oral QAM AC   · insulin lispro 0-6 Units Subcutaneous TID WC   · insulin lispro 0-3 Units Subcutaneous Nightly   · potassium bicarb-citric acid 40 mEq Oral Once   · sodium chloride flush 10 mL Intravenous 2 times per day              LABS       CBC:    No results for input(s): WBC, HGB, HCT, PLT in the last 72 hours.       BMP:        Recent Labs     12/20/19   0611 12/21/19   0734 12/22/19   0724    134 134   K 3.6 3.5 3.6   CL 95* 94* 95*   CO2 32* 29 30*   BUN 13 12 10   CREATININE 0.9 0.8 0.8   GLUCOSE 103* 97 99               Hepatic Function Panel:        Lab Results   Component Value Date     ALKPHOS 108 12/14/2019     ALT 12 12/14/2019     AST 13 12/14/2019     PROT 5.5 12/14/2019     BILITOT 0.6 12/14/2019     LABALBU 2.2 12/14/2019              Lab Results   Component Value Date     SEDRATE 75 (H) 12/21/2019           .      Lab Results   Component Value Date     CRP 5.0 (H) 12/21/2019               Microbiology :      Recent Labs     12/20/19   1510   BC 24 Hours- no growth          Recent Labs     12/20/19   1520   BLOODCULT2 24 Hours- no growth          Recent Labs     12/19/19   1400   LABURIN <10,000 CFU/mL   Gram positive organism           No results for input(s): CULTRESP in the last 72 hours. No results for input(s): WNDABS in the last 72 hours.       Radiology :      US DUP UPPER EXTREMITY RIGHT VENOUS   Final Result   1. No evidence of acute venous thrombosis right upper extremity. PICC   line visualized.       XR CHEST PORTABLE   Final Result   1. No radiographic change        XR CHEST PORTABLE   Final Result   1.  Stable large left effusion and airspace disease.        IR FLUORO GUIDED CVA DEVICE PLMT/REPLACE/REMOVAL   Final Result   Successful placement of a 5 [place] double-lumen Power PICC   line using ultrasound guidance via the right brachial  vein.               IR ULTRASOUND GUIDANCE VASCULAR ACCESS   Final Result   Ultrasound guidance was provided during placement of a   PICC line.       XR CHEST PORTABLE   Final Result   1. Stable bilateral parenchymal opacities.        CT Chest WO Contrast   Final Result   1. Dense consolidation seen within the left upper lobe consistent with   pneumonia. 2. Significant left lower lobe consolidation favored to represent   atelectasis. Underlying pneumonia cannot be excluded. 3. Patchy infiltrate within the right upper lobe and right middle   lobe. 4. Interval right lung base atelectasis. 5. Very small bilateral pleural effusions. 6. There is no evidence of an empyema.       XR CHEST PORTABLE   Final Result   1. Stable left lung airspace disease and effusions.        XR CHEST PORTABLE   Final Result       1. Stable chest x-ray with extensive left lung airspace disease.       XR CHEST PORTABLE   Final Result   1. No change from XR CHEST PORTABLE with report dated 12/7/2019 8:30   AM.           XR CHEST PORTABLE   Final Result   No change left lung pneumonia.       XR CHEST PORTABLE   Final Result   Stable dense left lung consolidation.       CT CHEST WO CONTRAST   Final Result   Large left lung infiltrate compatible pneumonia. Minimal   patchy infiltrate developing pneumonia right middle lobe.       XR CHEST STANDARD (2 VW)   Final Result   Large left pleural effusion with left lung airspace disease.               CT Head WO Contrast   Final Result   No acute intracranial hemorrhage or mass effect.           CT CERVICAL SPINE WO CONTRAST   Final Result   1. No acute cervical spine fracture. 2. Mild multilevel cervical spondylosis.                             ASSESSMENT:    This is an 80year old female who presented to the ER with altered mental status s/p fall 12/5/19. She had leukocytosis on admission. ID was consulted due to CONS bacteremia with a positive catheter tip, strep pneumoniae, and a stage 2 sacral ulcer.  On 12/17 blood cultures revealed gram positive cocci in clusters - CONS and her PICC line was removed 12/20 with catheter tip sent for culture which is negative.       PLAN:   Continue Vancomycin pharmacy to dose    Follow cultures - negative to date    Monitor labs            MEJIA Lora NP   12/22/2019   11:58 AM        Patient examined along with the nurse practitioner, patient with coagulase negative staph bacteremia and catheter tip culture positive and hence this is a central line associated bloodstream infection   We will continue patient on vancomycin and follow the patient closely       I have discussed the case, including pertinent history and physical  exam findings . I have seen and examined the patient and the key elements of the encounter have been performed by me.  I agree with the assessment, plan and orders as documented.         Treatment plan as per my recommendation        Rickie Spatz, MD, FACP   12/22/2019   7:31 PM           PER INTER MED   Patient reports feeling better today.  She complains of pain in her coccyx area.       ROS: denies fever, chills, cp, sob, n/v, HA unless stated above.          Scheduled Medications   · vancomycin 1,250 mg Intravenous Q24H   · furosemide 40 mg Oral BID   · nystatin Topical BID   · nystatin Topical BID   · apixaban 5 mg Oral BID   · sodium chloride flush 10 mL Intravenous 2 times per day   · gabapentin 600 mg Oral 4x Daily AC & HS   · metoprolol tartrate 100 mg Oral BID   · lidocaine PF 5 mL Intradermal Once   · heparin flush 3 mL Intravenous 2 times per day   · amiodarone 200 mg Oral Daily   · levalbuterol 0.31 mg Nebulization Q8H   · pantoprazole 40 mg Oral QAM AC   · insulin lispro 0-6 Units Subcutaneous TID WC   · insulin lispro 0-3 Units Subcutaneous Nightly   · potassium bicarb-citric acid 40 mEq Oral Once   · sodium chloride flush 10 mL Intravenous 2 times per day                PRN Medications    sodium chloride flush, 10 mL, PRN   potassium chloride, 40 mEq, PRN     Or   potassium alternative oral replacement, 40 mEq, PRN     Or   potassium chloride, 10 mEq, PRN   heparin flush, 100 Units, PRN   sodium chloride flush, 10 mL, PRN   heparin flush, 3 mL, PRN   biotene dry mouth moisturizing, , PRN   oxyCODONE-acetaminophen, 1 tablet, Q6H PRN   perflutren lipid microspheres, 1.5 mL, ONCE PRN   glucose, 15 g, PRN   dextrose, 12.5 g, PRN   glucagon (rDNA), 1 mg, PRN   dextrose, 100 mL/hr, PRN   hydrALAZINE, 10 mg, Q6H PRN   sodium chloride flush, 10 mL, PRN   magnesium hydroxide, 30 mL, Daily PRN   ondansetron, 4 mg, Q6H PRN   acetaminophen, 650 mg, Q4H PRN               Objective:       BP (!) 155/69   Pulse 72   Temp 98.9 °F (37.2 °C) (Oral)   Resp 16   Ht 5' 5\" (1.651 m)   Wt 171 lb (77.6 kg)   SpO2 96%   BMI 28.46 kg/m²    General Appearance: alert and oriented to person, place and time and in no acute distress   Skin: warm and dry, erythema to jeyson area and buttocks, coccyx wound. Head: normocephalic and atraumatic   Eyes: pupils equal, round, and reactive to light, conjunctivae normal   Neck: neck supple and non tender without mass    Pulmonary/Chest: Decreased breath sounds, crackles in bases, no respiratory distress   Cardiovascular: irregularly irregular   Abdomen: soft, non-tender, distended, normal bowel sounds, no masses or organomegaly   Extremities: no cyanosis, no clubbing and erythema and edema right upper extremity, edema bilateral lower extremities   Neurologic: no cranial nerve deficit and speech normal                  Recent Labs     12/20/19   0611 12/21/19   0734 12/22/19   0724    134 134   K 3.6 3.5 3.6   CL 95* 94* 95*   CO2 32* 29 30*   BUN 13 12 10   CREATININE 0.9 0.8 0.8   GLUCOSE 103* 97 99   CALCIUM 8.1* 8.0* 8.1*           No results for input(s): WBC, RBC, HGB, HCT, MCV, MCH, MCHC, RDW, PLT, MPV in the last 72 hours.               Radiology:       US DUP UPPER EXTREMITY RIGHT VENOUS   Final Result   1. No evidence of acute venous thrombosis right upper extremity.  PICC   line 1. No acute cervical spine fracture. 2. Mild multilevel cervical spondylosis.                         Assessment:   Principal Problem:     Sepsis due to pneumonia ([facility] [address]   Active Problems:     Acute respiratory failure with hypoxia (HCC)     JOVANI (acute kidney injury) ([facility] [address]     Elevated brain natriuretic peptide (BNP) level     Acute metabolic encephalopathy     Community acquired pneumonia of left lung     Hypoxia     Sepsis due to Streptococcus pneumoniae with acute hypoxic respiratory failure and septic shock (HCC)     Atrial fibrillation with RVR (HCC)     Gram-positive cocci bacteremia   Resolved Problems:     * No resolved hospital problems. *           Plan:   1. Acute hypoxic respiratory failure in the setting of left lobar streptococcal pneumonia: Initially received ceftriaxone and azithromycin in the ED, due to the severity of her pneumonia antibiotic was switched to Zosyn. Pulmonology was consulted and felt patient had a risk for decompensation and recommended transfer to ICU, on 12/5/2019.  Vancomycin was added due to high risk for staph pneumonia.  Levaquin was added to cover atypicals. Juany Alvarezk was stopped on 12/6/2019. Veronica Wolfgangrandy was stopped on 12/10/2019 ceftriaxone was started.  CT of the chest showed significant consolidation left upper lobe and lower lobe.  Patient is 95% on room air at rest.   Completed antibiotics. 2. Sepsis due to streptococcal pneumonia: Received hydrocortisone, vitamin C, and thiamine for 4 days.  Solu-Cortef was decreased on 12/6/2019 because of increasing BUN.  Prednisone is now completed.  Would benefit from Prevnar vaccine prior to discharge if ok with ID.   3. Acute metabolic encephalopathy: Madolyn Leak related to hypoxia and pneumonia. 4. Dysphagia: Speech therapy evaluated and place patient on a puréed diet with thin liquids, but was changed to mechanical soft diet once patient's mentation improved.     5. Acute kidney injury: Resolved.  Patient received IV fluids.  2.6 on admission 0.8 today. 6. Atrial fibrillation with RVR: Patient was on a Cardizem drip and has been weaned off.  Currently on metoprolol and was started oral amiodarone by cardiology. Flavia Cogan declined CAROLYN cardioversion.  Continue Eliquis. 7. Acute on chronic diastolic heart failure: Echo on 12/6/2019 revealed an EF of 55 to 60%.  Continue Lasix oral 40 mg twice a day. 8. Osteoarthritis left knee/buttock peripheral neuropathy: Continue Percocet as needed and gabapentin.     9. Edema right arm: Improving. No evidence of DVT on ultrasound.  Elevate. 10. Tip culture positive for coag negative staph: Tip culture from 12/11/2019 was positive for < 15 colonies of CONS. Patient had a right femoral central line. Blood culture positive in bottle one gram positive cocci in clusters and bottle two with no growth.  ID following.  Continue IV Vancomycin - Pharmacy to dose. 11. Bacteremia:  Blood culture bottle one sent on 12/17/19 is positive for CONS (same as the tip culture).  Blood culture bottle one shows no growth.  Repeat blood cultures sent on 12/20/19 show no growth to date.  ID following.  Vancomycin ordered. Laural Ring was removed 12/20/19 and tip was cultured.  Tip culture shows no growth to date.  Continue IV Vancomycin - Pharmacy to dose. 12. Coccyx wound:  Wound Care consulted.  Wound is open and surrounding area is erythematous. 13. Chest pain: No further chest pain. EKG showed no ischemia and troponin negative.  Echo from 12/6/19 revealed an EF of 55-60% and indeterminate diastolic dysfunction.               NOTE: This report was transcribed using voice recognition software.  Every effort was made to ensure accuracy; however, inadvertent computerized transcription errors may be present.       Electronically signed by MEJIA Blum CNP on 12/22/2019 at 12:26 PM                                                                  Respiratory Failure GRG - Care Day 17 (12/21/2019) by Q4H PRN               Objective:       /60   Pulse 62   Temp 98.8 °F (37.1 °C) (Oral)   Resp 18   Ht 5' 5\" (1.651 m)   Wt 166 lb (75.3 kg)   SpO2 95%   BMI 27.62 kg/m²    General Appearance: alert and oriented to person, place and time and in no acute distress   Skin: warm and dry, erythema to jeyson area and buttocks, coccyx wound. Head: normocephalic and atraumatic   Eyes: pupils equal, round, and reactive to light, conjunctivae normal   Neck: neck supple and non tender without mass    Pulmonary/Chest: Decreased breath sounds, crackles in bases, no respiratory distress   Cardiovascular: irregularly irregular   Abdomen: soft, non-tender, distended, normal bowel sounds, no masses or organomegaly   Extremities: no cyanosis, no clubbing and erythema and edema right upper extremity, edema bilateral lower extremities   Neurologic: no cranial nerve deficit and speech normal               Recent Labs     12/19/19   0600 12/20/19   0611 12/21/19   0734    134 134   K 3.7 3.6 3.5   CL 98 95* 94*   CO2 31* 32* 29   BUN 12 13 12   CREATININE 0.8 0.9 0.8   GLUCOSE 127* 103* 97   CALCIUM 8.0* 8.1* 8.0*      Assessment:   Principal Problem:     Sepsis due to pneumonia ([facility])   Active Problems:     Acute respiratory failure with hypoxia ([facility])     JOVANI (acute kidney injury) ([facility])     Elevated brain natriuretic peptide (BNP) level     Acute metabolic encephalopathy     Community acquired pneumonia of left lung     Hypoxia     Sepsis due to Streptococcus pneumoniae with acute hypoxic respiratory failure and septic shock ([facility])     Atrial fibrillation with RVR ([facility])     Gram-positive cocci bacteremia   Resolved Problems:     * No resolved hospital problems. *           Plan:   1. Acute hypoxic respiratory failure in the setting of left lobar streptococcal pneumonia: Initially received ceftriaxone and azithromycin in the ED, due to the severity of her pneumonia antibiotic was switched to Zosyn.  Pulmonology was positive cocci in clusters.  Blood culture bottle one shows no growth.  Repeat blood cultures sent.   ID following.  Vancomycin ordered. Ryley Sprain was removed 12/20/19 and tip was cultured.  Tip culture shows no growth to date.  Continue IV Vancomycin - Pharmacy to dose. 12. Coccyx wound:  Wound Care consulted.    13. Chest pain: EKG and troponin ordered.  Echo from 12/6/19 revealed an EF of 55-60% and indeterminate diastolic dysfunction.            PER ID   [facility]   [facility]   Progress Note       FU CLABSI        SUBJECTIVE:       Patient is awake, alert , seen at bedside for face to face encounter   Afebrile, no leukocytosis    Sacral ulcer examined   No complaints per patient   Family updated at bedside        ROS:   Negative except as above        OBJECTIVE:       Vitals   Vitals:     12/21/19 0432 12/21/19 0620 12/21/19 0630 12/21/19 0804   BP:       129/60   Pulse:       62   Resp:       18   Temp: 98.7 °F (37.1 °C)   98.3 °F (36.8 °C) 98.8 °F (37.1 °C)   TempSrc: Oral   Oral Oral   SpO2:       95%   Weight:   176 lb 4.8 oz (80 kg) 166 lb (75.3 kg)     Height:                      HEENT :         unremarkable    Heart:             RRR,  No murmurs, no gallops   Lungs:            clear to auscultation, no wheezing , no rales   Abdomen:       soft, non tender, bowel sounds present   Extremites:     No edema, no ulcers,   Skin:                Normal turgor,normal texture   PIV                  Scheduled Medications   · vancomycin 1,250 mg Intravenous Q24H   · furosemide 40 mg Oral BID   · nystatin Topical BID   · nystatin Topical BID   · apixaban 5 mg Oral BID   · sodium chloride flush 10 mL Intravenous 2 times per day   · gabapentin 600 mg Oral 4x Daily AC & HS   · metoprolol tartrate 100 mg Oral BID   · lidocaine PF 5 mL Intradermal Once   · heparin flush 3 mL Intravenous 2 times per day   · amiodarone 200 mg Oral Daily   · levalbuterol 0.31 mg Nebulization Q8H   · pantoprazole improving and denies complaints.        PLAN:   Continue Vancomycin pharmacy to dose    Follow cultures   Monitor labs

## 2019-12-25 LAB
BLOOD CULTURE, ROUTINE: NORMAL
CULTURE, BLOOD 2: NORMAL

## 2020-01-14 ENCOUNTER — HOSPITAL ENCOUNTER (OUTPATIENT)
Age: 85
Discharge: HOME | End: 2020-01-16
Payer: MEDICARE

## 2020-01-14 LAB
ALBUMIN SERPL-MCNC: 3.1 G/DL (ref 3.5–5.2)
ALP BLD-CCNC: 101 U/L (ref 35–104)
ALT SERPL-CCNC: 6 U/L (ref 0–32)
ANION GAP SERPL CALCULATED.3IONS-SCNC: 14 MMOL/L (ref 7–16)
AST SERPL-CCNC: 16 U/L (ref 0–31)
BASOPHILS ABSOLUTE: 0.12 E9/L (ref 0–0.2)
BASOPHILS RELATIVE PERCENT: 1.7 % (ref 0–2)
BILIRUB SERPL-MCNC: 0.4 MG/DL (ref 0–1.2)
BUN BLDV-MCNC: 43 MG/DL (ref 8–23)
C-REACTIVE PROTEIN: 0.4 MG/DL (ref 0–0.4)
CALCIUM SERPL-MCNC: 9.1 MG/DL (ref 8.6–10.2)
CHLORIDE BLD-SCNC: 93 MMOL/L (ref 98–107)
CO2: 31 MMOL/L (ref 22–29)
CREAT SERPL-MCNC: 2.7 MG/DL (ref 0.5–1)
EOSINOPHILS ABSOLUTE: 0.3 E9/L (ref 0.05–0.5)
EOSINOPHILS RELATIVE PERCENT: 4.2 % (ref 0–6)
GFR AFRICAN AMERICAN: 20
GFR NON-AFRICAN AMERICAN: 17 ML/MIN/1.73
GLUCOSE BLD-MCNC: 149 MG/DL (ref 74–99)
HCT VFR BLD CALC: 33.4 % (ref 34–48)
HEMOGLOBIN: 10.6 G/DL (ref 11.5–15.5)
IMMATURE GRANULOCYTES #: 0.03 E9/L
IMMATURE GRANULOCYTES %: 0.4 % (ref 0–5)
LYMPHOCYTES ABSOLUTE: 2.22 E9/L (ref 1.5–4)
LYMPHOCYTES RELATIVE PERCENT: 30.9 % (ref 20–42)
MCH RBC QN AUTO: 30.6 PG (ref 26–35)
MCHC RBC AUTO-ENTMCNC: 31.7 % (ref 32–34.5)
MCV RBC AUTO: 96.5 FL (ref 80–99.9)
MONOCYTES ABSOLUTE: 1.11 E9/L (ref 0.1–0.95)
MONOCYTES RELATIVE PERCENT: 15.5 % (ref 2–12)
NEUTROPHILS ABSOLUTE: 3.4 E9/L (ref 1.8–7.3)
NEUTROPHILS RELATIVE PERCENT: 47.3 % (ref 43–80)
PDW BLD-RTO: 14.6 FL (ref 11.5–15)
PLATELET # BLD: 353 E9/L (ref 130–450)
PMV BLD AUTO: 11.2 FL (ref 7–12)
POTASSIUM SERPL-SCNC: 3.7 MMOL/L (ref 3.5–5)
RBC # BLD: 3.46 E12/L (ref 3.5–5.5)
SEDIMENTATION RATE, ERYTHROCYTE: 70 MM/HR (ref 0–20)
SODIUM BLD-SCNC: 138 MMOL/L (ref 132–146)
TOTAL PROTEIN: 7.1 G/DL (ref 6.4–8.3)
VANCOMYCIN TROUGH: 26.9 MCG/ML (ref 5–16)
WBC # BLD: 7.2 E9/L (ref 4.5–11.5)

## 2020-01-14 PROCEDURE — 86140 C-REACTIVE PROTEIN: CPT

## 2020-01-14 PROCEDURE — 80053 COMPREHEN METABOLIC PANEL: CPT

## 2020-01-14 PROCEDURE — 80202 ASSAY OF VANCOMYCIN: CPT

## 2020-01-14 PROCEDURE — 85025 COMPLETE CBC W/AUTO DIFF WBC: CPT

## 2020-01-14 PROCEDURE — 85651 RBC SED RATE NONAUTOMATED: CPT

## 2020-01-16 ENCOUNTER — HOSPITAL ENCOUNTER (OUTPATIENT)
Age: 85
Discharge: HOME | End: 2020-01-18
Payer: MEDICARE

## 2020-01-16 LAB
ALBUMIN SERPL-MCNC: 3.4 G/DL (ref 3.5–5.2)
ALP BLD-CCNC: 99 U/L (ref 35–104)
ALT SERPL-CCNC: 8 U/L (ref 0–32)
ANION GAP SERPL CALCULATED.3IONS-SCNC: 15 MMOL/L (ref 7–16)
AST SERPL-CCNC: 15 U/L (ref 0–31)
BASOPHILS ABSOLUTE: 0.08 E9/L (ref 0–0.2)
BASOPHILS RELATIVE PERCENT: 1.2 % (ref 0–2)
BILIRUB SERPL-MCNC: 0.4 MG/DL (ref 0–1.2)
BUN BLDV-MCNC: 30 MG/DL (ref 8–23)
CALCIUM SERPL-MCNC: 9.5 MG/DL (ref 8.6–10.2)
CHLORIDE BLD-SCNC: 96 MMOL/L (ref 98–107)
CO2: 29 MMOL/L (ref 22–29)
CREAT SERPL-MCNC: 1.9 MG/DL (ref 0.5–1)
EOSINOPHILS ABSOLUTE: 0.34 E9/L (ref 0.05–0.5)
EOSINOPHILS RELATIVE PERCENT: 5.1 % (ref 0–6)
GFR AFRICAN AMERICAN: 30
GFR NON-AFRICAN AMERICAN: 25 ML/MIN/1.73
GLUCOSE BLD-MCNC: 149 MG/DL (ref 74–99)
HCT VFR BLD CALC: 34.5 % (ref 34–48)
HEMOGLOBIN: 11.1 G/DL (ref 11.5–15.5)
IMMATURE GRANULOCYTES #: 0.01 E9/L
IMMATURE GRANULOCYTES %: 0.1 % (ref 0–5)
LYMPHOCYTES ABSOLUTE: 2.23 E9/L (ref 1.5–4)
LYMPHOCYTES RELATIVE PERCENT: 33.4 % (ref 20–42)
MCH RBC QN AUTO: 30.7 PG (ref 26–35)
MCHC RBC AUTO-ENTMCNC: 32.2 % (ref 32–34.5)
MCV RBC AUTO: 95.6 FL (ref 80–99.9)
MONOCYTES ABSOLUTE: 1.05 E9/L (ref 0.1–0.95)
MONOCYTES RELATIVE PERCENT: 15.7 % (ref 2–12)
NEUTROPHILS ABSOLUTE: 2.97 E9/L (ref 1.8–7.3)
NEUTROPHILS RELATIVE PERCENT: 44.5 % (ref 43–80)
PDW BLD-RTO: 14.4 FL (ref 11.5–15)
PLATELET # BLD: 338 E9/L (ref 130–450)
PMV BLD AUTO: 10.9 FL (ref 7–12)
POTASSIUM SERPL-SCNC: 3.6 MMOL/L (ref 3.5–5)
RBC # BLD: 3.61 E12/L (ref 3.5–5.5)
SODIUM BLD-SCNC: 140 MMOL/L (ref 132–146)
TOTAL PROTEIN: 7.5 G/DL (ref 6.4–8.3)
VANCOMYCIN TROUGH: 21.8 MCG/ML (ref 5–16)
WBC # BLD: 6.7 E9/L (ref 4.5–11.5)

## 2020-01-16 PROCEDURE — 80202 ASSAY OF VANCOMYCIN: CPT

## 2020-01-16 PROCEDURE — 85025 COMPLETE CBC W/AUTO DIFF WBC: CPT

## 2020-01-16 PROCEDURE — 80053 COMPREHEN METABOLIC PANEL: CPT

## 2020-03-27 ENCOUNTER — TELEPHONE (OUTPATIENT)
Dept: ORTHOPEDIC SURGERY | Age: 85
End: 2020-03-27

## 2020-03-30 NOTE — PLAN OF CARE
Problem: Inadequate energy intake (NI-1.4)  Goal: Food and/or Nutrient Delivery  Description  Individualized approach for food/nutrient provision.   Outcome: Met This Shift V-Y Flap Text: The defect edges were debeveled with a #15 scalpel blade.  Given the location of the defect, shape of the defect and the proximity to free margins a V-Y flap was deemed most appropriate.  Using a sterile surgical marker, an appropriate advancement flap was drawn incorporating the defect and placing the expected incisions within the relaxed skin tension lines where possible.    The area thus outlined was incised deep to adipose tissue with a #15 scalpel blade.  The skin margins were undermined to an appropriate distance in all directions utilizing iris scissors.

## 2020-08-05 ENCOUNTER — HOSPITAL ENCOUNTER (EMERGENCY)
Age: 85
Discharge: HOME | End: 2020-08-05
Payer: MEDICARE

## 2020-08-05 VITALS
OXYGEN SATURATION: 97 % | BODY MASS INDEX: 27.31 KG/M2 | WEIGHT: 160 LBS | TEMPERATURE: 97 F | SYSTOLIC BLOOD PRESSURE: 206 MMHG | DIASTOLIC BLOOD PRESSURE: 85 MMHG | RESPIRATION RATE: 18 BRPM | HEIGHT: 64 IN | HEART RATE: 73 BPM

## 2020-08-05 DIAGNOSIS — Z23 NEED FOR PROPHYLACTIC VACCINATION AND INOCULATION AGAINST RABIES: ICD-10-CM
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
## 2020-08-05 PROCEDURE — 99281 EMR DPT VST MAYX REQ PHY/QHP: CPT

## 2020-08-05 PROCEDURE — 99282 EMERGENCY DEPT VISIT SF MDM: CPT

## 2020-08-05 PROCEDURE — 96372 THER/PROPH/DIAG INJ SC/IM: CPT

## 2020-08-05 PROCEDURE — 90471 IMMUNIZATION ADMIN: CPT | Performed by: PHYSICIAN ASSISTANT

## 2020-08-05 PROCEDURE — 6360000002 HC RX W HCPCS: Performed by: PHYSICIAN ASSISTANT

## 2020-08-05 PROCEDURE — 90675 RABIES VACCINE IM: CPT | Performed by: PHYSICIAN ASSISTANT

## 2020-08-05 PROCEDURE — 90375 RABIES IG IM/SC: CPT | Performed by: PHYSICIAN ASSISTANT

## 2020-08-05 RX ADMIN — Medication 1 ML: at 19:00

## 2020-08-05 RX ADMIN — RABIES IMMUNE GLOBULIN (HUMAN) 1500 UNITS: 300 INJECTION, SOLUTION INFILTRATION; INTRAMUSCULAR at 18:59
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
## 2020-08-11 ENCOUNTER — HOSPITAL ENCOUNTER (OUTPATIENT)
Dept: INFUSION THERAPY | Age: 85
Setting detail: INFUSION SERIES
Discharge: HOME | End: 2020-08-11
Payer: MEDICARE

## 2020-08-11 VITALS
RESPIRATION RATE: 18 BRPM | DIASTOLIC BLOOD PRESSURE: 83 MMHG | TEMPERATURE: 97 F | OXYGEN SATURATION: 98 % | SYSTOLIC BLOOD PRESSURE: 188 MMHG | HEART RATE: 61 BPM
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
## 2020-08-11 PROCEDURE — 96372 THER/PROPH/DIAG INJ SC/IM: CPT

## 2020-08-11 PROCEDURE — 90471 IMMUNIZATION ADMIN: CPT | Performed by: PHYSICIAN ASSISTANT

## 2020-08-11 PROCEDURE — 90675 RABIES VACCINE IM: CPT | Performed by: PHYSICIAN ASSISTANT

## 2020-08-11 PROCEDURE — 6360000002 HC RX W HCPCS: Performed by: PHYSICIAN ASSISTANT

## 2020-08-11 RX ADMIN — RABIES VACCINE 1 ML: KIT at 12:58

## 2020-08-15 ENCOUNTER — HOSPITAL ENCOUNTER (OUTPATIENT)
Dept: INFUSION THERAPY | Age: 85
Setting detail: INFUSION SERIES
Discharge: HOME | End: 2020-08-15
Payer: MEDICARE
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
## 2020-08-15 PROCEDURE — 90675 RABIES VACCINE IM: CPT | Performed by: PHYSICIAN ASSISTANT

## 2020-08-15 PROCEDURE — 90471 IMMUNIZATION ADMIN: CPT | Performed by: PHYSICIAN ASSISTANT

## 2020-08-15 PROCEDURE — 6360000002 HC RX W HCPCS: Performed by: PHYSICIAN ASSISTANT

## 2020-08-15 PROCEDURE — 96372 THER/PROPH/DIAG INJ SC/IM: CPT

## 2020-08-15 RX ADMIN — RABIES VACCINE 1 ML: KIT at 09:59

## 2020-08-22 ENCOUNTER — HOSPITAL ENCOUNTER (OUTPATIENT)
Dept: INFUSION THERAPY | Age: 85
Setting detail: INFUSION SERIES
Discharge: HOME | End: 2020-08-22
Payer: MEDICARE

## 2020-08-22 VITALS
HEART RATE: 74 BPM | SYSTOLIC BLOOD PRESSURE: 190 MMHG | RESPIRATION RATE: 18 BRPM | OXYGEN SATURATION: 98 % | TEMPERATURE: 97.2 F | DIASTOLIC BLOOD PRESSURE: 83 MMHG
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
## 2020-08-22 PROCEDURE — 90675 RABIES VACCINE IM: CPT | Performed by: PHYSICIAN ASSISTANT

## 2020-08-22 PROCEDURE — 90471 IMMUNIZATION ADMIN: CPT | Performed by: PHYSICIAN ASSISTANT

## 2020-08-22 PROCEDURE — 6360000002 HC RX W HCPCS: Performed by: PHYSICIAN ASSISTANT

## 2020-08-22 PROCEDURE — 96372 THER/PROPH/DIAG INJ SC/IM: CPT

## 2020-08-22 RX ADMIN — RABIES VACCINE 1 ML: KIT at 10:01

## 2020-10-16 ENCOUNTER — HOSPITAL ENCOUNTER (EMERGENCY)
Age: 85
Discharge: HOME | End: 2020-10-16
Payer: MEDICARE

## 2020-10-16 VITALS
WEIGHT: 168 LBS | TEMPERATURE: 97.7 F | HEART RATE: 80 BPM | RESPIRATION RATE: 18 BRPM | OXYGEN SATURATION: 98 % | HEIGHT: 64 IN | BODY MASS INDEX: 28.68 KG/M2 | DIASTOLIC BLOOD PRESSURE: 88 MMHG | SYSTOLIC BLOOD PRESSURE: 210 MMHG

## 2020-10-16 PROCEDURE — 6360000002 HC RX W HCPCS: Performed by: PHYSICIAN ASSISTANT

## 2020-10-16 PROCEDURE — 90471 IMMUNIZATION ADMIN: CPT | Performed by: PHYSICIAN ASSISTANT

## 2020-10-16 PROCEDURE — 99282 EMERGENCY DEPT VISIT SF MDM: CPT

## 2020-10-16 PROCEDURE — 90675 RABIES VACCINE IM: CPT | Performed by: PHYSICIAN ASSISTANT

## 2020-10-16 RX ADMIN — RABIES VACCINE 1 ML: KIT at 20:04

## 2020-10-17 NOTE — ED PROVIDER NOTES
[facility]       Department of Emergency Medicine   ED  Provider Note  Admit Date/RoomTime: 10/16/2020  6:45 PM  ED Room: [place] [address]  MRN: [id number]  Chief Complaint: Animal Bite (Pt presents today for a raccoon scratch on left arm. Pt states that she had the series of shots in July but got bit/scratched last night. )       History of Present Illness   Source of history provided by:  patient and spouse/SO. History/Exam Limitations: none. Taylor Benitez is a 80 y.o. female who has a past medical history of:   Past Medical History:   Diagnosis Date    Dental caries     Diabetes mellitus ([facility] [address]     Gall bladder stones     Hernia     Hypertension     Kidney stone     presents to the ED by private car for a raccoon bite to the left arm. Patient states that it occurred 2 days ago. She states that she was removing a cover to her swing outside, states that it was dark out. States that there was a raccoon underneath the swing. She believes that she frighten the raccoon and states that he either bit or scratched her. She could not see exactly what had happened. She states that 2 months ago she was bitten by a raccoon at that time and received all of her rabies vaccinations at that time. She states that she has had a tetanus shot within the past 5 years. She denies any redness or swelling to the area. No fevers or chills. ROS    Pertinent positives and negatives are stated within HPI, all other systems reviewed and are negative. Past Surgical History:   Procedure Laterality Date    APPENDECTOMY     Social History:  reports that she has never smoked. She has never used smokeless tobacco. She reports that she does not drink alcohol. Family History: family history is not on file. Allergies: Influenza vaccines; Alginate [alginic acid];  Novocain [procaine]; and Tape [adhesive tape]    Physical Exam   Oxygen Saturation Interpretation: Normal.   ED Triage Vitals   BP Temp Temp Source Pulse Resp SpO2 Height Weight   10/16/20 1848 10/16/20 1842 10/16/20 1842 10/16/20 1842 10/16/20 1842 10/16/20 1842 10/16/20 1842 10/16/20 1842   (!) 210/88 97.7 °F (36.5 °C) Oral 80 18 98 % 5' 4\" (1.626 m) 168 lb (76.2 kg)       Physical Exam  · Constitutional/General: Alert and oriented x3, well appearing, non toxic  · HEENT:  NC/NT. PERRLA,  Airway patent. · Neck: Supple, full ROM, non tender to palpation in the midline, no stridor, no crepitus, no meningeal signs  · Respiratory: Lungs clear to auscultation bilaterally, no wheezes, rales, or rhonchi. Not in respiratory distress  · CV:  Regular rate. Regular rhythm. No murmurs, gallops, or rubs. 2+ distal pulses  · Chest: No chest wall tenderness  · GI:  Abdomen Soft, Non tender, Non distended. +BS. No rebound, guarding, or rigidity. No pulsatile masses. · Musculoskeletal: Moves all extremities x 4. Warm and well perfused, no clubbing, cyanosis, or edema. Capillary refill <3 seconds. 2+ radial pulse of the left wrist.  Patient has a puncture wound to the left mid forearm. No surrounding erythema. No red streaking. No pain to palpation over the area. · Integument: skin warm and dry. No rashes. · Lymphatic: no lymphadenopathy noted  · Neurologic: GCS 15, no focal deficits, symmetric strength 5/5 in the upper and lower extremities bilaterally  · Psychiatric: Normal Affect    Lab / Imaging Results   (All laboratory and radiology results have been personally reviewed by myself)  Labs:  No results found for this visit on 10/16/20. Imaging: All Radiology results interpreted by Radiologist unless otherwise noted.   No orders to display       ED Course / Medical Decision Making     Medications   rabies vaccine, [facility] (RABAVERT) injection 1 mL (1 mL Intramuscular Given 10/16/20 2004)          Consult(s):   None    Procedure(s):   none    MDM:   I spoke with the clinical pharmacist, Meri Piedra, he states that the patient can receive 1 dose of the rabies vaccine here today and then 1 dose in 3 days time. She denies any history of immunosuppression, she is not on chemotherapy. Patient is advised to have the rabies vaccination schedule here today. Is agreeable to have another vaccine done in 3 days time. Patient's tetanus is up-to-date. Will discharge home at this time. Advised patient return the ER for any worsening symptoms. Otherwise to follow-up with PCP and infusion clinic. Counseling: The emergency provider has spoken with the patient and discussed todays results, in addition to providing specific details for the plan of care and counseling regarding the diagnosis and prognosis. Questions are answered at this time and they are agreeable with the plan. Assessment      1. Bite by animal    2. Need for prophylactic vaccination and inoculation against rabies      Plan   Discharge to home  Patient condition is good    New Medications     Discharge Medication List as of 10/16/2020  8:01 PM        Electronically signed by FELIX Dozier   DD: 10/16/20  **This report was transcribed using voice recognition software. Every effort was made to ensure accuracy; however, inadvertent computerized transcription errors may be present.   END OF ED PROVIDER NOTE       Krystal Dozier  10/16/20 [id number]

## 2020-10-21 ENCOUNTER — HOSPITAL ENCOUNTER (OUTPATIENT)
Dept: INFUSION THERAPY | Age: 85
Setting detail: INFUSION SERIES
Discharge: HOME | End: 2020-10-21
Payer: MEDICARE

## 2020-10-21 VITALS
HEART RATE: 87 BPM | OXYGEN SATURATION: 98 % | RESPIRATION RATE: 18 BRPM | SYSTOLIC BLOOD PRESSURE: 186 MMHG | DIASTOLIC BLOOD PRESSURE: 84 MMHG | TEMPERATURE: 97.2 F
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
## 2020-10-21 PROCEDURE — 96372 THER/PROPH/DIAG INJ SC/IM: CPT

## 2020-10-21 PROCEDURE — 90471 IMMUNIZATION ADMIN: CPT | Performed by: PHYSICIAN ASSISTANT

## 2020-10-21 PROCEDURE — 90675 RABIES VACCINE IM: CPT | Performed by: PHYSICIAN ASSISTANT

## 2020-10-21 PROCEDURE — 6360000002 HC RX W HCPCS: Performed by: PHYSICIAN ASSISTANT

## 2020-10-21 RX ADMIN — RABIES VACCINE 1 ML: KIT at 11:26

## 2021-02-15 NOTE — PROGRESS NOTES
light left by patient. A: Patient became very dizzy upon sitting up on edge of bed. Symptoms did not resolve during treatment session.     Vitals:    Supine: BP: 162/112, HR: 111, SPO2 94%    Sitting: BP: 162/127, HR 98, SPO2 90%    P: Continue with physical therapy   Jaron Hilliard Student Physical Therapist   act 1 [Negative] : Psychiatric

## 2021-03-01 ENCOUNTER — IMMUNIZATION (OUTPATIENT)
Dept: PRIMARY CARE CLINIC | Age: 86
End: 2021-03-01
Payer: MEDICARE

## 2021-03-01 PROCEDURE — 91301 COVID-19, MODERNA VACCINE 100MCG/0.5ML DOSE: CPT | Performed by: NURSE PRACTITIONER

## 2021-03-01 PROCEDURE — 0011A COVID-19, MODERNA VACCINE 100MCG/0.5ML DOSE: CPT | Performed by: NURSE PRACTITIONER

## 2021-03-25 ENCOUNTER — TELEPHONE (OUTPATIENT)
Dept: ADMINISTRATIVE | Age: 86
End: 2021-03-25

## 2021-03-25 ENCOUNTER — NURSE TRIAGE (OUTPATIENT)
Dept: OTHER | Facility: CLINIC | Age: 86
End: 2021-03-25

## 2021-03-25 ENCOUNTER — APPOINTMENT (OUTPATIENT)
Dept: GENERAL RADIOLOGY | Age: 86
DRG: 293 | End: 2021-03-25
Payer: MEDICARE

## 2021-03-25 ENCOUNTER — HOSPITAL ENCOUNTER (INPATIENT)
Age: 86
LOS: 6 days | Discharge: HOME | DRG: 293 | End: 2021-03-31
Attending: EMERGENCY MEDICINE | Admitting: INTERNAL MEDICINE
Payer: MEDICARE

## 2021-03-25 ENCOUNTER — APPOINTMENT (OUTPATIENT)
Dept: ULTRASOUND IMAGING | Age: 86
DRG: 293 | End: 2021-03-25
Payer: MEDICARE

## 2021-03-25 ENCOUNTER — HOSPITAL ENCOUNTER (EMERGENCY)
Age: 86
Discharge: ANOTHER HOSPITAL | DRG: 293 | End: 2021-03-25
Payer: MEDICARE

## 2021-03-25 VITALS
RESPIRATION RATE: 20 BRPM | BODY MASS INDEX: 28.17 KG/M2 | HEIGHT: 64 IN | DIASTOLIC BLOOD PRESSURE: 79 MMHG | WEIGHT: 165 LBS | HEART RATE: 101 BPM | SYSTOLIC BLOOD PRESSURE: 121 MMHG | OXYGEN SATURATION: 96 % | TEMPERATURE: 98.6 F

## 2021-03-25 DIAGNOSIS — R60.0 BILATERAL LOWER EXTREMITY EDEMA: ICD-10-CM

## 2021-03-25 DIAGNOSIS — I48.91 ATRIAL FIBRILLATION WITH RAPID VENTRICULAR RESPONSE (HCC): ICD-10-CM

## 2021-03-25 DIAGNOSIS — R60.0 BILATERAL LOWER EXTREMITY EDEMA: Primary | ICD-10-CM

## 2021-03-25 DIAGNOSIS — I50.43 ACUTE ON CHRONIC COMBINED SYSTOLIC AND DIASTOLIC CHF (CONGESTIVE HEART FAILURE) (HCC): Primary | ICD-10-CM

## 2021-03-25 PROBLEM — I50.31 ACUTE DIASTOLIC HEART FAILURE (HCC): Status: ACTIVE | Noted: 2021-03-25

## 2021-03-25 LAB
ALBUMIN SERPL-MCNC: 3.7 G/DL (ref 3.5–5.2)
ALP BLD-CCNC: 139 U/L (ref 35–104)
ALT SERPL-CCNC: 9 U/L (ref 0–32)
ANION GAP SERPL CALCULATED.3IONS-SCNC: 12 MMOL/L (ref 7–16)
AST SERPL-CCNC: 19 U/L (ref 0–31)
BASOPHILS ABSOLUTE: 0.07 E9/L (ref 0–0.2)
BASOPHILS RELATIVE PERCENT: 0.8 % (ref 0–2)
BILIRUB SERPL-MCNC: 0.4 MG/DL (ref 0–1.2)
BILIRUBIN URINE: NEGATIVE
BLOOD, URINE: NEGATIVE
BUN BLDV-MCNC: 16 MG/DL (ref 8–23)
CALCIUM SERPL-MCNC: 8.7 MG/DL (ref 8.6–10.2)
CHLORIDE BLD-SCNC: 105 MMOL/L (ref 98–107)
CLARITY: CLEAR
CO2: 23 MMOL/L (ref 22–29)
COLOR: YELLOW
CREAT SERPL-MCNC: 1 MG/DL (ref 0.5–1)
EOSINOPHILS ABSOLUTE: 0.3 E9/L (ref 0.05–0.5)
EOSINOPHILS RELATIVE PERCENT: 3.6 % (ref 0–6)
GFR AFRICAN AMERICAN: >60
GFR NON-AFRICAN AMERICAN: 53 ML/MIN/1.73
GLUCOSE BLD-MCNC: 102 MG/DL (ref 74–99)
GLUCOSE URINE: NEGATIVE MG/DL
HCT VFR BLD CALC: 37.7 % (ref 34–48)
HEMOGLOBIN: 11.4 G/DL (ref 11.5–15.5)
IMMATURE GRANULOCYTES #: 0.02 E9/L
IMMATURE GRANULOCYTES %: 0.2 % (ref 0–5)
KETONES, URINE: NEGATIVE MG/DL
LEUKOCYTE ESTERASE, URINE: NEGATIVE
LYMPHOCYTES ABSOLUTE: 2.13 E9/L (ref 1.5–4)
LYMPHOCYTES RELATIVE PERCENT: 25.2 % (ref 20–42)
MCH RBC QN AUTO: 28.1 PG (ref 26–35)
MCHC RBC AUTO-ENTMCNC: 30.2 % (ref 32–34.5)
MCV RBC AUTO: 93.1 FL (ref 80–99.9)
MONOCYTES ABSOLUTE: 1.05 E9/L (ref 0.1–0.95)
MONOCYTES RELATIVE PERCENT: 12.4 % (ref 2–12)
NEUTROPHILS ABSOLUTE: 4.88 E9/L (ref 1.8–7.3)
NEUTROPHILS RELATIVE PERCENT: 57.8 % (ref 43–80)
NITRITE, URINE: NEGATIVE
PDW BLD-RTO: 13.6 FL (ref 11.5–15)
PH UA: 5.5 (ref 5–9)
PLATELET # BLD: 275 E9/L (ref 130–450)
PMV BLD AUTO: 10.3 FL (ref 7–12)
POTASSIUM REFLEX MAGNESIUM: 4 MMOL/L (ref 3.5–5)
PRO-BNP: 2099 PG/ML (ref 0–450)
PROTEIN UA: NEGATIVE MG/DL
RBC # BLD: 4.05 E12/L (ref 3.5–5.5)
SODIUM BLD-SCNC: 140 MMOL/L (ref 132–146)
SPECIFIC GRAVITY UA: 1.02 (ref 1–1.03)
TOTAL PROTEIN: 7.1 G/DL (ref 6.4–8.3)
TROPONIN: <0.01 NG/ML (ref 0–0.03)
UROBILINOGEN, URINE: 0.2 E.U./DL
WBC # BLD: 8.5 E9/L (ref 4.5–11.5)

## 2021-03-25 PROCEDURE — 6360000002 HC RX W HCPCS: Performed by: INTERNAL MEDICINE

## 2021-03-25 PROCEDURE — 84484 ASSAY OF TROPONIN QUANT: CPT

## 2021-03-25 PROCEDURE — 99223 1ST HOSP IP/OBS HIGH 75: CPT | Performed by: INTERNAL MEDICINE

## 2021-03-25 PROCEDURE — 6370000000 HC RX 637 (ALT 250 FOR IP): Performed by: INTERNAL MEDICINE

## 2021-03-25 PROCEDURE — 71045 X-RAY EXAM CHEST 1 VIEW: CPT

## 2021-03-25 PROCEDURE — 2500000003 HC RX 250 WO HCPCS: Performed by: EMERGENCY MEDICINE

## 2021-03-25 PROCEDURE — 99211 OFF/OP EST MAY X REQ PHY/QHP: CPT

## 2021-03-25 PROCEDURE — 85025 COMPLETE CBC W/AUTO DIFF WBC: CPT

## 2021-03-25 PROCEDURE — 93970 EXTREMITY STUDY: CPT

## 2021-03-25 PROCEDURE — 96375 TX/PRO/DX INJ NEW DRUG ADDON: CPT

## 2021-03-25 PROCEDURE — 93005 ELECTROCARDIOGRAM TRACING: CPT | Performed by: EMERGENCY MEDICINE

## 2021-03-25 PROCEDURE — 2060000000 HC ICU INTERMEDIATE R&B

## 2021-03-25 PROCEDURE — 99283 EMERGENCY DEPT VISIT LOW MDM: CPT

## 2021-03-25 PROCEDURE — 94640 AIRWAY INHALATION TREATMENT: CPT

## 2021-03-25 PROCEDURE — 80053 COMPREHEN METABOLIC PANEL: CPT

## 2021-03-25 PROCEDURE — 83880 ASSAY OF NATRIURETIC PEPTIDE: CPT

## 2021-03-25 PROCEDURE — 96374 THER/PROPH/DIAG INJ IV PUSH: CPT

## 2021-03-25 PROCEDURE — 81003 URINALYSIS AUTO W/O SCOPE: CPT

## 2021-03-25 RX ORDER — ONDANSETRON 2 MG/ML
4 INJECTION INTRAMUSCULAR; INTRAVENOUS EVERY 6 HOURS PRN
Status: DISCONTINUED | OUTPATIENT
Start: 2021-03-25 | End: 2021-03-31 | Stop reason: HOSPADM

## 2021-03-25 RX ORDER — AMIODARONE HYDROCHLORIDE 200 MG/1
200 TABLET ORAL DAILY
Status: DISCONTINUED | OUTPATIENT
Start: 2021-03-26 | End: 2021-03-26

## 2021-03-25 RX ORDER — SODIUM CHLORIDE 0.9 % (FLUSH) 0.9 %
10 SYRINGE (ML) INJECTION PRN
Status: DISCONTINUED | OUTPATIENT
Start: 2021-03-25 | End: 2021-03-31 | Stop reason: HOSPADM

## 2021-03-25 RX ORDER — OXYCODONE HYDROCHLORIDE AND ACETAMINOPHEN 5; 325 MG/1; MG/1
1 TABLET ORAL EVERY 6 HOURS PRN
Status: DISCONTINUED | OUTPATIENT
Start: 2021-03-25 | End: 2021-03-31 | Stop reason: HOSPADM

## 2021-03-25 RX ORDER — BUMETANIDE 0.25 MG/ML
1 INJECTION, SOLUTION INTRAMUSCULAR; INTRAVENOUS 2 TIMES DAILY
Status: CANCELLED | OUTPATIENT
Start: 2021-03-25

## 2021-03-25 RX ORDER — ACETAMINOPHEN 650 MG/1
650 SUPPOSITORY RECTAL EVERY 6 HOURS PRN
Status: DISCONTINUED | OUTPATIENT
Start: 2021-03-25 | End: 2021-03-31 | Stop reason: HOSPADM

## 2021-03-25 RX ORDER — OXYCODONE AND ACETAMINOPHEN 7.5; 325 MG/1; MG/1
1 TABLET ORAL EVERY 6 HOURS PRN
Status: DISCONTINUED | OUTPATIENT
Start: 2021-03-25 | End: 2021-03-25 | Stop reason: CLARIF

## 2021-03-25 RX ORDER — LEVALBUTEROL INHALATION SOLUTION 0.31 MG/3ML
0.31 SOLUTION RESPIRATORY (INHALATION) EVERY 8 HOURS
Status: DISCONTINUED | OUTPATIENT
Start: 2021-03-25 | End: 2021-03-31 | Stop reason: HOSPADM

## 2021-03-25 RX ORDER — ASPIRIN 81 MG/1
81 TABLET ORAL DAILY
COMMUNITY
End: 2021-04-15

## 2021-03-25 RX ORDER — POLYETHYLENE GLYCOL 3350 17 G/17G
17 POWDER, FOR SOLUTION ORAL DAILY PRN
Status: DISCONTINUED | OUTPATIENT
Start: 2021-03-25 | End: 2021-03-31 | Stop reason: HOSPADM

## 2021-03-25 RX ORDER — FUROSEMIDE 10 MG/ML
40 INJECTION INTRAMUSCULAR; INTRAVENOUS ONCE
Status: DISCONTINUED | OUTPATIENT
Start: 2021-03-25 | End: 2021-03-25

## 2021-03-25 RX ORDER — POLYETHYLENE GLYCOL 3350 17 G/17G
17 POWDER, FOR SOLUTION ORAL DAILY PRN
Status: CANCELLED | OUTPATIENT
Start: 2021-03-25

## 2021-03-25 RX ORDER — SODIUM CHLORIDE 0.9 % (FLUSH) 0.9 %
10 SYRINGE (ML) INJECTION PRN
Status: CANCELLED | OUTPATIENT
Start: 2021-03-25

## 2021-03-25 RX ORDER — SODIUM CHLORIDE 0.9 % (FLUSH) 0.9 %
10 SYRINGE (ML) INJECTION EVERY 12 HOURS SCHEDULED
Status: DISCONTINUED | OUTPATIENT
Start: 2021-03-25 | End: 2021-03-31 | Stop reason: HOSPADM

## 2021-03-25 RX ORDER — BUMETANIDE 0.25 MG/ML
1 INJECTION, SOLUTION INTRAMUSCULAR; INTRAVENOUS 2 TIMES DAILY
Status: DISCONTINUED | OUTPATIENT
Start: 2021-03-26 | End: 2021-03-29

## 2021-03-25 RX ORDER — AMIODARONE HYDROCHLORIDE 200 MG/1
200 TABLET ORAL ONCE
Status: DISCONTINUED | OUTPATIENT
Start: 2021-03-25 | End: 2021-03-25

## 2021-03-25 RX ORDER — SODIUM CHLORIDE 0.9 % (FLUSH) 0.9 %
10 SYRINGE (ML) INJECTION EVERY 12 HOURS SCHEDULED
Status: CANCELLED | OUTPATIENT
Start: 2021-03-25

## 2021-03-25 RX ORDER — FLUTICASONE PROPIONATE 50 MCG
1 SPRAY, SUSPENSION (ML) NASAL DAILY
Status: DISCONTINUED | OUTPATIENT
Start: 2021-03-26 | End: 2021-03-31 | Stop reason: HOSPADM

## 2021-03-25 RX ORDER — PROMETHAZINE HYDROCHLORIDE 25 MG/1
12.5 TABLET ORAL EVERY 6 HOURS PRN
Status: CANCELLED | OUTPATIENT
Start: 2021-03-25

## 2021-03-25 RX ORDER — GABAPENTIN 300 MG/1
600 CAPSULE ORAL
Status: DISCONTINUED | OUTPATIENT
Start: 2021-03-25 | End: 2021-03-31 | Stop reason: HOSPADM

## 2021-03-25 RX ORDER — ACETAMINOPHEN 325 MG/1
650 TABLET ORAL EVERY 6 HOURS PRN
Status: CANCELLED | OUTPATIENT
Start: 2021-03-25

## 2021-03-25 RX ORDER — PROMETHAZINE HYDROCHLORIDE 25 MG/1
12.5 TABLET ORAL EVERY 6 HOURS PRN
Status: DISCONTINUED | OUTPATIENT
Start: 2021-03-25 | End: 2021-03-31 | Stop reason: HOSPADM

## 2021-03-25 RX ORDER — LISINOPRIL 10 MG/1
5 TABLET ORAL DAILY
Status: CANCELLED | OUTPATIENT
Start: 2021-03-26

## 2021-03-25 RX ORDER — OXYCODONE AND ACETAMINOPHEN 7.5; 325 MG/1; MG/1
1 TABLET ORAL EVERY 6 HOURS PRN
COMMUNITY

## 2021-03-25 RX ORDER — ONDANSETRON 2 MG/ML
4 INJECTION INTRAMUSCULAR; INTRAVENOUS EVERY 6 HOURS PRN
Status: CANCELLED | OUTPATIENT
Start: 2021-03-25

## 2021-03-25 RX ORDER — METOPROLOL TARTRATE 5 MG/5ML
5 INJECTION INTRAVENOUS ONCE
Status: COMPLETED | OUTPATIENT
Start: 2021-03-25 | End: 2021-03-25

## 2021-03-25 RX ORDER — OXYCODONE HYDROCHLORIDE 5 MG/1
2.5 TABLET ORAL EVERY 6 HOURS PRN
Status: DISCONTINUED | OUTPATIENT
Start: 2021-03-25 | End: 2021-03-31 | Stop reason: HOSPADM

## 2021-03-25 RX ORDER — PANTOPRAZOLE SODIUM 40 MG/1
40 TABLET, DELAYED RELEASE ORAL
Status: DISCONTINUED | OUTPATIENT
Start: 2021-03-26 | End: 2021-03-31 | Stop reason: HOSPADM

## 2021-03-25 RX ORDER — LISINOPRIL 10 MG/1
10 TABLET ORAL DAILY
Status: DISCONTINUED | OUTPATIENT
Start: 2021-03-26 | End: 2021-03-28

## 2021-03-25 RX ORDER — ASPIRIN 81 MG/1
81 TABLET ORAL DAILY
Status: DISCONTINUED | OUTPATIENT
Start: 2021-03-26 | End: 2021-03-26

## 2021-03-25 RX ORDER — LISINOPRIL 10 MG/1
5 TABLET ORAL DAILY
Status: DISCONTINUED | OUTPATIENT
Start: 2021-03-26 | End: 2021-03-25

## 2021-03-25 RX ORDER — ACETAMINOPHEN 325 MG/1
650 TABLET ORAL EVERY 6 HOURS PRN
Status: DISCONTINUED | OUTPATIENT
Start: 2021-03-25 | End: 2021-03-31 | Stop reason: HOSPADM

## 2021-03-25 RX ORDER — CARVEDILOL 6.25 MG/1
6.25 TABLET ORAL 2 TIMES DAILY WITH MEALS
Status: DISCONTINUED | OUTPATIENT
Start: 2021-03-26 | End: 2021-03-26

## 2021-03-25 RX ORDER — BUMETANIDE 0.25 MG/ML
1 INJECTION, SOLUTION INTRAMUSCULAR; INTRAVENOUS ONCE
Status: COMPLETED | OUTPATIENT
Start: 2021-03-25 | End: 2021-03-25

## 2021-03-25 RX ORDER — ACETAMINOPHEN 650 MG/1
650 SUPPOSITORY RECTAL EVERY 6 HOURS PRN
Status: CANCELLED | OUTPATIENT
Start: 2021-03-25

## 2021-03-25 RX ADMIN — APIXABAN 5 MG: 5 TABLET, FILM COATED ORAL at 23:52

## 2021-03-25 RX ADMIN — LEVALBUTEROL HYDROCHLORIDE 0.31 MG: 0.31 SOLUTION RESPIRATORY (INHALATION) at 22:47

## 2021-03-25 RX ADMIN — BUMETANIDE 1 MG: 0.25 INJECTION, SOLUTION INTRAMUSCULAR; INTRAVENOUS at 21:16

## 2021-03-25 RX ADMIN — METOPROLOL TARTRATE 5 MG: 1 INJECTION, SOLUTION INTRAVENOUS at 21:26

## 2021-03-25 ASSESSMENT — ENCOUNTER SYMPTOMS
NAUSEA: 0
EYE PAIN: 0
SHORTNESS OF BREATH: 1
SORE THROAT: 0
BACK PAIN: 0
WHEEZING: 1
ABDOMINAL PAIN: 0
VOMITING: 0

## 2021-03-25 ASSESSMENT — PAIN DESCRIPTION - DESCRIPTORS: DESCRIPTORS: BURNING;ACHING

## 2021-03-25 ASSESSMENT — PAIN DESCRIPTION - PROGRESSION: CLINICAL_PROGRESSION: GRADUALLY WORSENING

## 2021-03-25 ASSESSMENT — PAIN DESCRIPTION - FREQUENCY: FREQUENCY: CONTINUOUS

## 2021-03-25 ASSESSMENT — PAIN DESCRIPTION - LOCATION: LOCATION: LEG

## 2021-03-25 NOTE — TELEPHONE ENCOUNTER
Reason for Disposition   SEVERE swelling (e.g., swelling extends above knee, entire leg is swollen, weeping fluid)    Answer Assessment - Initial Assessment Questions  1. ONSET: \"When did the swelling start? \" (e.g., minutes, hours, days)      Swelling started a few days ago    2. LOCATION: \"What part of the leg is swollen? \"  \"Are both legs swollen or just one leg? \"     Both legs are swollen    3. SEVERITY: \"How bad is the swelling? \" (e.g., localized; mild, moderate, severe)   - Localized - small area of swelling localized to one leg   - MILD pedal edema - swelling limited to foot and ankle, pitting edema < 1/4 inch (6 mm) deep, rest and elevation eliminate most or all swelling   - MODERATE edema - swelling of lower leg to knee, pitting edema > 1/4 inch (6 mm) deep, rest and elevation only partially reduce swelling   - SEVERE edema - swelling extends above knee, facial or hand swelling present      Swelling really bad, swelling starts at feet and goes up to knees    4. REDNESS: \"Does the swelling look red or infected? \"   no redness    5. PAIN: \"Is the swelling painful to touch? \" If so, ask: \"How painful is it? \"   (Scale 1-10; mild, moderate or severe)      Having pain in her legs, pain level is 8/10, able to walk but not very good. 6. FEVER: \"Do you have a fever? \" If so, ask: \"What is it, how was it measured, and when did it start? \"       No fever    7. CAUSE: \"What do you think is causing the leg swelling? \"     Went to hospital recently for testing and her feet were bent in an awkward position and had weights on her feet, swelling started after that    8. MEDICAL HISTORY: \"Do you have a history of heart failure, kidney disease, liver failure, or cancer? \"      No heart failure or kidney disease    9. RECURRENT SYMPTOM: \"Have you had leg swelling before? \" If so, ask: \"When was the last time? \" \"What happened that time? \"      10. OTHER SYMPTOMS: \"Do you have any other symptoms? \" (e.g., chest pain, difficulty breathing)        Sob at times but has had that before the swelling. No chest pain. No fever. Some fluid coming from one leg    11. PREGNANCY: \"Is there any chance you are pregnant? \" \"When was your last menstrual period? \"    Protocols used: LEG SWELLING AND EDEMA-ADULT-OH    Received call from [facility] at pre-service center [facility] with Red Flag Complaint. Brief description of triage: Pt with bilateral leg swelling that started a few days ago. Swelling goes up to knees and is leaking fluid. Pain to legs. No redness, no fever. No chest pain. Triage indicates for patient to go to [address] [place] [address] Ave or PCP triage. Pt has no PCP so recommended to go to [facility] or ED. Care advice provided, patient verbalizes understanding; denies any other questions or concerns; instructed to call back for any new or worsening symptoms. Writer provided warm transfer to [employer] at [facility] for appointment scheduling. Attention Provider: Thank you for allowing me to participate in the care of your patient. The patient was connected to triage in response to information provided to the [facility]. Please do not respond through this encounter as the response is not directed to a shared pool.

## 2021-03-25 NOTE — ED PROVIDER NOTES
[address] [facility]  Department of Emergency Medicine  UC Encounter Note  3/25/21   5:57 PM EDT      NAME: Renae Shipley  :  1935  MRN:  [id number]    Chief Complaint: Leg Pain (Having pain and swelling in both legs. States left lower leg is oozing fluid.) and Leg Swelling      This is a 80-year-old female the presents to urgent care complaining of bilateral lower extremity swelling. She states for the past couple months she has had increasing swelling to both legs and is on a diuretic. But she states over the last several days she has noticed that her legs have become more swollen and the other oozing fluid. She does state a history of vascular problems in both legs and has seen a vascular surgeon for this. She states several years ago she was on a blood thinner. I first contact patient she appears to be in no acute distress. Review of Systems  Pertinent positives and negatives are stated within HPI, all other systems reviewed and are negative. Physical Exam  Vitals signs and nursing note reviewed. Constitutional:       Appearance: She is well-developed. HENT:      Head: Normocephalic and atraumatic. Right Ear: Hearing and external ear normal.      Left Ear: Hearing and external ear normal.      Nose: Nose normal.      Mouth/Throat:      Pharynx: Uvula midline. Eyes:      General: Lids are normal.      Conjunctiva/sclera: Conjunctivae normal.      Pupils: Pupils are equal, round, and reactive to light. Neck:      Musculoskeletal: Normal range of motion and neck supple. Cardiovascular:      Rate and Rhythm: Regular rhythm. Tachycardia present. Heart sounds: Normal heart sounds. No murmur. Comments: Left leg is draining clear fluid. Pulmonary:      Effort: Pulmonary effort is normal.      Breath sounds: Normal breath sounds. Abdominal:      General: Bowel sounds are normal.      Palpations: Abdomen is soft. Abdomen is not rigid. tape]    -------------------------------------------------- RESULTS -------------------------------------------------  No results found for this visit on 03/25/21. No orders to display       ------------------------- NURSING NOTES AND VITALS REVIEWED ---------------------------   The nursing notes within the ED encounter and vital signs as below have been reviewed. /79   Pulse 101   Temp 98.6 °F (37 °C) (Infrared)   Resp 20   Ht 5' 4\" (1.626 m)   Wt 165 lb (74.8 kg)   SpO2 96%   BMI 28.32 kg/m²   Oxygen Saturation Interpretation: Normal      ------------------------------------------ PROGRESS NOTES ------------------------------------------   I have spoken with the patient and discussed todays results, in addition to providing specific details for the plan of care and counseling regarding the diagnosis and prognosis. Their questions are answered at this time and they are agreeable with the plan.      --------------------------------- ADDITIONAL PROVIDER NOTES ---------------------------------     This patient is stable for discharge. I have shared the specific conditions for return, as well as the importance of follow-up. * NOTE: This report was transcribed using voice recognition software. Every effort was made to ensure accuracy; however, inadvertent computerized transcription errors may be present.    --------------------------------- IMPRESSION AND DISPOSITION ---------------------------------    IMPRESSION  1. Bilateral lower extremity edema        DISPOSITION  Disposition: Discharge to ED  Patient condition is stable.        Dinora Guillen PA-C  03/25/21 1800

## 2021-03-26 LAB
ANION GAP SERPL CALCULATED.3IONS-SCNC: 11 MMOL/L (ref 7–16)
BUN BLDV-MCNC: 16 MG/DL (ref 8–23)
CALCIUM SERPL-MCNC: 8.9 MG/DL (ref 8.6–10.2)
CHLORIDE BLD-SCNC: 107 MMOL/L (ref 98–107)
CHOLESTEROL, TOTAL: 146 MG/DL (ref 0–199)
CO2: 25 MMOL/L (ref 22–29)
CREAT SERPL-MCNC: 1 MG/DL (ref 0.5–1)
EKG ATRIAL RATE: 91 BPM
EKG Q-T INTERVAL: 314 MS
EKG QRS DURATION: 84 MS
EKG QTC CALCULATION (BAZETT): 456 MS
EKG R AXIS: -46 DEGREES
EKG T AXIS: 33 DEGREES
EKG VENTRICULAR RATE: 127 BPM
GFR AFRICAN AMERICAN: >60
GFR NON-AFRICAN AMERICAN: 53 ML/MIN/1.73
GLUCOSE BLD-MCNC: 110 MG/DL (ref 74–99)
HBA1C MFR BLD: 5.6 % (ref 4–5.6)
HDLC SERPL-MCNC: 43 MG/DL
LDL CHOLESTEROL CALCULATED: 86 MG/DL (ref 0–99)
LV EF: 30 %
LVEF MODALITY: NORMAL
MAGNESIUM: 2.2 MG/DL (ref 1.6–2.6)
POTASSIUM SERPL-SCNC: 4.1 MMOL/L (ref 3.5–5)
SODIUM BLD-SCNC: 143 MMOL/L (ref 132–146)
T4 FREE: 1.12 NG/DL (ref 0.93–1.7)
TRIGL SERPL-MCNC: 86 MG/DL (ref 0–149)
TSH SERPL DL<=0.05 MIU/L-ACNC: 0.84 UIU/ML (ref 0.27–4.2)
VLDLC SERPL CALC-MCNC: 17 MG/DL

## 2021-03-26 PROCEDURE — 80061 LIPID PANEL: CPT

## 2021-03-26 PROCEDURE — 2500000003 HC RX 250 WO HCPCS: Performed by: INTERNAL MEDICINE

## 2021-03-26 PROCEDURE — 2580000003 HC RX 258: Performed by: INTERNAL MEDICINE

## 2021-03-26 PROCEDURE — 2060000000 HC ICU INTERMEDIATE R&B

## 2021-03-26 PROCEDURE — 93306 TTE W/DOPPLER COMPLETE: CPT

## 2021-03-26 PROCEDURE — 84439 ASSAY OF FREE THYROXINE: CPT

## 2021-03-26 PROCEDURE — 99222 1ST HOSP IP/OBS MODERATE 55: CPT | Performed by: INTERNAL MEDICINE

## 2021-03-26 PROCEDURE — 84443 ASSAY THYROID STIM HORMONE: CPT

## 2021-03-26 PROCEDURE — 99233 SBSQ HOSP IP/OBS HIGH 50: CPT | Performed by: INTERNAL MEDICINE

## 2021-03-26 PROCEDURE — 6370000000 HC RX 637 (ALT 250 FOR IP): Performed by: INTERNAL MEDICINE

## 2021-03-26 PROCEDURE — 80048 BASIC METABOLIC PNL TOTAL CA: CPT

## 2021-03-26 PROCEDURE — 51702 INSERT TEMP BLADDER CATH: CPT

## 2021-03-26 PROCEDURE — 36415 COLL VENOUS BLD VENIPUNCTURE: CPT

## 2021-03-26 PROCEDURE — 83036 HEMOGLOBIN GLYCOSYLATED A1C: CPT

## 2021-03-26 PROCEDURE — APPSS30 APP SPLIT SHARED TIME 16-30 MINUTES: Performed by: NURSE PRACTITIONER

## 2021-03-26 PROCEDURE — 83735 ASSAY OF MAGNESIUM: CPT

## 2021-03-26 PROCEDURE — 6360000002 HC RX W HCPCS: Performed by: INTERNAL MEDICINE

## 2021-03-26 PROCEDURE — 94640 AIRWAY INHALATION TREATMENT: CPT

## 2021-03-26 PROCEDURE — APPSS60 APP SPLIT SHARED TIME 46-60 MINUTES: Performed by: PHYSICIAN ASSISTANT

## 2021-03-26 PROCEDURE — 6370000000 HC RX 637 (ALT 250 FOR IP): Performed by: PHYSICIAN ASSISTANT

## 2021-03-26 PROCEDURE — 6360000002 HC RX W HCPCS: Performed by: PHYSICIAN ASSISTANT

## 2021-03-26 PROCEDURE — 93010 ELECTROCARDIOGRAM REPORT: CPT | Performed by: INTERNAL MEDICINE

## 2021-03-26 RX ORDER — DILTIAZEM HYDROCHLORIDE 5 MG/ML
15 INJECTION INTRAVENOUS ONCE
Status: COMPLETED | OUTPATIENT
Start: 2021-03-26 | End: 2021-03-26

## 2021-03-26 RX ORDER — CARVEDILOL 6.25 MG/1
12.5 TABLET ORAL 2 TIMES DAILY WITH MEALS
Status: DISCONTINUED | OUTPATIENT
Start: 2021-03-26 | End: 2021-03-28

## 2021-03-26 RX ORDER — METOPROLOL TARTRATE 5 MG/5ML
10 INJECTION INTRAVENOUS ONCE
Status: COMPLETED | OUTPATIENT
Start: 2021-03-26 | End: 2021-03-26

## 2021-03-26 RX ORDER — DIGOXIN 0.25 MG/ML
250 INJECTION INTRAMUSCULAR; INTRAVENOUS ONCE
Status: COMPLETED | OUTPATIENT
Start: 2021-03-26 | End: 2021-03-26

## 2021-03-26 RX ADMIN — GABAPENTIN 600 MG: 300 CAPSULE ORAL at 08:51

## 2021-03-26 RX ADMIN — LEVALBUTEROL HYDROCHLORIDE 0.31 MG: 0.31 SOLUTION RESPIRATORY (INHALATION) at 05:30

## 2021-03-26 RX ADMIN — GABAPENTIN 600 MG: 300 CAPSULE ORAL at 20:30

## 2021-03-26 RX ADMIN — LISINOPRIL 10 MG: 10 TABLET ORAL at 08:51

## 2021-03-26 RX ADMIN — GABAPENTIN 600 MG: 300 CAPSULE ORAL at 17:31

## 2021-03-26 RX ADMIN — GABAPENTIN 600 MG: 300 CAPSULE ORAL at 11:32

## 2021-03-26 RX ADMIN — OXYCODONE AND ACETAMINOPHEN 1 TABLET: 5; 325 TABLET ORAL at 22:51

## 2021-03-26 RX ADMIN — ASPIRIN 81 MG: 81 TABLET, FILM COATED ORAL at 08:51

## 2021-03-26 RX ADMIN — BUMETANIDE 1 MG: 0.25 INJECTION INTRAMUSCULAR; INTRAVENOUS at 20:29

## 2021-03-26 RX ADMIN — APIXABAN 5 MG: 5 TABLET, FILM COATED ORAL at 20:30

## 2021-03-26 RX ADMIN — Medication 10 ML: at 08:52

## 2021-03-26 RX ADMIN — FLUTICASONE PROPIONATE 1 SPRAY: 50 SPRAY, METERED NASAL at 08:51

## 2021-03-26 RX ADMIN — Medication 10 ML: at 00:42

## 2021-03-26 RX ADMIN — DILTIAZEM HYDROCHLORIDE 5 MG/HR: 5 INJECTION INTRAVENOUS at 13:00

## 2021-03-26 RX ADMIN — Medication 10 ML: at 20:31

## 2021-03-26 RX ADMIN — DILTIAZEM HYDROCHLORIDE 15 MG: 5 INJECTION INTRAVENOUS at 12:33

## 2021-03-26 RX ADMIN — METOPROLOL TARTRATE 5 MG: 1 INJECTION, SOLUTION INTRAVENOUS at 06:22

## 2021-03-26 RX ADMIN — BUMETANIDE 1 MG: 0.25 INJECTION INTRAMUSCULAR; INTRAVENOUS at 08:52

## 2021-03-26 RX ADMIN — CARVEDILOL 12.5 MG: 6.25 TABLET, FILM COATED ORAL at 17:32

## 2021-03-26 RX ADMIN — PANTOPRAZOLE SODIUM 40 MG: 40 TABLET, DELAYED RELEASE ORAL at 08:51

## 2021-03-26 RX ADMIN — DIGOXIN 250 MCG: 250 INJECTION, SOLUTION INTRAMUSCULAR; INTRAVENOUS; PARENTERAL at 11:32

## 2021-03-26 RX ADMIN — CARVEDILOL 6.25 MG: 6.25 TABLET, FILM COATED ORAL at 08:51

## 2021-03-26 RX ADMIN — APIXABAN 5 MG: 5 TABLET, FILM COATED ORAL at 08:51

## 2021-03-26 ASSESSMENT — PAIN DESCRIPTION - ONSET: ONSET: GRADUAL

## 2021-03-26 ASSESSMENT — PAIN SCALES - GENERAL
PAINLEVEL_OUTOF10: 0
PAINLEVEL_OUTOF10: 0
PAINLEVEL_OUTOF10: 3

## 2021-03-26 ASSESSMENT — PAIN DESCRIPTION - DESCRIPTORS: DESCRIPTORS: ACHING

## 2021-03-26 ASSESSMENT — PAIN DESCRIPTION - PAIN TYPE: TYPE: ACUTE PAIN

## 2021-03-26 NOTE — CONSULTS
I met with patient and spouse at bedside regarding new consult for HF RN. Pt is hesitant to schedule a new consult appt at this time so clinic contact information provided to pt. Education folder also reviewed with pt and spouse.     Emilee Monae RN

## 2021-03-26 NOTE — PROGRESS NOTES
[facility] [place] [address] Hospitalist   Progress Note    Admitting Date and Time: 3/25/2021  7:03 PM  Admit Dx: Acute diastolic heart failure ([facility] [address] [I50.31]    Subjective:    Patient seen and examined. She reports that her hearing aid batteries  and she does not have hearing aids with her. She is now on a Cardizem drip for perisstent RVR. ROS: denies fever, chills, n/v, HA unless stated above.      carvedilol  12.5 mg Oral BID WC    apixaban  5 mg Oral BID    fluticasone  1 spray Each Nostril Daily    gabapentin  600 mg Oral 4x Daily AC & HS    levalbuterol  0.31 mg Nebulization Q8H    pantoprazole  40 mg Oral QAM AC    sodium chloride flush  10 mL Intravenous 2 times per day    bumetanide  1 mg Intravenous BID    lisinopril  10 mg Oral Daily     glycerin moisturizing mouth spray, 2 spray, PRN  sodium chloride flush, 10 mL, PRN  promethazine, 12.5 mg, Q6H PRN    Or  ondansetron, 4 mg, Q6H PRN  polyethylene glycol, 17 g, Daily PRN  acetaminophen, 650 mg, Q6H PRN    Or  acetaminophen, 650 mg, Q6H PRN  perflutren lipid microspheres, 1.5 mL, ONCE PRN  oxyCODONE-acetaminophen, 1 tablet, Q6H PRN    And  oxyCODONE, 2.5 mg, Q6H PRN         Objective:    BP (!) 191/91   Pulse 138   Temp 97.6 °F (36.4 °C) (Oral)   Resp 16   Ht 5' 4\" (1.626 m)   Wt 184 lb 6.4 oz (83.6 kg)   SpO2 96%   BMI 31.65 kg/m²   General Appearance: alert and oriented to person, place and time and in no acute distress  Skin: warm and dry  Head: normocephalic and atraumatic  Eyes: pupils equal, round, and reactive to light, conjunctivae normal  Neck: neck supple and non tender without mass   Pulmonary/Chest: clear to auscultation bilaterally- no wheezes, rales or rhonchi, normal air movement, no respiratory distress  Cardiovascular: irregularly irregular  Abdomen: soft, non-tender, distended, anasarca, normal bowel sounds, no masses or organomegaly  Extremities: no cyanosis, no clubbing and 4+ bilateral lower extermtiy edema Neurologic: no cranial nerve deficit and speech normal      Recent Labs     03/25/21 1946 03/26/21  0618    143   K 4.0 4.1    107   CO2 23 25   BUN 16 16   CREATININE 1.0 1.0   GLUCOSE 102* 110*   CALCIUM 8.7 8.9       Recent Labs     03/25/21 1946   ALKPHOS 139*   PROT 7.1   LABALBU 3.7   BILITOT 0.4   AST 19   ALT 9       Recent Labs     03/25/21 1946   WBC 8.5   RBC 4.05   HGB 11.4*   HCT 37.7   MCV 93.1   MCH 28.1   MCHC 30.2*   RDW 13.6      MPV 10.3           Radiology:   US DUP LOWER EXTREMITIES BILATERAL VENOUS   Final Result   No evidence of DVT in either lower extremity. XR CHEST 1 VIEW   Final Result   Early CHF. Assessment:  Principal Problem:    Acute diastolic heart failure (HCC)  Active Problems:    Atrial fibrillation with RVR (HCC)    Hypertension  Resolved Problems:    * No resolved hospital problems. *      Plan:  1. Acute decompensation of diastolic heart failure:  Cardiology following. Last Echo on 12/6/19 revealed an EF of 55-60%. Repeat Echo ordered. Continue IV Bumex. I&O's, daily weights, consult CHF nurse. 2. A fib with RVR:  Received a one time dose of Digoxin 250 mcg. Cardiology started patient on a Cardizem drip with a Cardizem bolus. Continue Eliquis. 3. Hypertension:  Continue Lisinopril. 4. History of diabetes:  Hemoglobin A1C is 5.6.   5. Chronic pain/sciatica: Patient reports that she follows with outpatient pain management. Continue prn Percocet. 6. Patient reported history of a thyroid and parathyroid nodule:  Patient relays that she is to have surgery for thyroid and parathyroid nodules. NOTE: This report was transcribed using voice recognition software. Every effort was made to ensure accuracy; however, inadvertent computerized transcription errors may be present.      Electronically signed by MEJIA Peck CNP on 3/26/2021 at 1:10 PM

## 2021-03-26 NOTE — ED PROVIDER NOTES
Patient is a 80-year-old female presented emergency department due to bilateral lower extremity swelling for over a week per the patient. She denies any chest pain. She does state that she has had increased shortness of breath with exertion and activity. He also states that she has gained approximately 30 pounds over the last 2 to 3 months. Patient denies any history of heart failure. States she does have atrial fibrillation which she takes metoprolol for. She is prescribed Eliquis however she does not recognize the name and states she takes aspirin for antiplatelet. The history is provided by the patient. No  was used. Shortness of Breath  Severity:  Mild  Onset quality:  Gradual  Timing:  Constant  Progression:  Worsening  Chronicity:  New  Relieved by:  Nothing  Worsened by:  Exertion, activity and movement  Ineffective treatments:  None tried  Associated symptoms: wheezing    Associated symptoms: no abdominal pain, no chest pain, no ear pain, no fever, no headaches, no neck pain, no rash, no sore throat and no vomiting         Review of Systems   Constitutional: Positive for fatigue. Negative for chills and fever. HENT: Negative for ear pain and sore throat. Eyes: Negative for pain. Respiratory: Positive for shortness of breath and wheezing. Cardiovascular: Positive for leg swelling. Negative for chest pain. Gastrointestinal: Negative for abdominal pain, nausea and vomiting. Genitourinary: Negative for flank pain and pelvic pain. Musculoskeletal: Negative for back pain and neck pain. Skin: Negative for rash. Neurological: Negative for headaches. Physical Exam  Vitals signs and nursing note reviewed. Constitutional:       General: She is not in acute distress. Appearance: She is well-developed. She is ill-appearing. HENT:      Head: Normocephalic and atraumatic.       Right Ear: External ear normal.      Left Ear: External ear normal.      Nose: Nose normal.      Mouth/Throat:      Mouth: Mucous membranes are moist.      Pharynx: Oropharynx is clear. Eyes:      Pupils: Pupils are equal, round, and reactive to light. Neck:      Musculoskeletal: Normal range of motion and neck supple. Cardiovascular:      Rate and Rhythm: Tachycardia present. Rhythm irregular. Heart sounds: Normal heart sounds. Pulmonary:      Effort: Pulmonary effort is normal. No respiratory distress. Breath sounds: Wheezing present. Abdominal:      Palpations: Abdomen is soft. Tenderness: There is no abdominal tenderness. Musculoskeletal:         General: No swelling or tenderness. Right lower leg: Edema present. Left lower leg: Edema present. Skin:     General: Skin is warm and dry. Findings: No rash. Neurological:      Mental Status: She is alert and oriented to person, place, and time. Cranial Nerves: No cranial nerve deficit. Procedures     EKG: This EKG is signed and interpreted by me. Rate: 127  Rhythm: Atrial fibrillation and Rapid ventricular response  Interpretation: atrial fibrillation (chronic)  Comparison: changes compared to previous EKG       MDM  Number of Diagnoses or Management Options  Diagnosis management comments: Patient when asked about Lasix denies ever taking Lasix but when we tried to give the patient Lasix via IV she stated that she did not take it due to it causing her to become bald. She then states that she was given a year ago and large doses for some unknown reason and cause her to lose her hair and a lot of \"other side effects\". He states that she has not taken any of it since then. Chest x-ray found early onset CHF. Patient did have bilateral lower extremity edema she stated that she had gained approximately 30 pounds over the last few months. Physical exam was consistent with acute CHF with rapid atrial fibrillation response.   Due to the patient's rapid A. fib she is given 5 metoprolol which is her home medication for improvement she states she ran out of this medication and has not been taking last few days. In addition patient was given 1 mg of Bumex in order to improve fluid diuresis. Patient will be admitted to hospital for the treatment of her acute CHF.          --------------------------------------------- PAST HISTORY ---------------------------------------------  Past Medical History:  has a past medical history of Dental caries, Diabetes mellitus ([address] Gall bladder stones, Hernia, Hypertension, and Kidney stone. Past Surgical History:  has a past surgical history that includes Appendectomy and hernia repair. Social History:  reports that she has never smoked. She has never used smokeless tobacco. She reports that she does not drink alcohol. Family History: family history is not on file. The patients home medications have been reviewed.     Allergies: Influenza vaccines, Alginate [alginic acid], Novocain [procaine], and Tape [adhesive tape]    -------------------------------------------------- RESULTS -------------------------------------------------    LABS:  Results for orders placed or performed during the hospital encounter of 03/25/21   CBC Auto Differential   Result Value Ref Range    WBC 8.5 4.5 - 11.5 E9/L    RBC 4.05 3.50 - 5.50 E12/L    Hemoglobin 11.4 (L) 11.5 - 15.5 g/dL    Hematocrit 37.7 34.0 - 48.0 %    MCV 93.1 80.0 - 99.9 fL    MCH 28.1 26.0 - 35.0 pg    MCHC 30.2 (L) 32.0 - 34.5 %    RDW 13.6 11.5 - 15.0 fL    Platelets 830 519 - 372 E9/L    MPV 10.3 7.0 - 12.0 fL    Neutrophils % 57.8 43.0 - 80.0 %    Immature Granulocytes % 0.2 0.0 - 5.0 %    Lymphocytes % 25.2 20.0 - 42.0 %    Monocytes % 12.4 (H) 2.0 - 12.0 %    Eosinophils % 3.6 0.0 - 6.0 %    Basophils % 0.8 0.0 - 2.0 %    Neutrophils Absolute 4.88 1.80 - 7.30 E9/L    Immature Granulocytes # 0.02 E9/L    Lymphocytes Absolute 2.13 1.50 - 4.00 E9/L    Monocytes Absolute 1.05 (H) 0.10 - 0.95 E9/L Eosinophils Absolute 0.30 0.05 - 0.50 E9/L    Basophils Absolute 0.07 0.00 - 0.20 E9/L   Comprehensive Metabolic Panel w/ Reflex to MG   Result Value Ref Range    Sodium 140 132 - 146 mmol/L    Potassium reflex Magnesium 4.0 3.5 - 5.0 mmol/L    Chloride 105 98 - 107 mmol/L    CO2 23 22 - 29 mmol/L    Anion Gap 12 7 - 16 mmol/L    Glucose 102 (H) 74 - 99 mg/dL    BUN 16 8 - 23 mg/dL    CREATININE 1.0 0.5 - 1.0 mg/dL    GFR Non-African American 53 >=60 mL/min/1.73    GFR African American >60     Calcium 8.7 8.6 - 10.2 mg/dL    Total Protein 7.1 6.4 - 8.3 g/dL    Albumin 3.7 3.5 - 5.2 g/dL    Total Bilirubin 0.4 0.0 - 1.2 mg/dL    Alkaline Phosphatase 139 (H) 35 - 104 U/L    ALT 9 0 - 32 U/L    AST 19 0 - 31 U/L   Troponin   Result Value Ref Range    Troponin <0.01 0.00 - 0.03 ng/mL   Brain Natriuretic Peptide   Result Value Ref Range    Pro-BNP 2,099 (H) 0 - 450 pg/mL   Urinalysis, reflex to microscopic   Result Value Ref Range    Color, UA Yellow Straw/Yellow    Clarity, UA Clear Clear    Glucose, Ur Negative Negative mg/dL    Bilirubin Urine Negative Negative    Ketones, Urine Negative Negative mg/dL    Specific Gravity, UA 1.020 1.005 - 1.030    Blood, Urine Negative Negative    pH, UA 5.5 5.0 - 9.0    Protein, UA Negative Negative mg/dL    Urobilinogen, Urine 0.2 <2.0 E.U./dL    Nitrite, Urine Negative Negative    Leukocyte Esterase, Urine Negative Negative   EKG 12 Lead   Result Value Ref Range    Ventricular Rate 127 BPM    Atrial Rate 91 BPM    QRS Duration 84 ms    Q-T Interval 314 ms    QTc Calculation (Bazett) 456 ms    R Axis -46 degrees    T Axis 33 degrees       RADIOLOGY:  US DUP LOWER EXTREMITIES BILATERAL VENOUS   Final Result   No evidence of DVT in either lower extremity.          XR CHEST 1 VIEW   Final Result   Early CHF.                 ------------------------- NURSING NOTES AND VITALS REVIEWED ---------------------------  Date / Time Roomed:  3/25/2021  7:03 PM  ED Bed Assignment:  01/01    The nursing notes within the ED encounter and vital signs as below have been reviewed. Patient Vitals for the past 24 hrs:   BP Temp Temp src Pulse Resp SpO2 Weight   03/25/21 2021 (!) 142/98         03/25/21 1820 (!) 169/103 98.5 °F (36.9 °C) Oral 120 20 94 % 192 lb (87.1 kg)       Oxygen Saturation Interpretation: Normal    ------------------------------------------ PROGRESS NOTES ------------------------------------------  Re-evaluation(s):  Time: 2048  Patients symptoms show no change  Repeat physical examination is not changed    Counseling:  I have spoken with the patient and discussed todays results, in addition to providing specific details for the plan of care and counseling regarding the diagnosis and prognosis. Their questions are answered at this time and they are agreeable with the plan of admission.    --------------------------------- ADDITIONAL PROVIDER NOTES ---------------------------------  Consultations:  Time: 2134. Spoke with Dr. Kala Jeong. Discussed case. They will admit the patient. This patient's ED course included: a personal history and physicial examination, re-evaluation prior to disposition, multiple bedside re-evaluations, IV medications, cardiac monitoring, continuous pulse oximetry and complex medical decision making and emergency management    This patient has remained hemodynamically stable during their ED course. Diagnosis:  1. Acute on chronic combined systolic and diastolic CHF (congestive heart failure) ([address]    2. Bilateral lower extremity edema    3. Atrial fibrillation with rapid ventricular response (HCC)        Disposition:  Patient's disposition: Admit to telemetry  Patient's condition is fair.                     Aly Arechiga DO  Resident  03/25/21 4704

## 2021-03-26 NOTE — PLAN OF CARE
Problem: OXYGENATION/RESPIRATORY FUNCTION  Goal: Patient will maintain patent airway  Outcome: Met This Shift  Goal: Patient will achieve/maintain normal respiratory rate/effort  Description: Respiratory rate and effort will be within normal limits for the patient  Outcome: Met This Shift     Problem: HEMODYNAMIC STATUS  Goal: Patient has stable vital signs and fluid balance  Outcome: Met This Shift     Problem: FLUID AND ELECTROLYTE IMBALANCE  Goal: Fluid and electrolyte balance are achieved/maintained  Outcome: Met This Shift     Problem: ACTIVITY INTOLERANCE/IMPAIRED MOBILITY  Goal: Mobility/activity is maintained at optimum level for patient  Outcome: Met This Shift     Problem:  Activity:  Goal: Ability to tolerate increased activity will improve  Description: Ability to tolerate increased activity will improve  Outcome: Met This Shift  Goal: Expression of feelings of increased energy will increase  Description: Expression of feelings of increased energy will increase  Outcome: Met This Shift     Problem: Cardiac:  Goal: Ability to maintain an adequate cardiac output will improve  Description: Ability to maintain an adequate cardiac output will improve  Outcome: Met This Shift  Goal: Complications related to the disease process, condition or treatment will be avoided or minimized  Description: Complications related to the disease process, condition or treatment will be avoided or minimized  Outcome: Met This Shift     Problem: Coping:  Goal: Level of anxiety will decrease  Description: Level of anxiety will decrease  Outcome: Met This Shift  Goal: General experience of comfort will improve  Description: General experience of comfort will improve  Outcome: Met This Shift     Problem: Health Behavior:  Goal: Ability to manage health-related needs will improve  Description: Ability to manage health-related needs will improve  Outcome: Met This Shift     Problem: Safety:  Goal: Ability to remain free from injury will improve  Description: Ability to remain free from injury will improve  Outcome: Met This Shift  Goal: Will show no signs and symptoms of excessive bleeding  Description: Will show no signs and symptoms of excessive bleeding  Outcome: Met This Shift

## 2021-03-26 NOTE — PROGRESS NOTES
Dr Chela Cardoza,    From Gabapentin labeling:    CrCl of 30 to 49 max dose is 900 mg/day in 2 to 3 divided doses.      Please monitor closely,  Rashmi Godoy [facility]. 3/25/2021 10:39 PM

## 2021-03-26 NOTE — H&P
[facility] History and Physical      CHIEF COMPLAINT: Lower extremity edema    History of Present Illness:    51-year-old female with history of A. fib and diastolic heart failure, diabetes as well as chronic pain and neuropathy presented with above chief complaint. Patient she noticed worsening swelling over the last 4 days to continue to have pain throughout the half point for the she denied having any swelling yet talking to the  at bedside reported that apparently she gradually has been developing edema over the last 2 to 3 months, patient denied any orthopnea but she reported off-and-on dyspnea on exertion. Patient was supposed to be on Lasix medication was stopped at some point over the last year, patient reported she was told that she was overdosed on Lasix and ended up with scaly skin and she reported presented because of it. Would have been exfoliative dermatitis. On questioning the patient reported at least for 16 ounces bottles of water and 2X16 ounce cups of Gatorade, a couple of tea and 1 cup of coffee almost every day, she reported not containing salt and adding salt. Not frequently but they do sometimes order precooked meals  Patient denied any fever or chills no headache no blurry vision no chest pain no palpitation. She was tachycardic, she denied any abdominal pain no nausea vomiting or diarrhea constipation. Unfortunately patient is not a good historian, she reported being on Coreg as well as metoprolol then she was not sure about that, at some point of time she was supposed to be on amiodarone Eliquis for A. fib, patient denies any history of A. fib and reported not taking dose of medication.     Informant(s) for H&P: Patient and  as well as chart    REVIEW OF SYSTEMS:  A comprehensive review of systems was negative except for: what is in the HPI      PMH:  Past Medical History:   Diagnosis Date    Dental caries     Diabetes mellitus ([facility] [address]    Irish Singh bladder stones     Hernia     Hypertension     Kidney stone        Surgical History:  Past Surgical History:   Procedure Laterality Date    APPENDECTOMY      HERNIA REPAIR         Medications Prior to Admission:    Prior to Admission medications    Medication Sig Start Date End Date Taking? Authorizing Provider   Carvedilol (COREG PO) Take by mouth    Historical Provider, MD   aspirin 81 MG EC tablet Take 81 mg by mouth daily    Historical Provider, MD   oxyCODONE-acetaminophen (PERCOCET) 7.5-325 MG per tablet Take 1 tablet by mouth every 6 hours as needed for Pain. Historical Provider, MD   amiodarone (CORDARONE) 200 MG tablet Take 1 tablet by mouth daily 12/24/19   MEJIA Khan CNP   levalbuterol Roxene Puente) 0.31 MG/3ML nebulization Take 3 mLs by nebulization every 8 hours 12/23/19   MEJIA Khan CNP   apixaban (ELIQUIS) 5 MG TABS tablet Take 1 tablet by mouth 2 times daily 12/23/19   MEJIA Khan CNP   gabapentin (NEURONTIN) 300 MG capsule Take 2 capsules by mouth 4 times daily (before meals and nightly) for 12 doses. 12/23/19 3/25/21  MEJIA Khan CNP   metoprolol tartrate (LOPRESSOR) 100 MG tablet Take 1 tablet by mouth 2 times daily 12/23/19   MEJIA Khan CNP   nystatin (MYCOSTATIN) 962896 UNIT/GM ointment Apply topically 2 times daily.  12/23/19   MEJIA Khan CNP   furosemide (LASIX) 40 MG tablet Take 1 tablet by mouth 2 times daily 12/23/19   MEJIA Khan CNP   pantoprazole (PROTONIX) 40 MG tablet Take 1 tablet by mouth every morning (before breakfast) 12/24/19   MEJIA Khan CNP   Artificial Saliva (BIOTENE DRY MOUTH MOISTURIZING) SOLN liquid Take 1 applicator by mouth as needed (dry mouth) 12/23/19   MEJIA Khan CNP   dextrose 5 % SOLN 250 mL with vancomycin 1 g SOLR 1,250 mg Infuse 1,250 mg intravenously every 24 hours 12/23/19   MEJIA Khan CNP   fluticasone (FLONASE) 50 MCG/ACT nasal failure [facility])  Resolved Problems:    * No resolved hospital problems. *      PLAN:    1. Acute decompensation of diastolic heart failure, due to the lack of diuretics and abundance of fluid intake. Last echo was in 2019 showed ejection fraction 60%. I will admit the patient to a telemetry bed, start the patient on Bumex 1 mg IV twice daily, strict I's and O's, daily weights, low-sodium diet with fluid restriction. Will consult CHF educator and dietitian. Will start patient on Coreg, low-dose lisinopril as blood pressure is not controlled. I will consult also cardiology. 2.  A. fib with RVR, rate is not controlled, patient received 5 mg of metoprolol in the ED, will start patient on Coreg 6.25 mg twice daily, I will resume that  That she was supposed to be on, as far as anticoagulation I will resume the patient on Eliquis that she was supposed to be on. I will discuss with cardiology. 3.  Hypertension, blood pressure is not controlled, continue on Coreg and add lisinopril. 4.  History of diabetes, patient not on any hypoglycemic agents, I will obtain A1c and evaluate the need for any medication. 5.  Chronic pain is on Percocet at home will continue    Code Status: Full code  DVT prophylaxis: Eliquis      NOTE: This report was transcribed using voice recognition software. Every effort was made to ensure accuracy; however, inadvertent computerized transcription errors may be present.   Electronically signed by Devyn Aggarwal MD on 3/25/2021 at 10:33 PM][

## 2021-03-26 NOTE — CONSULTS
Nutrition Education    Pt reported that she was unfamiliar with the low sodium diet PTA but does not typically salt food at the table. She stated that she generally consumes 2 meals and snack throughout the day. Discussed importance of low sodium diet, ways to reduce sodium intake, recommended/not recommended foods, nutrition label reading, salt free seasonings, and meal planning. · Verbally reviewed information with Patient  · Educated on Low Sodium Diet. · Written educational materials provided. · Contact name and number provided. Electronically signed by Kellen Eller RD, LD on 3/26/21 at 4:14 PM EDT    Contact: [phone]    Spent 10 minutes instructing pt on low sodium diet.

## 2021-03-26 NOTE — ED NOTES
Patient is taking medications she brought from home: oxy 7.5-325, gabapentin 300mg, 2 81mg asa. Dr. Eduard Selby notified.       Kayla Lamb RN  03/25/21 2121

## 2021-03-26 NOTE — CONSULTS
Inpatient Cardiology Consultation      Reason for Consult: CHF    Consulting Physician: Dr. Deedee Bauman    Requesting Physician: Dr. Tao Diego    Date of Consultation: 3/26/2021    HISTORY OF PRESENT ILLNESS:   Patient is an 80year old WF who follows with Dr. José Cope. She is being seen in initial consultation by Dr. Deedee Bauman this hospital admission for evaluation and recommendations regarding acute on chronic heart failure with preserved EF. Patient has a known past medical history of obesity, chronic heart failure with preserved ejection fraction, paroxysmal atrial fibrillation on chronic Eliquis and amiodarone therapy, hypertension, diabetes mellitus type II, chronic pain, and medical non-compliance. Of note, patient is a very poor historian regarding her past medical history. She does admit to non-compliance with her medications, and it is unclear which medications she was actually taking at home as she denies being on a blood thinner or Eliquis therapy (despite this being on her home medication list). Patient denies any cardiac history whatsoever, but does admit to seeing Dr. José Cope during her prior hospitalization. She presented to [address] [place] [address] on March 25, 2021 due to complaints of worsening bilateral lower extremity edema. She states over the past week or so, she has noticed progressively worsening lower extremity edema, now to having abdominal bloating and distention. She admits to dyspnea on exertion with progression to occasional dyspnea at rest.  She admits to orthopnea. She denies any complaints of chest discomfort at rest or on exertion, nausea, emesis, diaphoresis, dizziness, palpitations, near-syncope or syncope. She states she is unable to tell that her heart rate is fast.  She does not weigh herself regularly, so is unable to comment on weight gain or weight loss. She denies subjective fever, chills, cough or any known recent sick contacts.   She states she lives with her  currently and uses a walker at times for ambulation. She does have a wheelchair to use when she is unable to ambulate due to chronic back pain. She denies any pertinent cardiac family medical history to me at this time. She denies any former or current tobacco, alcohol or illicit drug use. Labs and diagnostic testing as noted below. Please note: past medical records were reviewed per electronic medical record (EMR) - see detailed reports under Past Medical/ Surgical History. PAST MEDICAL HISTORY:    1. Obesity. 2. Chronic heart failure with preserved ejection fraction. 3. Paroxysmal atrial fibrillation, on chronic Eliquis and Amiodarone therapy. 4. Hypertension. 5. Diabetes mellitus type II. 6. Chronic back pain. 7. Medical non-compliance. 8. Echocardiogram (Dr. Sean Giraldo, 12/2019): Summary: Normal left ventricle size and systolic function. Ejection fraction is visually estimated at 55-60%. Indeterminate diastolic function. Can not definitively rule out wall motion abnormalities on the basis of this study due to limited visualization of the endocardial borders. Mild left ventricular concentric hypertrophy noted. Normal right ventricle structure and function. Mild mitral annular calcification. Mild thickening and calcification of the mitral valve leaflets. PAST SURGICAL HISTORY:    Past Surgical History:   Procedure Laterality Date    APPENDECTOMY      HERNIA REPAIR         HOME MEDICATIONS:  Prior to Admission medications    Medication Sig Start Date End Date Taking? Authorizing Provider   Carvedilol (COREG PO) Take by mouth    Historical Provider, MD   aspirin 81 MG EC tablet Take 81 mg by mouth daily    Historical Provider, MD   oxyCODONE-acetaminophen (PERCOCET) 7.5-325 MG per tablet Take 1 tablet by mouth every 6 hours as needed for Pain.     Historical Provider, MD   amiodarone (CORDARONE) 200 MG tablet Take 1 tablet by mouth daily 12/24/19   Bladimir Witt, MEJIA - CNP 600 mg, Oral, 4x Daily AC & HS, Teo Peter MD    levalbuterol [facility]) nebulization 0.31 mg, 0.31 mg, Nebulization, Q8H, Teo Peter MD, 0.31 mg at 03/26/21 0530    pantoprazole (PROTONIX) tablet 40 mg, 40 mg, Oral, QAM AC, Teo Peter MD    sodium chloride flush 0.9 % injection 10 mL, 10 mL, Intravenous, 2 times per day, Teo Peter MD, 10 mL at 03/26/21 0042    sodium chloride flush 0.9 % injection 10 mL, 10 mL, Intravenous, PRN, Teo Peter MD    promethazine (PHENERGAN) tablet 12.5 mg, 12.5 mg, Oral, Q6H PRN **OR** ondansetron (ZOFRAN) injection 4 mg, 4 mg, Intravenous, Q6H PRN, Teo Peter MD    polyethylene glycol (GLYCOLAX) packet 17 g, 17 g, Oral, Daily PRN, Teo Peter MD    acetaminophen (TYLENOL) tablet 650 mg, 650 mg, Oral, Q6H PRN **OR** acetaminophen (TYLENOL) suppository 650 mg, 650 mg, Rectal, Q6H PRN, Teo Peter MD    perflutren lipid microspheres (DEFINITY) injection 1.65 mg, 1.5 mL, Intravenous, ONCE PRN, Teo Peter MD    bumetanide (BUMEX) injection 1 mg, 1 mg, Intravenous, BID, Teo Peter MD    oxyCODONE-acetaminophen (PERCOCET) 5-325 MG per tablet 1 tablet, 1 tablet, Oral, Q6H PRN **AND** oxyCODONE (ROXICODONE) immediate release tablet 2.5 mg, 2.5 mg, Oral, Q6H PRN, Teo Peter MD    lisinopril (PRINIVIL;ZESTRIL) tablet 10 mg, 10 mg, Oral, Daily, Teo Peter MD    Current Outpatient Medications:     Carvedilol (COREG PO), Take by mouth, Disp: , Rfl:     aspirin 81 MG EC tablet, Take 81 mg by mouth daily, Disp: , Rfl:     oxyCODONE-acetaminophen (PERCOCET) 7.5-325 MG per tablet, Take 1 tablet by mouth every 6 hours as needed for Pain., Disp: , Rfl:     amiodarone (CORDARONE) 200 MG tablet, Take 1 tablet by mouth daily, Disp: 30 tablet, Rfl: 0    levalbuterol (XOPENEX) 0.31 MG/3ML nebulization, Take 3 mLs by nebulization every 8 hours, Disp: , Rfl: 0    apixaban (ELIQUIS) 5 MG TABS tablet, Take 1 tablet by mouth 2 times daily, Disp: 60 tablet, Rfl: 0    gabapentin (NEURONTIN) 300 MG capsule, Take 2 capsules by mouth 4 times daily (before meals and nightly) for 12 doses. , Disp: 90 capsule, Rfl: 0    metoprolol tartrate (LOPRESSOR) 100 MG tablet, Take 1 tablet by mouth 2 times daily, Disp: 60 tablet, Rfl: 0    nystatin (MYCOSTATIN) [id number] UNIT/GM ointment, Apply topically 2 times daily. , Disp: , Rfl: 0    furosemide (LASIX) 40 MG tablet, Take 1 tablet by mouth 2 times daily, Disp: 60 tablet, Rfl: 0    pantoprazole (PROTONIX) 40 MG tablet, Take 1 tablet by mouth every morning (before breakfast), Disp: 30 tablet, Rfl: 0    Artificial Saliva (BIOTENE DRY MOUTH MOISTURIZING) SOLN liquid, Take 1 applicator by mouth as needed (dry mouth), Disp: , Rfl:     dextrose 5 % SOLN 250 mL with vancomycin 1 g SOLR 1,250 mg, Infuse 1,250 mg intravenously every 24 hours, Disp: , Rfl: 0    fluticasone (FLONASE) 50 MCG/ACT nasal spray, 1 spray by Each Nostril route daily, Disp: , Rfl:       ALLERGIES:  Influenza vaccines, Alginate [alginic acid], Novocain [procaine], and Tape [adhesive tape]    SOCIAL HISTORY:    She states she lives with her  currently and uses a walker at times for ambulation. She does have a wheelchair to use when she is unable to ambulate due to chronic back pain. She denies any former or current tobacco, alcohol or illicit drug use    FAMILY HISTORY:   She denies any pertinent cardiac family medical history to me at this time. REVIEW OF SYSTEMS:     · Constitutional: Denies fevers, chills, night sweats, and generalized fatigue. Denies significant weight loss or weight gain. · HEENT: Denies headaches, nose bleeds, rhinorrhea, sore throat. Denies blurred vision. Denies dysphagia, odynophagia. · Musculoskeletal: Denies falls, pain to BLE with ambulation. Denies muscle weakness. · Neurological: Denies dizziness and lightheadedness, numbness and tingling. Denies focal neurological deficits.   · Cardiovascular: +peripheral Intake/Output Summary (Last 24 hours) at 3/26/2021 0738  Last data filed at 3/26/2021 0042  Gross per 24 hour   Intake 10 ml   Output 600 ml   Net -590 ml       Labs:   CBC:   Recent Labs     03/25/21 1946   WBC 8.5   HGB 11.4*   HCT 37.7        BMP:   Recent Labs     03/25/21 1946 03/26/21  0618    143   K 4.0 4.1   CO2 23 25   BUN 16 16   CREATININE 1.0 1.0   LABGLOM 53 53   CALCIUM 8.7 8.9     Mag:   Recent Labs     03/26/21  0618   MG 2.2     HgA1c:   Lab Results   Component Value Date    LABA1C 6.0 (H) 12/09/2019     CARDIAC ENZYMES:  Recent Labs     03/25/21 1946   TROPONINI <0.01     FASTING LIPID PANEL:  Lab Results   Component Value Date    CHOL 146 03/26/2021    HDL 43 03/26/2021    LDLCALC 86 03/26/2021    TRIG 86 03/26/2021     LIVER PROFILE:  Recent Labs     03/25/21 1946   AST 19   ALT 9   LABALBU 3.7         ASSESSMENT:  1. Acute on chronic heart failure with preserved ejection fraction. In setting of non-compliance, as well as likely persistent tachycardia. ACC stage C/NYHA class III. Appears hypervolemic on exam.  2. Paroxysmal atrial fibrillation now with RVR, reinitiated on Eliquis therapy. CHADsVASc score of 5 (HTN, age, female, DM) pending echo results. 3. Medical non-compliance, poor medical literacy. 4. Hypertension, uncontrolled. 5. Diabetes mellitus type II. 6. Chronic back pain. 7. Obesity. RECOMMENDATIONS:  1. Increase Coreg to 12.5 mg twice daily for better heart rate and blood pressure control. 2. One dose IV digoxin 250 mcg. 3. Continue to adjust medications as needed for optimal heart rate and blood pressure control. 4. Discontinue amiodarone due to recurrence of atrial fibrillation, non-compliance and risk for thromboembolism due to [facility] noncompliance. 5. Agree with reinitiating Eliquis therapy at this time. 6. Continue other cardiac medications the same at this time. 7. Further recommendations to follow.              The above case and recommendations have been discussed with Dr. Reyes Campbell. Further recommendations to follow as per him. Stephanie Reyes, [address] [place] [address], [place] [id number] Cardiology    Electronically signed by Luisa Archibald PA-C on 3/26/2021 at 7:38 AM       Addendum:  Stephanie Monzon MD     Reason for consult:  A fib with RVR     Patient previously known to: Dr. Maryanne Carter     History of Present illness: 80 yr old [facility] with past medical history of obesity, chronic heart failure with preserved ejection fraction, paroxysmal atrial fibrillation on chronic Eliquis and amiodarone therapy, hypertension, diabetes mellitus type II, chronic pain, and medical non-compliance presented to [address] [place] [address] on March 25, 2021 due to complaints of worsening bilateral lower extremity edema. She states over the past week or so, she has noticed progressively worsening lower extremity edema, now to having abdominal bloating and distention. Of note, patient is a very poor historian regarding her past medical history. She does admit to non-compliance with her medications, and it is unclear which medications she was actually taking at home as she denies being on a blood thinner or Eliquis therapy (despite this being on her home medication list). Patient denies any cardiac history whatsoever, but does admit to seeing Dr. Maryanne Carter during her prior hospitalizationShe admits to dyspnea on exertion with progression to occasional dyspnea at rest.  She admits to orthopnea. She denies any complaints of chest discomfort at rest or on exertion, nausea, emesis, diaphoresis, dizziness, palpitations, near-syncope or syncope. She states she is unable to tell that her heart rate is fast.  She does not weigh herself regularly, so is unable to comment on weight gain or weight loss. She denies subjective fever, chills, cough. She states she lives with her  currently and uses a walker at times for ambulation.   She does have a wheelchair to use when she is unable to ambulate due to chronic back pain.      Review of systems:  Review of 10 systems negative except as mentioned in the HPI       PAST MEDICAL HISTORY:    Chronic heart failure with preserved ejection fraction. Paroxysmal atrial fibrillation, on chronic Eliquis and Amiodarone therapy. Hypertension. Diabetes mellitus type II. Chronic back pain. Medical non-compliance. PAST SURGICAL HISTORY:    Past Surgical History:   Procedure Laterality Date    APPENDECTOMY      HERNIA REPAIR         HOME MEDICATIONS: Reviewed    ALLERGIES:  Influenza vaccines, Alginate [alginic acid], Novocain [procaine], and Tape [adhesive tape]       Labs/imaging studies: Reviewed.     Echocardiogram (Dr. Mehran Edmonds, 12/2019): Summary: Normal left ventricle size and systolic function. Ejection fraction is visually estimated at 55-60%. Indeterminate diastolic function. Can not definitively rule out wall motion abnormalities on the basis of this study due to limited visualization of the endocardial borders. Mild left ventricular concentric hypertrophy noted. Normal right ventricle structure and function. Mild mitral annular calcification. Mild thickening and calcification of the mitral valve leaflets. Above VALENTE exam, assessment reviewed and reflect my work. I personally saw, examined, and evaluated the patient today.     I personally reviewed the medications, rhythm strips, pertinent labs and test reports. I directly participated in the medical-decision making, ordering pertinent tests and medication adjustment.     Physical exam:          Vitals:     03/26/21 0743   BP: (!) 142/98   Pulse: 137   Resp: 16   Temp: 97.6 °F (36.4 °C)   SpO2: 96%         In general, this is a well developed, well nourished who appears stated age. awake, alert, cooperative, no apparent distress     HEENT: eyes -conjunctivae pink,   Neck-  no stridor, no carotid bruit.  no jugular venous distention   RESPIRATORY: Chest symmetrical and non-tender to palpation. No accessory muscles use. Lung auscultation - clear to auscultation except few rhonchi  CARDIOVASCULAR:     Heart Inspection shows no noted pulsations  Heart Palpation - no palpable thrills  Heart Ausculation - Irregularly irregular rate and rhythm, tachycardia, 1/6 systolic murmur, No s3 or rub.   + lower extremity edema, no varicosities. Distal pulses palpable, no clubbing or cyanosis   ABDOMEN: Soft, nontender,  Bowel sounds present. MS: n/a. : Deferred  Rectal Exam: Deferred  SKIN: warm and dry  NEURO / PSYCH: oriented to person, place           Impression/Recommendations:     A fib RVR - Rate control with Coreg, Increase Coreg to 12.5 BID, give iv digoxin; If persistent tachy, start Cardizem. Non compliant with OAC in the past - She is aware of thromboembolic risk; Discontinue amiodarone due to recurrence of atrial fibrillation, non-compliance.  Continue [address] with Eliquis      Acute on chronic HFpEF - Continue iv diuretics, monitor daily wts, I/Os, Bp and renal Fn     Essential HTN - Monitor BP     Noncompliance with meds - Compliance with meds discussed                  Above recommendations discussed with her.        Thank you for the consult.           Maria Luisa Breaux MD  3/26/2021  [facility]) Cardiology

## 2021-03-26 NOTE — CARE COORDINATION
SS NOTE: NO COVID TEST. SW met with pt and her  Jaquan Ann today. Pt relates that they reside together in a 1 floor plan home with 8 step entry with railing. Pt was I pta with adl iadls and  drove. Pt has no hx of DME. Pt has had [facility] Home with [facility] in 2019 after a SNF stay at [facility]. Pt relates that she has an initial appt with a NP- Monetta Moritz at [facility] for April 15th. Pt's pharmacy is [facility] on [address]. The couple plan for pt to return home after dch. SS to continue. Leland Cox. 3/26/2021.1:20 PM

## 2021-03-27 LAB
ANION GAP SERPL CALCULATED.3IONS-SCNC: 11 MMOL/L (ref 7–16)
BUN BLDV-MCNC: 14 MG/DL (ref 8–23)
CALCIUM SERPL-MCNC: 8.9 MG/DL (ref 8.6–10.2)
CHLORIDE BLD-SCNC: 103 MMOL/L (ref 98–107)
CO2: 28 MMOL/L (ref 22–29)
CREAT SERPL-MCNC: 1 MG/DL (ref 0.5–1)
GFR AFRICAN AMERICAN: >60
GFR NON-AFRICAN AMERICAN: 53 ML/MIN/1.73
GLUCOSE BLD-MCNC: 98 MG/DL (ref 74–99)
HCT VFR BLD CALC: 37.7 % (ref 34–48)
HEMOGLOBIN: 11.7 G/DL (ref 11.5–15.5)
MAGNESIUM: 1.9 MG/DL (ref 1.6–2.6)
MCH RBC QN AUTO: 28.3 PG (ref 26–35)
MCHC RBC AUTO-ENTMCNC: 31 % (ref 32–34.5)
MCV RBC AUTO: 91.1 FL (ref 80–99.9)
PDW BLD-RTO: 13.7 FL (ref 11.5–15)
PLATELET # BLD: 284 E9/L (ref 130–450)
PMV BLD AUTO: 10.2 FL (ref 7–12)
POTASSIUM SERPL-SCNC: 3.6 MMOL/L (ref 3.5–5)
RBC # BLD: 4.14 E12/L (ref 3.5–5.5)
SODIUM BLD-SCNC: 142 MMOL/L (ref 132–146)
VITAMIN D 25-HYDROXY: 6 NG/ML (ref 30–100)
WBC # BLD: 8.2 E9/L (ref 4.5–11.5)

## 2021-03-27 PROCEDURE — 2580000003 HC RX 258: Performed by: INTERNAL MEDICINE

## 2021-03-27 PROCEDURE — 80048 BASIC METABOLIC PNL TOTAL CA: CPT

## 2021-03-27 PROCEDURE — 6370000000 HC RX 637 (ALT 250 FOR IP): Performed by: NURSE PRACTITIONER

## 2021-03-27 PROCEDURE — 2500000003 HC RX 250 WO HCPCS: Performed by: INTERNAL MEDICINE

## 2021-03-27 PROCEDURE — 2060000000 HC ICU INTERMEDIATE R&B

## 2021-03-27 PROCEDURE — 83735 ASSAY OF MAGNESIUM: CPT

## 2021-03-27 PROCEDURE — APPSS30 APP SPLIT SHARED TIME 16-30 MINUTES: Performed by: NURSE PRACTITIONER

## 2021-03-27 PROCEDURE — 99232 SBSQ HOSP IP/OBS MODERATE 35: CPT | Performed by: INTERNAL MEDICINE

## 2021-03-27 PROCEDURE — 82306 VITAMIN D 25 HYDROXY: CPT

## 2021-03-27 PROCEDURE — 6360000002 HC RX W HCPCS: Performed by: NURSE PRACTITIONER

## 2021-03-27 PROCEDURE — 85027 COMPLETE CBC AUTOMATED: CPT

## 2021-03-27 PROCEDURE — 6370000000 HC RX 637 (ALT 250 FOR IP): Performed by: PHYSICIAN ASSISTANT

## 2021-03-27 PROCEDURE — 6370000000 HC RX 637 (ALT 250 FOR IP): Performed by: INTERNAL MEDICINE

## 2021-03-27 PROCEDURE — 36415 COLL VENOUS BLD VENIPUNCTURE: CPT

## 2021-03-27 RX ORDER — MAGNESIUM SULFATE IN WATER 40 MG/ML
2000 INJECTION, SOLUTION INTRAVENOUS ONCE
Status: COMPLETED | OUTPATIENT
Start: 2021-03-27 | End: 2021-03-27

## 2021-03-27 RX ORDER — ERGOCALCIFEROL 1.25 MG/1
50000 CAPSULE ORAL WEEKLY
Status: DISCONTINUED | OUTPATIENT
Start: 2021-03-27 | End: 2021-03-31 | Stop reason: HOSPADM

## 2021-03-27 RX ADMIN — MAGNESIUM SULFATE HEPTAHYDRATE 2000 MG: 40 INJECTION, SOLUTION INTRAVENOUS at 12:24

## 2021-03-27 RX ADMIN — Medication 10 ML: at 21:12

## 2021-03-27 RX ADMIN — GABAPENTIN 600 MG: 300 CAPSULE ORAL at 12:22

## 2021-03-27 RX ADMIN — GABAPENTIN 600 MG: 300 CAPSULE ORAL at 17:22

## 2021-03-27 RX ADMIN — APIXABAN 5 MG: 5 TABLET, FILM COATED ORAL at 21:12

## 2021-03-27 RX ADMIN — DILTIAZEM HYDROCHLORIDE 5 MG/HR: 5 INJECTION INTRAVENOUS at 08:21

## 2021-03-27 RX ADMIN — CARVEDILOL 12.5 MG: 6.25 TABLET, FILM COATED ORAL at 17:23

## 2021-03-27 RX ADMIN — GABAPENTIN 600 MG: 300 CAPSULE ORAL at 08:49

## 2021-03-27 RX ADMIN — OXYCODONE AND ACETAMINOPHEN 1 TABLET: 5; 325 TABLET ORAL at 10:34

## 2021-03-27 RX ADMIN — OXYCODONE 2.5 MG: 5 TABLET ORAL at 10:33

## 2021-03-27 RX ADMIN — BUMETANIDE 1 MG: 0.25 INJECTION INTRAMUSCULAR; INTRAVENOUS at 21:12

## 2021-03-27 RX ADMIN — ERGOCALCIFEROL 50000 UNITS: 1.25 CAPSULE ORAL at 14:00

## 2021-03-27 RX ADMIN — POTASSIUM BICARBONATE 40 MEQ: 782 TABLET, EFFERVESCENT ORAL at 12:23

## 2021-03-27 RX ADMIN — GABAPENTIN 600 MG: 300 CAPSULE ORAL at 21:12

## 2021-03-27 RX ADMIN — APIXABAN 5 MG: 5 TABLET, FILM COATED ORAL at 08:50

## 2021-03-27 RX ADMIN — BUMETANIDE 1 MG: 0.25 INJECTION INTRAMUSCULAR; INTRAVENOUS at 08:50

## 2021-03-27 RX ADMIN — Medication 10 ML: at 08:50

## 2021-03-27 RX ADMIN — FLUTICASONE PROPIONATE 1 SPRAY: 50 SPRAY, METERED NASAL at 08:51

## 2021-03-27 RX ADMIN — CARVEDILOL 12.5 MG: 6.25 TABLET, FILM COATED ORAL at 08:50

## 2021-03-27 RX ADMIN — PANTOPRAZOLE SODIUM 40 MG: 40 TABLET, DELAYED RELEASE ORAL at 08:50

## 2021-03-27 RX ADMIN — LISINOPRIL 10 MG: 10 TABLET ORAL at 08:50

## 2021-03-27 NOTE — PLAN OF CARE
Problem: OXYGENATION/RESPIRATORY FUNCTION  Goal: Patient will maintain patent airway  3/26/2021 2308 by Tracey Delaney RN  Outcome: Met This Shift  3/26/2021 1849 by Slava Hu RN  Outcome: Met This Shift  Goal: Patient will achieve/maintain normal respiratory rate/effort  Description: Respiratory rate and effort will be within normal limits for the patient  3/26/2021 2308 by Tracey Delaney RN  Outcome: Met This Shift  3/26/2021 1849 by Slava Hu RN  Outcome: Met This Shift     Problem: HEMODYNAMIC STATUS  Goal: Patient has stable vital signs and fluid balance  3/26/2021 2308 by Tracey Delaney RN  Outcome: Met This Shift  3/26/2021 1849 by Slava Hu RN  Outcome: Met This Shift     Problem: FLUID AND ELECTROLYTE IMBALANCE  Goal: Fluid and electrolyte balance are achieved/maintained  3/26/2021 2308 by Tracey Delaney RN  Outcome: Met This Shift  3/26/2021 1849 by Slava Hu RN  Outcome: Met This Shift

## 2021-03-27 NOTE — PROGRESS NOTES
[facility] Cardiology Inpatient Progress Note    Patient is a 80 y.o. female of MEJIA Merlos CNP seen in hospital follow up. Chief complaint: CHF/Afib    HPI: Some SOB. No CP.     Patient Active Problem List   Diagnosis    Primary osteoarthritis of both knees    Acute respiratory failure with hypoxia ([address]    Sepsis due to pneumonia ([address]    JOVANI (acute kidney injury) ([address]    Elevated brain natriuretic peptide (BNP) level    Acute metabolic encephalopathy    Community acquired pneumonia of left lung    Hypoxia    Sepsis due to Streptococcus pneumoniae with acute hypoxic respiratory failure and septic shock ([facility])    Atrial fibrillation with RVR ([facility])    Gram-positive cocci bacteremia    Other contact with raccoon, sequela    Acute diastolic heart failure ([address]    Hypertension    Acute on chronic combined systolic and diastolic CHF (congestive heart failure) ([facility])    Atrial fibrillation with rapid ventricular response ([facility])    Bilateral lower extremity edema       Allergies   Allergen Reactions    Influenza Vaccines Other (See Comments)     Patient states had paralytic experience    Alginate [Alginic Acid]     Novocain [Procaine] Other (See Comments)     \"Rapid Heart Beat\"     Tape Smita Spore Tape] Other (See Comments)     \"Sensitive Skin\"        Current Facility-Administered Medications   Medication Dose Route Frequency Provider Last Rate Last Admin    vitamin D (ERGOCALCIFEROL) capsule 50,000 Units  50,000 Units Oral Weekly Rosie Kimberly, APRN - CNP        potassium bicarb-citric acid (EFFER-K) effervescent tablet 40 mEq  40 mEq Oral Once Rosie Kimberly, APRN - CNP        magnesium sulfate 2000 mg in 50 mL IVPB premix  2,000 mg Intravenous Once Rosie Kimberly, APRN - CNP        carvedilol (COREG) tablet 12.5 mg  12.5 mg Oral BID [facility] EVER Bolivar   12.5 mg at 03/27/21 0850    dilTIAZem 125 mg in dextrose 5 % 125 mL infusion  5 mg/hr Intravenous Continuous Tarik Fournier Rosalio Nunez MD   10 mg at 03/27/21 0850     Review of systems:   Heart: as above   Lungs: as above   Eyes: denies changes in vision or discharge. Ears: denies changes in hearing or pain. Nose: denies epistaxis or masses   Throat: denies sore throat or trouble swallowing. Neuro: denies numbness, tingling, tremors. Skin: denies rashes or itching. : denies hematuria, dysuria   GI: denies vomiting, diarrhea   Psych: denies mood changed, anxiety, depression. Physical Exam   /69   Pulse 96   Temp 97.8 °F (36.6 °C) (Oral)   Resp 18   Ht 5' 4\" (1.626 m)   Wt 172 lb 12.8 oz (78.4 kg)   SpO2 94%   BMI 29.66 kg/m²   Constitutional: Oriented to person, place, and time. No distress. Well developed. Head: Normocephalic and atraumatic. Neck: Neck supple. No hepatojugular reflux. No JVD present. Carotid bruit is not present. No tracheal deviation present. No thyromegaly present. Cardiovascular: Normal rate, regular rhythm, normal heart sounds. intact distal pulses. No gallop and no friction rub. No murmur heard. Pulmonary: Breath sounds normal. No respiratory distress. No wheezes. No rales. Chest: Effort normal. No tenderness. Abdominal: Soft. Bowel sounds are normal. No distension or mass. No tenderness, rebound or guarding. Musculoskeletal: . No tenderness. No clubbing or cyanosis. Extremitites: Intact distal pulses. No edema  Neurological: Alert and oriented to person, place, and time. Skin: Skin is warm and dry. No rash noted. Not diaphoretic. No erythema. Psychiatric: Normal mood and affect. Behavior is normal.   Lymphadenopathy: No cervical adenopathy. No groin adenopathy.     CBC:   Lab Results   Component Value Date    WBC 8.2 03/27/2021    RBC 4.14 03/27/2021    HGB 11.7 03/27/2021    HCT 37.7 03/27/2021    MCV 91.1 03/27/2021    MCH 28.3 03/27/2021    MCHC 31.0 03/27/2021    RDW 13.7 03/27/2021     03/27/2021    MPV 10.2 03/27/2021     BMP:   Lab Results Component Value Date     03/27/2021    K 3.6 03/27/2021    K 4.0 03/25/2021     03/27/2021    CO2 28 03/27/2021    BUN 14 03/27/2021    LABALBU 3.7 03/25/2021    CREATININE 1.0 03/27/2021    CALCIUM 8.9 03/27/2021    GFRAA >60 03/27/2021    LABGLOM 53 03/27/2021     Magnesium:    Lab Results   Component Value Date    MG 1.9 03/27/2021     Cardiac Enzymes:   Lab Results   Component Value Date    TROPONINI <0.01 03/25/2021    TROPONINI <0.01 12/21/2019    TROPONINI <0.01 12/17/2019      PT/INR:    Lab Results   Component Value Date    PROTIME 17.9 12/05/2019    INR 1.6 12/05/2019     TSH:    Lab Results   Component Value Date    TSH 0.841 03/26/2021     Rhythm Strip: atrial fibrillation. ASSESSMENT & PLAN:    Patient Active Problem List   Diagnosis    Primary osteoarthritis of both knees    [facility]    Sepsis due to pneumonia [facility]    JOVANI (acute kidney injury) ([facility]    Elevated brain natriuretic peptide (BNP) level    Acute metabolic encephalopathy    Community acquired pneumonia of left lung    Hypoxia    Sepsis due to Streptococcus pneumoniae with acute hypoxic respiratory failure and septic shock (HCC)    Atrial fibrillation with RVR ([facility])    Gram-positive cocci bacteremia    Other contact with raccoon, sequela    Acute diastolic heart failure (HCC)    Hypertension    Acute on chronic combined systolic and diastolic CHF (congestive heart failure) (HCC)    Atrial fibrillation with rapid ventricular response (HCC)    Bilateral lower extremity edema     1. Acute on chronic HFpEF:     ACEI/BB/bumex. 2. PAF: Eliquis. Rate control. 3. HTN: Observe. 4. DM: Per primary service. Russ Wright D.O.   Cardiologist  Cardiology, [address] [place] [address]

## 2021-03-27 NOTE — PROGRESS NOTES
[address] [place], [place] [address]ist   Progress Note    Admitting Date and Time: 3/25/2021  7:03 PM  Admit Dx: Acute diastolic heart failure ([address] [I50.31]    Subjective:    Patient seen and examined. She remains on a Cardizem drip. She complained of a headache after receiving IV Bumex. Will need to monitor during subsequent administrations. ROS: denies fever, chills, n/v, HA unless stated above.      vitamin D  50,000 Units Oral Weekly    potassium bicarb-citric acid  40 mEq Oral Once    magnesium sulfate  2,000 mg Intravenous Once    carvedilol  12.5 mg Oral BID [facility]    apixaban  5 mg Oral BID    fluticasone  1 spray Each Nostril Daily    gabapentin  600 mg Oral 4x Daily AC & HS    levalbuterol  0.31 mg Nebulization Q8H    pantoprazole  40 mg Oral QAM AC    sodium chloride flush  10 mL Intravenous 2 times per day    bumetanide  1 mg Intravenous BID    lisinopril  10 mg Oral Daily     glycerin moisturizing mouth spray, 2 spray, PRN  sodium chloride flush, 10 mL, PRN  promethazine, 12.5 mg, Q6H PRN    Or  ondansetron, 4 mg, Q6H PRN  polyethylene glycol, 17 g, Daily PRN  acetaminophen, 650 mg, Q6H PRN    Or  acetaminophen, 650 mg, Q6H PRN  perflutren lipid microspheres, 1.5 mL, ONCE PRN  oxyCODONE-acetaminophen, 1 tablet, Q6H PRN    And  oxyCODONE, 2.5 mg, Q6H PRN         Objective:    /69   Pulse 96   Temp 97.8 °F (36.6 °C) (Oral)   Resp 18   Ht 5' 4\" (1.626 m)   Wt 172 lb 12.8 oz (78.4 kg)   SpO2 94%   BMI 29.66 kg/m²   General Appearance: alert and oriented to person, place and time and in no acute distress  Skin: warm and dry  Head: normocephalic and atraumatic  Eyes: pupils equal, round, and reactive to light, conjunctivae normal  Neck: neck supple and non tender without mass   Pulmonary/Chest: diminished bilaterally, no respiratory distress  Cardiovascular: irregularly irregular  Abdomen: soft, non-tender, distended, anasarca, normal bowel sounds, no masses or organomegaly Extremities: no cyanosis, no clubbing and 4+ bilateral lower extermtiy edema  Neurologic: no cranial nerve deficit and speech normal      Recent Labs     03/25/21 1946 03/26/21  0618 03/27/21  0646    143 142   K 4.0 4.1 3.6    107 103   CO2 23 25 28   BUN 16 16 14   CREATININE 1.0 1.0 1.0   GLUCOSE 102* 110* 98   CALCIUM 8.7 8.9 8.9       Recent Labs     03/25/21 1946   ALKPHOS 139*   PROT 7.1   LABALBU 3.7   BILITOT 0.4   AST 19   ALT 9       Recent Labs     03/25/21 1946 03/27/21  0646   WBC 8.5 8.2   RBC 4.05 4.14   HGB 11.4* 11.7   HCT 37.7 37.7   MCV 93.1 91.1   MCH 28.1 28.3   MCHC 30.2* 31.0*   RDW 13.6 13.7    284   MPV 10.3 10.2           Radiology:   US DUP LOWER EXTREMITIES BILATERAL VENOUS   Final Result   No evidence of DVT in either lower extremity. XR CHEST 1 VIEW   Final Result   Early CHF. Assessment:  Principal Problem:    Acute diastolic heart failure (HCC)  Active Problems:    Atrial fibrillation with RVR (HCC)    Hypertension    Acute on chronic combined systolic and diastolic CHF (congestive heart failure) (HCC)    Atrial fibrillation with rapid ventricular response (HCC)    Bilateral lower extremity edema  Resolved Problems:    * No resolved hospital problems. *      Plan:  1. Acute decompensation of combined systolic and diastolic heart failure:  Cardiology following. Echo on 3/36 revealed an EF of 30% (previously 55-60% 12/6/19) and indeterminate diastolic dysfunction. Continue lisinopril and Coreg. Continue IV Bumex. I&O's, daily weights  She is negative 4.2 liters. 2. A fib with RVR:  She remains on a Cardizem drip. Continue Eliquis and Coreg. 3. Hypertension:  Continue Lisinopril. 4. History of diabetes:  Hemoglobin A1C is 5.6.   5. Chronic pain/sciatica: Patient reports that she follows with outpatient pain management. Continue prn Percocet.    6. Patient reported history of a thyroid and parathyroid nodule:  Patient relays that she is

## 2021-03-28 LAB
ANION GAP SERPL CALCULATED.3IONS-SCNC: 13 MMOL/L (ref 7–16)
BUN BLDV-MCNC: 16 MG/DL (ref 8–23)
CALCIUM SERPL-MCNC: 9.3 MG/DL (ref 8.6–10.2)
CHLORIDE BLD-SCNC: 98 MMOL/L (ref 98–107)
CO2: 28 MMOL/L (ref 22–29)
CREAT SERPL-MCNC: 1.1 MG/DL (ref 0.5–1)
GFR AFRICAN AMERICAN: 57
GFR NON-AFRICAN AMERICAN: 47 ML/MIN/1.73
GLUCOSE BLD-MCNC: 127 MG/DL (ref 74–99)
HCT VFR BLD CALC: 40.6 % (ref 34–48)
HEMOGLOBIN: 12.5 G/DL (ref 11.5–15.5)
MAGNESIUM: 2.2 MG/DL (ref 1.6–2.6)
MCH RBC QN AUTO: 28 PG (ref 26–35)
MCHC RBC AUTO-ENTMCNC: 30.8 % (ref 32–34.5)
MCV RBC AUTO: 90.8 FL (ref 80–99.9)
PDW BLD-RTO: 13.7 FL (ref 11.5–15)
PHOSPHORUS: 3.6 MG/DL (ref 2.5–4.5)
PLATELET # BLD: 308 E9/L (ref 130–450)
PMV BLD AUTO: 10.3 FL (ref 7–12)
POTASSIUM SERPL-SCNC: 3.9 MMOL/L (ref 3.5–5)
PRO-BNP: 1276 PG/ML (ref 0–450)
RBC # BLD: 4.47 E12/L (ref 3.5–5.5)
SODIUM BLD-SCNC: 139 MMOL/L (ref 132–146)
WBC # BLD: 9.6 E9/L (ref 4.5–11.5)

## 2021-03-28 PROCEDURE — 84100 ASSAY OF PHOSPHORUS: CPT

## 2021-03-28 PROCEDURE — 2580000003 HC RX 258: Performed by: INTERNAL MEDICINE

## 2021-03-28 PROCEDURE — 2500000003 HC RX 250 WO HCPCS: Performed by: INTERNAL MEDICINE

## 2021-03-28 PROCEDURE — APPSS30 APP SPLIT SHARED TIME 16-30 MINUTES: Performed by: NURSE PRACTITIONER

## 2021-03-28 PROCEDURE — 99232 SBSQ HOSP IP/OBS MODERATE 35: CPT | Performed by: INTERNAL MEDICINE

## 2021-03-28 PROCEDURE — 80048 BASIC METABOLIC PNL TOTAL CA: CPT

## 2021-03-28 PROCEDURE — 6370000000 HC RX 637 (ALT 250 FOR IP): Performed by: INTERNAL MEDICINE

## 2021-03-28 PROCEDURE — 2060000000 HC ICU INTERMEDIATE R&B

## 2021-03-28 PROCEDURE — 36415 COLL VENOUS BLD VENIPUNCTURE: CPT

## 2021-03-28 PROCEDURE — 94640 AIRWAY INHALATION TREATMENT: CPT

## 2021-03-28 PROCEDURE — 6360000002 HC RX W HCPCS: Performed by: INTERNAL MEDICINE

## 2021-03-28 PROCEDURE — 99233 SBSQ HOSP IP/OBS HIGH 50: CPT | Performed by: INTERNAL MEDICINE

## 2021-03-28 PROCEDURE — 83735 ASSAY OF MAGNESIUM: CPT

## 2021-03-28 PROCEDURE — 85027 COMPLETE CBC AUTOMATED: CPT

## 2021-03-28 PROCEDURE — 83880 ASSAY OF NATRIURETIC PEPTIDE: CPT

## 2021-03-28 PROCEDURE — 6370000000 HC RX 637 (ALT 250 FOR IP): Performed by: PHYSICIAN ASSISTANT

## 2021-03-28 RX ORDER — LOSARTAN POTASSIUM 50 MG/1
25 TABLET ORAL DAILY
Status: DISCONTINUED | OUTPATIENT
Start: 2021-03-29 | End: 2021-03-30

## 2021-03-28 RX ORDER — SPIRONOLACTONE 25 MG/1
25 TABLET ORAL DAILY
Status: DISCONTINUED | OUTPATIENT
Start: 2021-03-28 | End: 2021-03-31 | Stop reason: HOSPADM

## 2021-03-28 RX ORDER — METOPROLOL SUCCINATE 50 MG/1
50 TABLET, EXTENDED RELEASE ORAL 2 TIMES DAILY
Status: DISCONTINUED | OUTPATIENT
Start: 2021-03-28 | End: 2021-03-29

## 2021-03-28 RX ADMIN — GABAPENTIN 600 MG: 300 CAPSULE ORAL at 22:18

## 2021-03-28 RX ADMIN — PANTOPRAZOLE SODIUM 40 MG: 40 TABLET, DELAYED RELEASE ORAL at 06:06

## 2021-03-28 RX ADMIN — APIXABAN 5 MG: 5 TABLET, FILM COATED ORAL at 20:38

## 2021-03-28 RX ADMIN — Medication 10 ML: at 20:38

## 2021-03-28 RX ADMIN — GABAPENTIN 600 MG: 300 CAPSULE ORAL at 17:31

## 2021-03-28 RX ADMIN — METOPROLOL SUCCINATE 50 MG: 50 TABLET, EXTENDED RELEASE ORAL at 20:38

## 2021-03-28 RX ADMIN — METOPROLOL SUCCINATE 50 MG: 50 TABLET, EXTENDED RELEASE ORAL at 13:21

## 2021-03-28 RX ADMIN — Medication 10 ML: at 09:36

## 2021-03-28 RX ADMIN — SPIRONOLACTONE 25 MG: 25 TABLET ORAL at 13:22

## 2021-03-28 RX ADMIN — FLUTICASONE PROPIONATE 1 SPRAY: 50 SPRAY, METERED NASAL at 09:35

## 2021-03-28 RX ADMIN — DILTIAZEM HYDROCHLORIDE 5 MG/HR: 5 INJECTION INTRAVENOUS at 08:57

## 2021-03-28 RX ADMIN — CARVEDILOL 12.5 MG: 6.25 TABLET, FILM COATED ORAL at 09:32

## 2021-03-28 RX ADMIN — OXYCODONE AND ACETAMINOPHEN 1 TABLET: 5; 325 TABLET ORAL at 03:25

## 2021-03-28 RX ADMIN — OXYCODONE AND ACETAMINOPHEN 1 TABLET: 5; 325 TABLET ORAL at 17:31

## 2021-03-28 RX ADMIN — LISINOPRIL 10 MG: 10 TABLET ORAL at 09:34

## 2021-03-28 RX ADMIN — APIXABAN 5 MG: 5 TABLET, FILM COATED ORAL at 09:31

## 2021-03-28 RX ADMIN — LEVALBUTEROL HYDROCHLORIDE 0.31 MG: 0.31 SOLUTION RESPIRATORY (INHALATION) at 16:26

## 2021-03-28 RX ADMIN — BUMETANIDE 1 MG: 0.25 INJECTION INTRAMUSCULAR; INTRAVENOUS at 20:38

## 2021-03-28 RX ADMIN — BUMETANIDE 1 MG: 0.25 INJECTION INTRAMUSCULAR; INTRAVENOUS at 09:35

## 2021-03-28 RX ADMIN — GABAPENTIN 600 MG: 300 CAPSULE ORAL at 06:06

## 2021-03-28 RX ADMIN — GABAPENTIN 600 MG: 300 CAPSULE ORAL at 13:21

## 2021-03-28 RX ADMIN — LEVALBUTEROL HYDROCHLORIDE 0.31 MG: 0.31 SOLUTION RESPIRATORY (INHALATION) at 06:14

## 2021-03-28 ASSESSMENT — PAIN SCALES - GENERAL
PAINLEVEL_OUTOF10: 6
PAINLEVEL_OUTOF10: 0
PAINLEVEL_OUTOF10: 9

## 2021-03-28 NOTE — PROGRESS NOTES
Patient stated that her appointment for her second COVID vaccination on 3/29/2021 is scheduled at the [facility] on [address] Patient requested nursing to call to reschedule. Patient's  also is scheduled for his vaccination and will update the clinic staff when he arrives there.

## 2021-03-28 NOTE — PROGRESS NOTES
[address] [facility] Cardiology Inpatient Progress Note    Patient is a 80 y.o. female of MEJIA Brantley CNP seen in hospital follow up. Chief complaint: CHF/Afib    HPI: Some SOB. No CP.     Patient Active Problem List   Diagnosis    Primary osteoarthritis of both knees    Acute respiratory failure with hypoxia ([address]    Sepsis due to pneumonia ([address]    JOVANI (acute kidney injury) ([address]    Elevated brain natriuretic peptide (BNP) level    Acute metabolic encephalopathy    Community acquired pneumonia of left lung    Hypoxia    Sepsis due to Streptococcus pneumoniae with acute hypoxic respiratory failure and septic shock (HCC)    Atrial fibrillation with RVR (HCC)    Gram-positive cocci bacteremia    Other contact with raccoon, sequela    Acute diastolic heart failure (HCC)    Hypertension    Acute on chronic combined systolic and diastolic CHF (congestive heart failure) (HCC)    Atrial fibrillation with rapid ventricular response (HCC)    Bilateral lower extremity edema       Allergies   Allergen Reactions    Influenza Vaccines Other (See Comments)     Patient states had paralytic experience    Alginate [Alginic Acid]     Novocain [Procaine] Other (See Comments)     \"Rapid Heart Beat\"     Tape [Adhesive Tape] Other (See Comments)     \"Sensitive Skin\"        Current Facility-Administered Medications   Medication Dose Route Frequency Provider Last Rate Last Admin    vitamin D (ERGOCALCIFEROL) capsule 50,000 Units  50,000 Units Oral Weekly MEJIA Dias CNP   50,000 Units at 03/27/21 1400    carvedilol (COREG) tablet 12.5 mg  12.5 mg Oral BID [facility] EVER [place]ivar   12.5 mg at 03/28/21 0932    dilTIAZem 125 mg in dextrose 5 % 125 mL infusion  5 mg/hr Intravenous Continuous Nancy Chance MD 5 mL/hr at 03/28/21 0857 5 mg/hr at 03/28/21 0857    apixaban (ELIQUIS) tablet 5 mg  5 mg Oral BID Eliza Parra MD   5 mg at 03/28/21 0931    glycerin moisturizing mouth spray (OASIS) 35 % 2 spray  2 spray Oral PRN Patrecia Done, MD        fluticasone [facility]) 50 MCG/ACT nasal spray 1 spray  1 spray Each Nostril Daily Zenaida Done, MD   1 spray at 03/28/21 0935    gabapentin (NEURONTIN) capsule 600 mg  600 mg Oral 4x Daily AC & HS Patrecinellie Done, MD   600 mg at 03/28/21 0606    levalbuterol (XOPENEX) nebulization 0.31 mg  0.31 mg Nebulization Q8H Patrecia Done, MD   0.31 mg at 03/28/21 0614    pantoprazole (PROTONIX) tablet 40 mg  40 mg Oral QAM AC Patrecia Done, MD   40 mg at 03/28/21 0606    sodium chloride flush 0.9 % injection 10 mL  10 mL Intravenous 2 times per day Patrecinellie Done, MD   10 mL at 03/28/21 0936    sodium chloride flush 0.9 % injection 10 mL  10 mL Intravenous PRN Patrecia Done, MD        promethazine (PHENERGAN) tablet 12.5 mg  12.5 mg Oral Q6H PRN Patrecia Done, MD        Or    ondansetron [facility]) injection 4 mg  4 mg Intravenous Q6H PRN Patrecia Done, MD        polyethylene glycol (GLYCOLAX) packet 17 g  17 g Oral Daily PRN Patrecia Done, MD        acetaminophen (TYLENOL) tablet 650 mg  650 mg Oral Q6H PRN Patrecia Done, MD        Or    acetaminophen (TYLENOL) suppository 650 mg  650 mg Rectal Q6H PRN Patrecia Done, MD        perflutren lipid microspheres (DEFINITY) injection 1.65 mg  1.5 mL Intravenous ONCE PRN Patrecia Done, MD        bumetanide (BUMEX) injection 1 mg  1 mg Intravenous BID Patrecinellie Done, MD   1 mg at 03/28/21 0935    oxyCODONE-acetaminophen (PERCOCET) 5-325 MG per tablet 1 tablet  1 tablet Oral Q6H PRN Patrecia Done, MD   1 tablet at 03/28/21 0325    And    oxyCODONE (ROXICODONE) immediate release tablet 2.5 mg  2.5 mg Oral Q6H PRN Patrecia Done, MD   2.5 mg at 03/27/21 1033    lisinopril (PRINIVIL;ZESTRIL) tablet 10 mg  10 mg Oral Daily Zenaida Oneil MD   10 mg at 03/28/21 1992     Review of systems:   Heart: as above   Lungs: as above   Eyes: denies changes in vision or discharge. Ears: denies changes in hearing or pain.    Nose: denies epistaxis or masses   Throat: denies sore throat or trouble swallowing. Neuro: denies numbness, tingling, tremors. Skin: denies rashes or itching. : denies hematuria, dysuria   GI: denies vomiting, diarrhea   Psych: denies mood changed, anxiety, depression. Physical Exam   /79   Pulse 110   Temp 97.6 °F (36.4 °C) (Oral)   Resp 18   Ht 5' 4\" (1.626 m)   Wt 167 lb 3.2 oz (75.8 kg)   SpO2 94%   BMI 28.70 kg/m²   Constitutional: Oriented to person, place, and time. No distress. Well developed. Head: Normocephalic and atraumatic. Neck: Neck supple. No hepatojugular reflux. No JVD present. Carotid bruit is not present. No tracheal deviation present. No thyromegaly present. Cardiovascular: Normal rate, regular rhythm, normal heart sounds. intact distal pulses. No gallop and no friction rub. No murmur heard. Pulmonary: Breath sounds normal. No respiratory distress. No wheezes. No rales. Chest: Effort normal. No tenderness. Abdominal: Soft. Bowel sounds are normal. No distension or mass. No tenderness, rebound or guarding. Musculoskeletal: . No tenderness. No clubbing or cyanosis. Extremitites: Intact distal pulses. No edema  Neurological: Alert and oriented to person, place, and time. Skin: Skin is warm and dry. No rash noted. Not diaphoretic. No erythema. Psychiatric: Normal mood and affect. Behavior is normal.   Lymphadenopathy: No cervical adenopathy. No groin adenopathy.     CBC:   Lab Results   Component Value Date    WBC 9.6 03/28/2021    RBC 4.47 03/28/2021    HGB 12.5 03/28/2021    HCT 40.6 03/28/2021    MCV 90.8 03/28/2021    MCH 28.0 03/28/2021    MCHC 30.8 03/28/2021    RDW 13.7 03/28/2021     03/28/2021    MPV 10.3 03/28/2021     BMP:   Lab Results   Component Value Date     03/28/2021    K 3.9 03/28/2021    K 4.0 03/25/2021    CL 98 03/28/2021    CO2 28 03/28/2021    BUN 16 03/28/2021    LABALBU 3.7 03/25/2021    CREATININE 1.1 03/28/2021    CALCIUM 9.3 03/28/2021    GFRAA 57 03/28/2021    LABGLOM 47 03/28/2021     Magnesium:    Lab Results   Component Value Date    MG 2.2 03/28/2021     Cardiac Enzymes:   Lab Results   Component Value Date    TROPONINI <0.01 03/25/2021    TROPONINI <0.01 12/21/2019    TROPONINI <0.01 12/17/2019      PT/INR:    Lab Results   Component Value Date    PROTIME 17.9 12/05/2019    INR 1.6 12/05/2019     TSH:    Lab Results   Component Value Date    TSH 0.841 03/26/2021     Rhythm Strip: atrial fibrillation. Echo Summary 3/26/2021:   Normal left ventricular chamber size. Moderate global hypokinesis, LV EF 30%. Mild left ventricular concentric hypertrophy noted. Indeterminate LV diastolic function. diastolic function. Left atrium is enlarged. Increased left atrial volume. Interatrial septum not well visualized but appears intact. Normal right ventricle size and function. Right atrium is enlarged. Mitral annular calcification is present. There is mild mitral regurgitation. No mitral valve prolapse. The aortic valve appears mildly sclerotic. No hemodynamically significant aortic stenosis. There is mild to moderate tricuspid regurgitation. Moderate pulmonary HTN, RVSP 54mmHg. Normal aortic root size. No evidence of pericardial effusion. No intra cardiac mass or thrombus. Compared to prior echo 12/2019 - EF is decreased, and Pulmonary HTN, MR are new.     ASSESSMENT & PLAN:    Patient Active Problem List   Diagnosis    Primary osteoarthritis of both knees    Acute respiratory failure with hypoxia ([address]    Sepsis due to pneumonia ([address]    JOVANI (acute kidney injury) ([address]    Elevated brain natriuretic peptide (BNP) level    Acute metabolic encephalopathy    Community acquired pneumonia of left lung    Hypoxia    Sepsis due to Streptococcus pneumoniae with acute hypoxic respiratory failure and septic shock (HCC)    Atrial fibrillation with RVR (HCC)    Gram-positive cocci bacteremia    Other contact with sriram day    [facility]    Hypertension    Acute on chronic combined systolic and diastolic CHF (congestive heart failure) (HCC)    Atrial fibrillation with rapid ventricular response (HCC)    Bilateral lower extremity edema     1. Acute on chronic HFrEF:     Change to toprol to aid HR control. Change to ARB with intent of going to entresto after wash out of ACEI. Start aldactone. Continue bumex. 2. AFib: Decision made to treat as chronic. Refine control. Eliquis. 3. HTN: Observe. 4. DM: Per primary service. Pantera Calvert D.O.   Cardiologist  Cardiology, [address] [facility]

## 2021-03-28 NOTE — PROGRESS NOTES
[address] [place], [place] [address]   Progress Note    Admitting Date and Time: 3/25/2021  7:03 PM  Admit Dx: Acute diastolic heart failure ([address] [I50.31]    Subjective:    Patient seen and examined. She was concerned that she will miss her second covid shot tomorrow. She remains on a Cardizem Drip. ROS: denies fever, chills, n/v, HA unless stated above.      metoprolol succinate  50 mg Oral BID    [START ON 3/29/2021] losartan  25 mg Oral Daily    spironolactone  25 mg Oral Daily    vitamin D  50,000 Units Oral Weekly    apixaban  5 mg Oral BID    fluticasone  1 spray Each Nostril Daily    gabapentin  600 mg Oral 4x Daily AC & HS    levalbuterol  0.31 mg Nebulization Q8H    pantoprazole  40 mg Oral QAM AC    sodium chloride flush  10 mL Intravenous 2 times per day    bumetanide  1 mg Intravenous BID     glycerin moisturizing mouth spray, 2 spray, PRN  sodium chloride flush, 10 mL, PRN  promethazine, 12.5 mg, Q6H PRN    Or  ondansetron, 4 mg, Q6H PRN  polyethylene glycol, 17 g, Daily PRN  acetaminophen, 650 mg, Q6H PRN    Or  acetaminophen, 650 mg, Q6H PRN  perflutren lipid microspheres, 1.5 mL, ONCE PRN  oxyCODONE-acetaminophen, 1 tablet, Q6H PRN    And  oxyCODONE, 2.5 mg, Q6H PRN         Objective:    /79   Pulse 110   Temp 97.6 °F (36.4 °C) (Oral)   Resp 18   Ht 5' 4\" (1.626 m)   Wt 167 lb 3.2 oz (75.8 kg)   SpO2 94%   BMI 28.70 kg/m²   General Appearance: alert and oriented to person, place and time and in no acute distress  Skin: warm and dry  Head: normocephalic and atraumatic  Eyes: pupils equal, round, and reactive to light, conjunctivae normal  Neck: neck supple and non tender without mass   Pulmonary/Chest: diminished bilaterally, no respiratory distress  Cardiovascular: irregularly irregular  Abdomen: soft, non-tender, distended, anasarca, normal bowel sounds, no masses or organomegaly  Extremities: no cyanosis, no clubbing and 3+ bilateral lower extermtiy edema Neurologic: no cranial nerve deficit and speech normal      Recent Labs     03/26/21  0618 03/27/21  0646 03/28/21  0552    142 139   K 4.1 3.6 3.9    103 98   CO2 25 28 28   BUN 16 14 16   CREATININE 1.0 1.0 1.1*   GLUCOSE 110* 98 127*   CALCIUM 8.9 8.9 9.3       Recent Labs     03/25/21 1946   ALKPHOS 139*   PROT 7.1   LABALBU 3.7   BILITOT 0.4   AST 19   ALT 9       Recent Labs     03/25/21 1946 03/27/21  0646 03/28/21  0552   WBC 8.5 8.2 9.6   RBC 4.05 4.14 4.47   HGB 11.4* 11.7 12.5   HCT 37.7 37.7 40.6   MCV 93.1 91.1 90.8   MCH 28.1 28.3 28.0   MCHC 30.2* 31.0* 30.8*   RDW 13.6 13.7 13.7    284 308   MPV 10.3 10.2 10.3           Radiology:   US DUP LOWER EXTREMITIES BILATERAL VENOUS   Final Result   No evidence of DVT in either lower extremity. XR CHEST 1 VIEW   Final Result   Early CHF. Assessment:  Principal Problem:    Acute diastolic heart failure (HCC)  Active Problems:    Atrial fibrillation with RVR (HCC)    Hypertension    Acute on chronic combined systolic and diastolic CHF (congestive heart failure) (HCC)    Atrial fibrillation with rapid ventricular response (HCC)    Bilateral lower extremity edema  Resolved Problems:    * No resolved hospital problems. *      Plan:  1. Acute decompensation of combined systolic and diastolic heart failure:  Cardiology following. Echo revealed an EF of 30% (previously 55-60% 12/6/19) and indeterminate diastolic dysfunction. Cardiology changed to an ARB so that Entresto can be started after wash out ACEi. Continue IV Bumex. Started on Aldactone. I&O's, daily weights  She is negative 9.2 liters. 2. A fib with RVR:  She remains on a Cardizem drip. Continue Eliquis. Coreg stopped by Cardiology and Metoprolol started today. 3. Hypertension:  Lisinopril stopped today and losartan started.    4. History of diabetes:  Hemoglobin A1C is 5.6.   5. Chronic pain/sciatica: Patient reports that she follows with outpatient pain management. Continue prn Percocet. 6. Patient reported history of a thyroid and parathyroid nodule:  Patient relays that she is to have surgery for thyroid and parathyroid nodules. 7. Vitamin D deficiency:  Vitamin D level is 6. Continue supplementation. NOTE: This report was transcribed using voice recognition software. Every effort was made to ensure accuracy; however, inadvertent computerized transcription errors may be present.      Electronically signed by MEJIA Khan CNP on 3/28/2021 at 12:07 PM

## 2021-03-29 PROBLEM — I50.43 ACUTE ON CHRONIC COMBINED SYSTOLIC AND DIASTOLIC HEART FAILURE (HCC): Status: ACTIVE | Noted: 2021-03-25

## 2021-03-29 LAB
ANION GAP SERPL CALCULATED.3IONS-SCNC: 11 MMOL/L (ref 7–16)
BUN BLDV-MCNC: 19 MG/DL (ref 8–23)
CALCIUM SERPL-MCNC: 9.1 MG/DL (ref 8.6–10.2)
CHLORIDE BLD-SCNC: 97 MMOL/L (ref 98–107)
CO2: 31 MMOL/L (ref 22–29)
CREAT SERPL-MCNC: 1.1 MG/DL (ref 0.5–1)
GFR AFRICAN AMERICAN: 57
GFR NON-AFRICAN AMERICAN: 47 ML/MIN/1.73
GLUCOSE BLD-MCNC: 132 MG/DL (ref 74–99)
HCT VFR BLD CALC: 42.4 % (ref 34–48)
HEMOGLOBIN: 12.9 G/DL (ref 11.5–15.5)
MAGNESIUM: 2 MG/DL (ref 1.6–2.6)
MCH RBC QN AUTO: 27.6 PG (ref 26–35)
MCHC RBC AUTO-ENTMCNC: 30.4 % (ref 32–34.5)
MCV RBC AUTO: 90.8 FL (ref 80–99.9)
PDW BLD-RTO: 14 FL (ref 11.5–15)
PHOSPHORUS: 4 MG/DL (ref 2.5–4.5)
PLATELET # BLD: 315 E9/L (ref 130–450)
PMV BLD AUTO: 10 FL (ref 7–12)
POTASSIUM SERPL-SCNC: 3.4 MMOL/L (ref 3.5–5)
RBC # BLD: 4.67 E12/L (ref 3.5–5.5)
SODIUM BLD-SCNC: 139 MMOL/L (ref 132–146)
WBC # BLD: 10.3 E9/L (ref 4.5–11.5)

## 2021-03-29 PROCEDURE — 2580000003 HC RX 258: Performed by: INTERNAL MEDICINE

## 2021-03-29 PROCEDURE — 94640 AIRWAY INHALATION TREATMENT: CPT

## 2021-03-29 PROCEDURE — 85027 COMPLETE CBC AUTOMATED: CPT

## 2021-03-29 PROCEDURE — 2500000003 HC RX 250 WO HCPCS: Performed by: INTERNAL MEDICINE

## 2021-03-29 PROCEDURE — 6370000000 HC RX 637 (ALT 250 FOR IP): Performed by: STUDENT IN AN ORGANIZED HEALTH CARE EDUCATION/TRAINING PROGRAM

## 2021-03-29 PROCEDURE — 99232 SBSQ HOSP IP/OBS MODERATE 35: CPT | Performed by: INTERNAL MEDICINE

## 2021-03-29 PROCEDURE — 97110 THERAPEUTIC EXERCISES: CPT | Performed by: PHYSICAL THERAPIST

## 2021-03-29 PROCEDURE — 97165 OT EVAL LOW COMPLEX 30 MIN: CPT

## 2021-03-29 PROCEDURE — 2060000000 HC ICU INTERMEDIATE R&B

## 2021-03-29 PROCEDURE — 83735 ASSAY OF MAGNESIUM: CPT

## 2021-03-29 PROCEDURE — 6370000000 HC RX 637 (ALT 250 FOR IP): Performed by: INTERNAL MEDICINE

## 2021-03-29 PROCEDURE — 97161 PT EVAL LOW COMPLEX 20 MIN: CPT | Performed by: PHYSICAL THERAPIST

## 2021-03-29 PROCEDURE — 36415 COLL VENOUS BLD VENIPUNCTURE: CPT

## 2021-03-29 PROCEDURE — 6360000002 HC RX W HCPCS: Performed by: INTERNAL MEDICINE

## 2021-03-29 PROCEDURE — 84100 ASSAY OF PHOSPHORUS: CPT

## 2021-03-29 PROCEDURE — APPSS30 APP SPLIT SHARED TIME 16-30 MINUTES: Performed by: NURSE PRACTITIONER

## 2021-03-29 PROCEDURE — 97530 THERAPEUTIC ACTIVITIES: CPT | Performed by: PHYSICAL THERAPIST

## 2021-03-29 PROCEDURE — 99232 SBSQ HOSP IP/OBS MODERATE 35: CPT | Performed by: STUDENT IN AN ORGANIZED HEALTH CARE EDUCATION/TRAINING PROGRAM

## 2021-03-29 PROCEDURE — 80048 BASIC METABOLIC PNL TOTAL CA: CPT

## 2021-03-29 RX ORDER — POTASSIUM CHLORIDE 20 MEQ/1
40 TABLET, EXTENDED RELEASE ORAL ONCE
Status: COMPLETED | OUTPATIENT
Start: 2021-03-29 | End: 2021-03-29

## 2021-03-29 RX ORDER — METOPROLOL SUCCINATE 50 MG/1
100 TABLET, EXTENDED RELEASE ORAL 2 TIMES DAILY
Status: DISCONTINUED | OUTPATIENT
Start: 2021-03-29 | End: 2021-03-31 | Stop reason: HOSPADM

## 2021-03-29 RX ORDER — BUMETANIDE 1 MG/1
1 TABLET ORAL DAILY
Status: DISCONTINUED | OUTPATIENT
Start: 2021-03-29 | End: 2021-03-31 | Stop reason: HOSPADM

## 2021-03-29 RX ADMIN — BUMETANIDE 1 MG: 0.25 INJECTION INTRAMUSCULAR; INTRAVENOUS at 08:33

## 2021-03-29 RX ADMIN — POTASSIUM CHLORIDE 40 MEQ: 20 TABLET, EXTENDED RELEASE ORAL at 08:33

## 2021-03-29 RX ADMIN — METOPROLOL SUCCINATE 50 MG: 50 TABLET, EXTENDED RELEASE ORAL at 08:33

## 2021-03-29 RX ADMIN — GABAPENTIN 600 MG: 300 CAPSULE ORAL at 05:53

## 2021-03-29 RX ADMIN — OXYCODONE AND ACETAMINOPHEN 1 TABLET: 5; 325 TABLET ORAL at 23:45

## 2021-03-29 RX ADMIN — LEVALBUTEROL HYDROCHLORIDE 0.31 MG: 0.31 SOLUTION RESPIRATORY (INHALATION) at 07:37

## 2021-03-29 RX ADMIN — PANTOPRAZOLE SODIUM 40 MG: 40 TABLET, DELAYED RELEASE ORAL at 05:54

## 2021-03-29 RX ADMIN — OXYCODONE AND ACETAMINOPHEN 1 TABLET: 5; 325 TABLET ORAL at 08:32

## 2021-03-29 RX ADMIN — GABAPENTIN 600 MG: 300 CAPSULE ORAL at 17:20

## 2021-03-29 RX ADMIN — GABAPENTIN 600 MG: 300 CAPSULE ORAL at 11:24

## 2021-03-29 RX ADMIN — DILTIAZEM HYDROCHLORIDE 5 MG/HR: 5 INJECTION INTRAVENOUS at 20:37

## 2021-03-29 RX ADMIN — Medication 10 ML: at 08:33

## 2021-03-29 RX ADMIN — METOPROLOL SUCCINATE 100 MG: 50 TABLET, EXTENDED RELEASE ORAL at 20:33

## 2021-03-29 RX ADMIN — DILTIAZEM HYDROCHLORIDE 5 MG/HR: 5 INJECTION INTRAVENOUS at 11:24

## 2021-03-29 RX ADMIN — SPIRONOLACTONE 25 MG: 25 TABLET ORAL at 08:33

## 2021-03-29 RX ADMIN — LOSARTAN POTASSIUM 25 MG: 50 TABLET, FILM COATED ORAL at 08:33

## 2021-03-29 RX ADMIN — LEVALBUTEROL HYDROCHLORIDE 0.31 MG: 0.31 SOLUTION RESPIRATORY (INHALATION) at 14:41

## 2021-03-29 RX ADMIN — GABAPENTIN 600 MG: 300 CAPSULE ORAL at 20:33

## 2021-03-29 RX ADMIN — FLUTICASONE PROPIONATE 1 SPRAY: 50 SPRAY, METERED NASAL at 08:34

## 2021-03-29 RX ADMIN — Medication 10 ML: at 20:36

## 2021-03-29 RX ADMIN — APIXABAN 2.5 MG: 2.5 TABLET, FILM COATED ORAL at 20:33

## 2021-03-29 RX ADMIN — APIXABAN 5 MG: 5 TABLET, FILM COATED ORAL at 08:33

## 2021-03-29 ASSESSMENT — PAIN DESCRIPTION - FREQUENCY: FREQUENCY: CONTINUOUS

## 2021-03-29 ASSESSMENT — PAIN DESCRIPTION - PROGRESSION: CLINICAL_PROGRESSION: GRADUALLY WORSENING

## 2021-03-29 ASSESSMENT — PAIN DESCRIPTION - DESCRIPTORS: DESCRIPTORS: ACHING;DULL;DISCOMFORT

## 2021-03-29 ASSESSMENT — PAIN DESCRIPTION - ONSET: ONSET: ON-GOING

## 2021-03-29 ASSESSMENT — PAIN SCALES - GENERAL
PAINLEVEL_OUTOF10: 0
PAINLEVEL_OUTOF10: 0
PAINLEVEL_OUTOF10: 7
PAINLEVEL_OUTOF10: 6

## 2021-03-29 NOTE — PROGRESS NOTES
Occupational Therapy  OCCUPATIONAL THERAPY INITIAL EVALUATION      Date:3/29/2021  Patient Name: Klaus Pop  MRN: [id number]  : 1935  Room: [address] [place] [address]    Referring Provider: Mau Weston MD    Evaluating OT: Maryanne Snyder OTR/L [id number]    AM-PAC Daily Activity Raw Score:     Tenative Discharge Recommendations:  Home with support    Recommended Adaptive Equipment:  TBD     Diagnosis: Heart Failure    Pertinent Medical History: arthritic/painful B knees, LE swelling, chronic back pain   Precautions:  Falls, alarm      Home Living: Pt lives with , spit level home. To enter, curved staircase with 3 steps/ rail+ 2 large steps to porch + 1 step into house.    8 steps/rail to main level (bed/bath/kitchen/ family room), 6 steps/rail to basement     Bathroom setup: walk in shower with shower chair   Equipment owned: walker, cane, w/c      Prior Level of Function: Independent  with ADLs , assist as needed  with IADLs; ambulated with no device   Driving: yes    Pain Level: no pain this session ;   Cognition: alert, oriented x3 and conversing    Memory:  Good    Sequencing:  Good    Problem solving:  Fair   Judgement/safety:  Fair      Functional Assessment:   Initial Eval Status  Date: 3/29/21 Treatment Status  Date: STGs = LTGs  Time frame: 7-10 days   Feeding Independent      Grooming Set-up  Mod I   UB Dressing Set-up   Mod I    LB Dressing Min A  In long sitting able to don sock  Mod I    Bathing Min A  Mod I    Toileting Independent      Bed Mobility  SBA  Supine <> sit      Functional Transfers CGA/SBA  Sit-stand from bed   Mod I    Functional Mobility SBA,,w/walker  Ambulating in room   Mod I with good tolerance    Balance Sitting:     Static:  Independent     Dynamic:SBA   Standing: SBA   Independent   with good tolerance    Activity Tolerance Fair with light activity   Good with ADL activity    Visual/  Perceptual Glasses: reading                 Hand Dominance right    Strength ROM Additional Info:    RUE  WFL WFL good  and wfl FMC/dexterity noted during ADL tasks       LUE WFL  WFL good  and wfl FMC/dexterity noted during ADL tasks       Hearing: Jena  Sensation:  No c/o numbness or tingling   Tone: WFL     Comments: Upon arrival patient lying in bed . At end of session, patient returned to bed with call light and phone within reach, all lines and tubes intact. Overall patient demonstrated  decreased independence and safety during completion of ADL/functional transfer/mobility tasks. Pt would benefit from continued skilled OT to increase safety and independence with completion of ADL/IADL tasks for functional independence and quality of life.            Assessment of current deficits   Functional mobility [x]  ADLs [x] Strength [x]  Cognition []  Functional transfers  [x] IADLs [x] Safety Awareness [x]  Endurance [x]  Fine Motor Coordination [] Balance [x] Vision/perception [] Sensation []   Gross Motor Coordination [] ROM [] Delirium []                  Motor Control []       Plan of Care: 1-3 days/week for 1-2 weeks PRN   [x]ADL retraining/adapted techniques and AE recommendations to increase functional independence within precautions                    [x]Energy conservation techniques to improve tolerance for selfcare routine   [x]Functional transfer/mobility training/DME recommendations for increased independence, safety and fall prevention         [x]Patient/family education to increase safety and functional independence             [x]Environmental modifications for safe mobility and completion of ADLs                             []Cognitive retraining ex's to improve problem solving skills & safe participation in ADLs/IADLs     []Sensory re-education techniques to improve extremity awareness, maintain skin integrity and improve hand function                             []Visual/Perceptual retraining  to improve body awareness and safety during transfers and ADLs []Splinting/positioning needs to maintain joint/skin integrity and prevent contractures  [x]Therapeutic activity to improve functional performance during ADLs. [x]Therapeutic exercise to improve tolerance and functional strength for ADLs   [x]Balance retraining/tolerance tasks for facilitation of postural control with dynamic challenges during ADLs . []Neuromuscular re-education: facilitation of righting/equilibrium reactions, midline orientation, scapular stability/mobility, Normalization muscle     tone and facilitation active functional movement/Attention                         []Delirium prevention/treatment    [x]Positioning to improve functional independence  []Other:       Rehab Potential:  Good for established goals     Patient / Family Goal: return home       Patient and/or family were instructed on functional diagnosis, prognosis/goals and OT plan of care. Demonstrated fair  understanding. Eval Complexity: Low      Time In:1123  Time Out: 1147        Min Units   OT Eval Low 97165  x 1   OT Eval Medium 45851      OT Eval High 44719       OT Re-Eval [id number]       Therapeutic Ex 30494       Therapeutic Activities 89251       ADL/Self Care 42018       Orthotic Management 53532       Neuro Re-Ed 90828       Non-Billable Time          Evaluation Time includes thorough review of current medical information, gathering information on past medical history/social history and prior level of function, completion of standardized testing/informal observation of tasks, assessment of data and education on plan of care and goals.             Radha Torrez OTR/L [id number]

## 2021-03-29 NOTE — PROGRESS NOTES
Patient lives with spouse   in a [place] home  with 8 steps curved stairwell to enter with [place] [employer] [place] [employer] owned: Wheelchair, Cane and Otnabil Banks,       [address]   How much difficulty turning over in bed?: None  How much difficulty sitting down on / standing up from a chair with arms?: A Lot  How much difficulty moving from lying on back to sitting on side of bed?: A Little  How much help from another person moving to and from a bed to a chair?: A Lot  How much help from another person needed to walk in hospital room?: A Lot  How much help from another person for climbing 3-5 steps with a railing?: A Lot  AM-PAC Inpatient Mobility Raw Score : 15  AM-PAC Inpatient T-Scale Score : 39.45  Mobility Inpatient CMS 0-100% Score: 57.7  Mobility Inpatient CMS G-Code Modifier : CK    Nursing cleared patient for PT evaluation. The admitting diagnosis and active problem list as listed above have been reviewed prior to the initiation of this evaluation. OBJECTIVE;   Initial Evaluation  Date: 3/29/2021 Treatment Date:     Short Term/ Long Term   Goals   Was pt agreeable to Eval/treatment? Yes    To be met in 3 days   Pain level   10/10  left knee     Bed Mobility    Rolling: Independent    Supine to sit: Moderate assist of 1    Sit to supine: Minimal assist of 1    Scooting: Minimal assist of 1    Rolling: Independent    Supine to sit: Independent    Sit to supine: Independent    Scooting: Independent     Transfers Sit to stand:  Moderate assist of 1  With poor technique; resistant to instruction; fearful of falling  Sit to stand: Supervision      Ambulation    not assessed  However did ambulate with o.t. this am 20 feet wheeled walker    50 feet using  wheeled walker with Supervision     Stair negotiation: ascended and descended   Not assessed       3 steps with rail and cane min a; pt states she can crawl up steps if necessary   ROM Within functional limits with exception of bilateral knee lacking full extension and exhibits 60 deg flexion    Increase range of motion 10% of affected joints    Strength BUE:  refer to OT eval  RLE:  3/5  LLE:  3/5   Increase strength in affected mm groups by 1/3 grade   Balance Sitting EOB:  good    Dynamic Standing:  poor    Sitting EOB:  good    Dynamic Standing: fair       Patient is Alert & Oriented x person, place, time and situation and follows one step directions    Sensation:  Patient  reports numbness and tingling   bilateral lower extremities   Edema:  no   Endurance: poor tolerance; self limits mobility d/t pain       Patient education  Patient educated on role of Physical Therapy, risks of immobility, safety and plan of care and  importance of mobility while in hospital       Patient response to education:   Pt verbalized understanding Pt demonstrated skill Pt requires further education in this area   Yes Partial Yes      Treatment:  Patient practiced and was instructed/facilitated in the following treatment: Patient   Sat edge of bed 15 minutes with Supervision  to increase dynamic sitting balance and activity tolerance. Therapeutic Exercises:  ankle pumps and long arc quad  x 10 reps. At end of session, patient in bed with  call light and phone within reach,   all lines and tubes intact, nursing notified. Patient would benefit from continued skilled Physical Therapy to improve functional independence and quality of life. Patient's/ family goals   home        ASSESSMENT: Patient exhibits decreased strength, balance, endurance, range of motion and pain .l knee impairing functional mobility, transfers, gait , gait distance, tolerance to activity and participation are barriers to d/c and require skilled intervention during hospital stay   ; pt fearful of falling and pain. States patient is requesting  to bring in knee sleeves.   Patient able to ambulate this am 20 feet with o.t. however this pm, pt states unable to stand or walk with painful left knee, attempted and unable to stand and extend knee and wt bear. Plan of Care:     -Bed Mobility: Lower extremity exercises   -Standing Balance: Perform strengthening exercises in standing to promote motor control with or without upper extremity support   -Transfers: Provide instruction on proper hand and foot position for adequate transfer of weight onto lower extremities and use of gait device, Cues for hand placement, technique and safety and Provide stabilization to prevent fall   -Gait: Gait training, Standing activities to improve: base of support, weight shift, weight bearing  and Exercises to improve hip and knee control   -Endurance: Utilize Supervised activities to increase level of endurance to allow for safe functional mobility including transfers and gait   -Stairs: Stair training with instruction on proper technique and hand placement on rail    Patient and or family understand(s) diagnosis, prognosis, and plan of care. Frequency of treatments: Patient will be seen  daily. Time in  208  Time out  241    Total Treatment Time  23 minutes    Evaluation time includes thorough review of current medical information, gathering information on past medical history/social history and prior level of function, completion of standardized testing/informal observation of tasks, assessment of data, and development of Plan of care and goals.      CPT codes:  Low Complexity PT evaluation (27528)  Therapeutic activities (90727)   13 minutes  1 unit(s)  Therapeutic exercises (05483)   10 minutes  1 unit(s)    Jacek Hinton, PT

## 2021-03-29 NOTE — CARE COORDINATION
SS NOTE: NO COVID TEST. Pt continues on a cardiazem drip. PT/OT are seeing pt and she is up with 1 assist.  Pt has a f/u appt with a NP- Pernell Awad at [facility] for April 15th. Pt plans on returning home with her  after dch. SS to continue. Mandy Cox. 3/29/2021. 4:07 PM.

## 2021-03-29 NOTE — PROGRESS NOTES
injection 1.65 mg  1.5 mL Intravenous ONCE PRN Shirley Barksdale MD        oxyCODONE-acetaminophen (PERCOCET) 5-325 MG per tablet 1 tablet  1 tablet Oral Q6H PRN Shirley Barksdale MD   1 tablet at 03/29/21 0243    And    oxyCODONE (ROXICODONE) immediate release tablet 2.5 mg  2.5 mg Oral Q6H PRN Shirley Barksdale MD   2.5 mg at 03/27/21 1033      dilTIAZem (CARDIZEM) 125 mg in dextrose 5% 125 mL infusion 5 mg/hr (03/29/21 1124)       Physical Exam:  /79   Pulse 90   Temp 97.5 °F (36.4 °C) (Axillary)   Resp 14   Ht 5' 4\" (1.626 m)   Wt 166 lb (75.3 kg)   SpO2 95%   BMI 28.49 kg/m²   Wt Readings from Last 3 Encounters:   03/29/21 166 lb (75.3 kg)   03/25/21 165 lb (74.8 kg)   10/16/20 168 lb (76.2 kg)     Appearance: Awake, alert, no acute respiratory distress  Skin: Intact, no rash  Head: Normocephalic, atraumatic  Neck: Supple, no elevated JVP, no carotid bruits  Lungs: Clear to auscultation bilaterally. No wheezes, rales, or rhonchi. Cardiac: Irregular, systolic murmurs apparent  Abdomen: Soft, nontender, +bowel sounds  Extremities: Moves all extremities x 4, no lower extremity edema  Neurologic: No focal motor deficits apparent, normal mood and affect  Peripheral Pulses: Intact posterior tibial pulses bilaterally    Intake/Output:    Intake/Output Summary (Last 24 hours) at 3/29/2021 1621  Last data filed at 3/29/2021 1318  Gross per 24 hour   Intake 720 ml   Output 2500 ml   Net -1780 ml     No intake/output data recorded. Laboratory Tests:  Recent Labs     03/27/21  0646 03/28/21  0552 03/29/21  0521    139 139   K 3.6 3.9 3.4*    98 97*   CO2 28 28 31*   BUN 14 16 19   CREATININE 1.0 1.1* 1.1*   GLUCOSE 98 127* 132*   CALCIUM 8.9 9.3 9.1     Lab Results   Component Value Date    MG 2.0 03/29/2021     No results for input(s): ALKPHOS, ALT, AST, PROT, BILITOT, BILIDIR, LABALBU in the last 72 hours.   Recent Labs     03/27/21  0646 03/28/21  0552 03/29/21  0521   WBC 8.2 9.6 10.3   RBC 4.14 4. 47 4.67   HGB 11.7 12.5 12.9   HCT 37.7 40.6 42.4   MCV 91.1 90.8 90.8   MCH 28.3 28.0 27.6   MCHC 31.0* 30.8* 30.4*   RDW 13.7 13.7 14.0    308 315   MPV 10.2 10.3 10.0     Lab Results   Component Value Date    TROPONINI <0.01 03/25/2021    TROPONINI <0.01 12/21/2019    TROPONINI <0.01 12/17/2019     Lab Results   Component Value Date    INR 1.6 12/05/2019    PROTIME 17.9 (H) 12/05/2019     Lab Results   Component Value Date    TSH 0.841 03/26/2021     Lab Results   Component Value Date    LABA1C 5.6 03/26/2021     No results found for: EAG  Lab Results   Component Value Date    CHOL 146 03/26/2021    CHOL 230 (H) 09/14/2015     Lab Results   Component Value Date    TRIG 86 03/26/2021    TRIG 126 09/14/2015     Lab Results   Component Value Date    HDL 43 03/26/2021    HDL 57 09/14/2015     Lab Results   Component Value Date    LDLCALC 86 03/26/2021    LDLCALC 148 (H) 09/14/2015     Lab Results   Component Value Date    LABVLDL 17 03/26/2021    LABVLDL 25 09/14/2015     No results found for: CHOLHDLRATIO  Recent Labs     03/28/21  0552   PROBNP 1,276*       ASSESSMENT / PLAN:  54-year-old female with history of atrial fibrillation presents with congestive heart failure found to have new drop in ejection fraction 30%. Since admission, heart rate better controlled and she was started on heart failure guideline directed medical therapy. Recommendations  N.p.o. after midnight. Nuclear stress imaging tomorrow to exclude coronary artery disease. Patient looks euvolemic. Switch to oral Bumex. Continue spironolactone, losartan. Uptitrate metoprolol. Continue diltiazem drip for now. Decrease apixaban dosage.     Giovanni Ace MD  [facility]) Cardiology

## 2021-03-29 NOTE — PROGRESS NOTES
[address] [place], [place] [address]ist   Progress Note    Admitting Date and Time: 3/25/2021  7:03 PM  Admit Dx: Acute diastolic heart failure (HCC) [I50.31]    Subjective:    Pt seen while visiting with . Patient complains that she has intermittent dizziness but this is not new. Patient also complains of right knee pain, worse with ambulating        ROS: denies fever, chills, cp, sob, n/v, HA unless stated above.      potassium chloride  40 mEq Oral Once    metoprolol succinate  50 mg Oral BID    losartan  25 mg Oral Daily    spironolactone  25 mg Oral Daily    vitamin D  50,000 Units Oral Weekly    apixaban  5 mg Oral BID    fluticasone  1 spray Each Nostril Daily    gabapentin  600 mg Oral 4x Daily AC & HS    levalbuterol  0.31 mg Nebulization Q8H    pantoprazole  40 mg Oral QAM AC    sodium chloride flush  10 mL Intravenous 2 times per day    bumetanide  1 mg Intravenous BID     glycerin moisturizing mouth spray, 2 spray, PRN  sodium chloride flush, 10 mL, PRN  promethazine, 12.5 mg, Q6H PRN    Or  ondansetron, 4 mg, Q6H PRN  polyethylene glycol, 17 g, Daily PRN  acetaminophen, 650 mg, Q6H PRN    Or  acetaminophen, 650 mg, Q6H PRN  perflutren lipid microspheres, 1.5 mL, ONCE PRN  oxyCODONE-acetaminophen, 1 tablet, Q6H PRN    And  oxyCODONE, 2.5 mg, Q6H PRN         Objective:    /67   Pulse 97   Temp 98 °F (36.7 °C) (Axillary)   Resp 16   Ht 5' 4\" (1.626 m)   Wt 166 lb (75.3 kg)   SpO2 94%   BMI 28.49 kg/m²   General Appearance: alert and oriented to person, place and time and in no acute distress  Skin: warm and dry  Head: normocephalic and atraumatic  Eyes: pupils equal, round, and reactive to light, extraocular eye movements intact, conjunctivae normal  Neck: neck supple and non tender without mass   Pulmonary/Chest: clear to auscultation bilaterally- no wheezes, rales or rhonchi, normal air movement, no respiratory distress  Cardiovascular: normal rate, normal S1 and S2 and no carotid bruits  Abdomen: soft, non-tender, non-distended, normal bowel sounds, no masses or organomegaly  Extremities: no cyanosis, no clubbing and no edema  Neurologic: no cranial nerve deficit and speech normal      Recent Labs     03/27/21  0646 03/28/21  0552 03/29/21  0521    139 139   K 3.6 3.9 3.4*    98 97*   CO2 28 28 31*   BUN 14 16 19   CREATININE 1.0 1.1* 1.1*   GLUCOSE 98 127* 132*   CALCIUM 8.9 9.3 9.1       No results for input(s): ALKPHOS, PROT, LABALBU, BILITOT, AST, ALT in the last 72 hours. Recent Labs     03/27/21  0646 03/28/21  0552 03/29/21  0521   WBC 8.2 9.6 10.3   RBC 4.14 4.47 4.67   HGB 11.7 12.5 12.9   HCT 37.7 40.6 42.4   MCV 91.1 90.8 90.8   MCH 28.3 28.0 27.6   MCHC 31.0* 30.8* 30.4*   RDW 13.7 13.7 14.0    308 315   MPV 10.2 10.3 10.0         Radiology:   US DUP LOWER EXTREMITIES BILATERAL VENOUS   Final Result   No evidence of DVT in either lower extremity. XR CHEST 1 VIEW   Final Result   Early CHF. Assessment:  Principal Problem:    Acute diastolic heart failure (HCC)  Active Problems:    Atrial fibrillation with RVR (HCC)    Hypertension    Acute on chronic combined systolic and diastolic CHF (congestive heart failure) (HCC)    Atrial fibrillation with rapid ventricular response (HCC)    Bilateral lower extremity edema  Resolved Problems:    * No resolved hospital problems. *      Plan:  1. Acute on chronic combined systolic and diastolic heart failure - decompensation - breathing easy - echocardiogram completed EF 30% - cardiology following - medications changes have been done per cardiology - IV diuresing with Bumex - strict I&O - will weigh daily     2. Atrial fibrillation with RVR - Cardizem drip - anticoagulation - rate better controlled - cardiology following - BB continued     3. Hypokalemia - oral K-dur     4. Hypertension - blood pressure remains well controlled and stable     5.  Type 2 diabetes mellitus - controlled as evident by A1c 5.6    6. Bilateral lower extremity edema - edema improving     7. Chronic sciatica pain prn Percocet - PT/OT     NOTE: This report was transcribed using voice recognition software. Every effort was made to ensure accuracy; however, inadvertent computerized transcription errors may be present.      Electronically signed by MEJIA Kahn CNP on 3/29/2021 at 8:18 AM

## 2021-03-30 ENCOUNTER — APPOINTMENT (OUTPATIENT)
Dept: NON INVASIVE DIAGNOSTICS | Age: 86
DRG: 293 | End: 2021-03-30
Payer: MEDICARE

## 2021-03-30 ENCOUNTER — APPOINTMENT (OUTPATIENT)
Dept: NUCLEAR MEDICINE | Age: 86
DRG: 293 | End: 2021-03-30
Payer: MEDICARE

## 2021-03-30 LAB
ANION GAP SERPL CALCULATED.3IONS-SCNC: 10 MMOL/L (ref 7–16)
BUN BLDV-MCNC: 21 MG/DL (ref 8–23)
CALCIUM SERPL-MCNC: 9 MG/DL (ref 8.6–10.2)
CHLORIDE BLD-SCNC: 96 MMOL/L (ref 98–107)
CO2: 30 MMOL/L (ref 22–29)
CREAT SERPL-MCNC: 1.3 MG/DL (ref 0.5–1)
GFR AFRICAN AMERICAN: 47
GFR NON-AFRICAN AMERICAN: 39 ML/MIN/1.73
GLUCOSE BLD-MCNC: 115 MG/DL (ref 74–99)
HCT VFR BLD CALC: 42.8 % (ref 34–48)
HEMOGLOBIN: 13.4 G/DL (ref 11.5–15.5)
LV EF: 55 %
LVEF MODALITY: NORMAL
MAGNESIUM: 2.1 MG/DL (ref 1.6–2.6)
MCH RBC QN AUTO: 28.2 PG (ref 26–35)
MCHC RBC AUTO-ENTMCNC: 31.3 % (ref 32–34.5)
MCV RBC AUTO: 90.1 FL (ref 80–99.9)
METER GLUCOSE: 125 MG/DL (ref 74–99)
PDW BLD-RTO: 13.8 FL (ref 11.5–15)
PLATELET # BLD: 342 E9/L (ref 130–450)
PMV BLD AUTO: 10.2 FL (ref 7–12)
POTASSIUM SERPL-SCNC: 3.8 MMOL/L (ref 3.5–5)
RBC # BLD: 4.75 E12/L (ref 3.5–5.5)
SODIUM BLD-SCNC: 136 MMOL/L (ref 132–146)
WBC # BLD: 10.7 E9/L (ref 4.5–11.5)

## 2021-03-30 PROCEDURE — APPSS30 APP SPLIT SHARED TIME 16-30 MINUTES: Performed by: NURSE PRACTITIONER

## 2021-03-30 PROCEDURE — 6370000000 HC RX 637 (ALT 250 FOR IP): Performed by: STUDENT IN AN ORGANIZED HEALTH CARE EDUCATION/TRAINING PROGRAM

## 2021-03-30 PROCEDURE — 85027 COMPLETE CBC AUTOMATED: CPT

## 2021-03-30 PROCEDURE — 99232 SBSQ HOSP IP/OBS MODERATE 35: CPT | Performed by: INTERNAL MEDICINE

## 2021-03-30 PROCEDURE — 6370000000 HC RX 637 (ALT 250 FOR IP): Performed by: INTERNAL MEDICINE

## 2021-03-30 PROCEDURE — 36415 COLL VENOUS BLD VENIPUNCTURE: CPT

## 2021-03-30 PROCEDURE — 6360000002 HC RX W HCPCS: Performed by: INTERNAL MEDICINE

## 2021-03-30 PROCEDURE — 93018 CV STRESS TEST I&R ONLY: CPT | Performed by: STUDENT IN AN ORGANIZED HEALTH CARE EDUCATION/TRAINING PROGRAM

## 2021-03-30 PROCEDURE — 83735 ASSAY OF MAGNESIUM: CPT

## 2021-03-30 PROCEDURE — 94640 AIRWAY INHALATION TREATMENT: CPT

## 2021-03-30 PROCEDURE — 2060000000 HC ICU INTERMEDIATE R&B

## 2021-03-30 PROCEDURE — 80048 BASIC METABOLIC PNL TOTAL CA: CPT

## 2021-03-30 PROCEDURE — 78452 HT MUSCLE IMAGE SPECT MULT: CPT | Performed by: INTERNAL MEDICINE

## 2021-03-30 PROCEDURE — 99232 SBSQ HOSP IP/OBS MODERATE 35: CPT | Performed by: STUDENT IN AN ORGANIZED HEALTH CARE EDUCATION/TRAINING PROGRAM

## 2021-03-30 PROCEDURE — 3430000000 HC RX DIAGNOSTIC RADIOPHARMACEUTICAL: Performed by: RADIOLOGY

## 2021-03-30 PROCEDURE — A9500 TC99M SESTAMIBI: HCPCS | Performed by: RADIOLOGY

## 2021-03-30 PROCEDURE — 93016 CV STRESS TEST SUPVJ ONLY: CPT | Performed by: STUDENT IN AN ORGANIZED HEALTH CARE EDUCATION/TRAINING PROGRAM

## 2021-03-30 PROCEDURE — 82962 GLUCOSE BLOOD TEST: CPT

## 2021-03-30 PROCEDURE — 93017 CV STRESS TEST TRACING ONLY: CPT

## 2021-03-30 PROCEDURE — 78452 HT MUSCLE IMAGE SPECT MULT: CPT

## 2021-03-30 PROCEDURE — 2580000003 HC RX 258: Performed by: INTERNAL MEDICINE

## 2021-03-30 PROCEDURE — 6360000002 HC RX W HCPCS: Performed by: STUDENT IN AN ORGANIZED HEALTH CARE EDUCATION/TRAINING PROGRAM

## 2021-03-30 RX ORDER — DILTIAZEM HYDROCHLORIDE 120 MG/1
120 CAPSULE, COATED, EXTENDED RELEASE ORAL DAILY
Status: DISCONTINUED | OUTPATIENT
Start: 2021-03-31 | End: 2021-03-31 | Stop reason: HOSPADM

## 2021-03-30 RX ADMIN — Medication 30 MILLICURIE: at 10:54

## 2021-03-30 RX ADMIN — PANTOPRAZOLE SODIUM 40 MG: 40 TABLET, DELAYED RELEASE ORAL at 06:48

## 2021-03-30 RX ADMIN — METOPROLOL SUCCINATE 100 MG: 50 TABLET, EXTENDED RELEASE ORAL at 11:42

## 2021-03-30 RX ADMIN — APIXABAN 2.5 MG: 2.5 TABLET, FILM COATED ORAL at 21:27

## 2021-03-30 RX ADMIN — OXYCODONE AND ACETAMINOPHEN 1 TABLET: 5; 325 TABLET ORAL at 21:27

## 2021-03-30 RX ADMIN — BUMETANIDE 1 MG: 1 TABLET ORAL at 11:41

## 2021-03-30 RX ADMIN — REGADENOSON 0.4 MG: 0.08 INJECTION, SOLUTION INTRAVENOUS at 10:50

## 2021-03-30 RX ADMIN — FLUTICASONE PROPIONATE 1 SPRAY: 50 SPRAY, METERED NASAL at 11:42

## 2021-03-30 RX ADMIN — Medication 10 ML: at 21:27

## 2021-03-30 RX ADMIN — OXYCODONE AND ACETAMINOPHEN 1 TABLET: 5; 325 TABLET ORAL at 11:41

## 2021-03-30 RX ADMIN — Medication 10 MILLICURIE: at 09:06

## 2021-03-30 RX ADMIN — Medication 10 ML: at 11:42

## 2021-03-30 RX ADMIN — GABAPENTIN 600 MG: 300 CAPSULE ORAL at 11:42

## 2021-03-30 RX ADMIN — GABAPENTIN 600 MG: 300 CAPSULE ORAL at 17:43

## 2021-03-30 RX ADMIN — GABAPENTIN 600 MG: 300 CAPSULE ORAL at 06:48

## 2021-03-30 RX ADMIN — SPIRONOLACTONE 25 MG: 25 TABLET ORAL at 11:42

## 2021-03-30 RX ADMIN — METOPROLOL SUCCINATE 100 MG: 50 TABLET, EXTENDED RELEASE ORAL at 21:27

## 2021-03-30 RX ADMIN — LEVALBUTEROL HYDROCHLORIDE 0.31 MG: 0.31 SOLUTION RESPIRATORY (INHALATION) at 06:58

## 2021-03-30 RX ADMIN — APIXABAN 2.5 MG: 2.5 TABLET, FILM COATED ORAL at 11:41

## 2021-03-30 RX ADMIN — GABAPENTIN 600 MG: 300 CAPSULE ORAL at 21:27

## 2021-03-30 ASSESSMENT — PAIN SCALES - GENERAL
PAINLEVEL_OUTOF10: 4
PAINLEVEL_OUTOF10: 4
PAINLEVEL_OUTOF10: 0
PAINLEVEL_OUTOF10: 0

## 2021-03-30 ASSESSMENT — PAIN DESCRIPTION - LOCATION: LOCATION: KNEE

## 2021-03-30 NOTE — CARE COORDINATION
SS NOTE: NO COVID TEST. Pt to have a Stress Test today. Pt had an ECHO yesterday. Pt cardiazem drip dced. Pt MAY need a Life Vest at [facility]- will awaitn orders. Pt has a f/u appt with a NP- Christine Wilder at [facility] for April 15th. Pt plans on returning home with her  after dch. SS to continue. Saima Cox. 3/30/2021. 9:39PM.

## 2021-03-30 NOTE — PLAN OF CARE
Problem: OXYGENATION/RESPIRATORY FUNCTION  Goal: Patient will maintain patent airway  Outcome: Met This Shift  Goal: Patient will achieve/maintain normal respiratory rate/effort  Description: Respiratory rate and effort will be within normal limits for the patient  Outcome: Met This Shift     Problem: HEMODYNAMIC STATUS  Goal: Patient has stable vital signs and fluid balance  Outcome: Met This Shift     Problem: FLUID AND ELECTROLYTE IMBALANCE  Goal: Fluid and electrolyte balance are achieved/maintained  Outcome: Met This Shift     Problem: ACTIVITY INTOLERANCE/IMPAIRED MOBILITY  Goal: Mobility/activity is maintained at optimum level for patient  Outcome: Met This Shift

## 2021-03-30 NOTE — PROGRESS NOTES
[address] [place], [place] [address]ist   Progress Note    Admitting Date and Time: 3/25/2021  7:03 PM  Admit Dx: Acute diastolic heart failure (HCC) [I50.31]    Subjective:    Pt. voiced concerns regarding having some blood in her carrillo catheter. Patient denies any complaints of chest pain or shortness of breath. Patient and  questions have been addressed. ROS: denies fever, chills, cp, sob, n/v, HA unless stated above.      [START ON 3/31/2021] dilTIAZem  120 mg Oral Daily    bumetanide  1 mg Oral Daily    metoprolol succinate  100 mg Oral BID    apixaban  2.5 mg Oral BID    spironolactone  25 mg Oral Daily    vitamin D  50,000 Units Oral Weekly    fluticasone  1 spray Each Nostril Daily    gabapentin  600 mg Oral 4x Daily AC & HS    levalbuterol  0.31 mg Nebulization Q8H    pantoprazole  40 mg Oral QAM AC    sodium chloride flush  10 mL Intravenous 2 times per day     glycerin moisturizing mouth spray, 2 spray, PRN  sodium chloride flush, 10 mL, PRN  promethazine, 12.5 mg, Q6H PRN    Or  ondansetron, 4 mg, Q6H PRN  polyethylene glycol, 17 g, Daily PRN  acetaminophen, 650 mg, Q6H PRN    Or  acetaminophen, 650 mg, Q6H PRN  perflutren lipid microspheres, 1.5 mL, ONCE PRN  oxyCODONE-acetaminophen, 1 tablet, Q6H PRN    And  oxyCODONE, 2.5 mg, Q6H PRN         Objective:    /72   Pulse 101   Temp 98.7 °F (37.1 °C) (Oral)   Resp 20   Ht 5' 4\" (1.626 m)   Wt 166 lb (75.3 kg)   SpO2 96%   BMI 28.49 kg/m²   General Appearance: alert and oriented to person, place and time and in no acute distress  Skin: warm and dry  Head: normocephalic and atraumatic  Eyes: pupils equal, round, and reactive to light, extraocular eye movements intact, conjunctivae normal  Neck: neck supple and non tender without mass   Pulmonary/Chest: clear to auscultation bilaterally- no wheezes, rales or rhonchi, normal air movement, no respiratory distress  Cardiovascular: normal rate, normal S1 and S2 and no carotid bruits  Abdomen: soft, non-tender, non-distended, normal bowel sounds, no masses or organomegaly  Extremities: no cyanosis, no clubbing and no edema  Neurologic: no cranial nerve deficit and speech normal      Recent Labs     03/28/21  0552 03/29/21  0521 03/30/21  0719    139 136   K 3.9 3.4* 3.8   CL 98 97* 96*   CO2 28 31* 30*   BUN 16 19 21   CREATININE 1.1* 1.1* 1.3*   GLUCOSE 127* 132* 115*   CALCIUM 9.3 9.1 9.0       No results for input(s): ALKPHOS, PROT, LABALBU, BILITOT, AST, ALT in the last 72 hours. Recent Labs     03/28/21 0552 03/29/21  0521 03/30/21  0719   WBC 9.6 10.3 10.7   RBC 4.47 4.67 4.75   HGB 12.5 12.9 13.4   HCT 40.6 42.4 42.8   MCV 90.8 90.8 90.1   MCH 28.0 27.6 28.2   MCHC 30.8* 30.4* 31.3*   RDW 13.7 14.0 13.8    315 342   MPV 10.3 10.0 10.2        Radiology:   US DUP LOWER EXTREMITIES BILATERAL VENOUS   Final Result   No evidence of DVT in either lower extremity. XR CHEST 1 VIEW   Final Result   Early CHF. Stress/Rest NM Myocardial SPECT Exercise or RX    (Results Pending)       Assessment:  Principal Problem:    Acute on chronic combined systolic and diastolic CHF (congestive heart failure) ([facility])  Active Problems:    Atrial fibrillation with RVR ([facility])    Acute on chronic combined systolic and diastolic heart failure ([facility])    Hypertension    Atrial fibrillation with rapid ventricular response (HCC)    Bilateral lower extremity edema  Resolved Problems:    * No resolved hospital problems. *      Plan:    1. Acute on chronic combined systolic and diastolic heart failure - decompensation - breathing easy - echocardiogram completed EF 30% - cardiology on - medications changes have been done per cardiology - now on oral Bumex and Aldactone continued  - strict I&O - stress test completed - weigh daily      2.  Atrial fibrillation with RVR - Cardizem drip off and switched to oral - low dose Eliquis - rate better controlled - cardiology following - BB continued    3. Hypokalemia - resolved      4. Hypertension - well controlled and stable      5. Type 2 diabetes mellitus - controlled as evident by A1c 5.6 - no changes made to regimen     6. Bilateral lower extremity edema - slowly improving      7. Chronic sciatica pain - prn Percocet - PT/OT       NOTE: This report was transcribed using voice recognition software. Every effort was made to ensure accuracy; however, inadvertent computerized transcription errors may be present.      Electronically signed by MEJIA French CNP on 3/30/2021 at 1:13 PM

## 2021-03-30 NOTE — PROGRESS NOTES
INPATIENT CARDIOLOGY FOLLOW-UP    Name: Jaqueline Kothari    Age: 80 y.o. Date of Admission: 3/25/2021  7:03 PM    Date of Service: 3/30/2021    Interim History:  HR still uncontrolled. Review of Systems:   Cardiac: As per HPI  General: No fever, chills  Pulmonary: As per HPI  HEENT: No visual disturbances, difficult swallowing  GI: No nausea, vomiting  Endocrine: No thyroid disease or DM  Musculoskeletal: JACKSON x 4, no focal motor deficits  Skin: Intact, no rashes  Neuro/Psych: No headache or seizures    Problem List:  Patient Active Problem List   Diagnosis    Primary osteoarthritis of both knees    Acute respiratory failure with hypoxia ([address]    Sepsis due to pneumonia ([address]    JOVANI (acute kidney injury) ([address]    Elevated brain natriuretic peptide (BNP) level    Acute metabolic encephalopathy    Community acquired pneumonia of left lung    Hypoxia    Sepsis due to Streptococcus pneumoniae with acute hypoxic respiratory failure and septic shock (HCC)    Atrial fibrillation with RVR (HCC)    Gram-positive cocci bacteremia    Other contact with raccoon, sequela    Acute on chronic combined systolic and diastolic heart failure ([address]    Hypertension    Acute on chronic combined systolic and diastolic CHF (congestive heart failure) ([address]    Atrial fibrillation with rapid ventricular response (HCC)    Bilateral lower extremity edema       Allergies:   Allergies   Allergen Reactions    Influenza Vaccines Other (See Comments)     Patient states had paralytic experience    Alginate [Alginic Acid]     Novocain [Procaine] Other (See Comments)     \"Rapid Heart Beat\"     Tape [Adhesive Tape] Other (See Comments)     \"Sensitive Skin\"        Current Medications:  Current Facility-Administered Medications   Medication Dose Route Frequency Provider Last Rate Last Admin    [START ON 3/31/2021] dilTIAZem (CARDIZEM CD) extended release capsule 120 mg  120 mg Oral Daily Anthony Cervantes MD        bumetanide (BUMEX) tablet 1 mg  1 mg Oral Daily Elke Huffman MD        metoprolol succinate (TOPROL XL) extended release tablet 100 mg  100 mg Oral BID Elke Huffman MD   100 mg at 03/29/21 2033    apixaban (ELIQUIS) tablet 2.5 mg  2.5 mg Oral BID Elke Huffman MD   2.5 mg at 03/29/21 2033    spironolactone (ALDACTONE) tablet 25 mg  25 mg Oral Daily Candace Lewis DO   25 mg at 03/29/21 1441    vitamin D (ERGOCALCIFEROL) capsule 50,000 Units  50,000 Units Oral Weekly Erika Martinez APRN - CNP   50,000 Units at 03/27/21 1400    glycerin moisturizing mouth spray (OASIS) 35 % 2 spray  2 spray Oral PRN Luisa Omalley MD        fluticasone (FLONASE) 50 MCG/ACT nasal spray 1 spray  1 spray Each Nostril Daily Luisa Omalley MD   1 spray at 03/29/21 0834    gabapentin (NEURONTIN) capsule 600 mg  600 mg Oral 4x Daily AC & HS Luisa Omalley MD   600 mg at 03/30/21 0648    levalbuterol (XOPENEX) nebulization 0.31 mg  0.31 mg Nebulization Q8H Luisa Omalley MD   0.31 mg at 03/30/21 0658    pantoprazole (PROTONIX) tablet 40 mg  40 mg Oral QAM AC Luisa Omalley MD   40 mg at 03/30/21 4994    sodium chloride flush 0.9 % injection 10 mL  10 mL Intravenous 2 times per day Luisa Omalley MD   10 mL at 03/29/21 2036    sodium chloride flush 0.9 % injection 10 mL  10 mL Intravenous PRN Luisa Omalley MD        promethazine (PHENERGAN) tablet 12.5 mg  12.5 mg Oral Q6H PRN Luisa Omalley MD        Or    ondansetron [facility]) injection 4 mg  4 mg Intravenous Q6H PRN Luisa Omalley MD        polyethylene glycol (GLYCOLAX) packet 17 g  17 g Oral Daily PRN Luisa Omalley MD        acetaminophen (TYLENOL) tablet 650 mg  650 mg Oral Q6H PRN Luisa Omalley MD        Or    acetaminophen (TYLENOL) suppository 650 mg  650 mg Rectal Q6H PRN Luisa Omalley MD        perflutren lipid microspheres (DEFINITY) injection 1.65 mg  1.5 mL Intravenous ONCE PRN Luisa Omalley MD        oxyCODONE-acetaminophen (PERCOCET) 5-325 MG per tablet 1 tablet  1 tablet Oral Q6H PRN Yisel Rene MD   1 tablet at 03/29/21 2345    And    oxyCODONE (ROXICODONE) immediate release tablet 2.5 mg  2.5 mg Oral Q6H PRN Yisel Rene MD   2.5 mg at 03/27/21 1033         Physical Exam:  /76   Pulse 100   Temp 99.1 °F (37.3 °C) (Oral)   Resp 16   Ht 5' 4\" (1.626 m)   Wt 166 lb (75.3 kg)   SpO2 94%   BMI 28.49 kg/m²   Wt Readings from Last 3 Encounters:   03/29/21 166 lb (75.3 kg)   03/25/21 165 lb (74.8 kg)   10/16/20 168 lb (76.2 kg)     Appearance: Awake, alert, no acute respiratory distress  Skin: Intact, no rash  Head: Normocephalic, atraumatic  Neck: Supple, no elevated JVP, no carotid bruits  Lungs: Clear to auscultation bilaterally. No wheezes, rales, or rhonchi. Cardiac: Irregular, systolic murmurs apparent  Abdomen: Soft, nontender, +bowel sounds  Extremities: Moves all extremities x 4, no lower extremity edema  Neurologic: No focal motor deficits apparent, normal mood and affect  Peripheral Pulses: Intact posterior tibial pulses bilaterally    Intake/Output:    Intake/Output Summary (Last 24 hours) at 3/30/2021 1055  Last data filed at 3/30/2021 0646  Gross per 24 hour   Intake 420 ml   Output 1350 ml   Net -930 ml     No intake/output data recorded. Laboratory Tests:  Recent Labs     03/28/21  0552 03/29/21  0521 03/30/21  0719    139 136   K 3.9 3.4* 3.8   CL 98 97* 96*   CO2 28 31* 30*   BUN 16 19 21   CREATININE 1.1* 1.1* 1.3*   GLUCOSE 127* 132* 115*   CALCIUM 9.3 9.1 9.0     Lab Results   Component Value Date    MG 2.1 03/30/2021     No results for input(s): ALKPHOS, ALT, AST, PROT, BILITOT, BILIDIR, LABALBU in the last 72 hours.   Recent Labs     03/28/21  0552 03/29/21  0521 03/30/21  0719   WBC 9.6 10.3 10.7   RBC 4.47 4.67 4.75   HGB 12.5 12.9 13.4   HCT 40.6 42.4 42.8   MCV 90.8 90.8 90.1   MCH 28.0 27.6 28.2   MCHC 30.8* 30.4* 31.3*   RDW 13.7 14.0 13.8    315 342   MPV 10.3 10.0 10.2     Lab Results   Component Value Date TROPONINI <0.01 03/25/2021    TROPONINI <0.01 12/21/2019    TROPONINI <0.01 12/17/2019     Lab Results   Component Value Date    INR 1.6 12/05/2019    PROTIME 17.9 (H) 12/05/2019     Lab Results   Component Value Date    TSH 0.841 03/26/2021     Lab Results   Component Value Date    LABA1C 5.6 03/26/2021     No results found for: EAG  Lab Results   Component Value Date    CHOL 146 03/26/2021    CHOL 230 (H) 09/14/2015     Lab Results   Component Value Date    TRIG 86 03/26/2021    TRIG 126 09/14/2015     Lab Results   Component Value Date    HDL 43 03/26/2021    HDL 57 09/14/2015     Lab Results   Component Value Date    LDLCALC 86 03/26/2021    LDLCALC 148 (H) 09/14/2015     Lab Results   Component Value Date    LABVLDL 17 03/26/2021    LABVLDL 25 09/14/2015     No results found for: [address]     03/28/21  0552   PROBNP 1,276*       ASSESSMENT / PLAN:  80-year-old female with history of atrial fibrillation presents with congestive heart failure found to have new drop in ejection fraction 30%. Since admission, heart rate better controlled and she was started on heart failure guideline directed medical therapy. Recommendations  Nuclear stress imaging today to exclude coronary artery disease. Continue spironolactone and oral bumex. Continue metoprolol. Discontinue losartan in order to allow BP room for diltiazem. Continue apixaban.     Jazmín Erickson MD  [facility]) Cardiology

## 2021-03-31 VITALS
HEART RATE: 102 BPM | RESPIRATION RATE: 18 BRPM | DIASTOLIC BLOOD PRESSURE: 65 MMHG | WEIGHT: 163.4 LBS | BODY MASS INDEX: 27.9 KG/M2 | TEMPERATURE: 98 F | OXYGEN SATURATION: 93 % | HEIGHT: 64 IN | SYSTOLIC BLOOD PRESSURE: 121 MMHG

## 2021-03-31 LAB
ANION GAP SERPL CALCULATED.3IONS-SCNC: 10 MMOL/L (ref 7–16)
BUN BLDV-MCNC: 25 MG/DL (ref 8–23)
CALCIUM SERPL-MCNC: 9.1 MG/DL (ref 8.6–10.2)
CHLORIDE BLD-SCNC: 99 MMOL/L (ref 98–107)
CO2: 29 MMOL/L (ref 22–29)
CREAT SERPL-MCNC: 1.3 MG/DL (ref 0.5–1)
GFR AFRICAN AMERICAN: 47
GFR NON-AFRICAN AMERICAN: 39 ML/MIN/1.73
GLUCOSE BLD-MCNC: 126 MG/DL (ref 74–99)
HCT VFR BLD CALC: 45.1 % (ref 34–48)
HEMOGLOBIN: 14.2 G/DL (ref 11.5–15.5)
MAGNESIUM: 2.2 MG/DL (ref 1.6–2.6)
MCH RBC QN AUTO: 28.2 PG (ref 26–35)
MCHC RBC AUTO-ENTMCNC: 31.5 % (ref 32–34.5)
MCV RBC AUTO: 89.7 FL (ref 80–99.9)
PDW BLD-RTO: 13.9 FL (ref 11.5–15)
PLATELET # BLD: 339 E9/L (ref 130–450)
PMV BLD AUTO: 10.1 FL (ref 7–12)
POTASSIUM SERPL-SCNC: 4.1 MMOL/L (ref 3.5–5)
PRO-BNP: 1344 PG/ML (ref 0–450)
RBC # BLD: 5.03 E12/L (ref 3.5–5.5)
SODIUM BLD-SCNC: 138 MMOL/L (ref 132–146)
WBC # BLD: 10.8 E9/L (ref 4.5–11.5)

## 2021-03-31 PROCEDURE — 80048 BASIC METABOLIC PNL TOTAL CA: CPT

## 2021-03-31 PROCEDURE — 36415 COLL VENOUS BLD VENIPUNCTURE: CPT

## 2021-03-31 PROCEDURE — 6370000000 HC RX 637 (ALT 250 FOR IP): Performed by: STUDENT IN AN ORGANIZED HEALTH CARE EDUCATION/TRAINING PROGRAM

## 2021-03-31 PROCEDURE — APPSS45 APP SPLIT SHARED TIME 31-45 MINUTES: Performed by: NURSE PRACTITIONER

## 2021-03-31 PROCEDURE — 2580000003 HC RX 258: Performed by: INTERNAL MEDICINE

## 2021-03-31 PROCEDURE — 6360000002 HC RX W HCPCS: Performed by: INTERNAL MEDICINE

## 2021-03-31 PROCEDURE — 99239 HOSP IP/OBS DSCHRG MGMT >30: CPT | Performed by: INTERNAL MEDICINE

## 2021-03-31 PROCEDURE — 85027 COMPLETE CBC AUTOMATED: CPT

## 2021-03-31 PROCEDURE — 94640 AIRWAY INHALATION TREATMENT: CPT

## 2021-03-31 PROCEDURE — 6370000000 HC RX 637 (ALT 250 FOR IP): Performed by: INTERNAL MEDICINE

## 2021-03-31 PROCEDURE — 83880 ASSAY OF NATRIURETIC PEPTIDE: CPT

## 2021-03-31 PROCEDURE — 83735 ASSAY OF MAGNESIUM: CPT

## 2021-03-31 RX ORDER — BUMETANIDE 1 MG/1
1 TABLET ORAL DAILY
Qty: 30 TABLET | Refills: 0 | Status: SHIPPED | OUTPATIENT
Start: 2021-04-01 | End: 2021-04-15 | Stop reason: SDUPTHER

## 2021-03-31 RX ORDER — SPIRONOLACTONE 25 MG/1
25 TABLET ORAL DAILY
Qty: 30 TABLET | Refills: 0 | Status: SHIPPED | OUTPATIENT
Start: 2021-04-01 | End: 2021-04-15 | Stop reason: SDUPTHER

## 2021-03-31 RX ORDER — DILTIAZEM HYDROCHLORIDE 120 MG/1
120 CAPSULE, COATED, EXTENDED RELEASE ORAL DAILY
Qty: 30 CAPSULE | Refills: 0 | Status: SHIPPED | OUTPATIENT
Start: 2021-04-01 | End: 2021-04-15 | Stop reason: SDUPTHER

## 2021-03-31 RX ORDER — METOPROLOL SUCCINATE 100 MG/1
100 TABLET, EXTENDED RELEASE ORAL 2 TIMES DAILY
Qty: 30 TABLET | Refills: 0 | Status: SHIPPED | OUTPATIENT
Start: 2021-03-31 | End: 2021-04-15 | Stop reason: SDUPTHER

## 2021-03-31 RX ORDER — ERGOCALCIFEROL 1.25 MG/1
50000 CAPSULE ORAL WEEKLY
Qty: 5 CAPSULE | Refills: 0 | Status: SHIPPED | OUTPATIENT
Start: 2021-04-03 | End: 2021-05-18

## 2021-03-31 RX ORDER — PANTOPRAZOLE SODIUM 40 MG/1
40 TABLET, DELAYED RELEASE ORAL
Qty: 30 TABLET | Refills: 0 | Status: SHIPPED | OUTPATIENT
Start: 2021-03-31 | End: 2021-08-18

## 2021-03-31 RX ADMIN — LEVALBUTEROL HYDROCHLORIDE 0.31 MG: 0.31 SOLUTION RESPIRATORY (INHALATION) at 06:11

## 2021-03-31 RX ADMIN — Medication 10 ML: at 08:26

## 2021-03-31 RX ADMIN — PANTOPRAZOLE SODIUM 40 MG: 40 TABLET, DELAYED RELEASE ORAL at 06:36

## 2021-03-31 RX ADMIN — GABAPENTIN 600 MG: 300 CAPSULE ORAL at 11:35

## 2021-03-31 RX ADMIN — GABAPENTIN 600 MG: 300 CAPSULE ORAL at 06:36

## 2021-03-31 RX ADMIN — DILTIAZEM HYDROCHLORIDE 120 MG: 120 CAPSULE, COATED, EXTENDED RELEASE ORAL at 08:26

## 2021-03-31 RX ADMIN — FLUTICASONE PROPIONATE 1 SPRAY: 50 SPRAY, METERED NASAL at 08:29

## 2021-03-31 RX ADMIN — BUMETANIDE 1 MG: 1 TABLET ORAL at 08:26

## 2021-03-31 RX ADMIN — METOPROLOL SUCCINATE 100 MG: 50 TABLET, EXTENDED RELEASE ORAL at 08:25

## 2021-03-31 RX ADMIN — ACETAMINOPHEN 650 MG: 325 TABLET, FILM COATED ORAL at 08:26

## 2021-03-31 RX ADMIN — SPIRONOLACTONE 25 MG: 25 TABLET ORAL at 08:25

## 2021-03-31 RX ADMIN — LEVALBUTEROL HYDROCHLORIDE 0.31 MG: 0.31 SOLUTION RESPIRATORY (INHALATION) at 13:22

## 2021-03-31 RX ADMIN — APIXABAN 2.5 MG: 2.5 TABLET, FILM COATED ORAL at 08:26

## 2021-03-31 ASSESSMENT — PAIN DESCRIPTION - FREQUENCY: FREQUENCY: INTERMITTENT

## 2021-03-31 ASSESSMENT — PAIN DESCRIPTION - ORIENTATION: ORIENTATION: RIGHT;LEFT

## 2021-03-31 NOTE — CARE COORDINATION
SS NOTE: NO COVID TEST. Possible dch today as long a Cardiology agrees. Pt has a f/u appt with a NP- Jacky Greenfield at [facility] for April 15th. Pt plans on returning home with her  after dch. SS to continue. Kim Cox. 3/31/2021. 12:48PM.

## 2021-03-31 NOTE — PLAN OF CARE
Problem: ACTIVITY INTOLERANCE/IMPAIRED MOBILITY  Goal: Mobility/activity is maintained at optimum level for patient  3/31/2021 1055 by Sheryle Mage, RN  Outcome: Ongoing  3/30/2021 2213 by Abhijeet Florentino RN  Outcome: Met This Shift

## 2021-03-31 NOTE — PROGRESS NOTES
Message sent to Dr. Karoline Devries to check if patient is able to be discharge from their stand point. Patient able to be discharged from cardio standpoint.

## 2021-03-31 NOTE — DISCHARGE SUMMARY
[facility] Physician Discharge Summary       Scott Gonzalez MD  [address] [place]  [phone]    Schedule an appointment as soon as possible for a visit in 2 weeks  Call to schedule post hospital follow-up appointment    [address] [facility]  [address] [facility] [address] [phone]  Schedule an appointment as soon as possible for a visit in 1 week  Call to schedule follow-up appointment    Julio Cesar [place], APRN  [address] [facility] [address] [phone]    Schedule an appointment as soon as possible for a visit in 1 week  Call for post hospital follow-up appointment      Activity level: As Tolerated    Diet: DIET LOW SODIUM 2 GM; 1800 ml    Dispo:Home    Condition at discharge: Stable         Patient ID:  Abibmola Juarez  [id number]  80 y.o.  1935    Admit date: 3/25/2021    Discharge date and time:  3/31/2021  12:52 PM    Admission Diagnoses: Principal Problem:    Acute on chronic combined systolic and diastolic CHF (congestive heart failure) ([address]  Active Problems:    Atrial fibrillation with RVR (HCC)    Acute on chronic combined systolic and diastolic heart failure ([address]    Hypertension    Atrial fibrillation with rapid ventricular response ([address]    Bilateral lower extremity edema  Resolved Problems:    * No resolved hospital problems. *      Discharge Diagnoses: Principal Problem:    Acute on chronic combined systolic and diastolic CHF (congestive heart failure) (HCC)  Active Problems:    Atrial fibrillation with RVR (HCC)    Acute on chronic combined systolic and diastolic heart failure (HCC)    Hypertension    Atrial fibrillation with rapid ventricular response (HCC)    Bilateral lower extremity edema  Resolved Problems:    * No resolved hospital problems.  *      Consults:  IP CONSULT TO HEART FAILURE NURSE/COORDINATOR  IP CONSULT TO DIETITIAN  IP CONSULT TO CARDIOLOGY  IP CONSULT TO HEART FAILURE risk myocardial perfusion study. Patient has remained chest pain free rated is controlled. Patient is stable therefore she will be discharged home with the following medications and instructions.     Discharge Exam:  Vitals:    03/30/21 0800 03/30/21 2122 03/31/21 0640 03/31/21 0745   BP: 128/72 113/69  121/65   Pulse: 101 108  102   Resp: 20 18  18   Temp: 98.7 °F (37.1 °C) 97.8 °F (36.6 °C)  98 °F (36.7 °C)   TempSrc: Oral Oral  Oral   SpO2: 96%   93%   Weight:   163 lb 6.4 oz (74.1 kg)    Height:           General Appearance: alert and oriented to person, place and time, well-developed and well-nourished, in no acute distress  Skin: warm and dry  Head: normocephalic and atraumatic  Eyes: pupils equal, round, and reactive to light and conjunctivae normal  ENT: hearing abnormal-a little hard of hearing  Neck: neck supple and non tender without mass   Pulmonary/Chest: clear to auscultation bilaterally- no wheezes, rales or rhonchi, normal air movement, no respiratory distress  Cardiovascular: normal rate, intact distal pulses and irregularly irregular rhythm noted  Abdomen: soft, non-tender, non-distended, normal bowel sounds, no masses or organomegaly  Extremities: no cyanosis, no clubbing and no edema  Musculoskeletal: normal range of motion, no joint swelling, deformity or tenderness  Neurologic: no cranial nerve deficit, gait and coordination normal and speech normal    I/O last 3 completed shifts:  In: -   Out: 1400 [Urine:1400]  I/O this shift:  In: 120 [P.O.:120]  Out: 600 [Urine:600]      LABS:  Recent Labs     03/29/21  0521 03/30/21  0719 03/31/21  0532    136 138   K 3.4* 3.8 4.1   CL 97* 96* 99   CO2 31* 30* 29   BUN 19 21 25*   CREATININE 1.1* 1.3* 1.3*   GLUCOSE 132* 115* 126*   CALCIUM 9.1 9.0 9.1       Recent Labs     03/29/21  0521 03/30/21  0719 03/31/21  0532   WBC 10.3 10.7 10.8   RBC 4.67 4.75 5.03   HGB 12.9 13.4 14.2   HCT 42.4 42.8 45.1   MCV 90.8 90.1 89.7   MCH 27.6 28.2 28.2   MCHC 30.4* 31.3* 31.5*   RDW 14.0 13.8 13.9    342 339   MPV 10.0 10.2 10.1       No results for input(s): POCGLU in the last 72 hours. Imaging:   Stress/Rest NM Myocardial SPECT Exercise or RX   Final Result      1. The myocardial perfusion is normal.   2. No evidence of stress-induced ischemia or prior myocardial   infarction. 3. Normal LV systolic function, EF 37% with septal and apical   hypokinesis. 4. There is no transient ischemic dilation. 5. Low risk myocardial perfusion study. US DUP LOWER EXTREMITIES BILATERAL VENOUS   Final Result   No evidence of DVT in either lower extremity. XR CHEST 1 VIEW   Final Result   Early CHF. Patient Instructions:      Medication List      START taking these medications    bumetanide 1 MG tablet  Commonly known as: BUMEX  Take 1 tablet by mouth daily  Start taking on: April 1, 2021     dilTIAZem 120 MG extended release capsule  Commonly known as: CARDIZEM CD  Take 1 capsule by mouth daily  Start taking on: April 1, 2021     metoprolol succinate 100 MG extended release tablet  Commonly known as: TOPROL XL  Take 1 tablet by mouth 2 times daily     spironolactone 25 MG tablet  Commonly known as: ALDACTONE  Take 1 tablet by mouth daily  Start taking on: April 1, 2021     vitamin D 1.25 MG (18186 UT) Caps capsule  Commonly known as: ERGOCALCIFEROL  Take 1 capsule by mouth once a week  Start taking on: April 3, 2021        CHANGE how you take these medications    * apixaban 5 MG Tabs tablet  Commonly known as: ELIQUIS  Take 1 tablet by mouth 2 times daily  What changed: Another medication with the same name was added. Make sure you understand how and when to take each. * apixaban 2.5 MG Tabs tablet  Commonly known as: ELIQUIS  Take 1 tablet by mouth 2 times daily  What changed: You were already taking a medication with the same name, and this prescription was added. Make sure you understand how and when to take each.          * This list MEJIA Delacruz - CNP on 3/31/2021 at 12:52 PM    NOTE: This report was transcribed using voice recognition software. Every effort was made to ensure accuracy; however, inadvertent computerized transcription errors may be present.

## 2021-04-02 ENCOUNTER — TELEPHONE (OUTPATIENT)
Dept: ADMINISTRATIVE | Age: 86
End: 2021-04-02

## 2021-04-02 NOTE — TELEPHONE ENCOUNTER
Pts spouse,Jimmy, called requesting his wife to est with you as PCP. She was admitted to [address] [place] [address],[address] 3/25-3/31. Per discharged, would need to be seen within 30 days. She has an appt sched with Candelaria Walker, NP that was sched through hospital, but again requesting your services. Would you be willing to accept as NP at this time?

## 2021-04-05 ENCOUNTER — TELEPHONE (OUTPATIENT)
Dept: CARDIOLOGY CLINIC | Age: 86
End: 2021-04-05

## 2021-04-05 NOTE — TELEPHONE ENCOUNTER
Spoke to patient and  about scheduling a post hospital CHF follow up.   They will see their PCP next week and if they feel she needs to be seen, they will call

## 2021-04-13 ENCOUNTER — TELEPHONE (OUTPATIENT)
Dept: CARDIOLOGY CLINIC | Age: 86
End: 2021-04-13

## 2021-04-14 NOTE — TELEPHONE ENCOUNTER
Pt said she doesn't see Dr. Tatum Charles, but saw him in the hospital about a year ago. She isn't interested in coming at this time. She is looking for a new family doctor and will come back if they tell her too.

## 2021-04-15 ENCOUNTER — OFFICE VISIT (OUTPATIENT)
Dept: FAMILY MEDICINE CLINIC | Age: 86
End: 2021-04-15
Payer: MEDICARE

## 2021-04-15 VITALS
RESPIRATION RATE: 18 BRPM | SYSTOLIC BLOOD PRESSURE: 106 MMHG | TEMPERATURE: 97.6 F | HEART RATE: 80 BPM | OXYGEN SATURATION: 96 % | HEIGHT: 64 IN | DIASTOLIC BLOOD PRESSURE: 68 MMHG | BODY MASS INDEX: 27.83 KG/M2 | WEIGHT: 163 LBS

## 2021-04-15 DIAGNOSIS — I50.43 ACUTE ON CHRONIC COMBINED SYSTOLIC AND DIASTOLIC CHF (CONGESTIVE HEART FAILURE) (HCC): ICD-10-CM

## 2021-04-15 DIAGNOSIS — I48.91 ATRIAL FIBRILLATION WITH RAPID VENTRICULAR RESPONSE (HCC): ICD-10-CM

## 2021-04-15 DIAGNOSIS — E55.9 VITAMIN D DEFICIENCY: ICD-10-CM

## 2021-04-15 DIAGNOSIS — H90.3 SENSORINEURAL HEARING LOSS (SNHL) OF BOTH EARS: ICD-10-CM

## 2021-04-15 DIAGNOSIS — I10 ESSENTIAL HYPERTENSION: ICD-10-CM

## 2021-04-15 DIAGNOSIS — Z76.89 ENCOUNTER TO ESTABLISH CARE: Primary | ICD-10-CM

## 2021-04-15 PROBLEM — R91.8 MULTIPLE NODULES OF LUNG: Status: ACTIVE | Noted: 2021-04-15

## 2021-04-15 PROBLEM — R79.89 ELEVATED BRAIN NATRIURETIC PEPTIDE (BNP) LEVEL: Status: RESOLVED | Noted: 2019-12-05 | Resolved: 2021-04-15

## 2021-04-15 PROBLEM — M81.0 AGE-RELATED OSTEOPOROSIS WITHOUT CURRENT PATHOLOGICAL FRACTURE: Status: ACTIVE | Noted: 2019-04-30

## 2021-04-15 PROBLEM — E11.9 TYPE 2 DIABETES MELLITUS (HCC): Status: RESOLVED | Noted: 2021-04-15 | Resolved: 2021-04-15

## 2021-04-15 PROBLEM — M48.061 SPINAL STENOSIS, LUMBAR REGION WITHOUT NEUROGENIC CLAUDICATION: Status: ACTIVE | Noted: 2019-08-07

## 2021-04-15 PROBLEM — K43.9 HERNIA OF ANTERIOR ABDOMINAL WALL: Status: ACTIVE | Noted: 2021-04-15

## 2021-04-15 PROBLEM — N17.9 AKI (ACUTE KIDNEY INJURY) (HCC): Status: RESOLVED | Noted: 2019-12-05 | Resolved: 2021-04-15

## 2021-04-15 PROBLEM — K21.9 GASTRO-ESOPHAGEAL REFLUX DISEASE WITHOUT ESOPHAGITIS: Status: ACTIVE | Noted: 2021-04-15

## 2021-04-15 PROBLEM — G93.41 ACUTE METABOLIC ENCEPHALOPATHY: Status: RESOLVED | Noted: 2019-12-05 | Resolved: 2021-04-15

## 2021-04-15 PROBLEM — H35.30 MACULAR DEGENERATION: Status: ACTIVE | Noted: 2021-04-15

## 2021-04-15 PROBLEM — M47.816 SPONDYLOSIS WITHOUT MYELOPATHY OR RADICULOPATHY, LUMBAR REGION: Status: ACTIVE | Noted: 2019-08-07

## 2021-04-15 PROBLEM — E11.9 TYPE 2 DIABETES MELLITUS (HCC): Status: ACTIVE | Noted: 2021-04-15

## 2021-04-15 PROBLEM — M71.21 SYNOVIAL CYST OF RIGHT KNEE: Status: ACTIVE | Noted: 2021-04-15

## 2021-04-15 PROBLEM — J18.9 SEPSIS DUE TO PNEUMONIA (HCC): Status: RESOLVED | Noted: 2019-12-05 | Resolved: 2021-04-15

## 2021-04-15 PROBLEM — R94.4 RENAL FUNCTION TEST ABNORMAL: Status: ACTIVE | Noted: 2021-04-15

## 2021-04-15 PROBLEM — M16.10 ARTHRITIS OF HIP: Status: ACTIVE | Noted: 2021-04-15

## 2021-04-15 PROBLEM — Z79.891 LONG TERM (CURRENT) USE OF OPIATE ANALGESIC: Status: ACTIVE | Noted: 2019-10-28

## 2021-04-15 PROBLEM — J96.01 ACUTE RESPIRATORY FAILURE WITH HYPOXIA (HCC): Status: RESOLVED | Noted: 2019-12-05 | Resolved: 2021-04-15

## 2021-04-15 PROBLEM — E04.2 NONTOXIC MULTINODULAR GOITER: Status: ACTIVE | Noted: 2019-04-30

## 2021-04-15 PROBLEM — I83.90 VARICOSE VEINS OF LOWER EXTREMITY: Status: ACTIVE | Noted: 2021-04-15

## 2021-04-15 PROBLEM — L57.0 SENILE HYPERKERATOSIS: Status: ACTIVE | Noted: 2021-04-15

## 2021-04-15 PROBLEM — K21.9 GASTRO-ESOPHAGEAL REFLUX DISEASE WITHOUT ESOPHAGITIS: Status: RESOLVED | Noted: 2021-04-15 | Resolved: 2021-04-15

## 2021-04-15 PROBLEM — E04.9 GOITER: Status: ACTIVE | Noted: 2021-04-15

## 2021-04-15 PROBLEM — A41.9 SEPSIS DUE TO PNEUMONIA (HCC): Status: RESOLVED | Noted: 2019-12-05 | Resolved: 2021-04-15

## 2021-04-15 LAB
ALBUMIN SERPL-MCNC: 4.1 G/DL (ref 3.5–5.2)
ALP BLD-CCNC: 112 U/L (ref 35–104)
ALT SERPL-CCNC: 8 U/L (ref 0–32)
ANION GAP SERPL CALCULATED.3IONS-SCNC: 10 MMOL/L (ref 7–16)
AST SERPL-CCNC: 19 U/L (ref 0–31)
BASOPHILS ABSOLUTE: 0.09 E9/L (ref 0–0.2)
BASOPHILS RELATIVE PERCENT: 0.9 % (ref 0–2)
BILIRUB SERPL-MCNC: 0.4 MG/DL (ref 0–1.2)
BUN BLDV-MCNC: 36 MG/DL (ref 8–23)
CALCIUM SERPL-MCNC: 9.7 MG/DL (ref 8.6–10.2)
CHLORIDE BLD-SCNC: 97 MMOL/L (ref 98–107)
CO2: 32 MMOL/L (ref 22–29)
CREAT SERPL-MCNC: 1.9 MG/DL (ref 0.5–1)
EOSINOPHILS ABSOLUTE: 0.32 E9/L (ref 0.05–0.5)
EOSINOPHILS RELATIVE PERCENT: 3.3 % (ref 0–6)
GFR AFRICAN AMERICAN: 30
GFR NON-AFRICAN AMERICAN: 25 ML/MIN/1.73
GLUCOSE BLD-MCNC: 114 MG/DL (ref 74–99)
HCT VFR BLD CALC: 44.1 % (ref 34–48)
HEMOGLOBIN: 13.8 G/DL (ref 11.5–15.5)
IMMATURE GRANULOCYTES #: 0.04 E9/L
IMMATURE GRANULOCYTES %: 0.4 % (ref 0–5)
LYMPHOCYTES ABSOLUTE: 2.31 E9/L (ref 1.5–4)
LYMPHOCYTES RELATIVE PERCENT: 24 % (ref 20–42)
MCH RBC QN AUTO: 29.9 PG (ref 26–35)
MCHC RBC AUTO-ENTMCNC: 31.3 % (ref 32–34.5)
MCV RBC AUTO: 95.5 FL (ref 80–99.9)
MONOCYTES ABSOLUTE: 1.25 E9/L (ref 0.1–0.95)
MONOCYTES RELATIVE PERCENT: 13 % (ref 2–12)
NEUTROPHILS ABSOLUTE: 5.6 E9/L (ref 1.8–7.3)
NEUTROPHILS RELATIVE PERCENT: 58.4 % (ref 43–80)
PDW BLD-RTO: 13.8 FL (ref 11.5–15)
PLATELET # BLD: 379 E9/L (ref 130–450)
PMV BLD AUTO: 10.9 FL (ref 7–12)
POTASSIUM SERPL-SCNC: 4.8 MMOL/L (ref 3.5–5)
RBC # BLD: 4.62 E12/L (ref 3.5–5.5)
SODIUM BLD-SCNC: 139 MMOL/L (ref 132–146)
TOTAL PROTEIN: 8 G/DL (ref 6.4–8.3)
WBC # BLD: 9.6 E9/L (ref 4.5–11.5)

## 2021-04-15 PROCEDURE — 99203 OFFICE O/P NEW LOW 30 MIN: CPT | Performed by: NURSE PRACTITIONER

## 2021-04-15 RX ORDER — METOPROLOL SUCCINATE 100 MG/1
100 TABLET, EXTENDED RELEASE ORAL 2 TIMES DAILY
Qty: 30 TABLET | Refills: 1 | Status: SHIPPED
Start: 2021-04-15 | End: 2021-05-17 | Stop reason: SDUPTHER

## 2021-04-15 RX ORDER — CARVEDILOL 6.25 MG/1
TABLET ORAL
COMMUNITY
Start: 2015-11-19 | End: 2021-04-15

## 2021-04-15 RX ORDER — BUMETANIDE 1 MG/1
1 TABLET ORAL DAILY
Qty: 30 TABLET | Refills: 1 | Status: SHIPPED
Start: 2021-04-15 | End: 2021-06-21

## 2021-04-15 RX ORDER — SPIRONOLACTONE 25 MG/1
25 TABLET ORAL DAILY
Qty: 30 TABLET | Refills: 1 | Status: SHIPPED
Start: 2021-04-15 | End: 2021-06-21

## 2021-04-15 RX ORDER — DILTIAZEM HYDROCHLORIDE 120 MG/1
120 CAPSULE, COATED, EXTENDED RELEASE ORAL DAILY
Qty: 30 CAPSULE | Refills: 1 | Status: SHIPPED
Start: 2021-04-15 | End: 2021-05-18

## 2021-04-15 SDOH — ECONOMIC STABILITY: TRANSPORTATION INSECURITY
IN THE PAST 12 MONTHS, HAS LACK OF TRANSPORTATION KEPT YOU FROM MEETINGS, WORK, OR FROM GETTING THINGS NEEDED FOR DAILY LIVING?: NO

## 2021-04-15 SDOH — ECONOMIC STABILITY: FOOD INSECURITY: WITHIN THE PAST 12 MONTHS, YOU WORRIED THAT YOUR FOOD WOULD RUN OUT BEFORE YOU GOT MONEY TO BUY MORE.: NEVER TRUE

## 2021-04-15 ASSESSMENT — ENCOUNTER SYMPTOMS
VOICE CHANGE: 0
SINUS PAIN: 0
SINUS PRESSURE: 0
COUGH: 0
CHEST TIGHTNESS: 0
VOMITING: 0
SORE THROAT: 0
DIARRHEA: 0
CONSTIPATION: 0
COLOR CHANGE: 0
ABDOMINAL PAIN: 0
BACK PAIN: 1
SHORTNESS OF BREATH: 0
TROUBLE SWALLOWING: 0
RHINORRHEA: 0
FACIAL SWELLING: 0
WHEEZING: 0
NAUSEA: 0

## 2021-04-15 ASSESSMENT — PATIENT HEALTH QUESTIONNAIRE - PHQ9
SUM OF ALL RESPONSES TO PHQ QUESTIONS 1-9: 0

## 2021-04-15 NOTE — ASSESSMENT & PLAN NOTE
Well-controlled, continue current treatment plan, lifestyle modifications recommended and medication adherence emphasized   -Discussed taking medication and directed every day. -Discussed exercising daily 30 minutes 5 times a week for 150 minutes weekly.  -Discussed weight reduction if needed.  -Discussed low sodium diet.  -Discussed limiting caffeine consumption and tobacco cessation and the effects they have on the heart and blood pressure.

## 2021-04-15 NOTE — PROGRESS NOTES
OFFICE PROGRESS NOTE  [address] [place] [address] Rd  [id number]  Dept: [phone]   Chief Complaint   Patient presents with   [facility].            ASSESSMENT/PLAN   1. Encounter to establish care  2. Acute on chronic combined systolic and diastolic CHF (congestive heart failure) ([facility] [address]  Assessment & Plan:   Asymptomatic,  continue current treatment. Reviewed CHF zone guidelines, discussed to weigh daily after using the restroom same time every day. Call if increased 3 - 5 pounds  Summary   Normal left ventricular chamber size. Moderate global hypokinesis, LV EF 30%. Mild left ventricular concentric hypertrophy noted. Indeterminate LV diastolic function. diastolic function. Left atrium is enlarged. Increased left atrial volume. Interatrial septum not well visualized but appears intact. Normal right ventricle size and function. Right atrium is enlarged. Mitral annular calcification is present. There is mild mitral regurgitation. No mitral valve prolapse. The aortic valve appears mildly sclerotic. No hemodynamically significant aortic stenosis. There is mild to moderate tricuspid regurgitation. Moderate pulmonary HTN, RVSP 54mmHg. Normal aortic root size. No evidence of pericardial effusion. No intra cardiac mass or thrombus. Compared to prior echo 12/2019 - EF is decreased, and Pulmonary HTN, MR   are new. Orders:  -     Ambulatory referral to Cardiology  -     bumetanide (BUMEX) 1 MG tablet; Take 1 tablet by mouth daily, Disp-30 tablet, R-1Normal  -     spironolactone (ALDACTONE) 25 MG tablet; Take 1 tablet by mouth daily, Disp-30 tablet, R-1Normal  3. Essential hypertension  Assessment & Plan:   Well-controlled, continue current treatment plan, lifestyle modifications recommended and medication adherence emphasized   -Discussed taking medication and directed every day.   -Discussed exercising daily 30 minutes 5 times a week for 150 minutes weekly.  -Discussed weight reduction if needed.  -Discussed low sodium diet.  -Discussed limiting caffeine consumption and tobacco cessation and the effects they have on the heart and blood pressure. Orders:  -     CBC Auto Differential; Future  -     Comprehensive Metabolic Panel; Future  4. Atrial fibrillation with rapid ventricular response (HCC)  -     apixaban (ELIQUIS) 2.5 MG TABS tablet; Take 1 tablet by mouth 2 times daily, Disp-60 tablet, R-3Normal  -     metoprolol succinate (TOPROL XL) 100 MG extended release tablet; Take 1 tablet by mouth 2 times daily, Disp-30 tablet, R-1Discontinue carvediololNormal  -     dilTIAZem (CARDIZEM CD) 120 MG extended release capsule; Take 1 capsule by mouth daily, Disp-30 capsule, R-1Normal  5. Sensorineural hearing loss (SNHL) of both ears  -     Ambulatory referral to Audiology  6. Vitamin D deficiency  Assessment & Plan:   New continue vitamin D as recommended. Discussed foods high in vitamin D       Reviewed labs: CMP, CBC, Lipids, TSH, FT4, vitamin D, A1c, Magnesium, phosphorous, BNP, BMP  Reviewed notes from Hospital discharge 3/25/21  Reviewed radiology CT chest 2019, CXR 2021, echo    Discussed exercising 30 minutes daily and Discussed taking medications as directed and adverse effects    Return in about 1 month (around 5/15/2021) for medicare well exam.     HPI:   Here today to establish care was seeing DR Camilo Hsu, She follows with DR Irish Griffith for her knees, DR Devonte Cao for her heart, DR Dionna Huynh for her legs/veins. She sees Pain Management at [facility],    She has hx of OA, atrial fibrillation, CHF recently, chronic back pain, kidney stones, DM type 2 diet controlled, GERD, HTN. She denies any smoking, drugs or alcohol. She had hernia surgery years ago. She has thyroid goiter and in notes ? Parathyroid but no recent imaging noted. Discussed at this time she wants to hold off on having thyroid checked.  She denies any swallowing problems at this time. She was admitted to [facility]  3/25/21 and discharged 3/31/21. She had gained 40 pounds of water weight. Was also in atrial fibrillation. States Dr Josep Garcia was going to shock her heart but then didn't. She feels well and denies any CP, SOB, no unusual leg edema she was to see DR Brandon Hernandez regarding her varicose veins and then had CHF admission. She is eating well and drinking well. Has been weighing every other day. No change in weight. Hypertension: Patient here for follow-up of elevated blood pressure. She is not exercising and is adherent to low salt diet. Blood pressure is well controlled at home. Cardiac symptoms none. Patient denies chest pain, chest pressure/discomfort, claudication, dyspnea, exertional chest pressure/discomfort, fatigue, irregular heart beat, lower extremity edema, near-syncope, orthopnea, palpitations, paroxysmal nocturnal dyspnea, syncope and tachypnea. Cardiovascular risk factors: advanced age (older than 54 for men, 72 for women), diabetes mellitus and hypertension. Use of agents associated with hypertension: none. History of target organ damage: chronic kidney disease and heart failure. Needs medications refilled today.        Current Outpatient Medications:     apixaban (ELIQUIS) 2.5 MG TABS tablet, Take 1 tablet by mouth 2 times daily, Disp: 60 tablet, Rfl: 3    metoprolol succinate (TOPROL XL) 100 MG extended release tablet, Take 1 tablet by mouth 2 times daily, Disp: 30 tablet, Rfl: 1    dilTIAZem (CARDIZEM CD) 120 MG extended release capsule, Take 1 capsule by mouth daily, Disp: 30 capsule, Rfl: 1    bumetanide (BUMEX) 1 MG tablet, Take 1 tablet by mouth daily, Disp: 30 tablet, Rfl: 1    spironolactone (ALDACTONE) 25 MG tablet, Take 1 tablet by mouth daily, Disp: 30 tablet, Rfl: 1    pantoprazole (PROTONIX) 40 MG tablet, Take 1 tablet by mouth every morning (before breakfast), Disp: 30 tablet, Rfl: 0    vitamin D (ERGOCALCIFEROL) 1.25 MG (38130 UT) CAPS capsule, Take 1 capsule by mouth once a week, Disp: 5 capsule, Rfl: 0    oxyCODONE-acetaminophen (PERCOCET) 7.5-325 MG per tablet, Take 1 tablet by mouth every 6 hours as needed for Pain., Disp: , Rfl:     gabapentin (NEURONTIN) 300 MG capsule, Take 2 capsules by mouth 4 times daily (before meals and nightly) for 12 doses. , Disp: 90 capsule, Rfl: 0    nystatin (MYCOSTATIN) 713245 UNIT/GM ointment, Apply topically 2 times daily. , Disp: , Rfl: 0    Artificial Saliva (BIOTENE DRY MOUTH MOISTURIZING) SOLN liquid, Take 1 applicator by mouth as needed (dry mouth), Disp: , Rfl:     fluticasone (FLONASE) 50 MCG/ACT nasal spray, 1 spray by Each Nostril route daily, Disp: , Rfl:   Social History     Socioeconomic History    Marital status:      Spouse name: None    Number of children: None    Years of education: None    Highest education level: None   Occupational History    None   Social Needs    Financial resource strain: Not hard at all   Jin-Magic insecurity     Worry: Never true     Inability: Never true    Transportation needs     Medical: No     Non-medical: No   Tobacco Use    Smoking status: Never Smoker    Smokeless tobacco: Never Used   Substance and Sexual Activity    Alcohol use: No    Drug use: Never    Sexual activity: Not Currently     Partners: Male   Lifestyle    Physical activity     Days per week: None     Minutes per session: None    Stress: None   Relationships    Social connections     Talks on phone: None     Gets together: None     Attends Yazidi service: None     Active member of club or organization: None     Attends meetings of clubs or organizations: None     Relationship status: None    Intimate partner violence     Fear of current or ex partner: None     Emotionally abused: None     Physically abused: None     Forced sexual activity: None   Other Topics Concern    None   Social History Narrative    None       I have reviewed (74.1 kg)   03/25/21 165 lb (74.8 kg)                       Vitals:    04/15/21 1109   BP: 106/68   Pulse: 80   Resp: 18   Temp: 97.6 °F (36.4 °C)   SpO2: 96%   Weight: 163 lb (73.9 kg)   Height: 5' 4\" (1.626 m)       General: Alert and oriented to person, place, and time, well developed and well nourished, in no acute distress  SKIN: Warm and dry, intact without any rash, masses or lesions  HEAD: normocephalic, atraumatic  Eyes: sclera/conjunctiva clear, PERRLA, EOMI's intact  ENT: tympanic membranes, external ear and ear canal normal bilaterally, very hard of hearing, Nose without deformity, nasal mucosa and turbinates normal small scabbed area noted in the left side of the nose on second turbinate no active bleeding now,  without polyps   Throat: clear, tongue midline, tonsils 3+, drainage, no masses or lesions noted, good dentition  Neck: supple and non-tender without mass, trachea midline, no cervical lymphadenopathy, no bruit,  thyromegaly noted   Cardiovascular: irregular rate and rhythm, normal S1 and S2,  no murmurs, rubs, clicks, or gallop. Distal pulses intact, no carotid bruits. No edema  Pulmonary/Chest: clear to auscultation bilaterally, no wheezes, rales or rhonchi, normal air movement, no respiratory distress  Abdomen: soft, non-tender, non-distended, normal bowel sounds, no masses or hepatosplenomegaly  Musculoskeletal: OA both knees with crepitus bilaterally,  no joint swelling, deformity or tenderness   Neurologic: reflexes normal and symmetric, no cranial nerve deficit, ambulates with walker, coordination and speech normal  Extremities: no clubbing, cyanosis, or edema. Psychiatric: Good eye contact, normal mood and affect, answers questions appropriately    I have reviewed my findings and recommendations with Adrianna Ramirez.     Samreen Romero, NP-C, FNP-BC

## 2021-04-15 NOTE — PATIENT INSTRUCTIONS
The medication list included in this document is our record of what you are currently taking, including any changes that were made at today's visit.  If you find any differences when compared to your medications at home, or have any questions that were not answered at your visit, please contact the office. Patient Education        DASH Diet: Care Instructions  Your Care Instructions     The DASH diet is an eating plan that can help lower your blood pressure. DASH stands for Dietary Approaches to Stop Hypertension. Hypertension is high blood pressure. The DASH diet focuses on eating foods that are high in calcium, potassium, and magnesium. These nutrients can lower blood pressure. The foods that are highest in these nutrients are fruits, vegetables, low-fat dairy products, nuts, seeds, and legumes. But taking calcium, potassium, and magnesium supplements instead of eating foods that are high in those nutrients does not have the same effect. The DASH diet also includes whole grains, fish, and poultry. The DASH diet is one of several lifestyle changes your doctor may recommend to lower your high blood pressure. Your doctor may also want you to decrease the amount of sodium in your diet. Lowering sodium while following the DASH diet can lower blood pressure even further than just the DASH diet alone. Follow-up care is a key part of your treatment and safety. Be sure to make and go to all appointments, and call your doctor if you are having problems. It's also a good idea to know your test results and keep a list of the medicines you take. How can you care for yourself at home? Following the DASH diet  · Eat 4 to 5 servings of fruit each day. A serving is 1 medium-sized piece of fruit, ½ cup chopped or canned fruit, 1/4 cup dried fruit, or 4 ounces (½ cup) of fruit juice. Choose fruit more often than fruit juice. · Eat 4 to 5 servings of vegetables each day.  A serving is 1 cup of lettuce or raw leafy vegetables, ½ cup of chopped or cooked vegetables, or 4 ounces (½ cup) of vegetable juice. Choose vegetables more often than vegetable juice. · Get 2 to 3 servings of low-fat and fat-free dairy each day. A serving is 8 ounces of milk, 1 cup of yogurt, or 1 ½ ounces of cheese. · Eat 6 to 8 servings of grains each day. A serving is 1 slice of bread, 1 ounce of dry cereal, or ½ cup of cooked rice, pasta, or cooked cereal. Try to choose whole-grain products as much as possible. · Limit lean meat, poultry, and fish to 2 servings each day. A serving is 3 ounces, about the size of a deck of cards. · Eat 4 to 5 servings of nuts, seeds, and legumes (cooked dried beans, lentils, and split peas) each week. A serving is 1/3 cup of nuts, 2 tablespoons of seeds, or ½ cup of cooked beans or peas. · Limit fats and oils to 2 to 3 servings each day. A serving is 1 teaspoon of vegetable oil or 2 tablespoons of salad dressing. · Limit sweets and added sugars to 5 servings or less a week. A serving is 1 tablespoon jelly or jam, ½ cup sorbet, or 1 cup of lemonade. · Eat less than 2,300 milligrams (mg) of sodium a day. If you limit your sodium to 1,500 mg a day, you can lower your blood pressure even more. · Be aware that all of these are the suggested number of servings for people who eat 1,800 to 2,000 calories a day. Your recommended number of servings may be different if you need more or fewer calories. Tips for success  · Start small. Do not try to make dramatic changes to your diet all at once. You might feel that you are missing out on your favorite foods and then be more likely to not follow the plan. Make small changes, and stick with them. Once those changes become habit, add a few more changes. · Try some of the following:  ? Make it a goal to eat a fruit or vegetable at every meal and at snacks. This will make it easy to get the recommended amount of fruits and vegetables each day.   ? Try yogurt topped with fruit and nuts for a snack or healthy dessert. ? Add lettuce, tomato, cucumber, and onion to sandwiches. ? Combine a ready-made pizza crust with low-fat mozzarella cheese and lots of vegetable toppings. Try using tomatoes, squash, spinach, broccoli, carrots, cauliflower, and onions. ? Have a variety of cut-up vegetables with a low-fat dip as an appetizer instead of chips and dip. ? Sprinkle sunflower seeds or chopped almonds over salads. Or try adding chopped walnuts or almonds to cooked vegetables. ? Try some vegetarian meals using beans and peas. Add garbanzo or kidney beans to salads. Make burritos and tacos with mashed cortes beans or black beans. Where can you learn more? Go to https://Other Machinemarloeweb.[employer]. org and sign in to your [employer] account. Enter G481 in the Plandree box to learn more about \"DASH Diet: Care Instructions. \"     If you do not have an account, please click on the \"Sign Up Now\" link. Current as of: August 31, 2020               Content Version: 12.8  © 2006-2021 [employer]. Care instructions adapted under license by [facility] [address] If you have questions about a medical condition or this instruction, always ask your healthcare professional. [place] [address] any warranty or liability for your use of this information. Patient Education        Learning About Self-Care for Heart Failure  What is self-care for heart failure? Heart failure usually gets worse over time. But there are many things you can do to feel better, avoid the hospital, and live longer. Self-care means managing your health by doing certain things every day, like weighing yourself. It's about knowing which symptoms to watch for so you can avoid getting worse. When you practice good self-care, you know when it's time to call your doctor and when your heart failure has turned into an emergency. The list below can help. Top 5 self-care tips for every day   1.  Take your medicines as prescribed. This gives them the best chance of helping you. 2. Weigh yourself every day. This helps you watch for signs that you're getting worse. Weight gain may be a sign that your body is holding on to too much fluid. Weigh yourself at the same time each day, using the same scale, on a hard, flat surface. The best time is in the morning after you go to the bathroom and before you eat or drink anything. 3. Keep a daily record of your symptoms. Checking your symptoms helps you see what symptoms are normal for you and if they change or get worse. 4. Limit sodium. This helps keep fluid from building up and may help you feel better. Your doctor can tell you how much sodium is right for you. An example is less than 3,000 milligrams (mg) a day. Try limiting the salt you eat at home, and by watching for \"hidden\" sodium when you eat out or shop for food. 5. Try to exercise throughout the week. Exercise makes your heart stronger and can help you avoid symptoms. Walking is a great way to get exercise. If your doctor says it's safe, start out with some short walks. Then slowly build up to longer ones. Some people with heart failure also may need to limit how much fluid they drink each day. Ask your doctor if this is true for you and, if it is, how much fluid is safe for you to drink each day. When should you act? Try to become familiar with signs that mean your heart failure is getting worse. If you need help, talk with your doctor about making a personal plan. Here are some things to watch for as you practice your daily self-care. Call your doctor if:  · You have sudden weight gain, such as more than 2 to 3 pounds in a day or 5 pounds in a week. (Your doctor may suggest a different range of weight gain.)  · You have new or worse swelling in your feet, ankles, or legs. · Your breathing gets worse. Activities that did not make you short of breath before are hard for you now.   · Your breathing when you lie down is type of treatment might help you. Follow-up care is a key part of your treatment and safety. Be sure to make and go to all appointments, and call your doctor if you are having problems. It's also a good idea to know your test results and keep a list of the medicines you take. Where can you learn more? Go to https://chpepiceweb.[employer]. org and sign in to your [employer] account. Enter V528 in the Fronto box to learn more about \"Learning About Self-Care for Heart Failure. \"     If you do not have an account, please click on the \"Sign Up Now\" link. Current as of: August 31, 2020               Content Version: 12.8  © 2006-2021 [employer]. Care instructions adapted under license by [facility] ([facility]). If you have questions about a medical condition or this instruction, always ask your healthcare professional. [place] [address] any warranty or liability for your use of this information. Patient Education        Medicines to Avoid With Heart Failure: Care Instructions  Your Care Instructions     Your doctor gave you medicines to help treat your heart failure. But did you know that many other medicines can make heart failure worse? Even medicines and herbs that you buy over the counter (OTC) can harm you. Be sure your doctor knows all of the OTC and prescription drugs you take. And don't start to take any medicine unless your doctor says it's okay. Follow-up care is a key part of your treatment and safety. Be sure to make and go to all appointments, and call your doctor if you are having problems. It's also a good idea to know your test results and keep a list of the medicines you take. How can you care for yourself at home? Over-the-counter drugs  · Before you take any over-the-counter drug, ask your doctor or pharmacist if it's safe. This includes herbs and vitamins. Medicines to avoid include:  ? Pain relievers called NSAIDs.  These include ibuprofen and naproxen. Use acetaminophen instead. For example, you can take Tylenol for pain or fever. ? Low-dose aspirin. If your doctor has told you to take aspirin every day for your heart, follow the doctor's instructions on how much to take. Don't take aspirin for pain. ? Antacids or laxatives. Don't take ones that have sodium in them. These include Guadalupe-Phelps. ? Cold, cough, flu, or sinus medicines. Read the label. Don't take ones that have pseudoephedrine, ephedrine, phenylephrine, or oxymetazoline in them. And make sure they don't have aspirin or ibuprofen in them. Watch for all of these in allergy medicines, nose sprays, and herbal products too.  ? Supplements and vitamins. These include black cohosh, Giovani's wort, and vitamin E.  Prescription drugs  · Each time you see a doctor, make sure that your doctor knows that you take drugs for heart failure. Before you fill any new prescription, ask the pharmacist if it's okay to take the new drug. Drugs that can make heart failure worse include:  ? Calcium channel blockers. These include nifedipine. If you need to take this type of drug for another health problem, your doctor will closely watch your health. ? Heart rhythm drugs. These include disopyramide and flecainide. These can treat a fast or uneven heart rhythm. ? Prescription NSAIDs. These include celecoxib (Celebrex) and diclofenac.  ? Certain medicines for diabetes. These include pioglitazone, rosiglitazone, and saxagliptin. Where can you learn more? Go to https://Beijing TierTime Technologyprimo.MD-IT. org and sign in to your Access Information Management account. Enter R202 in the [facility] box to learn more about \"Medicines to Avoid With Heart Failure: Care Instructions. \"     If you do not have an account, please click on the \"Sign Up Now\" link. Current as of: August 31, 2020               Content Version: 12.8  © 1648-7067 Healthwise, Incorporated. Care instructions adapted under license by [facility] ([facility]).  If you have questions about a medical condition or this instruction, always ask your healthcare professional. [place] [address] any warranty or liability for your use of this information.

## 2021-04-15 NOTE — ASSESSMENT & PLAN NOTE
Asymptomatic,  continue current treatment. Reviewed CHF zone guidelines, discussed to weigh daily after using the restroom same time every day. Call if increased 3 - 5 pounds  Summary   Normal left ventricular chamber size. Moderate global hypokinesis, LV EF 30%. Mild left ventricular concentric hypertrophy noted. Indeterminate LV diastolic function. diastolic function. Left atrium is enlarged. Increased left atrial volume. Interatrial septum not well visualized but appears intact. Normal right ventricle size and function. Right atrium is enlarged. Mitral annular calcification is present. There is mild mitral regurgitation. No mitral valve prolapse. The aortic valve appears mildly sclerotic. No hemodynamically significant aortic stenosis. There is mild to moderate tricuspid regurgitation. Moderate pulmonary HTN, RVSP 54mmHg. Normal aortic root size. No evidence of pericardial effusion. No intra cardiac mass or thrombus. Compared to prior echo 12/2019 - EF is decreased, and Pulmonary HTN, MR   are new.

## 2021-04-16 ENCOUNTER — TELEPHONE (OUTPATIENT)
Dept: FAMILY MEDICINE CLINIC | Age: 86
End: 2021-04-16

## 2021-04-16 NOTE — TELEPHONE ENCOUNTER
Call Rebecca Cottrell, renal function is declining so at this time hold the Bumex 1 mg and only take if she gains 3 - 5 pounds in 24 - 48 hours. She will continue the spironolactone as directed.

## 2021-04-22 DIAGNOSIS — I48.91 ATRIAL FIBRILLATION WITH RAPID VENTRICULAR RESPONSE (HCC): ICD-10-CM

## 2021-04-22 DIAGNOSIS — I50.43 ACUTE ON CHRONIC COMBINED SYSTOLIC AND DIASTOLIC CHF (CONGESTIVE HEART FAILURE) (HCC): ICD-10-CM

## 2021-04-22 RX ORDER — BUMETANIDE 1 MG/1
1 TABLET ORAL DAILY
Qty: 90 TABLET | OUTPATIENT
Start: 2021-04-22

## 2021-04-22 RX ORDER — DILTIAZEM HYDROCHLORIDE 120 MG/1
120 CAPSULE, COATED, EXTENDED RELEASE ORAL DAILY
Qty: 90 CAPSULE | OUTPATIENT
Start: 2021-04-22

## 2021-04-22 RX ORDER — SPIRONOLACTONE 25 MG/1
25 TABLET ORAL DAILY
Qty: 90 TABLET | OUTPATIENT
Start: 2021-04-22

## 2021-05-04 ENCOUNTER — TELEPHONE (OUTPATIENT)
Dept: FAMILY MEDICINE CLINIC | Age: 86
End: 2021-05-04

## 2021-05-04 NOTE — TELEPHONE ENCOUNTER
Spoke with  Rip Sena and explained she can have chronic heart failure with acute when she flares up. But at this time she is not end stage.

## 2021-05-04 NOTE — TELEPHONE ENCOUNTER
Patient's , Carroll Moore, called to speak with Shannan Vivar to ask if patient is at end-stage heart failure.     Last seen 4/15/2021  Next appt 5/19/2021

## 2021-05-17 DIAGNOSIS — I48.91 ATRIAL FIBRILLATION WITH RAPID VENTRICULAR RESPONSE (HCC): ICD-10-CM

## 2021-05-17 RX ORDER — METOPROLOL SUCCINATE 100 MG/1
100 TABLET, EXTENDED RELEASE ORAL 2 TIMES DAILY
Qty: 60 TABLET | Refills: 1 | Status: SHIPPED
Start: 2021-05-17 | End: 2021-05-18

## 2021-05-17 RX ORDER — METOPROLOL SUCCINATE 100 MG/1
TABLET, EXTENDED RELEASE ORAL
Qty: 30 TABLET | Refills: 0 | OUTPATIENT
Start: 2021-05-17

## 2021-05-18 ENCOUNTER — OFFICE VISIT (OUTPATIENT)
Dept: CARDIOLOGY CLINIC | Age: 86
End: 2021-05-18
Payer: MEDICARE

## 2021-05-18 VITALS
BODY MASS INDEX: 27.83 KG/M2 | HEART RATE: 108 BPM | DIASTOLIC BLOOD PRESSURE: 72 MMHG | RESPIRATION RATE: 16 BRPM | WEIGHT: 163 LBS | HEIGHT: 64 IN | SYSTOLIC BLOOD PRESSURE: 128 MMHG

## 2021-05-18 DIAGNOSIS — I50.43 ACUTE ON CHRONIC COMBINED SYSTOLIC AND DIASTOLIC CHF (CONGESTIVE HEART FAILURE) (HCC): Primary | ICD-10-CM

## 2021-05-18 PROCEDURE — 93000 ELECTROCARDIOGRAM COMPLETE: CPT | Performed by: INTERNAL MEDICINE

## 2021-05-18 PROCEDURE — 99215 OFFICE O/P EST HI 40 MIN: CPT | Performed by: INTERNAL MEDICINE

## 2021-05-18 RX ORDER — METOPROLOL SUCCINATE 100 MG/1
TABLET, EXTENDED RELEASE ORAL
Qty: 180 TABLET | Refills: 1 | Status: SHIPPED | OUTPATIENT
Start: 2021-05-18 | End: 2021-08-31 | Stop reason: ALTCHOICE

## 2021-05-18 NOTE — PROGRESS NOTES
[facility] Cardiology consult  Dr. Rangel Khan      Reason for Consult: CHF  Referring Physician: Severo Keene, APRN - CNP     CHIEF COMPLAINT:   Chief Complaint   Patient presents with    Congestive Heart Failure    Follow-Up from [address] [place] [address]  80year old female with history of systolic congestive heart failure, nonischemic cardiomyopathy, atrial fibrillation, chronic kidney disease, hypertension, type 2 diabetes mellitus, is here for follow-up appointment.   Shortness of breath is better, pedal edema is under control, positive palpitations, easy fatigability, shortness of breath with exertion, denies any chest pain, no lightheadedness or dizziness, no PND, no orthopnea, no syncope, no presyncopal episodes have been      Past Medical History:   Diagnosis Date    Acute metabolic encephalopathy 62/2/6787    Acute respiratory failure with hypoxia ([address] 12/5/2019    JOVANI (acute kidney injury) ([address] 12/5/2019    Anticoagulant long-term use     Atrial fibrillation (HCC)     CHF (congestive heart failure) (HCC)     Chronic back pain     Chronic kidney disease     Community acquired pneumonia of left lung     Dental caries     Diabetes mellitus ([address]     Elevated brain natriuretic peptide (BNP) level 12/5/2019    Gall bladder stones     Gastro-esophageal reflux disease without esophagitis 4/15/2021    GERD (gastroesophageal reflux disease)     Gram-positive cocci bacteremia     H/O cardiovascular stress test 03/30/2021    Lexiscan    Hearing loss     Hernia     Hypertension     Hypoxia     Kidney stone     Osteoarthritis     Other contact with raccoon, sequela 8/10/2020    This Diagnosis was added to the Problem List based on transcribed orders from Dr. Lelia Potter NP      Sepsis due to pneumonia ([address] 12/5/2019    Sepsis due to Streptococcus pneumoniae with acute hypoxic respiratory failure and septic shock (HCC)     Type 2 diabetes mellitus ([address] 4/15/2021    Type 2 diabetes mellitus without complication ([facility]          Past Surgical History:   Procedure Laterality Date    APPENDECTOMY      HERNIA REPAIR           Current Outpatient Medications   Medication Sig Dispense Refill    metoprolol succinate (TOPROL XL) 100 MG extended release tablet TAKE 1 TABLET BY MOUTH TWICE DAILY 180 tablet 1    apixaban (ELIQUIS) 2.5 MG TABS tablet Take 1 tablet by mouth 2 times daily 60 tablet 3    dilTIAZem (CARDIZEM CD) 120 MG extended release capsule Take 1 capsule by mouth daily 30 capsule 1    bumetanide (BUMEX) 1 MG tablet Take 1 tablet by mouth daily 30 tablet 1    spironolactone (ALDACTONE) 25 MG tablet Take 1 tablet by mouth daily 30 tablet 1    pantoprazole (PROTONIX) 40 MG tablet Take 1 tablet by mouth every morning (before breakfast) 30 tablet 0    oxyCODONE-acetaminophen (PERCOCET) 7.5-325 MG per tablet Take 1 tablet by mouth every 6 hours as needed for Pain.  gabapentin (NEURONTIN) 300 MG capsule Take 2 capsules by mouth 4 times daily (before meals and nightly) for 12 doses. 90 capsule 0    fluticasone (FLONASE) 50 MCG/ACT nasal spray 1 spray by Each Nostril route daily      nystatin (MYCOSTATIN) 384114 UNIT/GM ointment Apply topically 2 times daily. (Patient not taking: Reported on 5/18/2021)  0    Artificial Saliva (BIOTENE DRY MOUTH MOISTURIZING) SOLN liquid Take 1 applicator by mouth as needed (dry mouth) (Patient not taking: Reported on 5/18/2021)       No current facility-administered medications for this visit.          Allergies as of 05/18/2021 - Fully Reviewed 05/18/2021   Allergen Reaction Noted    Influenza vaccines Other (See Comments) 12/12/2019    Alginate [alginic acid]  05/03/2018    Novocain [procaine] Other (See Comments) 07/22/2017    Tape Chelsy Decree tape] Other (See Comments) 05/03/2018    Clindamycin Itching and Rash 04/15/2021       Social History     Socioeconomic History    Marital status:      Spouse name: Not on file    Number of children: Not on file    Years of education: Not on file    Highest education level: Not on file   Occupational History    Not on file   Tobacco Use    Smoking status: Never Smoker    Smokeless tobacco: Never Used   Substance and Sexual Activity    Alcohol use: No     Comment: 1/2 CUP OF COFFEE PER DAY    Drug use: Never    Sexual activity: Not Currently     Partners: Male   Other Topics Concern    Not on file   Social History Narrative    Not on file     Social Determinants of Health     Financial Resource Strain: Low Risk     Difficulty of Paying Living Expenses: Not hard at all   Food Insecurity: No Food Insecurity    Worried About [address] [place] [address] in the Last Year: Never true    Gerardo of Food in the Last Year: Never true   Transportation Needs: No Transportation Needs    Lack of Transportation (Medical): No    Lack of Transportation (Non-Medical):  No   Physical Activity:     Days of Exercise per Week:     Minutes of Exercise per Session:    Stress:     Feeling of Stress :    Social Connections:     Frequency of Communication with Friends and Family:     Frequency of Social Gatherings with Friends and Family:     Attends Sikhism Services:     Active Member of Clubs or Organizations:     Attends Club or Organization Meetings:     Marital Status:    Intimate Partner Violence:     Fear of Current or Ex-Partner:     Emotionally Abused:     Physically Abused:     Sexually Abused:        Family History   Problem Relation Age of Onset    Heart Disease Father     Cancer Daughter        REVIEW OF SYSTEMS:     CONSTITUTIONAL:  negative for  fevers, chills, sweats, + fatigue  HEENT:  negative for  tinnitus, earaches, nasal congestion and epistaxis  RESPIRATORY:  negative for  dry cough, cough with sputum, dyspnea, wheezing and hemoptysis  GASTROINTESTINAL:  negative for nausea, vomiting, diarrhea, constipation, pruritus and jaundice  HEMATOLOGIC/LYMPHATIC:  negative for easy bruising, bleeding, lymphadenopathy and petechiae  ENDOCRINE:  negative for heat intolerance, cold intolerance, tremor, hair loss and diabetic symptoms including neither polyuria nor polydipsia nor blurred vision  MUSCULOSKELETAL:  negative for  myalgias, arthralgias, joint swelling, stiff joints and decreased range of motion  NEUROLOGICAL:  negative for memory problems, speech problems, visual disturbance, dysphagia, weakness and numbness      PHYSICAL EXAM:   CONSTITUTIONAL:  awake, alert, cooperative, no apparent distress, and appears stated age  HEAD:  normocepalic, without obvious abnormality, atraumatic  NECK:  Supple, symmetrical, trachea midline, no adenopathy, thyroid symmetric, not enlarged and no tenderness, skin normal  LUNGS:  No increased work of breathing, good air exchange, bilateral rhonchi, decreased breath sounds left base cARDIOVASCULAR:  Normal apical impulse, irregularly irregular, tachycardic, normal S1 and S2, no S3, 3/systolic murmur at the apex, 3/systolic murmur at the left lower sternal border, trace edema, no JVD, no carotid bruit. ABDOMEN:  Soft, nontender, no masses, no hepatomegaly, no splenomegaly, BS+  MUSCULOSKELETAL:  No clubbing no cyanosis. there is no redness, warmth, or swelling of the joints  full range of motion noted  NEUROLOGIC:  Alert, awake,oriented x3  SKIN:  no bruising or bleeding, normal skin color, texture, turgor and no redness, warmth, or swelling        /72   Pulse 108   Resp 16   Ht 5' 4\" (1.626 m)   Wt 163 lb (73.9 kg)   BMI 27.98 kg/m²     DATA:   I personally reviewed the visit EKG with the following interpretation: Atrial fibrillation, rapid ventricular response, low voltage QRS in the limb leads    EKG 3/25/21 Atrial fibrillation with rapid ventricular response  Left anterior fascicular block  Cannot rule out Anterior infarct , age undetermined  Abnormal ECG  When compared with ECG of 21-DEC-2019 11:33,  Atrial fibrillation has replaced Sinus being on anti-coagulation, symptoms and signs of bleeding discussed with patient, patient was advised to seek medical attention at the earliest symptoms or signs of bleeding. 9.  Follow-up with GOMEZ Duncan as scheduled. 10.  Follow-up with Dr. Mary Colón in 1 month    I have reviewed my findings and recommendations with patient    Electronically signed by Aurelio Hernandez MD on 5/18/2021 at 4:12 PM      NOTE: This report was transcribed using voice recognition software.  Every effort was made to ensure accuracy; however, inadvertent computerized transcription errors may be present

## 2021-05-19 ENCOUNTER — OFFICE VISIT (OUTPATIENT)
Dept: FAMILY MEDICINE CLINIC | Age: 86
End: 2021-05-19
Payer: MEDICARE

## 2021-05-19 VITALS
WEIGHT: 166.9 LBS | HEART RATE: 115 BPM | HEIGHT: 64 IN | SYSTOLIC BLOOD PRESSURE: 120 MMHG | OXYGEN SATURATION: 95 % | TEMPERATURE: 96.7 F | BODY MASS INDEX: 28.49 KG/M2 | DIASTOLIC BLOOD PRESSURE: 82 MMHG | RESPIRATION RATE: 18 BRPM

## 2021-05-19 DIAGNOSIS — I50.43 ACUTE ON CHRONIC COMBINED SYSTOLIC AND DIASTOLIC CHF (CONGESTIVE HEART FAILURE) (HCC): ICD-10-CM

## 2021-05-19 DIAGNOSIS — Z00.00 ROUTINE GENERAL MEDICAL EXAMINATION AT A HEALTH CARE FACILITY: Primary | ICD-10-CM

## 2021-05-19 DIAGNOSIS — Z23 NEED FOR PROPHYLACTIC VACCINATION AND INOCULATION AGAINST VARICELLA: ICD-10-CM

## 2021-05-19 PROBLEM — R91.8 MULTIPLE PULMONARY NODULES: Status: ACTIVE | Noted: 2021-05-19

## 2021-05-19 PROBLEM — R91.8 MULTIPLE PULMONARY NODULES: Status: RESOLVED | Noted: 2021-05-19 | Resolved: 2021-05-19

## 2021-05-19 PROBLEM — N20.0 CALCULUS OF KIDNEY: Status: ACTIVE | Noted: 2021-05-19

## 2021-05-19 LAB
ANION GAP SERPL CALCULATED.3IONS-SCNC: 13 MMOL/L (ref 7–16)
BUN BLDV-MCNC: 22 MG/DL (ref 6–23)
CALCIUM SERPL-MCNC: 9.6 MG/DL (ref 8.6–10.2)
CHLORIDE BLD-SCNC: 106 MMOL/L (ref 98–107)
CO2: 24 MMOL/L (ref 22–29)
CREAT SERPL-MCNC: 1.2 MG/DL (ref 0.5–1)
GFR AFRICAN AMERICAN: 52
GFR NON-AFRICAN AMERICAN: 43 ML/MIN/1.73
GLUCOSE BLD-MCNC: 103 MG/DL (ref 74–99)
POTASSIUM SERPL-SCNC: 4.7 MMOL/L (ref 3.5–5)
PRO-BNP: 2667 PG/ML (ref 0–450)
SODIUM BLD-SCNC: 143 MMOL/L (ref 132–146)

## 2021-05-19 PROCEDURE — G0438 PPPS, INITIAL VISIT: HCPCS | Performed by: NURSE PRACTITIONER

## 2021-05-19 RX ORDER — AMIODARONE HYDROCHLORIDE 200 MG/1
200 TABLET ORAL 2 TIMES DAILY
Qty: 60 TABLET | Refills: 0 | Status: SHIPPED
Start: 2021-05-19 | End: 2021-05-20

## 2021-05-19 ASSESSMENT — PATIENT HEALTH QUESTIONNAIRE - PHQ9
2. FEELING DOWN, DEPRESSED OR HOPELESS: 1
1. LITTLE INTEREST OR PLEASURE IN DOING THINGS: 1
SUM OF ALL RESPONSES TO PHQ QUESTIONS 1-9: 2
SUM OF ALL RESPONSES TO PHQ9 QUESTIONS 1 & 2: 2

## 2021-05-19 ASSESSMENT — LIFESTYLE VARIABLES
AUDIT TOTAL SCORE: INCOMPLETE
HOW OFTEN DO YOU HAVE A DRINK CONTAINING ALCOHOL: 0
AUDIT-C TOTAL SCORE: INCOMPLETE
HOW OFTEN DO YOU HAVE A DRINK CONTAINING ALCOHOL: NEVER

## 2021-05-19 NOTE — PROGRESS NOTES
Medicare Annual Wellness Visit  Name: Arabella Campo Date: 2021   MRN: <[id number]> Sex: Female   Age: 80 y.o. Ethnicity: Non-/Non    : 1935 Race: Sera Pepper is here for Medicare AWV    Screenings for behavioral, psychosocial and functional/safety risks, and cognitive dysfunction are all negative except as indicated below. These results, as well as other patient data from the [address] form, are documented in Flowsheets linked to this Encounter. Allergies   Allergen Reactions    Influenza Vaccines Other (See Comments)     Patient states had paralytic experience    Alginate [Alginic Acid]     Novocain [Procaine] Other (See Comments)     \"Rapid Heart Beat\"     Tape [Adhesive Tape] Other (See Comments)     \"Sensitive Skin\"     Clindamycin Itching and Rash         Prior to Visit Medications    Medication Sig Taking? Authorizing Provider   zoster recombinant adjuvanted vaccine (SHINGRIX) 50 MCG/0.5ML SUSR injection Inject 0.5 mLs into the muscle once for 1 dose Repeat in 2 - 6 months Yes Sweta Rudd APRN - CNP   metoprolol succinate (TOPROL XL) 100 MG extended release tablet TAKE 1 TABLET BY MOUTH TWICE DAILY Yes Sweta Rudd APRN - CNP   apixaban (ELIQUIS) 2.5 MG TABS tablet Take 1 tablet by mouth 2 times daily Yes MEJIA Glez - CNP   bumetanide (BUMEX) 1 MG tablet Take 1 tablet by mouth daily Yes MEJIA Glez - CNP   spironolactone (ALDACTONE) 25 MG tablet Take 1 tablet by mouth daily Yes Hugh Pena APRN - CNP   pantoprazole (PROTONIX) 40 MG tablet Take 1 tablet by mouth every morning (before breakfast) Yes MEJIA Rivers - YENIFER   oxyCODONE-acetaminophen (PERCOCET) 7.5-325 MG per tablet Take 1 tablet by mouth every 6 hours as needed for Pain. Yes Historical Provider, MD   gabapentin (NEURONTIN) 300 MG capsule Take 2 capsules by mouth 4 times daily (before meals and nightly) for 12 doses.  Yes MEJIA Oliver - YENIFER nystatin (MYCOSTATIN) 343542 UNIT/GM ointment Apply topically 2 times daily.  Yes MEJIA Stein CNP   fluticasone (FLONASE) 50 MCG/ACT nasal spray 1 spray by Each Nostril route daily Yes Historical Provider, MD   Artificial Saliva ([address] MOISTURIZING) SOLN liquid Take 1 applicator by mouth as needed (dry mouth)  Patient not taking: Reported on 5/18/2021  MEJIA Stein CNP         Past Medical History:   Diagnosis Date    Acute metabolic encephalopathy 64/6/0477    Acute respiratory failure with hypoxia ([address] 12/5/2019    JOVANI (acute kidney injury) ([address] 12/5/2019    Anticoagulant long-term use     Atrial fibrillation ([address]     [facility]est[facility]     Chronic back pain     Chronic kidney disease     Community acquired pneumonia of left lung     Dental caries     Diabetes mellitus ([address]     Elevated brain natriuretic peptide (BNP) level 12/5/2019    Gall bladder stones     Gastro-esophageal reflux disease without esophagitis 4/15/2021    GERD (gastroesophageal reflux disease)     Gram-positive cocci bacteremia     H/O cardiovascular stress test 03/30/2021    Lexiscan    Hearing loss     Hernia     Hypertension     Hypoxia     Kidney stone     Multiple pulmonary nodules 5/19/2021    Osteoarthritis     Other contact with raccoon, sequela 8/10/2020    This Diagnosis was added to the Problem List based on transcribed orders from Dr. Daniele Jasso NP      Sepsis due to pneumonia ([facility] 12/5/2019    Sepsis due to Streptococcus pneumoniae with acute hypoxic respiratory failure and septic shock ([facility])     Type 2 diabetes mellitus ([address] 4/15/2021    Type 2 diabetes mellitus without complication ([address]        Past Surgical History:   Procedure Laterality Date    APPENDECTOMY      HERNIA REPAIR           Family History   Problem Relation Age of Onset    Heart Disease Father     Cancer Daughter        CareTeam (Including outside providers/suppliers regularly involved in providing care):   Patient Care Team:  MEJIA Maldonado CNP as PCP - General (Family Nurse Practitioner)  MEJIA Maldonado CNP as PCP - [facility]  Neftali Cardona MD as Consulting Physician (Cardiology)    Wt Readings from Last 3 Encounters:   05/19/21 166 lb 14.4 oz (75.7 kg)   05/18/21 163 lb (73.9 kg)   04/15/21 163 lb (73.9 kg)     Vitals:    05/19/21 1325   BP: 120/82   Pulse: 115   Resp: 18   Temp: 96.7 °F (35.9 °C)   SpO2: 95%   Weight: 166 lb 14.4 oz (75.7 kg)   Height: 5' 4\" (1.626 m)     Body mass index is 28.65 kg/m². Based upon direct observation of the patient, evaluation of cognition reveals recent and remote memory intact.     General Appearance: alert and oriented to person, place and time, well developed and well- nourished, in no acute distress  Skin: warm and dry, no rash or erythema  Head: normocephalic and atraumatic  Eyes: pupils equal, round, and reactive to light, extraocular eye movements intact, conjunctivae normal  ENT: tympanic membrane, external ear and ear canal normal bilaterally, hearing is very poor,  nose without deformity, nasal mucosa and turbinates normal without polyps  Neck: supple and non-tender without mass, no thyromegaly or thyroid nodules, no cervical lymphadenopathy  Pulmonary/Chest: clear to auscultation bilaterally- no wheezes, rales or rhonchi, normal air movement, no respiratory distress  Cardiovascular: normal rate, regular rhythm, normal S1 and S2, no murmurs, rubs, clicks, or gallops, distal pulses intact, no carotid bruits  Abdomen: soft, non-tender, non-distended, normal bowel sounds, no masses or organomegaly  Breast: appear normal, no suspicious masses, no skin or nipple changes or axillary nodes  Extremities: no cyanosis, clubbing or edema  Musculoskeletal: normal range of motion, no joint swelling, deformity or tenderness  Neurologic: reflexes normal and symmetric, no cranial nerve deficit, walks with walker, coordination and speech normal    Patient's complete Health Risk Assessment and screening values have been reviewed and are found in Flowsheets. The following problems were reviewed today and where indicated follow up appointments were made and/or referrals ordered. Positive Risk Factor Screenings with Interventions:            General Health and ACP:  General  In general, how would you say your health is?: Fair  In the past 7 days, have you experienced any of the following?  New or Increased Pain, New or Increased Fatigue, Loneliness, Social Isolation, Stress or Anger?: None of These  Do you get the social and emotional support that you need?: Yes  Do you have a Living Will?: (!) No  Advance Directives     Power of [facility] Will ACP-Advance Directive ACP-Power of     Not on File Filed on 05/17/12 Filed Not on File      General Health Risk Interventions:  · No Living Will: Advance Care Planning addressed with patient today    Health Habits/Nutrition:  Health Habits/Nutrition  Do you exercise for at least 20 minutes 2-3 times per week?: (!) No  Have you lost any weight without trying in the past 3 months?: No  Do you eat only one meal per day?: No  Have you seen the dentist within the past year?: Yes  Body mass index: (!) 28.65  Health Habits/Nutrition Interventions:  · Inadequate physical activity:  Patient has CHF and Atrial fibrillation using walker due to OA bilateral knees    Hearing/Vision:  No exam data present  Hearing/Vision  Do you or your family notice any trouble with your hearing that hasn't been managed with hearing aids?: (!) Yes  Do you have difficulty driving, watching TV, or doing any of your daily activities because of your eyesight?: (!) Yes  Have you had an eye exam within the past year?: (!) No  Hearing/Vision Interventions:  · Hearing concerns:  referral made but she will not wear them      Personalized Preventive Plan   Current Health Maintenance Status  Immunization History Administered Date(s) Administered    COVID-19, Moderna, PF, 100mcg/0.5mL 03/01/2021    Influenza, High Dose (Fluzone 65 yrs and older) 02/13/2017, 11/12/2018    Rabies IM Fibroblast Culture (Denia Guerrero) 08/05/2020, 08/11/2020, 08/15/2020, 08/22/2020, 10/16/2020, 10/21/2020    Rabies Immune Globulin 08/05/2020        Health Maintenance   Topic Date Due    DTaP/Tdap/Td vaccine (1 - Tdap) Never done    Shingles Vaccine (1 of 2) Never done    Pneumococcal 65+ years Vaccine (1 of 1 - PPSV23) Never done   [employer] Visit (AWV)  Never done    COVID-19 Vaccine (2 - Moderna 2-dose series) 03/29/2021    Potassium monitoring  04/15/2022    Creatinine monitoring  04/15/2022    Hepatitis A vaccine  Aged Out    Hepatitis B vaccine  Aged Out    Hib vaccine  Aged Out    Meningococcal (ACWY) vaccine  Aged Out     Recommendations for [employer] Due: see orders and patient instructions/AVS.  . Recommended screening schedule for the next 5-10 years is provided to the patient in written form: see Patient Instructions/AVS.    Akin Martinez was seen today for medicare awv. Diagnoses and all orders for this visit:    Routine general medical examination at a health care facility    Need for prophylactic vaccination and inoculation against varicella  -     zoster recombinant adjuvanted vaccine [facility]) 50 MCG/0.5ML SUSR injection; Inject 0.5 mLs into the muscle once for 1 dose Repeat in 2 - 6 months                 Advance Care Planning   Advanced Care Planning: Discussed the patients choices for care and treatment in case of a health event that adversely affects decision-making abilities. Also discussed the patients long-term treatment options. Reviewed with the patient the [address] [place] [address] [facility] Will Declaration forms  Reviewed the process of designating a competent adult as an Agent (or -in-fact) that could take make health care decisions for the patient if incompetent. Patient was asked to complete the declaration forms, either acknowledge the forms by a public notary or an eligible witness and provide a signed copy to the practice office. Time spent (minutes): 10    Cardiovascular Disease Risk Counseling: Assessed the patient's risk to develop cardiovascular disease and reviewed main risk factors. Reviewed steps to reduce disease risk including:   · Quitting tobacco use, reducing amount smoked, or not starting the habit  · Making healthy food choices  · Being physically active and gradualy increasing activity levels   · Reduce weight and determine a healthy BMI goal  · Monitor blood pressure and treat if higher than 140/90 mmHg  · Maintain blood total cholesterol levels under 5 mmol/l or 190 mg/dl  · Maintain LDL cholesterol levels under 3.0 mmol/l or 115 mg/dl   · Control blood glucose levels  · Consider taking aspirin (75 mg daily), once blood pressure is controlled   Provided a follow up plan.   Time spent (minutes): 10

## 2021-05-20 RX ORDER — AMIODARONE HYDROCHLORIDE 200 MG/1
TABLET ORAL
Qty: 180 TABLET | Refills: 3 | Status: SHIPPED
Start: 2021-05-20 | End: 2021-06-15

## 2021-05-25 ENCOUNTER — TELEPHONE (OUTPATIENT)
Dept: FAMILY MEDICINE CLINIC | Age: 86
End: 2021-05-25

## 2021-05-25 DIAGNOSIS — G62.9 NEUROPATHY: ICD-10-CM

## 2021-05-25 NOTE — TELEPHONE ENCOUNTER
Pt's  left  msg requesting refill of gabapentin 600 mg four times daily. It's on current med list but I don't see that you've ordered it before.     Last seen 5/19/2021  Next appt 8/18/2021  [facility] ([address])

## 2021-05-26 PROBLEM — G62.9 NEUROPATHY: Status: ACTIVE | Noted: 2021-05-26

## 2021-05-26 RX ORDER — GABAPENTIN 600 MG/1
600 TABLET ORAL
Qty: 120 TABLET | Refills: 0 | Status: SHIPPED
Start: 2021-05-26 | End: 2021-08-18 | Stop reason: SDUPTHER

## 2021-05-26 NOTE — TELEPHONE ENCOUNTER
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 ;No signs of potential drug abuse or diversion identified.

## 2021-06-13 NOTE — PROGRESS NOTES
[facility] Cardiology consult  Dr. Nish Monroy      Reason for Consult: CHF  Referring Physician: MEJIA Schwartz - CNP     CHIEF COMPLAINT:   Chief Complaint   Patient presents with    Atrial Fibrillation     1 month check . Patient denies any cp. SOB on occasions. HISTORY OF PRESENT ILLNESS  80year old female with history of systolic congestive heart failure, nonischemic cardiomyopathy, atrial fibrillation, chronic kidney disease, hypertension, type 2 diabetes mellitus, is here for follow-up appointment. Patient denies any chest pain, no shortness of breath, no lightheadedness, no dizziness, no palpitations, no pedal edema, no PND, no orthopnea, no syncope, no presyncopal episodes.       Past Medical History:   Diagnosis Date    Acute metabolic encephalopathy 52/8/1966    Acute respiratory failure with hypoxia ([address] 12/5/2019    JOVANI (acute kidney injury) ([address] 12/5/2019    Anticoagulant long-term use     Atrial fibrillation (HCC)     CHF (congestive heart failure) (HCC)     Chronic back pain     Chronic kidney disease     Community acquired pneumonia of left lung     Dental caries     Diabetes mellitus ([address]     Elevated brain natriuretic peptide (BNP) level 12/5/2019    Gall bladder stones     Gastro-esophageal reflux disease without esophagitis 4/15/2021    GERD (gastroesophageal reflux disease)     Gram-positive cocci bacteremia     H/O cardiovascular stress test 03/30/2021    Lexiscan    Hearing loss     Hernia     Hypertension     Hypoxia     Kidney stone     Multiple pulmonary nodules 5/19/2021    Neuropathy 5/26/2021    Osteoarthritis     Other contact with raccoon, sequela 8/10/2020    This Diagnosis was added to the Problem List based on transcribed orders from Dr. Jessica Hardwick NP      Sepsis due to pneumonia ([address] 12/5/2019    Sepsis due to Streptococcus pneumoniae with acute hypoxic respiratory failure and septic shock (HCC)     Type 2 diabetes mellitus ([address] 4/15/2021    Type 2 diabetes mellitus without complication (HCC)          Past Surgical History:   Procedure Laterality Date    APPENDECTOMY      HERNIA REPAIR           Current Outpatient Medications   Medication Sig Dispense Refill    gabapentin (NEURONTIN) 600 MG tablet Take 1 tablet by mouth 4 times daily (after meals and at bedtime) for 30 days. 120 tablet 0    amiodarone (CORDARONE) 200 MG tablet TAKE 1 TABLET BY MOUTH TWICE DAILY 180 tablet 3    metoprolol succinate (TOPROL XL) 100 MG extended release tablet TAKE 1 TABLET BY MOUTH TWICE DAILY 180 tablet 1    apixaban (ELIQUIS) 2.5 MG TABS tablet Take 1 tablet by mouth 2 times daily 60 tablet 3    bumetanide (BUMEX) 1 MG tablet Take 1 tablet by mouth daily (Patient taking differently: Take 1 mg by mouth as needed Only if legs are swelling) 30 tablet 1    spironolactone (ALDACTONE) 25 MG tablet Take 1 tablet by mouth daily 30 tablet 1    pantoprazole (PROTONIX) 40 MG tablet Take 1 tablet by mouth every morning (before breakfast) 30 tablet 0    oxyCODONE-acetaminophen (PERCOCET) 7.5-325 MG per tablet Take 1 tablet by mouth every 6 hours as needed for Pain.  nystatin (MYCOSTATIN) 445075 UNIT/GM ointment Apply topically 2 times daily. 0    Artificial Saliva (BIOTENE DRY MOUTH MOISTURIZING) SOLN liquid Take 1 applicator by mouth as needed (dry mouth)      fluticasone (FLONASE) 50 MCG/ACT nasal spray 1 spray by Each Nostril route daily       No current facility-administered medications for this visit.          Allergies as of 06/14/2021 - Fully Reviewed 06/14/2021   Allergen Reaction Noted    Influenza vaccines Other (See Comments) 12/12/2019    Alginate [alginic acid]  05/03/2018    Novocain [procaine] Other (See Comments) 07/22/2017    Tape Dorothyann Saundra tape] Other (See Comments) 05/03/2018    Clindamycin Itching and Rash 04/15/2021       Social History     Socioeconomic History    Marital status:      Spouse name: Not on file    Number of children: Not on file    Years of education: Not on file    Highest education level: Not on file   Occupational History    Not on file   Tobacco Use    Smoking status: Never Smoker    Smokeless tobacco: Never Used   Vaping Use    Vaping Use: Never used   Substance and Sexual Activity    Alcohol use: No     Comment: 1/2 CUP OF COFFEE PER DAY    Drug use: Never    Sexual activity: Not Currently     Partners: Male   Other Topics Concern    Not on file   Social History Narrative    Not on file     Social Determinants of Health     Financial Resource Strain: Low Risk     Difficulty of Paying Living Expenses: Not hard at all   Food Insecurity: No Food Insecurity    Worried About [address] in the Last Year: Never true    [address] [facility] [address] in the Last Year: Never true   Transportation Needs: No Transportation Needs    Lack of Transportation (Medical): No    Lack of Transportation (Non-Medical):  No   Physical Activity:     Days of Exercise per Week:     Minutes of Exercise per Session:    Stress:     Feeling of Stress :    Social Connections:     Frequency of Communication with Friends and Family:     Frequency of Social Gatherings with Friends and Family:     Attends Orthodoxy Services:     Active Member of Clubs or Organizations:     Attends Club or Organization Meetings:     Marital Status:    Intimate Partner Violence:     Fear of Current or Ex-Partner:     Emotionally Abused:     Physically Abused:     Sexually Abused:        Family History   Problem Relation Age of Onset    Heart Disease Father     Cancer Daughter        REVIEW OF SYSTEMS:     CONSTITUTIONAL:  negative for  fevers, chills, sweats, + fatigue  HEENT:  negative for  tinnitus, earaches, nasal congestion and epistaxis  RESPIRATORY:  negative for  dry cough, cough with sputum, dyspnea, wheezing and hemoptysis  GASTROINTESTINAL:  negative for nausea, vomiting, diarrhea, constipation, pruritus and jaundice HEMATOLOGIC/LYMPHATIC:  negative for easy bruising, bleeding, lymphadenopathy and petechiae  ENDOCRINE:  negative for heat intolerance, cold intolerance, tremor, hair loss and diabetic symptoms including neither polyuria nor polydipsia nor blurred vision  MUSCULOSKELETAL:  negative for  myalgias, arthralgias, joint swelling, stiff joints and decreased range of motion  NEUROLOGICAL:  negative for memory problems, speech problems, visual disturbance, dysphagia, weakness and numbness      PHYSICAL EXAM:   CONSTITUTIONAL:  awake, alert, cooperative, no apparent distress, and appears stated age  HEAD:  normocepalic, without obvious abnormality, atraumatic  NECK:  Supple, symmetrical, trachea midline, no adenopathy, thyroid symmetric, not enlarged and no tenderness, skin normal  LUNGS:  No increased work of breathing, good air exchange, no wheezing or rales  cARDIOVASCULAR:  Normal apical impulse, irregularly irregular, tachycardic, normal S1 and S2, no S3, 3/systolic murmur at the apex, 3/systolic murmur at the left lower sternal border, trace edema, no JVD, no carotid bruit. ABDOMEN:  Soft, nontender, no masses, no hepatomegaly, no splenomegaly, BS+  MUSCULOSKELETAL:  No clubbing no cyanosis. there is no redness, warmth, or swelling of the joints  full range of motion noted  NEUROLOGIC:  Alert, awake,oriented x3  SKIN:  no bruising or bleeding, normal skin color, texture, turgor and no redness, warmth, or swelling        BP (!) 164/92 (Site: Right Upper Arm, Position: Sitting, Cuff Size: Medium Adult)   Pulse 87   Ht 5' 4\" (1.626 m)   Wt 163 lb 12.8 oz (74.3 kg)   BMI 28.12 kg/m²     DATA:   I personally reviewed the visit EKG with the following interpretation: Atrial fibrillation, controlled ventricular response, poor R wave progression, left anterior fascicular block    EKG 5/18/21 Atrial fibrillation, rapid ventricular response, low voltage QRS in the limb leads    ECHO: 3/26/21 Summary   Normal left ventricular chamber size. Moderate global hypokinesis, LV EF 30%. Mild left ventricular concentric hypertrophy noted. Indeterminate LV diastolic function. diastolic function. Left atrium is enlarged. Increased left atrial volume. Interatrial septum not well visualized but appears intact. Normal right ventricle size and function. Right atrium is enlarged. Mitral annular calcification is present. There is mild mitral regurgitation. No mitral valve prolapse. The aortic valve appears mildly sclerotic. No hemodynamically significant aortic stenosis. There is mild to moderate tricuspid regurgitation. Moderate pulmonary HTN, RVSP 54mmHg. Normal aortic root size. No evidence of pericardial effusion. No intra cardiac mass or thrombus. Compared to prior echo 12/2019 - EF is decreased, and Pulmonary HTN, MR   are new. Stress Test: 3/30/21      1. The myocardial perfusion is normal.   2. No evidence of stress-induced ischemia or prior myocardial   infarction. 3. Normal LV systolic function, EF 24% with septal and apical   hypokinesis. 4. There is no transient ischemic dilation. 5. Low risk myocardial perfusion study.        Angiography:   Cardiology Labs: BMP:    Lab Results   Component Value Date     05/19/2021    K 4.7 05/19/2021    K 4.0 03/25/2021     05/19/2021    CO2 24 05/19/2021    BUN 22 05/19/2021    CREATININE 1.2 05/19/2021     CMP:    Lab Results   Component Value Date     05/19/2021    K 4.7 05/19/2021    K 4.0 03/25/2021     05/19/2021    CO2 24 05/19/2021    BUN 22 05/19/2021    CREATININE 1.2 05/19/2021    PROT 8.0 04/15/2021     CBC:    Lab Results   Component Value Date    WBC 9.6 04/15/2021    RBC 4.62 04/15/2021    HGB 13.8 04/15/2021    HCT 44.1 04/15/2021    MCV 95.5 04/15/2021    RDW 13.8 04/15/2021     04/15/2021     PT/INR:  No results found for: PTINR  PT/INR Warfarin:  No components found for: PTPATWAR, PTINRWAR  PTT:  No results found This report was transcribed using voice recognition software.  Every effort was made to ensure accuracy; however, inadvertent computerized transcription errors may be present Abdomen soft, nontender, nondistended, bowel sounds present in all 4 quadrants.

## 2021-06-14 ENCOUNTER — OFFICE VISIT (OUTPATIENT)
Dept: CARDIOLOGY CLINIC | Age: 86
End: 2021-06-14
Payer: MEDICARE

## 2021-06-14 VITALS
HEART RATE: 87 BPM | SYSTOLIC BLOOD PRESSURE: 164 MMHG | DIASTOLIC BLOOD PRESSURE: 92 MMHG | WEIGHT: 163.8 LBS | BODY MASS INDEX: 27.96 KG/M2 | HEIGHT: 64 IN

## 2021-06-14 DIAGNOSIS — I50.43 ACUTE ON CHRONIC COMBINED SYSTOLIC AND DIASTOLIC CHF (CONGESTIVE HEART FAILURE) (HCC): Primary | ICD-10-CM

## 2021-06-14 PROCEDURE — 93000 ELECTROCARDIOGRAM COMPLETE: CPT | Performed by: INTERNAL MEDICINE

## 2021-06-14 PROCEDURE — 99214 OFFICE O/P EST MOD 30 MIN: CPT | Performed by: INTERNAL MEDICINE

## 2021-06-15 DIAGNOSIS — I48.91 ATRIAL FIBRILLATION WITH RAPID VENTRICULAR RESPONSE (HCC): ICD-10-CM

## 2021-06-15 RX ORDER — METOPROLOL SUCCINATE 100 MG/1
TABLET, EXTENDED RELEASE ORAL
Qty: 180 TABLET | Refills: 1 | OUTPATIENT
Start: 2021-06-15

## 2021-06-15 RX ORDER — AMIODARONE HYDROCHLORIDE 200 MG/1
TABLET ORAL
Qty: 180 TABLET | Refills: 3 | Status: SHIPPED | OUTPATIENT
Start: 2021-06-15 | End: 2021-08-31 | Stop reason: ALTCHOICE

## 2021-06-22 ENCOUNTER — TELEPHONE (OUTPATIENT)
Dept: CARDIOLOGY CLINIC | Age: 86
End: 2021-06-22

## 2021-06-22 DIAGNOSIS — I50.43 ACUTE ON CHRONIC COMBINED SYSTOLIC AND DIASTOLIC CHF (CONGESTIVE HEART FAILURE) (HCC): ICD-10-CM

## 2021-06-22 RX ORDER — SPIRONOLACTONE 25 MG/1
25 TABLET ORAL PRN
Qty: 30 TABLET | Refills: 3
Start: 2021-06-22 | End: 2021-09-07

## 2021-06-22 RX ORDER — BUMETANIDE 1 MG/1
1 TABLET ORAL PRN
Qty: 90 TABLET | Refills: 1 | Status: SHIPPED
Start: 2021-06-22 | End: 2021-10-07

## 2021-07-15 ENCOUNTER — TELEPHONE (OUTPATIENT)
Dept: FAMILY MEDICINE CLINIC | Age: 86
End: 2021-07-15

## 2021-07-15 DIAGNOSIS — I48.91 ATRIAL FIBRILLATION WITH RAPID VENTRICULAR RESPONSE (HCC): ICD-10-CM

## 2021-07-15 NOTE — TELEPHONE ENCOUNTER
Spoke to the pharmacy they did not get Rx request for metoprolol from 5.18.2021,  verbal was given to the pharmacy.     Last seen 5/19/2021  Next appt 8/18/2021

## 2021-07-16 RX ORDER — METOPROLOL SUCCINATE 100 MG/1
TABLET, EXTENDED RELEASE ORAL
Qty: 180 TABLET | Refills: 1 | OUTPATIENT
Start: 2021-07-16

## 2021-08-18 ENCOUNTER — OFFICE VISIT (OUTPATIENT)
Dept: FAMILY MEDICINE CLINIC | Age: 86
End: 2021-08-18
Payer: MEDICARE

## 2021-08-18 VITALS
SYSTOLIC BLOOD PRESSURE: 132 MMHG | TEMPERATURE: 97.5 F | HEIGHT: 64 IN | WEIGHT: 157.4 LBS | RESPIRATION RATE: 18 BRPM | DIASTOLIC BLOOD PRESSURE: 80 MMHG | BODY MASS INDEX: 26.87 KG/M2 | OXYGEN SATURATION: 97 % | HEART RATE: 79 BPM

## 2021-08-18 DIAGNOSIS — G62.9 NEUROPATHY: ICD-10-CM

## 2021-08-18 DIAGNOSIS — I48.91 ATRIAL FIBRILLATION WITH RAPID VENTRICULAR RESPONSE (HCC): ICD-10-CM
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
## 2021-08-18 DIAGNOSIS — N18.32 CHRONIC RENAL FAILURE, STAGE 3B (HCC): ICD-10-CM
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
## 2021-08-18 DIAGNOSIS — L98.9 SKIN LESIONS: ICD-10-CM

## 2021-08-18 PROBLEM — N20.0 CALCULUS OF KIDNEY: Status: RESOLVED | Noted: 2021-05-19 | Resolved: 2021-08-18

## 2021-08-18 PROCEDURE — 99214 OFFICE O/P EST MOD 30 MIN: CPT | Performed by: NURSE PRACTITIONER

## 2021-08-18 RX ORDER — GABAPENTIN 600 MG/1
600 TABLET ORAL
Qty: 120 TABLET | Refills: 3 | Status: SHIPPED
Start: 2021-08-18 | End: 2022-09-07 | Stop reason: SDUPTHER

## 2021-08-18 ASSESSMENT — ENCOUNTER SYMPTOMS
FACIAL SWELLING: 0
RHINORRHEA: 0
SORE THROAT: 0
ABDOMINAL PAIN: 0
VOICE CHANGE: 0
SINUS PAIN: 0
DIARRHEA: 0
TROUBLE SWALLOWING: 0
NAUSEA: 0
SHORTNESS OF BREATH: 1
WHEEZING: 0
BACK PAIN: 1
SINUS PRESSURE: 0
COUGH: 0
COLOR CHANGE: 0
CONSTIPATION: 0
VOMITING: 0

## 2021-08-18 NOTE — PROGRESS NOTES
OFFICE PROGRESS NOTE  [address]  [address] [id number]  Dept: [phone]   Chief Complaint   Patient presents with    Hypertension    Health Maintenance     due for pneu23, shingles, tdap       ASSESSMENT/PLAN   1. Fall in home, initial encounter  Assessment & Plan:   Discussed to make sure there is a clear path and be cautious about the dog, be sure lights are on when walking. Use walker at all times. Timed get up and go is normal.   Orders:  -     XR CERVICAL SPINE (2-3 VIEWS); Future  -     XR THORACIC SPINE (2 VIEWS); Future  2. Atrial fibrillation with rapid ventricular response (HCC)  Assessment & Plan:   Well-controlled, continue current medications, medication adherence emphasized and lifestyle modifications recommended  Orders:  -     apixaban (ELIQUIS) 2.5 MG TABS tablet; Take 1 tablet by mouth 2 times daily, Disp-60 tablet, R-3Normal  -     CBC Auto Differential; Future  -     Comprehensive Metabolic Panel; Future  3. Neuropathy  Assessment & Plan:   Well-controlled, changes made today: discussed with patient and  to try to decrease to 3 times a day if she can due to renal function declining. She states she doesn't take it 4 times a day only when the burning is really bad. will monitor renal function, medication adherence emphasized and lifestyle modifications recommended  Orders:  -     gabapentin (NEURONTIN) 600 MG tablet; Take 1 tablet by mouth 4 times daily (after meals and at bedtime) for 363 days. , Disp-120 tablet, R-3Normal  4. Skin lesions  Assessment & Plan:  New Unclear control, will refer to Dr Scar Palmer for removal   Orders:  -     Amb External Referral To Dermatology  5.  Chronic renal failure, stage 3b ([facility] [address]  Assessment & Plan:   Asymptomatic, continue current plan pending work up below, medication adherence emphasized and lifestyle modifications recommended  Orders:  -     CBC Auto Differential; Future  - Comprehensive Metabolic Panel; Future     I will call with lab and x ray results  Reviewed labs: BMP, BNP  Reviewed notes from DR Rajesh Feliz       Discussed exercising 30 minutes daily and Discussed taking medications as directed and adverse effects    Return in about 3 months (around 11/18/2021) for neuropathy. HPI:   She states she tripped over the dog 2 weeks ago and fell she denies hitting her head but since then she will get lightheaded and feel like she is going to fall off and on. She does complain of neck and upper back cracking. will check x ray to be sure no fractures. She is on Eliquis but states she didn't hit her head. She does have osteoporosis. She is feeling well. States she has been working to lose some weight and has only had problems with SOB if she walks a long distance. She denies any abdominal bloating, does have some lower leg swelling off and on. She is asking for refill of the gabapentin for her neuropathy and she takes 3 - 4 times a day for the burning. If the burning isn't bad she only takes it as needed. She does see DR Sandra Ortiz in [place] for pain management for her back. She walks with a walker at all times. She has several irregular lesions on the forehead and left temple would like to see dermatology as her son had skin cancer. Has been there for many years. Current Outpatient Medications:     apixaban (ELIQUIS) 2.5 MG TABS tablet, Take 1 tablet by mouth 2 times daily, Disp: 60 tablet, Rfl: 3    gabapentin (NEURONTIN) 600 MG tablet, Take 1 tablet by mouth 4 times daily (after meals and at bedtime) for 363 days. , Disp: 120 tablet, Rfl: 3    bumetanide (BUMEX) 1 MG tablet, Take 1 tablet by mouth as needed (edema) Only if legs are swelling, Disp: 90 tablet, Rfl: 1    spironolactone (ALDACTONE) 25 MG tablet, Take 1 tablet by mouth as needed (as needed), Disp: 30 tablet, Rfl: 3    amiodarone (CORDARONE) 200 MG tablet, TAKE 1 TABLET BY MOUTH TWICE DAILY, Disp: 180 tablet, Rfl: 3    metoprolol succinate (TOPROL XL) 100 MG extended release tablet, TAKE 1 TABLET BY MOUTH TWICE DAILY, Disp: 180 tablet, Rfl: 1    oxyCODONE-acetaminophen (PERCOCET) 7.5-325 MG per tablet, Take 1 tablet by mouth every 6 hours as needed for Pain., Disp: , Rfl:     nystatin (MYCOSTATIN) [id number] UNIT/GM ointment, Apply topically 2 times daily. , Disp: , Rfl: 0    Artificial Saliva (BIOTENE DRY MOUTH MOISTURIZING) SOLN liquid, Take 1 applicator by mouth as needed (dry mouth), Disp: , Rfl:     fluticasone (FLONASE) 50 MCG/ACT nasal spray, 1 spray by Each Nostril route daily, Disp: , Rfl:       Surgical History:  has a past surgical history that includes Appendectomy and hernia repair. Social History:  reports that she has never smoked. She has never used smokeless tobacco. She reports that she does not drink alcohol and does not use drugs. Family History: family history includes Cancer in her daughter; Heart Disease in her father. I have reviewed Jackelyn's allergies, medications, problem list, medical, social and family history and have updated as needed in the electronic medical record    Review of Systems   Constitutional: Negative for activity change, appetite change, chills, diaphoresis, fatigue, fever and unexpected weight change. HENT: Negative for congestion, dental problem, drooling, ear discharge, ear pain, facial swelling, hearing loss, mouth sores, nosebleeds, postnasal drip, rhinorrhea, sinus pressure, sinus pain, sneezing, sore throat, tinnitus, trouble swallowing and voice change. Eyes: Negative for visual disturbance. Respiratory: Positive for shortness of breath ( only if walks a long distance). Negative for cough and wheezing. Cardiovascular: Positive for leg swelling. Negative for chest pain and palpitations. Gastrointestinal: Negative for abdominal pain, constipation, diarrhea, nausea and vomiting. Genitourinary: Negative for difficulty urinating, frequency and urgency. Musculoskeletal: Positive for back pain and gait problem. Skin: Negative for color change, pallor, rash and wound. She has several areas on her forehead she would like removed. Neurological: Positive for light-headedness. Negative for dizziness, tremors, seizures, syncope, facial asymmetry, speech difficulty, weakness, numbness and headaches. Hematological: Negative for adenopathy. Does not bruise/bleed easily. Psychiatric/Behavioral: Negative for agitation, behavioral problems, confusion, decreased concentration, dysphoric mood, hallucinations, self-injury, sleep disturbance and suicidal ideas. The patient is not nervous/anxious and is not hyperactive. OBJECTIVE:     VS:  Wt Readings from Last 3 Encounters:   08/18/21 157 lb 6.4 oz (71.4 kg)   06/14/21 163 lb 12.8 oz (74.3 kg)   05/19/21 166 lb 14.4 oz (75.7 kg)                       Vitals:    08/18/21 1552   BP: 132/80   Pulse: 79   Resp: 18   Temp: 97.5 °F (36.4 °C)   SpO2: 97%   Weight: 157 lb 6.4 oz (71.4 kg)   Height: 5' 4\" (1.626 m)       General: Alert and oriented to person, place, and time, well developed and well nourished, in no acute distress  SKIN: Warm and dry, intact with several irregular rough, raised dark brown lesions on the forehead and a black lesion at the left temple. Non tender. HEAD: normocephalic, atraumatic  Eyes: sclera/conjunctiva clear, PERRLA, EOMI's intact  Neck: supple and non-tender without mass, trachea midline, no cervical lymphadenopathy, no bruit, no thyromegaly or nodules  Cardiovascular: regular rate and regular rhythm, normal S1 and S2,  no murmurs, rubs, clicks, or gallop. Distal pulses intact, no carotid bruits.  No edema  Pulmonary/Chest: clear to auscultation bilaterally, no wheezes, rales or rhonchi, normal air movement, no respiratory distress  Abdomen: soft, non-tender, non-distended, normal bowel sounds, no masses or hepatosplenomegaly  Musculoskeletal: Normal ROM with kyphosis noted, non tender on exam, no joint swelling, deformity or tenderness   Neurologic: reflexes normal and symmetric, no cranial nerve deficit, gait with walker, coordination and speech normal  Extremities: no clubbing, cyanosis, or edema. Psychiatric: Good eye contact, normal mood and affect, answers questions appropriately    I have reviewed my findings and recommendations with Edwardo Gutierrez.     Dalton Johns, APRN - CNP, NP-C, FNP-BC

## 2021-08-18 NOTE — ASSESSMENT & PLAN NOTE
Well-controlled, changes made today: discussed with patient and  to try to decrease to 3 times a day if she can due to renal function declining.  She states she doesn't take it 4 times a day only when the burning is really bad. will monitor renal function, medication adherence emphasized and lifestyle modifications recommended

## 2021-08-18 NOTE — ASSESSMENT & PLAN NOTE
Discussed to make sure there is a clear path and be cautious about the dog, be sure lights are on when walking. Use walker at all times.  Timed get up and go is normal.

## 2021-08-18 NOTE — ASSESSMENT & PLAN NOTE
Asymptomatic, continue current plan pending work up below, medication adherence emphasized and lifestyle modifications recommended

## 2021-08-25 ENCOUNTER — OFFICE VISIT (OUTPATIENT)
Dept: FAMILY MEDICINE CLINIC | Age: 86
End: 2021-08-25
Payer: MEDICARE

## 2021-08-25 ENCOUNTER — OFFICE VISIT (OUTPATIENT)
Dept: ORTHOPEDIC SURGERY | Age: 86
End: 2021-08-25
Payer: MEDICARE

## 2021-08-25 VITALS — HEIGHT: 64 IN | BODY MASS INDEX: 26.12 KG/M2 | TEMPERATURE: 98 F | WEIGHT: 153 LBS

## 2021-08-25 VITALS
BODY MASS INDEX: 26.8 KG/M2 | SYSTOLIC BLOOD PRESSURE: 122 MMHG | WEIGHT: 157 LBS | TEMPERATURE: 96.3 F | HEART RATE: 35 BPM | HEIGHT: 64 IN | RESPIRATION RATE: 16 BRPM | DIASTOLIC BLOOD PRESSURE: 54 MMHG | OXYGEN SATURATION: 95 %

## 2021-08-25 DIAGNOSIS — R00.1 BRADYCARDIA: ICD-10-CM

## 2021-08-25 DIAGNOSIS — S52.601A CLOSED FRACTURE OF DISTAL RADIUS AND ULNA, RIGHT, INITIAL ENCOUNTER: Primary | ICD-10-CM

## 2021-08-25 DIAGNOSIS — M25.531 RIGHT WRIST PAIN: Primary | ICD-10-CM

## 2021-08-25 DIAGNOSIS — S52.501A CLOSED FRACTURE OF DISTAL RADIUS AND ULNA, RIGHT, INITIAL ENCOUNTER: Primary | ICD-10-CM

## 2021-08-25 PROCEDURE — 99203 OFFICE O/P NEW LOW 30 MIN: CPT | Performed by: ORTHOPAEDIC SURGERY

## 2021-08-25 PROCEDURE — 99213 OFFICE O/P EST LOW 20 MIN: CPT | Performed by: NURSE PRACTITIONER

## 2021-08-25 PROCEDURE — 93000 ELECTROCARDIOGRAM COMPLETE: CPT | Performed by: NURSE PRACTITIONER

## 2021-08-25 ASSESSMENT — ENCOUNTER SYMPTOMS
SHORTNESS OF BREATH: 0
COUGH: 0
DIARRHEA: 0
CONSTIPATION: 0
VOMITING: 0
WHEEZING: 0
NAUSEA: 0

## 2021-08-25 NOTE — PROGRESS NOTES
Chief Complaint   Patient presents with    Wrist Pain     pt fell a hour ago ang hurt her wrist right one it swellon as well , pt pluse is low and she took meds and didnt eat today        HPI:  Patient presents today for complaints of a fall that occurred about an hour ago while she was at a . She reports that her legs are weak and that she occasionally has these falls. She went to catch herself with her right arm, and this is where she is complaining of pain today. She has not iced it or taken any other medications at this time. She has no other concerns. While rooming the patient she was found to be bradycardic, HR 40. Patient denies any dizziness or lightheadedness. She denies any recent medication changes. She does state that she has heart failure. She follows with Dr. Maria Eugenia Vega for this. Prior to Visit Medications    Medication Sig Taking? Authorizing Provider   apixaban (ELIQUIS) 2.5 MG TABS tablet Take 1 tablet by mouth 2 times daily Yes MEJIA Gilmore CNP   gabapentin (NEURONTIN) 600 MG tablet Take 1 tablet by mouth 4 times daily (after meals and at bedtime) for 363 days. Yes MEJIA Gilmore CNP   bumetanide (BUMEX) 1 MG tablet Take 1 tablet by mouth as needed (edema) Only if legs are swelling Yes MEJIA Gilmore CNP   spironolactone (ALDACTONE) 25 MG tablet Take 1 tablet by mouth as needed (as needed) Yes Karoline Dukes MD   amiodarone (CORDARONE) 200 MG tablet TAKE 1 TABLET BY MOUTH TWICE DAILY Yes Karoline Dukes MD   metoprolol succinate (TOPROL XL) 100 MG extended release tablet TAKE 1 TABLET BY MOUTH TWICE DAILY Yes MEJIA Gilmore CNP   oxyCODONE-acetaminophen (PERCOCET) 7.5-325 MG per tablet Take 1 tablet by mouth every 6 hours as needed for Pain. Yes Historical Provider, MD   nystatin (MYCOSTATIN) [id number] UNIT/GM ointment Apply topically 2 times daily.  Yes MEJIA Negron CNP   Artificial Saliva (BIOTENE DRY MOUTH MOISTURIZING) SOLN liquid Take 1 back: Normal range of motion and neck supple. Lymphadenopathy:      Cervical: No cervical adenopathy. Skin:     General: Skin is warm and dry. Neurological:      Mental Status: She is alert and oriented to person, place, and time. Psychiatric:         Behavior: Behavior normal.           Assessment/Plan:    1. Right wrist pain  Xr right wrist  Patient able tp be scheduled with Dr. Kilo Herrera today for positive wrist fracture    2. Bradycardia  Spoke with Dr. Ricardo Bertrand nurse  Patient is to do the following:  Decrease toprol xl to 1 time per day  Hold amlodipine  F/U 8/30/2021 in their office for repeat EKG  All instructions written for patient, patient and  verbalized understanding  - EKG 12 lead; Future  - EKG 12 lead      Return if symptoms worsen or fail to improve.         Kevin Barksdale, MEJIA - CNP

## 2021-08-25 NOTE — PROGRESS NOTES
Rosalina Meehan is a 80 y.o. female, who presents   Chief Complaint   Patient presents with    Wrist Pain     right wrist fx  having alot of pain       HPI[de-identified] Injury occurred today to her .  Patient was walking and tripped and fell and hit an outstretched right hand. She is right-hand dominant. She suffered a painful injury and went to walk-in clinic at [facility] office building. X-rays were done there showing a fracture of the distal radius and the ulnar styloid. She is placed in an Ace bandage and referred downstairs to us for further treatment. Allergies; medications; past medical, surgical, family, and social history; and problem list have been reviewed today and updated as indicated in this encounter - see below following Ortho specifics. Musculoskeletal: Skin condition gross neurovascular function good in right upper extremity. There is tenderness to palpation. There is slight amount of radial shortening suggested. Neurovascular function is good. There is mild edema. There are no other areas of indicated injury. Radiologic Studies: Imaging shows a slightly impacted fracture of the distal radial metaphysis and a fracture of the ulnar styloid which is nondisplaced. ASSESSMENT:  Vianey Franks was seen today for wrist pain. Diagnoses and all orders for this visit:    Closed fracture of distal radius and ulna, right, initial encounter     Treatment alternatives were reviewed including medical and physical therapies, injections, and surgical options, expected risks benefits and likely outcome of each were discussed in detail, questions asked and answered and understood. I discussed the incident as well as well as his ago findings and imaging results with the patient and her . This is a fracture that can be treated conservatively with bracing. PLAN: The Ace bandage was replaced for compression and additional padding support.   Forearm fracture brace of Velcro closure type was placed. She was instructed as to limitations of activity. We'll follow-up in a week and a half and marlene-ray the wrist to check alignment.         Patient Active Problem List   Diagnosis    Primary osteoarthritis of both knees    Other contact with raccoon, sequela    Essential hypertension    Acute on chronic combined systolic and diastolic CHF (congestive heart failure) [facility])    Atrial fibrillation with rapid ventricular response (HCC)    Bilateral lower extremity edema    Age-related osteoporosis without current pathological fracture    Senile hyperkeratosis    Arthritis of hip    Varicose veins of lower extremity    Synovial cyst of right knee    Spondylosis without myelopathy or radiculopathy, lumbar region    Spinal stenosis, lumbar region without neurogenic claudication    Chronic renal failure, stage 3b ([facility])    Nontoxic multinodular goiter    Macular degeneration    Long term (current) use of opiate analgesic    Hernia of anterior abdominal wall    Sensorineural hearing loss (SNHL) of both ears    Vitamin D deficiency    Neuropathy    Fall at home    Skin lesions       Past Medical History:   Diagnosis Date    Acute metabolic encephalopathy 51/7/2815    Acute respiratory failure with hypoxia ([address] 12/5/2019    JOVANI (acute kidney injury) ([address] 12/5/2019    Anticoagulant long-term use     Atrial fibrillation (HCC)     Calculus of kidney 5/19/2021    CHF (congestive heart failure) ([facility])     Chronic back pain     Chronic kidney disease     Community acquired pneumonia of left lung     Dental caries     Diabetes mellitus ([address]     Elevated brain natriuretic peptide (BNP) level 12/5/2019    Gall bladder stones     Gastro-esophageal reflux disease without esophagitis 4/15/2021    GERD (gastroesophageal reflux disease)     Gram-positive cocci bacteremia     H/O cardiovascular stress test 03/30/2021    Lexiscan    Hearing loss     Hernia     Hypertension     Hypoxia     Kidney stone     Multiple pulmonary nodules 5/19/2021    Neuropathy 5/26/2021    Osteoarthritis     Other contact with raccoon, sequela 8/10/2020    This Diagnosis was added to the Problem List based on transcribed orders from Dr. Kelly Virgen NP      Sepsis due to pneumonia [facility]) 12/5/2019    Sepsis due to Streptococcus pneumoniae with acute hypoxic respiratory failure and septic shock (HCC)     Type 2 diabetes mellitus ([facility] 4/15/2021    Type 2 diabetes mellitus without complication (HCC)        Past Surgical History:   Procedure Laterality Date    APPENDECTOMY      HERNIA REPAIR         Current Outpatient Medications   Medication Sig Dispense Refill    apixaban (ELIQUIS) 2.5 MG TABS tablet Take 1 tablet by mouth 2 times daily 60 tablet 3    gabapentin (NEURONTIN) 600 MG tablet Take 1 tablet by mouth 4 times daily (after meals and at bedtime) for 363 days. 120 tablet 3    bumetanide (BUMEX) 1 MG tablet Take 1 tablet by mouth as needed (edema) Only if legs are swelling 90 tablet 1    spironolactone (ALDACTONE) 25 MG tablet Take 1 tablet by mouth as needed (as needed) 30 tablet 3    amiodarone (CORDARONE) 200 MG tablet TAKE 1 TABLET BY MOUTH TWICE DAILY 180 tablet 3    metoprolol succinate (TOPROL XL) 100 MG extended release tablet TAKE 1 TABLET BY MOUTH TWICE DAILY 180 tablet 1    oxyCODONE-acetaminophen (PERCOCET) 7.5-325 MG per tablet Take 1 tablet by mouth every 6 hours as needed for Pain.  nystatin (MYCOSTATIN) 610804 UNIT/GM ointment Apply topically 2 times daily. 0    Artificial Saliva (BIOTENE DRY MOUTH MOISTURIZING) SOLN liquid Take 1 applicator by mouth as needed (dry mouth)      fluticasone (FLONASE) 50 MCG/ACT nasal spray 1 spray by Each Nostril route daily       No current facility-administered medications for this visit.        Allergies   Allergen Reactions    Influenza Vaccines Other (See Comments)     Patient states had paralytic experience    Alginate [Alginic Acid]     Novocain [Procaine] Other (See Comments)     \"Rapid Heart Beat\"     Tape [Adhesive Tape] Other (See Comments)     \"Sensitive Skin\"     Clindamycin Itching and Rash       Social History     Socioeconomic History    Marital status:      Spouse name: None    Number of children: None    Years of education: None    Highest education level: None   Occupational History    None   Tobacco Use    Smoking status: Never Smoker    Smokeless tobacco: Never Used   Vaping Use    Vaping Use: Never used   Substance and Sexual Activity    Alcohol use: No     Comment: 1/2 CUP OF COFFEE PER DAY    Drug use: Never    Sexual activity: Not Currently     Partners: Male   Other Topics Concern    None   Social History Narrative    None     Social Determinants of Health     Financial Resource Strain: Low Risk     Difficulty of Paying Living Expenses: Not hard at all   Food Insecurity: No Food Insecurity    Worried About Running Out of Food in the Last Year: Never true    Gerardo of Food in the Last Year: Never true   Transportation Needs: No Transportation Needs    Lack of Transportation (Medical): No    Lack of Transportation (Non-Medical):  No   Physical Activity:     Days of Exercise per Week:     Minutes of Exercise per Session:    Stress:     Feeling of Stress :    Social Connections:     Frequency of Communication with Friends and Family:     Frequency of Social Gatherings with Friends and Family:     Attends Advent Services:     Active Member of Clubs or Organizations:     Attends Club or Organization Meetings:     Marital Status:    Intimate Partner Violence:     Fear of Current or Ex-Partner:     Emotionally Abused:     Physically Abused:     Sexually Abused:        Family History   Problem Relation Age of Onset    Heart Disease Father     Cancer Daughter          Review of Systems:   As follows except as previously noted in HPI:  Constitutional: Negative for chills, diaphoresis,  fever   Respiratory: Negative for cough, shortness of breath and wheezing. Cardiovascular: Negative for chest pain and palpitations. Neurological: Negative for dizziness, syncope,   GI / : abdominal pain or cramping  Musculoskeletal: see HPI       Objective:   Physical Exam   Constitutional: Oriented to person, place, and time. and appears well-developed and well-nourished. :   Head: Normocephalic and atraumatic. Neck: Neck supple. Eyes: EOM are normal.   Pulmonary/Chest: Effort normal.  No respiratory distress, no wheezes. Neurological: Alert and oriented to person  Skin: Skin is warm and dry. Georges Shrestha DO    8/25/21  2:46 PM    All reasonable efforts have been made to minimize the risk of errors that may occur in the use of voice recognition and other electronic means of charting.

## 2021-08-31 ENCOUNTER — NURSE ONLY (OUTPATIENT)
Dept: CARDIOLOGY CLINIC | Age: 86
End: 2021-08-31
Payer: MEDICARE

## 2021-08-31 VITALS — HEART RATE: 41 BPM

## 2021-08-31 DIAGNOSIS — I48.91 ATRIAL FIBRILLATION WITH RAPID VENTRICULAR RESPONSE (HCC): Primary | ICD-10-CM

## 2021-08-31 PROCEDURE — 93000 ELECTROCARDIOGRAM COMPLETE: CPT | Performed by: INTERNAL MEDICINE

## 2021-08-31 NOTE — PROGRESS NOTES
Patient is here for an EKG only. Medications/Allergies were reviewed. HR 41 bpm    Per Dr. Melendez Range, patient was advised to stop Metoprolol and recheck EKG in 1 week. If she gets dizzy or feels like she may pass out she should go to ER.

## 2021-09-07 ENCOUNTER — TELEPHONE (OUTPATIENT)
Dept: CARDIOLOGY CLINIC | Age: 86
End: 2021-09-07

## 2021-09-07 ENCOUNTER — NURSE ONLY (OUTPATIENT)
Dept: CARDIOLOGY CLINIC | Age: 86
End: 2021-09-07
Payer: MEDICARE

## 2021-09-07 VITALS — HEART RATE: 48 BPM

## 2021-09-07 DIAGNOSIS — S52.601A CLOSED FRACTURE OF DISTAL RADIUS AND ULNA, RIGHT, INITIAL ENCOUNTER: Primary | ICD-10-CM

## 2021-09-07 DIAGNOSIS — S52.501A CLOSED FRACTURE OF DISTAL RADIUS AND ULNA, RIGHT, INITIAL ENCOUNTER: Primary | ICD-10-CM

## 2021-09-07 DIAGNOSIS — I48.91 ATRIAL FIBRILLATION WITH RAPID VENTRICULAR RESPONSE (HCC): Primary | ICD-10-CM

## 2021-09-07 PROCEDURE — 93000 ELECTROCARDIOGRAM COMPLETE: CPT | Performed by: INTERNAL MEDICINE

## 2021-09-07 NOTE — TELEPHONE ENCOUNTER
Dr. Severiano Hoe,    Patient is here for a 1 week EKG after stopping Metoprolol. She states she has not blacked out and dizziness has improved. She remains in a wheelchair. Please advise if there are any further instructions. Thank you.   Lisa Gary

## 2021-09-08 ENCOUNTER — OFFICE VISIT (OUTPATIENT)
Dept: ORTHOPEDIC SURGERY | Age: 86
End: 2021-09-08
Payer: MEDICARE

## 2021-09-08 VITALS — TEMPERATURE: 98 F | WEIGHT: 153 LBS | BODY MASS INDEX: 26.12 KG/M2 | HEIGHT: 64 IN

## 2021-09-08 DIAGNOSIS — S52.601A CLOSED FRACTURE OF DISTAL RADIUS AND ULNA, RIGHT, INITIAL ENCOUNTER: Primary | ICD-10-CM

## 2021-09-08 DIAGNOSIS — S52.501A CLOSED FRACTURE OF DISTAL RADIUS AND ULNA, RIGHT, INITIAL ENCOUNTER: Primary | ICD-10-CM

## 2021-09-08 DIAGNOSIS — R00.1 BRADYCARDIA: Primary | ICD-10-CM

## 2021-09-08 PROCEDURE — 99213 OFFICE O/P EST LOW 20 MIN: CPT | Performed by: ORTHOPAEDIC SURGERY

## 2021-09-08 NOTE — PROGRESS NOTES
Chief Complaint:   Chief Complaint   Patient presents with    Wrist Pain     Right Wrist FX F/U DOI 08/25/2021       Landon Montes follows up for right wrist fracture. She has some discomfort. She has been wearing the brace and an Ace bandage beneath it. Allergies; medications; past medical, surgical, family, and social history; and problem list have been reviewed today and updated as indicated in this encounter seen below. Exam: The brace and the Ace bandage were removed. Her skin is intact with good neurovascular function. She has some dryness and wrinkling as expected. There is some tenderness around the distal radius. General alignment seems good. Radiographs: Imaging today and multiple views shows a fracture of the distal radius which has shortened and impacted dorsally with some reversal of the distal angle as seen on lateral film. ASSESSMENT:    Yessy Albert was seen today for wrist pain. Diagnoses and all orders for this visit:    Closed fracture of distal radius and ulna, right, initial encounter        PLAN: We will continue use of the Ace bandage and the brace. She may remove this to bathe. Will marlene-ray in 4 weeks. Return in about 4 weeks (around 10/6/2021) for R wrist X.       Current Outpatient Medications   Medication Sig Dispense Refill    apixaban (ELIQUIS) 2.5 MG TABS tablet Take 1 tablet by mouth 2 times daily 60 tablet 3    gabapentin (NEURONTIN) 600 MG tablet Take 1 tablet by mouth 4 times daily (after meals and at bedtime) for 363 days. 120 tablet 3    bumetanide (BUMEX) 1 MG tablet Take 1 tablet by mouth as needed (edema) Only if legs are swelling 90 tablet 1    oxyCODONE-acetaminophen (PERCOCET) 7.5-325 MG per tablet Take 1 tablet by mouth every 6 hours as needed for Pain.  nystatin (MYCOSTATIN) 315333 UNIT/GM ointment Apply topically 2 times daily.   0    fluticasone (FLONASE) 50 MCG/ACT nasal spray 1 spray by Each Nostril route daily       No current facility-administered medications for this visit.        Patient Active Problem List   Diagnosis    Primary osteoarthritis of both knees    Other contact with raccoon, sequela    Essential hypertension    Acute on chronic combined systolic and diastolic CHF (congestive heart failure) (HCC)    Atrial fibrillation with rapid ventricular response (HCC)    Bilateral lower extremity edema    Age-related osteoporosis without current pathological fracture    Senile hyperkeratosis    Arthritis of hip    Varicose veins of lower extremity    Synovial cyst of right knee    Spondylosis without myelopathy or radiculopathy, lumbar region    Spinal stenosis, lumbar region without neurogenic claudication    Chronic renal failure, stage 3b (HCC)    Nontoxic multinodular goiter    Macular degeneration    Long term (current) use of opiate analgesic    Hernia of anterior abdominal wall    Sensorineural hearing loss (SNHL) of both ears    Vitamin D deficiency    Neuropathy    Fall at home    Skin lesions       Past Medical History:   Diagnosis Date    Acute metabolic encephalopathy 85/9/2706    Acute respiratory failure with hypoxia ([address] 12/5/2019    JOVANI (acute kidney injury) ([address] 12/5/2019    Anticoagulant long-term use     Atrial fibrillation (HCC)     Calculus of kidney 5/19/2021    [facility]est[facility]) [facility])     Chronic back pain     Chronic kidney disease     Community acquired pneumonia of left lung     Dental caries     Diabetes mellitus ([address]     Elevated brain natriuretic peptide (BNP) level 12/5/2019    Gall bladder stones     Gastro-esophageal reflux disease without esophagitis 4/15/2021    GERD (gastroesophageal reflux disease)     Gram-positive cocci bacteremia     H/O cardiovascular stress test 03/30/2021    Lexiscan    Hearing loss     Hernia     Hypertension     Hypoxia     Kidney stone     Multiple pulmonary nodules 5/19/2021    Neuropathy 5/26/2021    Osteoarthritis     Other contact with sriram day 8/10/2020    This Diagnosis was added to the Problem List based on transcribed orders from Dr. Margaret Gilliam NP      Sepsis due to pneumonia [facility]) 12/5/2019    Sepsis due to Streptococcus pneumoniae with acute hypoxic respiratory failure and septic shock [facility]     Type 2 diabetes mellitus ([facility] 4/15/2021    Type 2 diabetes mellitus without complication [facility]        Past Surgical History:   Procedure Laterality Date    APPENDECTOMY      HERNIA REPAIR         Allergies   Allergen Reactions    Influenza Vaccines Other (See Comments)     Patient states had paralytic experience    Alginate [Alginic Acid]     Novocain [Procaine] Other (See Comments)     \"Rapid Heart Beat\"     Tape [Adhesive Tape] Other (See Comments)     \"Sensitive Skin\"     Clindamycin Itching and Rash       Social History     Socioeconomic History    Marital status:      Spouse name: None    Number of children: None    Years of education: None    Highest education level: None   Occupational History    None   Tobacco Use    Smoking status: Never Smoker    Smokeless tobacco: Never Used   Vaping Use    Vaping Use: Never used   Substance and Sexual Activity    Alcohol use: No     Comment: 1/2 CUP OF COFFEE PER DAY    Drug use: Never    Sexual activity: Not Currently     Partners: Male   Other Topics Concern    None   Social History Narrative    None     Social Determinants of Health     Financial Resource Strain: Low Risk     Difficulty of Paying Living Expenses: Not hard at all   Food Insecurity: No Food Insecurity    Worried About Running Out of Food in the Last Year: Never true    Gerardo of Food in the Last Year: Never true   Transportation Needs: No Transportation Needs    Lack of Transportation (Medical): No    Lack of Transportation (Non-Medical):  No   Physical Activity:     Days of Exercise per Week:     Minutes of Exercise per Session:    Stress:     Feeling of Stress :    Social Connections:     Frequency of Communication with Friends and Family:     Frequency of Social Gatherings with Friends and Family:     Attends Gnosticism Services:     Active Member of Clubs or Organizations:     Attends Club or Organization Meetings:     Marital Status:    Intimate Partner Violence:     Fear of Current or Ex-Partner:     Emotionally Abused:     Physically Abused:     Sexually Abused:        Review of Systems  As follows except as previously noted in HPI:  Constitutional: Negative for chills, diaphoresis, fatigue, fever and unexpected weight change. Respiratory: Negative for cough, shortness of breath and wheezing. Cardiovascular: Negative for chest pain and palpitations. Neurological: Negative for dizziness, syncope, cephalgia. GI / : negative  Musculoskeletal: see HPI       Objective:   Physical Exam   Constitutional: Oriented to person, place, and time. and appears well-developed and well-nourished. :   Head: Normocephalic and atraumatic. Eyes: EOM are normal.   Neck: Neck supple. Cardiovascular: Normal rate and regular rhythm. Pulmonary/Chest: Effort normal. No stridor. No respiratory distress, no wheezes. Abdominal:  No abnormal distension. Neurological: Alert and oriented to person, place, and time. Skin: Skin is warm and dry. Psychiatric: Normal mood and affect.  Behavior is normal. Thought content normal.    K Jonas Dakins, DO    9/8/21  3:30 PM

## 2021-09-13 ENCOUNTER — NURSE ONLY (OUTPATIENT)
Dept: CARDIOLOGY CLINIC | Age: 86
End: 2021-09-13

## 2021-09-17 ENCOUNTER — TELEPHONE (OUTPATIENT)
Dept: CARDIOLOGY CLINIC | Age: 86
End: 2021-09-17

## 2021-09-17 DIAGNOSIS — R00.1 BRADYCARDIA: Primary | ICD-10-CM

## 2021-09-17 NOTE — TELEPHONE ENCOUNTER
Showing bradycardia similar to her EKG, she is completely asymptomatic, however, prefer to refer her to EP at least to get established with them and being evaluated for pacemaker

## 2021-09-20 DIAGNOSIS — R00.1 BRADYCARDIA: ICD-10-CM

## 2021-10-05 DIAGNOSIS — S52.601A CLOSED FRACTURE OF DISTAL RADIUS AND ULNA, RIGHT, INITIAL ENCOUNTER: Primary | ICD-10-CM

## 2021-10-05 DIAGNOSIS — S52.501A CLOSED FRACTURE OF DISTAL RADIUS AND ULNA, RIGHT, INITIAL ENCOUNTER: Primary | ICD-10-CM

## 2021-10-06 ENCOUNTER — OFFICE VISIT (OUTPATIENT)
Dept: ORTHOPEDIC SURGERY | Age: 86
End: 2021-10-06
Payer: MEDICARE

## 2021-10-06 VITALS — HEIGHT: 64 IN | TEMPERATURE: 98 F | WEIGHT: 153 LBS | BODY MASS INDEX: 26.12 KG/M2

## 2021-10-06 DIAGNOSIS — S52.501A CLOSED FRACTURE OF DISTAL RADIUS AND ULNA, RIGHT, INITIAL ENCOUNTER: Primary | ICD-10-CM

## 2021-10-06 DIAGNOSIS — S52.601A CLOSED FRACTURE OF DISTAL RADIUS AND ULNA, RIGHT, INITIAL ENCOUNTER: Primary | ICD-10-CM

## 2021-10-06 PROCEDURE — 99213 OFFICE O/P EST LOW 20 MIN: CPT | Performed by: ORTHOPAEDIC SURGERY

## 2021-10-06 NOTE — PROGRESS NOTES
Chief Complaint:   Chief Complaint   Patient presents with    Wrist Pain     Right Wrist FX F/U DOI 08/25/2021       Marilyn Montes follows up about 8 weeks post injury to her right wrist.  She still wearing her brace. She has been moving her hand a lot and doing more with it. It is much less uncomfortable. She complains of little discomfort along the ulnar side. Allergies; medications; past medical, surgical, family, and social history; and problem list have been reviewed today and updated as indicated in this encounter seen below. Exam: Skin condition gross neurovascular function is good in right upper extremity. There is some tenderness along the ulnar styloid area which is not a typical with type fracture she has. She has fairly good range of motion of the wrist in all planes and also has about 75% forearm rotation. Radiographs: Imaging of the right wrist 3 view shows maintenance of position alignment. There is decreased fracture distinction through the distal radius with increased bone density consistent with healing. ASSESSMENT:    Jackeilne was seen today for wrist pain. Diagnoses and all orders for this visit:    Closed fracture of distal radius and ulna, right, initial encounter        PLAN: I recommended she continue to use the brace for another couple weeks. She can remove it for hygiene and when she is at rest.  She will wear it at night as well to help protect her. We will follow on an as-needed basis. Return if symptoms worsen or fail to improve. Current Outpatient Medications   Medication Sig Dispense Refill    apixaban (ELIQUIS) 2.5 MG TABS tablet Take 1 tablet by mouth 2 times daily 60 tablet 3    gabapentin (NEURONTIN) 600 MG tablet Take 1 tablet by mouth 4 times daily (after meals and at bedtime) for 363 days.  120 tablet 3    bumetanide (BUMEX) 1 MG tablet Take 1 tablet by mouth as needed (edema) Only if legs are swelling 90 tablet 1    oxyCODONE-acetaminophen (PERCOCET) 7.5-325 MG per tablet Take 1 tablet by mouth every 6 hours as needed for Pain.  nystatin (MYCOSTATIN) 413498 UNIT/GM ointment Apply topically 2 times daily. 0    fluticasone (FLONASE) 50 MCG/ACT nasal spray 1 spray by Each Nostril route daily       No current facility-administered medications for this visit.        Patient Active Problem List   Diagnosis    Primary osteoarthritis of both knees    Other contact with raccoon, sequela    Essential hypertension    Acute on chronic combined systolic and diastolic [facility]congestive heart failure) ([facility])    Atrial fibrillation with rapid ventricular response (HCC)    Bilateral lower extremity edema    Age-related osteoporosis without current pathological fracture    Senile hyperkeratosis    Arthritis of hip    Varicose veins of lower extremity    Synovial cyst of right knee    Spondylosis without myelopathy or radiculopathy, lumbar region    Spinal stenosis, lumbar region without neurogenic claudication    Chronic renal failure, stage 3b ([facility])    Nontoxic multinodular goiter    Macular degeneration    Long term (current) use of opiate analgesic    Hernia of anterior abdominal wall    Sensorineural hearing loss (SNHL) of both ears    Vitamin D deficiency    Neuropathy    Fall at home    Skin lesions       Past Medical History:   Diagnosis Date    Acute metabolic encephalopathy 89/9/8465    Acute respiratory failure with hypoxia ([address] 12/5/2019    JOVANI (acute kidney injury) ([address] 12/5/2019    Anticoagulant long-term use     Atrial fibrillation ([facility])     Calculus of kidney 5/19/2021    CHF (congestive heart failure) ([facility])     Chronic back pain     Chronic kidney disease     Community acquired pneumonia of left lung     Dental caries     Diabetes mellitus ([address]     Elevated brain natriuretic peptide (BNP) level 12/5/2019    Gall bladder stones     Gastro-esophageal reflux disease without esophagitis true    Gerardo of Food in the Last Year: Never true   Transportation Needs: No Transportation Needs    Lack of Transportation (Medical): No    Lack of Transportation (Non-Medical): No   Physical Activity:     Days of Exercise per Week:     Minutes of Exercise per Session:    Stress:     Feeling of Stress :    Social Connections:     Frequency of Communication with Friends and Family:     Frequency of Social Gatherings with Friends and Family:     Attends Yarsani Services:     Active Member of Clubs or Organizations:     Attends Club or Organization Meetings:     Marital Status:    Intimate Partner Violence:     Fear of Current or Ex-Partner:     Emotionally Abused:     Physically Abused:     Sexually Abused:        Review of Systems  As follows except as previously noted in HPI:  Constitutional: Negative for chills, diaphoresis, fatigue, fever and unexpected weight change. Respiratory: Negative for cough, shortness of breath and wheezing. Cardiovascular: Negative for chest pain and palpitations. Neurological: Negative for dizziness, syncope, cephalgia. GI / : negative  Musculoskeletal: see HPI       Objective:   Physical Exam   Constitutional: Oriented to person, place, and time. and appears well-developed and well-nourished. :   Head: Normocephalic and atraumatic. Eyes: EOM are normal.   Neck: Neck supple. Cardiovascular: Normal rate and regular rhythm. Pulmonary/Chest: Effort normal. No stridor. No respiratory distress, no wheezes. Abdominal:  No abnormal distension. Neurological: Alert and oriented to person, place, and time. Skin: Skin is warm and dry. Psychiatric: Normal mood and affect.  Behavior is normal. Thought content normal.    K Jerrlyn Saint, DO    10/6/21  3:31 PM

## 2021-10-07 ENCOUNTER — OFFICE VISIT (OUTPATIENT)
Dept: NON INVASIVE DIAGNOSTICS | Age: 86
End: 2021-10-07
Payer: MEDICARE

## 2021-10-07 VITALS
WEIGHT: 151 LBS | DIASTOLIC BLOOD PRESSURE: 78 MMHG | HEIGHT: 64 IN | BODY MASS INDEX: 25.78 KG/M2 | RESPIRATION RATE: 16 BRPM | HEART RATE: 57 BPM | SYSTOLIC BLOOD PRESSURE: 120 MMHG | OXYGEN SATURATION: 96 %

## 2021-10-07 DIAGNOSIS — I50.43 ACUTE ON CHRONIC COMBINED SYSTOLIC AND DIASTOLIC CHF (CONGESTIVE HEART FAILURE) (HCC): ICD-10-CM

## 2021-10-07 DIAGNOSIS — I44.30 AV BLOCK: ICD-10-CM

## 2021-10-07 DIAGNOSIS — R00.1 BRADYCARDIA: Primary | ICD-10-CM

## 2021-10-07 DIAGNOSIS — I38 VALVULAR HEART DISEASE: ICD-10-CM

## 2021-10-07 DIAGNOSIS — I48.91 ATRIAL FIBRILLATION WITH RAPID VENTRICULAR RESPONSE (HCC): ICD-10-CM

## 2021-10-07 DIAGNOSIS — N18.30 STAGE 3 CHRONIC KIDNEY DISEASE, UNSPECIFIED WHETHER STAGE 3A OR 3B CKD (HCC): ICD-10-CM

## 2021-10-07 PROCEDURE — 99204 OFFICE O/P NEW MOD 45 MIN: CPT | Performed by: INTERNAL MEDICINE

## 2021-10-07 PROCEDURE — 93000 ELECTROCARDIOGRAM COMPLETE: CPT | Performed by: INTERNAL MEDICINE

## 2021-10-07 ASSESSMENT — ENCOUNTER SYMPTOMS
CHEST TIGHTNESS: 0
EYE REDNESS: 0
SHORTNESS OF BREATH: 0
DIARRHEA: 0
WHEEZING: 0
COLOR CHANGE: 0
COUGH: 0
ABDOMINAL DISTENTION: 0
ABDOMINAL PAIN: 0
NAUSEA: 0
SINUS PRESSURE: 0

## 2021-10-07 NOTE — PROGRESS NOTES
Cardiac Electrophysiology Outpatient Progress Note    Abilio Chavez  1935  Date of Service: 10/7/2021  Referring Provider/PCP: MEJIA Contreras CNP  Chief Complaint:   Chief Complaint   Patient presents with    New Patient     Referred by Dr Barak Mejia D: Bradycardia         Kristine Miller presents to the office today for the management of these Electrophysiology conditions: bradycardia. Ms. Arben Peña has a history of systolic congestive heart failure, nonischemic cardiomyopathy, atrial fibrillation, chronic kidney disease, hypertension, type 2 diabetes mellitus. 10/7/2021: She presents today for consultations and presents with her . She wore HM in September 2021 that showed a 2:1 AVB, upon review of the HM it is hard to determine if  2:1 block vs prominent u waves. She does report fatigue but attributes this poor quality sleep. She denies any syncope, lightheadedness or dizziness. She presents today in SR. She does use a wheel chair occasionally. Her main issue is her left knee/leg that gives way. She is supposed to have surgery on it soon.     Patient Active Problem List    Diagnosis Date Noted    Fall at home 08/18/2021    Skin lesions 08/18/2021    Neuropathy 05/26/2021    Senile hyperkeratosis 04/15/2021    Arthritis of hip 04/15/2021    Varicose veins of lower extremity 04/15/2021    Synovial cyst of right knee 04/15/2021    Chronic renal failure, stage 3b ([address] 04/15/2021    Macular degeneration 04/15/2021    Hernia of anterior abdominal wall 04/15/2021    Sensorineural hearing loss (SNHL) of both ears 04/15/2021    Vitamin D deficiency 04/15/2021    Essential hypertension     Acute on chronic combined systolic and diastolic CHF (congestive heart failure) (HCC)     Atrial fibrillation with rapid ventricular response (HCC)     Bilateral lower extremity edema     Other contact with raccoon, sequela 08/10/2020     Overview Note:     This Diagnosis was added to the Problem List based on transcribed orders from Dr. Ana York NP        [facility] term (current) use of opiate analgesic 10/28/2019    Spondylosis without myelopathy or radiculopathy, lumbar region 08/07/2019    Spinal stenosis, lumbar region without neurogenic claudication 08/07/2019    Age-related osteoporosis without current pathological fracture 04/30/2019    Nontoxic multinodular goiter 04/30/2019    Primary osteoarthritis of both knees 08/24/2017       Family History   Problem Relation Age of Onset    Heart Disease Father     Cancer Daughter        SOCIAL HISTORY   Social History     Socioeconomic History    Marital status:      Spouse name: Not on file    Number of children: Not on file    Years of education: Not on file    Highest education level: Not on file   Occupational History    Not on file   Tobacco Use    Smoking status: Never Smoker    Smokeless tobacco: Never Used   Vaping Use    Vaping Use: Never used   Substance and Sexual Activity    Alcohol use: No     Comment: 1/2 CUP OF COFFEE PER DAY    Drug use: Never    Sexual activity: Not Currently     Partners: Male   Other Topics Concern    Not on file   Social History Narrative    Not on file     Social Determinants of Health     Financial Resource Strain: Low Risk     Difficulty of Paying Living Expenses: Not hard at all   Food Insecurity: No Food Insecurity    Worried About Running Out of Food in the Last Year: Never true    Gerardo of Food in the Last Year: Never true   Transportation Needs: No Transportation Needs    Lack of Transportation (Medical): No    Lack of Transportation (Non-Medical):  No   Physical Activity:     Days of Exercise per Week:     Minutes of Exercise per Session:    Stress:     Feeling of Stress :    Social Connections:     Frequency of Communication with Friends and Family:     Frequency of Social Gatherings with Friends and Family:     Attends Hoahaoism Services:     Active Member of Clubs or Organizations:     Attends Club or Organization Meetings:     Marital Status:    Intimate Partner Violence:     Fear of Current or Ex-Partner:     Emotionally Abused:     Physically Abused:     Sexually Abused:          Past Surgical History:   Procedure Laterality Date    APPENDECTOMY      HERNIA REPAIR         Current Outpatient Medications   Medication Sig Dispense Refill    apixaban (ELIQUIS) 2.5 MG TABS tablet Take 1 tablet by mouth 2 times daily 60 tablet 3    gabapentin (NEURONTIN) 600 MG tablet Take 1 tablet by mouth 4 times daily (after meals and at bedtime) for 363 days. 120 tablet 3    oxyCODONE-acetaminophen (PERCOCET) 7.5-325 MG per tablet Take 1 tablet by mouth every 6 hours as needed for Pain.  fluticasone (FLONASE) 50 MCG/ACT nasal spray 1 spray by Each Nostril route daily       No current facility-administered medications for this visit. Allergies   Allergen Reactions    Influenza Vaccines Other (See Comments)     Patient states had paralytic experience    Alginate [Alginic Acid]     Novocain [Procaine] Other (See Comments)     \"Rapid Heart Beat\"     Tape [Adhesive Tape] Other (See Comments)     \"Sensitive Skin\"     Clindamycin Itching and Rash           ROS:   Review of Systems   Constitutional: Negative for fatigue and fever. HENT: Negative for congestion, nosebleeds and sinus pressure. Eyes: Negative for redness and visual disturbance. Respiratory: Negative for cough, chest tightness, shortness of breath and wheezing. Cardiovascular: Negative for chest pain, palpitations and leg swelling. Gastrointestinal: Negative for abdominal distention, abdominal pain, diarrhea and nausea. Endocrine: Negative for cold intolerance, heat intolerance, polydipsia and polyphagia. Genitourinary: Negative for difficulty urinating, frequency and urgency. Musculoskeletal: Negative for arthralgias, joint swelling and myalgias. Skin: Negative for color change and wound. Neurological: Negative for dizziness, syncope, weakness and numbness. Psychiatric/Behavioral: Negative for agitation, behavioral problems, confusion, decreased concentration, hallucinations and suicidal ideas. The patient is not nervous/anxious. PHYSICAL EXAM:  Vitals:    10/07/21 1254   BP: 120/78   Site: Left Upper Arm   Position: Sitting   Cuff Size: Medium Adult   Pulse: 57   Resp: 16   SpO2: 96%   Weight: 151 lb (68.5 kg)   Height: 5' 4\" (1.626 m)     Physical Exam  Vitals reviewed. Constitutional:       Appearance: Normal appearance. HENT:      Head: Normocephalic. Mouth/Throat:      Mouth: Mucous membranes are moist.      Pharynx: Oropharynx is clear. Eyes:      Conjunctiva/sclera: Conjunctivae normal.   Neck:      Vascular: No carotid bruit. Cardiovascular:      Rate and Rhythm: Normal rate and regular rhythm. Pulses: Normal pulses. Heart sounds: Normal heart sounds. Pulmonary:      Effort: Pulmonary effort is normal.      Breath sounds: Normal breath sounds. No rales. Chest:      Chest wall: No tenderness. Abdominal:      General: Bowel sounds are normal.      Palpations: Abdomen is soft. Musculoskeletal:         General: Normal range of motion. Cervical back: Normal range of motion and neck supple. Skin:     General: Skin is warm. Neurological:      General: No focal deficit present. Mental Status: She is alert and oriented to person, place, and time. Gait: Gait abnormal.      Comments: In wheel chair   Psychiatric:         Mood and Affect: Mood normal.         Behavior: Behavior normal.         Thought Content:  Thought content normal.          Pertinent Labs:  CBC:   WBC (E9/L)   Date Value   04/15/2021 9.6   03/31/2021 10.8   03/30/2021 10.7     Hemoglobin (g/dL)   Date Value   04/15/2021 13.8   03/31/2021 14.2   03/30/2021 13.4     Hematocrit (%)   Date Value   04/15/2021 44.1   03/31/2021 45.1 03/30/2021 42.8     Platelets (N1/S)   Date Value   04/15/2021 379   03/31/2021 339   03/30/2021 342      BMP:   Sodium (mmol/L)   Date Value   05/19/2021 143   04/15/2021 139   03/31/2021 138     Potassium (mmol/L)   Date Value   05/19/2021 4.7   04/15/2021 4.8   03/31/2021 4.1     Potassium reflex Magnesium (mmol/L)   Date Value   03/25/2021 4.0   12/05/2019 4.0     Magnesium (mg/dL)   Date Value   03/31/2021 2.2   03/30/2021 2.1   03/29/2021 2.0     Chloride (mmol/L)   Date Value   05/19/2021 106   04/15/2021 97 (L)   03/31/2021 99     CO2 (mmol/L)   Date Value   05/19/2021 24   04/15/2021 32 (H)   03/31/2021 29     BUN (mg/dL)   Date Value   05/19/2021 22   04/15/2021 36 (H)   03/31/2021 25 (H)     CREATININE (mg/dL)   Date Value   05/19/2021 1.2 (H)   04/15/2021 1.9 (H)   03/31/2021 1.3 (H)     Glucose (mg/dL)   Date Value   05/19/2021 103 (H)   04/15/2021 114 (H)   03/31/2021 126 (H)     Calcium (mg/dL)   Date Value   05/19/2021 9.6   04/15/2021 9.7   03/31/2021 9.1      INR:   INR (no units)   Date Value   12/05/2019 1.6      BNP: No results found for: BNP   TSH:   TSH (uIU/mL)   Date Value   03/26/2021 0.841   12/07/2019 0.721   09/14/2015 1.720      Cardiac Injury Profile:   Troponin (ng/mL)   Date Value   03/25/2021 <0.01     Lipid Profile:   Triglycerides (mg/dL)   Date Value   03/26/2021 86     HDL (mg/dL)   Date Value   03/26/2021 43     LDL Calculated (mg/dL)   Date Value   03/26/2021 86     Cholesterol, Total (mg/dL)   Date Value   03/26/2021 146      Hemoglobin A1C:   Hemoglobin A1C (%)   Date Value   03/26/2021 5.6           Pertinent Cardiac Testing:     ECHO: 3/26/21 Summary   Normal left ventricular chamber size.   Moderate global hypokinesis, LV EF 30%. [address] left ventricular concentric hypertrophy noted.   Indeterminate LV diastolic function.  diastolic function.   Left atrium is enlarged.   Increased left atrial volume.   Interatrial septum not well visualized but appears intact.   Normal right ventricle size and function.   Right atrium is enlarged.   Mitral annular calcification is present.  Louanna Mcrae is mild mitral regurgitation.   No mitral valve prolapse.   The aortic valve appears mildly sclerotic.   No hemodynamically significant aortic stenosis.   There is mild to moderate tricuspid regurgitation.   Moderate pulmonary HTN, RVSP 54mmHg.   Normal aortic root size.   No evidence of pericardial effusion.   No intra cardiac mass or thrombus.   Compared to prior echo 12/2019 - EF is decreased, and Pulmonary HTN, MR   are new.     Stress Test: 3/30/21       1. The myocardial perfusion is normal.   2. No evidence of stress-induced ischemia or prior myocardial   infarction. 3. Normal LV systolic function, EF 00% with septal and apical   hypokinesis. 4. There is no transient ischemic dilation. 5. Low risk myocardial perfusion study           ECG 10/7/2021: sinus bradycardia, iRBBB, LAFB, rate: 57 bpm - see scanned cardiology    I have independently reviewed all of the ECGs and rhythm strips per above    I have personally reviewed the laboratory, cardiac diagnostic and radiographic testing as outlined above: We have requested previous records. 1. Bradycardia    2. Atrial fibrillation with rapid ventricular response ([address]    3. Acute on chronic combined systolic and diastolic CHF (congestive heart failure) ([address]    4. AV block    5. Stage 3 chronic kidney disease, unspecified whether stage 3a or 3b CKD (HCC)    6. Valvular heart disease         ASSESSMENT & PLAN    1. AVB  - noted 2:1 on HM - Unclear if 2:1 AV block vs prominent U waves  - asymptomatic  - will obtain a 14 day monitor to further clarify  - Discussed possible pacemaker if positive and she declines at this time as she is concerned about electrical interefernce and electrical storms. I tried to reassure her about mechanics of pacemaker  She will let us know if she is interested in doing the 2 week Zio patch    2.  CHF  - Follows with Dr. Russell Killian     3. NICM  - negative Lexiscan in April 2021  - on: Bumex  - LVEF 30% by TTE, 55% by stress. Needs repeat LVEF assessment    4. AF  - persistent  - [id number] score of 4, on Eliquis    5. HTN  - well controlled at this office visit    6. CKD  - stage III    7. Pulmonary HTN  - moderate    8. Tricuspid regurgitation  -  TTE: 3/2021 mild to moderate    9. Mitral valve regurgitation  - TTE: 3/2021 mild    Plan  1. Obtain a TTE for updated structure and function. 2. Obtain 14 day Zio XT to determine any bradyarrhythmias she wishes to defer  3. OV PRN. Encouraged the patient to call the office for any questions or concerns. Thank you for allowing me to participate in your patient's care. I have spent a total of 40 minutes with the patient and his/her family reviewing the above stated recommendations. A total of >50% of that time involved face-to-face time providing counseling and or coordination of care with the other providers.       Inge Chahal MD  Cardiac Electrophysiology  [address] [place] [address]

## 2021-10-18 DIAGNOSIS — R00.1 BRADYCARDIA: Primary | ICD-10-CM

## 2021-10-18 DIAGNOSIS — I48.91 ATRIAL FIBRILLATION WITH RAPID VENTRICULAR RESPONSE (HCC): ICD-10-CM

## 2021-11-23 ENCOUNTER — OFFICE VISIT (OUTPATIENT)
Dept: FAMILY MEDICINE CLINIC | Age: 86
End: 2021-11-23
Payer: MEDICARE

## 2021-11-23 ENCOUNTER — NURSE TRIAGE (OUTPATIENT)
Dept: OTHER | Facility: CLINIC | Age: 86
End: 2021-11-23

## 2021-11-23 VITALS
DIASTOLIC BLOOD PRESSURE: 60 MMHG | RESPIRATION RATE: 17 BRPM | SYSTOLIC BLOOD PRESSURE: 181 MMHG | WEIGHT: 151 LBS | OXYGEN SATURATION: 99 % | HEIGHT: 64 IN | BODY MASS INDEX: 25.78 KG/M2 | TEMPERATURE: 97.2 F | HEART RATE: 75 BPM

## 2021-11-23 DIAGNOSIS — I10 ESSENTIAL HYPERTENSION: Primary | ICD-10-CM

## 2021-11-23 DIAGNOSIS — I50.9 CHRONIC CONGESTIVE HEART FAILURE, UNSPECIFIED HEART FAILURE TYPE (HCC): ICD-10-CM

## 2021-11-23 PROCEDURE — 99214 OFFICE O/P EST MOD 30 MIN: CPT | Performed by: FAMILY MEDICINE

## 2021-11-23 RX ORDER — LOSARTAN POTASSIUM 50 MG/1
50 TABLET ORAL DAILY
Qty: 30 TABLET | Refills: 0 | Status: SHIPPED
Start: 2021-11-23 | End: 2021-12-14 | Stop reason: SDUPTHER

## 2021-11-23 NOTE — PROGRESS NOTES
symptoms and/or complaints related to the medical problem. Past Medical History:  has a past medical history of Acute metabolic encephalopathy, Acute respiratory failure with hypoxia ([facility] JOVANI (acute kidney injury) ([facility] Anticoagulant long-term use, Atrial fibrillation ([facility] Calculus of kidney, CHF (congestive heart failure) ([facility] Chronic back pain, Chronic kidney disease, Community acquired pneumonia of left lung, Dental caries, Diabetes mellitus ([facility] Elevated brain natriuretic peptide (BNP) level, Gall bladder stones, Gastro-esophageal reflux disease without esophagitis, GERD (gastroesophageal reflux disease), Gram-positive cocci bacteremia, H/O cardiovascular stress test, Hearing loss, Hernia, Hypertension, Hypoxia, Kidney stone, Multiple pulmonary nodules, Neuropathy, Osteoarthritis, Other contact with raccoon, sequela, Sepsis due to pneumonia ([facility] Sepsis due to Streptococcus pneumoniae with acute hypoxic respiratory failure and septic shock [facility] Type 2 diabetes mellitus ([facility] and Type 2 diabetes mellitus without complication ([facility] Past Surgical History:  has a past surgical history that includes Appendectomy and hernia repair. Social History:  reports that she has never smoked. She has never used smokeless tobacco. She reports that she does not drink alcohol and does not use drugs. Family History: family history includes Cancer in her daughter; Heart Disease in her father. Allergies: Influenza vaccines, Alginate [alginic acid], Novocain [procaine], Tape [adhesive tape], and Clindamycin    Physical Exam:  (Vital signs reviewed) BP (!) 181/60   Pulse 75   Temp 97.2 °F (36.2 °C) (Temporal)   Resp 17   Ht 5' 4\" (1.626 m)   Wt 151 lb (68.5 kg)   SpO2 99%   BMI 25.92 kg/m²   Oxygen Saturation Interpretation: Normal.   Constitutional:  Alert, development consistent with age. Eyes:  PERRL, EOMI, no discharge or conjunctival injection. Neck:  Normal ROM. Supple.   Lungs:  Clear to auscultation and breath sounds equal.  Heart:  Regular rate and rhythm, normal heart sounds, without pathological murmurs, ectopy, gallops, or rubs. Neurological:  Alert and oriented. Motor functions intact.      ------------------------------------------Test Results Section----------------------------------------------  (All laboratory and radiology results have been personally reviewed by myself)  Laboratory:      Radiology: All Radiology results interpreted by Radiologist unless otherwise noted. -------------------------------------Impression & Disposition Section-----------------------------------  Impression(s):  1. Essential hypertension      Disposition:  Disposition: Discharge to home    Restart losartan (patient previously took and tolerated well)    Pt advised to f/u with PCP in 5-7 days for continued management and further care. ER if changes or worse. Pt advised to take all medications as directed.

## 2021-11-23 NOTE — TELEPHONE ENCOUNTER
Received call from Lorri Gregory at [facility] with [employer]. Brief description of triage: pt has high blood pressure of 208/96      2nd level triage completed with Zelalem Burgess at [facility] care, pt transferred to office for 2nd level triage      Care advice provided, patient verbalizes understanding; denies any other questions or concerns; instructed to call back for any new or worsening symptoms. Attention Provider: Thank you for allowing me to participate in the care of your patient. The patient was connected to triage in response to information provided to the ECC/PSC. Please do not respond through this encounter as the response is not directed to a shared pool. Reason for Disposition   Systolic BP >= 633 OR Diastolic >= 146, and any cardiac or neurologic symptoms (e.g., chest pain, difficulty breathing, unsteady gait, blurred vision)    Answer Assessment - Initial Assessment Questions  1. BLOOD PRESSURE: \"What is the blood pressure? \" \"Did you take at least two measurements 5 minutes apart?\"      208/96     2. ONSET: \"When did you take your blood pressure? \"      15 mins ago     3. HOW: \"How did you obtain the blood pressure? \" (e.g., visiting nurse, automatic home BP monitor)      Home blood pressure monitor     4. HISTORY: \"Do you have a history of high blood pressure? \"      Yes but was taken off of her b/p meds     5. MEDICATIONS: Eliezer Haste you taking any medications for blood pressure? \" \"Have you missed any doses recently? \"      Was taking meds for HTN but was taken off the meds 2 months ago and pt was no longer  monitoring     6. OTHER SYMPTOMS: \"Do you have any symptoms? \" (e.g., headache, chest pain, blurred vision, difficulty breathing, weakness)      Dizziness, and pt has a \"bad heart\" MD wants her to wear a monitor and is considering a pacemaker     7. PREGNANCY: \"Is there any chance you are pregnant? \" \"When was your last menstrual period? \"      N/a    Protocols used: HIGH BLOOD PRESSURE-ADULT-[place]

## 2021-11-24 ENCOUNTER — TELEPHONE (OUTPATIENT)
Dept: FAMILY MEDICINE CLINIC | Age: 86
End: 2021-11-24

## 2021-11-24 NOTE — TELEPHONE ENCOUNTER
----- Message from [facility] sent at 11/24/2021  2:33 PM EST -----  Subject: Appointment Request    Reason for Call: Routine ED Follow Up Visit    QUESTIONS  Type of Appointment? Established Patient  Reason for appointment request? Available appointments did not meet   patient need  Additional Information for Provider? Mahad Coronel was seen at [facility] in   clinic on 11/23 for her blood pressure. She needs to be schedule for a   follow up appt ASAP. Her fingers on her right hand are going numb.   ---------------------------------------------------------------------------  --------------  CALL BACK INFO  What is the best way for the office to contact you? OK to leave message on   voicemail  Preferred Call Back Phone Number? [phone]  ---------------------------------------------------------------------------  --------------  SCRIPT ANSWERS  Relationship to Patient? Other  Representative Name? gene  Additional information verified (besides Name and Date of Birth)? Address  (Patient requests to see provider urgently. )? No  Do you have any questions for your primary care provider that need to be   answered prior to your appointment? No  Have you been diagnosed with, awaiting test results for, or told that you   are suspected of having COVID-19 (Coronavirus)? (If patient has tested   negative or was tested as a requirement for work, school, or travel and   not based on symptoms, answer no)? No  Within the past two weeks have you developed any of the following symptoms   (answer no if symptoms have been present longer than 2 weeks or began   more than 2 weeks ago)? Fever or Chills, Cough, Shortness of breath or   difficulty breathing, Loss of taste or smell, Sore throat, Nasal   congestion, Sneezing or runny nose, Fatigue or generalized body aches   (answer no if pain is specific to a body part e.g. back pain), Diarrhea,   Headache? No  Have you had close contact with someone with COVID-19 in the last 14 days? No  (Service Expert - click yes below to proceed with Net Transmit & Receive As Usual   Scheduling)?  Yes

## 2021-11-29 ENCOUNTER — OFFICE VISIT (OUTPATIENT)
Dept: FAMILY MEDICINE CLINIC | Age: 86
End: 2021-11-29
Payer: MEDICARE

## 2021-11-29 VITALS
OXYGEN SATURATION: 98 % | WEIGHT: 143 LBS | DIASTOLIC BLOOD PRESSURE: 64 MMHG | HEIGHT: 64 IN | TEMPERATURE: 97.4 F | BODY MASS INDEX: 24.41 KG/M2 | HEART RATE: 64 BPM | RESPIRATION RATE: 18 BRPM | SYSTOLIC BLOOD PRESSURE: 148 MMHG

## 2021-11-29 DIAGNOSIS — I10 ESSENTIAL HYPERTENSION: Primary | ICD-10-CM

## 2021-11-29 DIAGNOSIS — S62.101A CLOSED FRACTURE OF RIGHT WRIST, INITIAL ENCOUNTER: ICD-10-CM

## 2021-11-29 DIAGNOSIS — R20.0 NUMBNESS: ICD-10-CM

## 2021-11-29 PROCEDURE — 99214 OFFICE O/P EST MOD 30 MIN: CPT | Performed by: NURSE PRACTITIONER

## 2021-11-29 NOTE — PROGRESS NOTES
OFFICE PROGRESS NOTE  [address]  [address] [place] [id number]  Dept: [phone]   Chief Complaint   Patient presents with   [employer] Hypertension    Health Maintenance     Due for TDap,Shingles, and Pneumococcal       ASSESSMENT/PLAN   1. Essential hypertension  Assessment & Plan:   Uncontrolled, at times, continue current medications, medication adherence emphasized, lifestyle modifications recommended and discuss with Dr Yrn Donahue as she is not taking the amiodarone or Toprol now per his direction. 2. Closed fracture of right wrist, initial encounter  -     XR WRIST RIGHT (MIN 3 VIEWS); Future  3. Numbness  Assessment & Plan:  New Uncontrolled, will repeat Xray to see if anything has changed from October, discussed having EMG done and she wants to wait at this time, she will follow up with Dr Mariela Victoria. She has decreased sensation to all the fingers on the right hand with + Tinel         Reviewed notes from Dr Mariela Victoria 10/6/21, Dr Naomy Dan 11/23/21, DR Marcial 10/7/21, Keke Crawford NP 8/25/21  Reviewed radiology right wrist 8/25/21, 9/8/21, 10/6/21    Discussed exercising 30 minutes daily and Discussed taking medications as directed and adverse effects    Return in about 1 month (around 12/29/2021) for HTN.     HPI:   Accompanied by   Here today for follow up on hypertension, in August she was seen at the [facility] in [facility] for wrist fracture was found to bradycardic. Keke Crawford NP spoke with Dr Georges Rodriguez nurse and was told to hold the amiodarone and cut the Toprol to once a day. She was also on Spironolactone and bumex as needed, per her  she had EKG at Dr Georges Rodriguez office and was told to stop the Toprol so she hasn't been on any BP medications until last week when she came to walk in [facility] and saw Dr Naomy Dan for hypertension he started her on losartan 50 mg daily. She says today her BP was very low.  She said last week she had pressure in her teeth and on the right side of the chest with pressure lasted 10 minutes then it was gone. She monitored BP at home has been 108/68, 98/62 today. Using the automatic cuff and wrist cuff. She does see cardiology and electro physiologist and declined pacemaker at last visit. She is also complaining of pain and numbness in the right wrist since she had her fracture. She says the numbness is in the fingers, She said she turned in bed one night and had severe pain in the wrist then the numbness started, she says the bone on the lateral aspect of the wrist appears to be sticking out more than it was prior to the last x ray    Current Outpatient Medications:     losartan (COZAAR) 50 MG tablet, Take 1 tablet by mouth daily, Disp: 30 tablet, Rfl: 0    apixaban (ELIQUIS) 2.5 MG TABS tablet, Take 1 tablet by mouth 2 times daily, Disp: 60 tablet, Rfl: 3    gabapentin (NEURONTIN) 600 MG tablet, Take 1 tablet by mouth 4 times daily (after meals and at bedtime) for 363 days. , Disp: 120 tablet, Rfl: 3    oxyCODONE-acetaminophen (PERCOCET) 7.5-325 MG per tablet, Take 1 tablet by mouth every 6 hours as needed for Pain., Disp: , Rfl:     fluticasone (FLONASE) 50 MCG/ACT nasal spray, 1 spray by Each Nostril route daily, Disp: , Rfl:       Surgical History:  has a past surgical history that includes Appendectomy and hernia repair. Social History:  reports that she has never smoked. She has never used smokeless tobacco. She reports that she does not drink alcohol and does not use drugs. Family History: family history includes Cancer in her daughter; Heart Disease in her father. I have reviewed Jackelyn's allergies, medications, problem list, medical, social and family history and have updated as needed in the electronic medical record    Review of Systems   Constitutional: Negative for activity change, appetite change, chills, diaphoresis, fatigue, fever and unexpected weight change. HENT: Positive for hearing loss. Negative for congestion, dental problem, drooling, ear discharge, ear pain, facial swelling, mouth sores, nosebleeds, postnasal drip, rhinorrhea, sinus pressure, sinus pain, sneezing, sore throat, tinnitus, trouble swallowing and voice change. Eyes: Negative for visual disturbance. Respiratory: Negative for cough, chest tightness, shortness of breath and wheezing. Cardiovascular: Negative for chest pain, palpitations and leg swelling ( off and on). Gastrointestinal: Negative for abdominal pain, constipation, diarrhea, nausea and vomiting. Endocrine: Negative for cold intolerance, heat intolerance, polydipsia, polyphagia and polyuria. Genitourinary: Negative for difficulty urinating, frequency and urgency. Musculoskeletal: Positive for arthralgias ( pain in right wrist and fingers). Negative for back pain, gait problem, joint swelling, myalgias, neck pain and neck stiffness. Skin: Negative for color change, pallor, rash and wound. Allergic/Immunologic: Negative for environmental allergies, food allergies and immunocompromised state. Neurological: Positive for numbness ( right fingers,  ). Negative for dizziness, tremors, seizures, syncope, facial asymmetry, speech difficulty, weakness, light-headedness and headaches. Hematological: Negative for adenopathy. Does not bruise/bleed easily. Psychiatric/Behavioral: Negative for agitation, behavioral problems, confusion, decreased concentration, dysphoric mood, hallucinations, self-injury, sleep disturbance and suicidal ideas. The patient is not nervous/anxious and is not hyperactive.         OBJECTIVE:     VS:  Wt Readings from Last 3 Encounters:   11/29/21 143 lb (64.9 kg)   11/23/21 151 lb (68.5 kg)   10/07/21 151 lb (68.5 kg)                       Vitals:    11/29/21 1334 11/29/21 1340   BP: (!) 160/72 (!) 148/64   Pulse: 64    Resp: 18    Temp: 97.4 °F (36.3 °C)    TempSrc: Temporal    SpO2: 98%    Weight: 143 lb (64.9 kg)    Height: 5' 4\" (1.626 m)        General: Alert and oriented to person, place, and time, well developed and well nourished, in no acute distress  SKIN: Warm and dry, intact without any rash, masses or lesions  HEAD: normocephalic, atraumatic  Eyes: sclera/conjunctiva clear, PERRLA, EOMI's intact  ENT: tympanic membranes, external ear and ear canal normal bilaterally,very hard of hearing  had to ask her almost every thing I asked her as she couldn't hear me,   Neck: supple and non-tender without mass, trachea midline, no cervical lymphadenopathy, no bruit, no thyromegaly or nodules  Cardiovascular: regular rate and regular rhythm, normal S1 and S2,  no murmurs, rubs, clicks, or gallop. Distal pulses intact, no carotid bruits. No edema  Pulmonary/Chest: clear to auscultation bilaterally, no wheezes, rales or rhonchi, normal air movement, no respiratory distress  Abdomen: soft, non-tender, non-distended, normal bowel sounds, no masses or hepatosplenomegaly  Musculoskeletal: Normal ROM, + Tinel, - Phalen no joint swelling, no tenderness does have slight deformity at the Ulna   Neurologic: , sitting in WC, coordination and speech normal  Extremities: no clubbing, cyanosis, or edema, decreased sensation to all 5 fingers on the right hand using the monofilament, normal in the palm. Psychiatric: Good eye contact, normal mood and affect, answers questions appropriately    I have reviewed my findings and recommendations with Deirdre Roberto.     Cortez Mcnulty, MEJIA - CNP, NP-C, FNP-BC

## 2021-11-30 ENCOUNTER — TELEPHONE (OUTPATIENT)
Dept: CARDIOLOGY CLINIC | Age: 86
End: 2021-11-30

## 2021-11-30 PROBLEM — E07.9 THYROID LUMP: Status: ACTIVE | Noted: 2021-11-30

## 2021-11-30 PROBLEM — R20.0 NUMBNESS: Status: ACTIVE | Noted: 2021-11-30

## 2021-11-30 ASSESSMENT — ENCOUNTER SYMPTOMS
SINUS PAIN: 0
VOMITING: 0
VOICE CHANGE: 0
BACK PAIN: 0
SHORTNESS OF BREATH: 0
COLOR CHANGE: 0
SORE THROAT: 0
DIARRHEA: 0
SINUS PRESSURE: 0
ABDOMINAL PAIN: 0
NAUSEA: 0
WHEEZING: 0
FACIAL SWELLING: 0
RHINORRHEA: 0
COUGH: 0
TROUBLE SWALLOWING: 0
CONSTIPATION: 0
CHEST TIGHTNESS: 0

## 2021-11-30 NOTE — ASSESSMENT & PLAN NOTE
Uncontrolled, at times, continue current medications, medication adherence emphasized, lifestyle modifications recommended and discuss with Dr Angel Orozco as she is not taking the amiodarone or Toprol now per his direction.

## 2021-11-30 NOTE — TELEPHONE ENCOUNTER
Patient is refusing 14 day event monitor at this time due to she has family in the entire month of December. Patient will call us in January to reschedule.     Mirna Caldera

## 2021-11-30 NOTE — ASSESSMENT & PLAN NOTE
New Uncontrolled, will repeat Xray to see if anything has changed from October, discussed having EMG done and she wants to wait at this time, she will follow up with Dr Taylor Juarez.  She has decreased sensation to all the fingers on the right hand with + Tinel

## 2021-12-13 RX ORDER — LOSARTAN POTASSIUM 50 MG/1
50 TABLET ORAL DAILY
Qty: 30 TABLET | Refills: 0 | Status: CANCELLED | OUTPATIENT
Start: 2021-12-13

## 2021-12-13 NOTE — TELEPHONE ENCOUNTER
Pt states the Rx was increase to 1 1/2 pills daily  At her last O/V  On 11.29.21 not found in the chart. and now she needs refill. Please advise.     Last seen 11/29/2021  Next appt Visit date not found    [address]

## 2021-12-13 NOTE — TELEPHONE ENCOUNTER
She needs seen for the blood pressure, I don't remember and don't see any documentation in the chart about increasing the losartan. She never made follow up appointment and I don't know what her blood pressure is doing can she come today at 2:20?

## 2021-12-14 RX ORDER — LOSARTAN POTASSIUM 50 MG/1
TABLET ORAL
Qty: 45 TABLET | Refills: 1 | Status: SHIPPED
Start: 2021-12-14 | End: 2022-02-10 | Stop reason: SDUPTHER

## 2021-12-14 NOTE — TELEPHONE ENCOUNTER
Pt called stating that she ran out of her Losartan 50 mg tablets. Pt states she was only given a 30 day supply but was told by Sweta to take 1 pill in the am and 1/2 pill in the evening. PT states that if she is to continue to take her medication this way she needs a new script called in for the correct amount.

## 2021-12-27 DIAGNOSIS — I48.91 ATRIAL FIBRILLATION WITH RAPID VENTRICULAR RESPONSE (HCC): ICD-10-CM

## 2021-12-27 RX ORDER — APIXABAN 2.5 MG/1
TABLET, FILM COATED ORAL
Qty: 60 TABLET | Refills: 3 | OUTPATIENT
Start: 2021-12-27

## 2022-02-07 ENCOUNTER — TELEPHONE (OUTPATIENT)
Dept: OTHER | Age: 87
End: 2022-02-07

## 2022-02-10 ENCOUNTER — TELEPHONE (OUTPATIENT)
Dept: FAMILY MEDICINE CLINIC | Age: 87
End: 2022-02-10

## 2022-02-10 RX ORDER — LOSARTAN POTASSIUM 50 MG/1
TABLET ORAL
Qty: 45 TABLET | Refills: 0 | Status: SHIPPED
Start: 2022-02-10 | End: 2022-03-10

## 2022-02-10 NOTE — TELEPHONE ENCOUNTER
Called and scheduled appt 4.18.22.
Left  ms I was returning Jimmy's call.
Media Ebbs  [facility]   Subject: Appointment Request     Reason for Call: Routine Existing Condition Follow Up (Diabetes)     QUESTIONS   Type of Appointment? Established Patient   Reason for appointment request? Available appointments did not meet   patient need   Additional Information for Provider? Patient needs a follow up appointment   for blood pressure with someone sooner than April. Who would be able to   fit her in? Please reach out to patient.   ---------------------------------------------------------------------------   --------------   CALL BACK INFO   What is the best way for the office to contact you? OK to leave message on   voicemail   Preferred Call Back Phone Number?  [phone]
As Usual   Scheduling)?  Yes

## 2022-02-14 ENCOUNTER — NURSE TRIAGE (OUTPATIENT)
Dept: OTHER | Facility: CLINIC | Age: 87
End: 2022-02-14

## 2022-02-14 NOTE — TELEPHONE ENCOUNTER
Received call from [facility] at [facility] with Red Flag Complaint. limited triage due to caller with patient but patient not able to be part of call     Subjective: Andresmariaelena Wilde spouse this writer was given verbal permission to speak with caller by patient, pt does not hearing air states \"she had broken wrist couple months ago. She saw dr Scar Ramos and wrapped it. After that was taken off, a while after her fingers started getting numb goes up hands into hand into wrist. \"     Current Symptoms: prior injury fracture right hand , constant numbness sensation that was present in finger now all the way to wrist. Spouse reports patient does not have good strength in that arm. No weakness in arm per spouse report. Patient reports she has had chest pain at times last occurrence was a couple days ago or 1 week ago. Has appt with cardiology tomorrow ( did review EMR 2/16 is the appt) . Patient's spouse denies patient reporting difficulty, confusion     Onset: 3 weeks     Associated Symptoms: reduced activity    Pain Severity: 7/10; sharp; constant    Temperature:no fever     What has been tried: pain medicine     LMP: NA Pregnant: NA    Recommended disposition:  GO TO ED/UCC NOW (OR TO OFFICE WITH PCP APPROVAL):              2nd level. Care advice provided, patient verbalizes understanding; denies any other questions or concerns; instructed to call back for any new or worsening symptoms. Writer provided warm transfer to Fara Avalos at [facility] [place] for further assessment and resume of care      Attention Provider: Thank you for allowing me to participate in the care of your patient. The patient was connected to triage in response to information provided to the ECC/PSC. Please do not respond through this encounter as the response is not directed to a shared pool.         Reason for Disposition   Patient sounds very sick or weak to the triager     Patient with prior injury fracture wrist right side now experiencing numbness new onset 3 weeks constant    Protocols used: NEUROLOGIC DEFICIT-ADULT-[place]

## 2022-02-21 ENCOUNTER — TELEPHONE (OUTPATIENT)
Dept: FAMILY MEDICINE CLINIC | Age: 87
End: 2022-02-21

## 2022-02-21 ENCOUNTER — NURSE TRIAGE (OUTPATIENT)
Dept: OTHER | Facility: CLINIC | Age: 87
End: 2022-02-21

## 2022-02-21 NOTE — TELEPHONE ENCOUNTER
----- Message from Benny Jenny sent at 2/21/2022 12:51 PM EST -----  Subject: Appointment Request    Reason for Call: Routine ED Follow Up Visit    QUESTIONS  Type of Appointment? Established Patient  Reason for appointment request? No appointments available during search  Additional Information for Provider? pt needs a Follow up regarding her   wrist, Need's sooner apt date.   ---------------------------------------------------------------------------  --------------  CALL BACK INFO  What is the best way for the office to contact you? OK to leave message on   voicemail  Preferred Call Back Phone Number? [phone]  ---------------------------------------------------------------------------  --------------  SCRIPT ANSWERS  Relationship to Patient? Third Party  Representative Name?   (Patient requests to see provider urgently. )? No  Do you have any questions for your primary care provider that need to be   answered prior to your appointment? No  Have you been diagnosed with, awaiting test results for, or told that you   are suspected of having COVID-19 (Coronavirus)? (If patient has tested   negative or was tested as a requirement for work, school, or travel and   not based on symptoms, answer no)? No  Within the past 10 days have you developed any of the following symptoms   (answer no if symptoms have been present longer than 10 days or began   more than 10 days ago)? Fever or Chills, Cough, Shortness of breath or   difficulty breathing, Loss of taste or smell, Sore throat, Nasal   congestion, Sneezing or runny nose, Fatigue or generalized body aches   (answer no if pain is specific to a body part e.g. back pain), Diarrhea,   Headache? No  Have you had close contact with someone with COVID-19 in the last 7 days? No  (Service Expert - click yes below to proceed with [employer] As Usual   Scheduling)?  Yes

## 2022-02-21 NOTE — TELEPHONE ENCOUNTER
Received call from Keri Zhang at [facility] with [employer] Complaint. Subjective: Caller states \"My wife has been complaining about numbness in her Right hand and it's going into her thumb and fingers. \"     Limited Triage due to  calling on patient's behalf. Current Symptoms: Decreased strength to right hand, aching pain, fingers feel cold - still pink in color    Onset: 1 month ago; gradual    Associated Symptoms: reduced activity    Pain Severity: pain scale unknown due to  calling on her behlaf/10; pins and needles; constant    Temperature: None at this time    What has been tried: Nothing at this time    LMP: NA Pregnant: NA    Recommended disposition: See PCP in office within 3 days    Care advice provided, patient verbalizes understanding; denies any other questions or concerns; instructed to call back for any new or worsening symptoms. Patient/Caller agrees with recommended disposition; writer provided warm transfer to Mindy Ortiz at [facility] for appointment scheduling     Attention Provider: Thank you for allowing me to participate in the care of your patient. The patient was connected to triage in response to information provided to the ECC/PSC. Please do not respond through this encounter as the response is not directed to a shared pool.       Reason for Disposition   Numbness or tingling in one or both hands is a chronic symptom (recurrent or ongoing problem lasting > 4 weeks)    Protocols used: NEUROLOGIC DEFICIT-ADULT-OH

## 2022-02-21 NOTE — TELEPHONE ENCOUNTER
Accepted transferred call from [address] [place] [address],[address]. Spouse informed no available appt within 3 days, and he is unable to bring pt in at 0740. Offered walk-in.  Spouse then asked to be transferred to Dr. Manish Clark office in [facility], and he would consider walk-in if he cannot get appt with Dr. Manish Clark

## 2022-02-22 ENCOUNTER — TELEPHONE (OUTPATIENT)
Dept: OTHER | Age: 87
End: 2022-02-22

## 2022-02-22 NOTE — TELEPHONE ENCOUNTER
2:58 PM 2/22/2022    Yumiko Arora  1935      Called patient re: referral to the CHF clinic. Left a voicemail to become established and left a callback number to call the clinic back.        Valdez Finley RN

## 2022-02-23 NOTE — TELEPHONE ENCOUNTER
She cancelled the last 3 appointments was 2/7, 2/15 was the day she called about the wrist so why did they cancel the appt. I thought she was going to see ortho?  She will have to wait for what is available

## 2022-02-28 NOTE — TELEPHONE ENCOUNTER
Called and  answered, offered apt for this Friday at 10:20. Pt  declined stating he had to speak with her first.   I asked him to call me back with a yes or no. Patient is asking for afternoon apt, we have no afternoon apt for a month.

## 2022-03-01 NOTE — TELEPHONE ENCOUNTER
3/1/2022 9:17 AM called Claudy Gilbert 1935 [phone] (home)   Rosanna Mark and Liam Jimenez have same home and mobile #. Left VM to call CHF Clinic to set initial visit or to update staff if declining services. Reminder of DR Hopkins visit left also. Future Appointments   Date Time Provider Juliana Ureña   4/4/2022  2:30 PM Brian Brothers MD [facility]   4/18/2022  2:40 PM MEJIA Aguilera - [facility]        Terese Fernandez [phone] was next called 9:20 AM requested to have [address] call to set chf visit or to notify staff if declining services        Pending call back.      3 attempts have been made to contact Claudy Gilbert to set initial visit    2 7 , 2 22  And 3/1/2022       Eduardo Sheikh RN

## 2022-03-10 RX ORDER — LOSARTAN POTASSIUM 50 MG/1
TABLET ORAL
Qty: 45 TABLET | Refills: 0 | Status: SHIPPED
Start: 2022-03-10 | End: 2022-04-12

## 2022-04-09 DIAGNOSIS — I48.91 ATRIAL FIBRILLATION WITH RAPID VENTRICULAR RESPONSE (HCC): ICD-10-CM

## 2022-04-12 ENCOUNTER — TELEPHONE (OUTPATIENT)
Dept: FAMILY MEDICINE CLINIC | Age: 87
End: 2022-04-12

## 2022-04-12 RX ORDER — LOSARTAN POTASSIUM 50 MG/1
TABLET ORAL
Qty: 45 TABLET | Refills: 0 | Status: SHIPPED
Start: 2022-04-12 | End: 2022-05-10 | Stop reason: SDUPTHER

## 2022-04-12 RX ORDER — APIXABAN 2.5 MG/1
TABLET, FILM COATED ORAL
Qty: 60 TABLET | Refills: 3 | Status: SHIPPED
Start: 2022-04-12 | End: 2022-05-31 | Stop reason: SDUPTHER

## 2022-04-12 NOTE — TELEPHONE ENCOUNTER
Patient's  called regarding RX for Losartan stating the pharmacy notified them that it needs a Prior Auth. Patient's  stated patient is out of her medication.       Last seen 11/29/2021  Next appt 5/31/2022

## 2022-04-12 NOTE — TELEPHONE ENCOUNTER
Patient called stating the losartan needs PA . Please advise.     Last seen 11/29/2021  Next appt 5/31/2022

## 2022-04-13 NOTE — TELEPHONE ENCOUNTER
I called patient's  and informed PA was done and RX should be ready to be picked up at the pharmacy. Advised to contact pharmacy to be sure it's ready.

## 2022-04-13 NOTE — TELEPHONE ENCOUNTER
Karlo Savage is working on this not sure why it requires PA. She has been very non compliant to appointments. Why did they wait until she was out. We just got the approval. They should be able to pick it up.

## 2022-04-13 NOTE — TELEPHONE ENCOUNTER
Received an approval for medication called pharmacy left message, also called patient and left message.

## 2022-05-10 RX ORDER — LOSARTAN POTASSIUM 50 MG/1
TABLET ORAL
Qty: 45 TABLET | Refills: 0 | Status: SHIPPED
Start: 2022-05-10 | End: 2022-05-31 | Stop reason: SDUPTHER

## 2022-05-10 NOTE — TELEPHONE ENCOUNTER
Patient called for refill  Last seen 11/29/2021  Next appt 5/31/2022  [address] [facility]

## 2022-05-10 NOTE — TELEPHONE ENCOUNTER
This is last refill until patient is seen or can find a new provider, she has cancelled multiple appointments

## 2022-05-19 NOTE — PROGRESS NOTES
[facility] Cardiology consult  Dr. aWldo Chua      Reason for Consult: CHF  Referring Physician: MEJIA Monet - CNP     CHIEF COMPLAINT:   Chief Complaint   Patient presents with    Atrial Fibrillation     3 month talk about event monitor, Patient has  no complaints        HISTORY OF PRESENT ILLNESS  80year old female with history of systolic congestive heart failure, nonischemic cardiomyopathy, atrial fibrillation, chronic kidney disease, hypertension, type 2 diabetes mellitus, is here for follow-up appointment. Complaining of right-sided chest pain, radiation to the neck, 3 episodes at rest, weeks apart, no recurrence over the last few weeks, shortness of breath is at baseline, patient denies any lightheadedness, no dizziness, no palpitations, no pedal edema, no PND, no orthopnea, no syncope, no presyncopal episodes.       Past Medical History:   Diagnosis Date    Acute metabolic encephalopathy 05/9/0373    Acute respiratory failure with hypoxia ([facility] 12/5/2019    JOVANI (acute kidney injury) ([facility] 12/5/2019    Anticoagulant long-term use     Atrial fibrillation (HCC)     Calculus of kidney 5/19/2021    CHF (congestive heart failure) (HCC)     Chronic back pain     Chronic kidney disease     Community acquired pneumonia of left lung     Dental caries     Diabetes mellitus ([facility]     Elevated brain natriuretic peptide (BNP) level 12/5/2019    Gall bladder stones     Gastro-esophageal reflux disease without esophagitis 4/15/2021    GERD (gastroesophageal reflux disease)     Gram-positive cocci bacteremia     H/O cardiovascular stress test 03/30/2021    Lexiscan    Hearing loss     Hernia     Hypertension     Hypoxia     Kidney stone     Multiple pulmonary nodules 5/19/2021    Neuropathy 5/26/2021    Osteoarthritis     Other contact with raccoon, sequela 8/10/2020    This Diagnosis was added to the Problem List based on transcribed orders from Dr. Nury Shay NP      Sepsis due to pneumonia ([facility] 12/5/2019    Sepsis due to Streptococcus pneumoniae with acute hypoxic respiratory failure and septic shock (HCC)     Type 2 diabetes mellitus ([facility] 4/15/2021    Type 2 diabetes mellitus without complication (HCC)          Past Surgical History:   Procedure Laterality Date    APPENDECTOMY      HERNIA REPAIR           Current Outpatient Medications   Medication Sig Dispense Refill    losartan (COZAAR) 50 MG tablet TAKE 1 TABLET BY MOUTH EVERY MORNING AND 1/2 TABLETS EVERY EVENING 45 tablet 0    ELIQUIS 2.5 MG TABS tablet TAKE 1 TABLET BY MOUTH TWICE DAILY 60 tablet 3    gabapentin (NEURONTIN) 600 MG tablet Take 1 tablet by mouth 4 times daily (after meals and at bedtime) for 363 days. 120 tablet 3    oxyCODONE-acetaminophen (PERCOCET) 7.5-325 MG per tablet Take 1 tablet by mouth every 6 hours as needed for Pain.  fluticasone (FLONASE) 50 MCG/ACT nasal spray 1 spray by Each Nostril route daily       No current facility-administered medications for this visit.          Allergies as of 05/20/2022 - Fully Reviewed 05/20/2022   Allergen Reaction Noted    Influenza vaccines Other (See Comments) 12/12/2019    Alginate [alginic acid]  05/03/2018    Novocain [procaine] Other (See Comments) 07/22/2017    Tape [place] tape] Other (See Comments) 05/03/2018    Clindamycin Itching and Rash 04/15/2021       Social History     Socioeconomic History    Marital status:      Spouse name: Not on file    Number of children: Not on file    Years of education: Not on file    Highest education level: Not on file   Occupational History    Not on file   Tobacco Use    Smoking status: Never Smoker    Smokeless tobacco: Never Used   Vaping Use    Vaping Use: Never used   Substance and Sexual Activity    Alcohol use: No     Comment: 1/2 CUP OF COFFEE PER DAY    Drug use: Never    Sexual activity: Not Currently     Partners: Male   Other Topics Concern    Not on file   Social History Narrative    Not on file     Social Determinants of Health     Financial Resource Strain:     Difficulty of Paying Living Expenses: Not on file   Food Insecurity:     Worried About Running Out of Food in the Last Year: Not on file    Gerardo of Food in the Last Year: Not on file   Transportation Needs:     Lack of Transportation (Medical): Not on file    Lack of Transportation (Non-Medical):  Not on file   Physical Activity:     Days of Exercise per Week: Not on file    Minutes of Exercise per Session: Not on file   Stress:     Feeling of Stress : Not on file   Social Connections:     Frequency of Communication with Friends and Family: Not on file    Frequency of Social Gatherings with Friends and Family: Not on file    Attends Zoroastrianism Services: Not on file    Active Member of [address] [place] [address] [employer] or Organizations: Not on file    Attends Club or Organization Meetings: Not on file    Marital Status: Not on file   Intimate Partner Violence:     Fear of Current or Ex-Partner: Not on file    Emotionally Abused: Not on file    Physically Abused: Not on file    Sexually Abused: Not on file   Housing Stability:     Unable to Pay for Housing in the Last Year: Not on file    Number of [place] in the Last Year: Not on file    Unstable Housing in the Last Year: Not on file       Family History   Problem Relation Age of Onset    Heart Disease Father     Cancer Daughter        REVIEW OF SYSTEMS:     CONSTITUTIONAL:  negative for  fevers, chills, sweats, + fatigue  HEENT:  negative for  tinnitus, earaches, nasal congestion and epistaxis  RESPIRATORY:  negative for  dry cough, cough with sputum, dyspnea, wheezing and hemoptysis  GASTROINTESTINAL:  negative for nausea, vomiting, diarrhea, constipation, pruritus and jaundice  HEMATOLOGIC/LYMPHATIC:  negative for easy bruising, bleeding, lymphadenopathy and petechiae  ENDOCRINE:  negative for heat intolerance, cold intolerance, tremor, hair loss and diabetic symptoms including neither polyuria nor polydipsia nor blurred vision  MUSCULOSKELETAL:  negative for  myalgias, arthralgias, joint swelling, stiff joints and decreased range of motion  NEUROLOGICAL:  negative for memory problems, speech problems, visual disturbance, dysphagia, weakness and numbness      PHYSICAL EXAM:   CONSTITUTIONAL:  awake, alert, cooperative, no apparent distress, and appears stated age  HEAD:  normocepalic, without obvious abnormality, atraumatic  NECK:  Supple, symmetrical, trachea midline, no adenopathy, thyroid symmetric, not enlarged and no tenderness, skin normal  LUNGS:  No increased work of breathing, good air exchange, no wheezing or rales  cARDIOVASCULAR:  Normal apical impulse, irregularly irregular, tachycardic, normal S1 and S2, no S3, 3/systolic murmur at the apex, 3/systolic murmur at the left lower sternal border, trace edema, no JVD, no carotid bruit. ABDOMEN:  Soft, nontender, no masses, no hepatomegaly, no splenomegaly, BS+  MUSCULOSKELETAL:  No clubbing no cyanosis. there is no redness, warmth, or swelling of the joints  full range of motion noted  NEUROLOGIC:  Alert, awake,oriented x3  SKIN:  no bruising or bleeding, normal skin color, texture, turgor and no redness, warmth, or swelling    /78   Pulse 59   Resp 16   Ht 5' 4\" (1.626 m)   Wt 143 lb (64.9 kg)   BMI 24.55 kg/m²     DATA:   I personally reviewed the visit EKG with the following interpretation: Sinus bradycardia, low voltage QRS, left anterior fascicular block    EKG 10/7/21  Atrial fibrillation, controlled ventricular response, poor R wave progression, left anterior fascicular block        ECHO: 3/26/21 Summary   Normal left ventricular chamber size. Moderate global hypokinesis, LV EF 30%. Mild left ventricular concentric hypertrophy noted. Indeterminate LV diastolic function. diastolic function. Left atrium is enlarged. Increased left atrial volume.    Interatrial septum not well visualized but appears intact. Normal right ventricle size and function. Right atrium is enlarged. Mitral annular calcification is present. There is mild mitral regurgitation. No mitral valve prolapse. The aortic valve appears mildly sclerotic. No hemodynamically significant aortic stenosis. There is mild to moderate tricuspid regurgitation. Moderate pulmonary HTN, RVSP 54mmHg. Normal aortic root size. No evidence of pericardial effusion. No intra cardiac mass or thrombus. Compared to prior echo 12/2019 - EF is decreased, and Pulmonary HTN, MR   are new. Stress Test: 3/30/21      1. The myocardial perfusion is normal.   2. No evidence of stress-induced ischemia or prior myocardial   infarction. 3. Normal LV systolic function, EF 27% with septal and apical   hypokinesis. 4. There is no transient ischemic dilation. 5. Low risk myocardial perfusion study.        Angiography:   Cardiology Labs: BMP:    Lab Results   Component Value Date     05/19/2021    K 4.7 05/19/2021    K 4.0 03/25/2021     05/19/2021    CO2 24 05/19/2021    BUN 22 05/19/2021    CREATININE 1.2 05/19/2021     CMP:    Lab Results   Component Value Date     05/19/2021    K 4.7 05/19/2021    K 4.0 03/25/2021     05/19/2021    CO2 24 05/19/2021    BUN 22 05/19/2021    CREATININE 1.2 05/19/2021    PROT 8.0 04/15/2021     CBC:    Lab Results   Component Value Date    WBC 9.6 04/15/2021    RBC 4.62 04/15/2021    HGB 13.8 04/15/2021    HCT 44.1 04/15/2021    MCV 95.5 04/15/2021    RDW 13.8 04/15/2021     04/15/2021     PT/INR:  No results found for: PTINR  PT/INR Warfarin:  No components found for: PTPATWAR, PTINRWAR  PTT:  No results found for: APTT  PTT Heparin:  No components found for: APTTHEP  Magnesium:    Lab Results   Component Value Date    MG 2.2 03/31/2021     TSH:    Lab Results   Component Value Date    TSH 0.841 03/26/2021     TROPONIN:  No components found for: TROP  BNP:  No results found for: BNP  FASTING LIPID PANEL:    Lab Results   Component Value Date    CHOL 146 03/26/2021    HDL 43 03/26/2021    TRIG 86 03/26/2021     No orders to display     I have personally reviewed the laboratory, cardiac diagnostic and radiographic testing as outlined above:  Records from recent hospital admission reviewed and summarized as above    IMPRESSION:  1. Chest pain: Right-sided, at rest, patient was reassured  2. Chronic systolic congestive heart failure: Compensated, will adjust treatment  3. Cardiomyopathy: Nonischemic as evident by negative Lexiscan stress test done in April 2021, will adjust medications  4. Atrial fibrillation: Persistent, rate is controlled on current treatment, [id number] score of 4, on chronic anticoagulation with Eliquis  5. Tricuspid valve regurgitation: Mild to moderate  6. Pulmonary hypertension: Moderate  7. Mitral valve regurgitation: Mild  8. Hypertension: Controlled  9. Chronic anticoagulation  10. Stage III chronic kidney disease    RECOMMENDATIONS:   1. Will continue current treatment  2. CHF: Daily weight, take an extra Bumex for weight gain of more than 2-3 pounds in 24 hours, compliance with diuretics, low-salt diet were all advised. 3.  Increase risk of bleeding due to being on anti-coagulation, symptoms and signs of bleeding discussed with patient, patient was advised to seek medical attention at the earliest symptoms or signs of bleeding. 4.  Follow-up with INEZ Telles as scheduled. 5.  Follow-up with Dr. Josiah Griffin in 6 months, sooner if symptomatic for any reason    I have reviewed my findings and recommendations with patient    Electronically signed by Crystal Fang MD on 5/20/2022 at 3:18 PM      NOTE: This report was transcribed using voice recognition software.  Every effort was made to ensure accuracy; however, inadvertent computerized transcription errors may be present

## 2022-05-20 ENCOUNTER — OFFICE VISIT (OUTPATIENT)
Dept: CARDIOLOGY CLINIC | Age: 87
End: 2022-05-20
Payer: MEDICARE

## 2022-05-20 VITALS
BODY MASS INDEX: 24.41 KG/M2 | SYSTOLIC BLOOD PRESSURE: 116 MMHG | WEIGHT: 143 LBS | RESPIRATION RATE: 16 BRPM | DIASTOLIC BLOOD PRESSURE: 78 MMHG | HEART RATE: 59 BPM | HEIGHT: 64 IN

## 2022-05-20 DIAGNOSIS — I48.91 ATRIAL FIBRILLATION WITH RAPID VENTRICULAR RESPONSE (HCC): Primary | ICD-10-CM

## 2022-05-20 PROCEDURE — 99214 OFFICE O/P EST MOD 30 MIN: CPT | Performed by: INTERNAL MEDICINE

## 2022-05-20 PROCEDURE — 93000 ELECTROCARDIOGRAM COMPLETE: CPT | Performed by: INTERNAL MEDICINE

## 2022-05-31 ENCOUNTER — OFFICE VISIT (OUTPATIENT)
Dept: FAMILY MEDICINE CLINIC | Age: 87
End: 2022-05-31
Payer: MEDICARE

## 2022-05-31 VITALS
HEIGHT: 64 IN | OXYGEN SATURATION: 96 % | HEART RATE: 71 BPM | SYSTOLIC BLOOD PRESSURE: 138 MMHG | WEIGHT: 147 LBS | DIASTOLIC BLOOD PRESSURE: 64 MMHG | RESPIRATION RATE: 16 BRPM | TEMPERATURE: 97.6 F | BODY MASS INDEX: 25.1 KG/M2

## 2022-05-31 DIAGNOSIS — I10 ESSENTIAL HYPERTENSION: Primary | ICD-10-CM

## 2022-05-31 DIAGNOSIS — R07.81 RIB PAIN ON LEFT SIDE: ICD-10-CM

## 2022-05-31 DIAGNOSIS — Z91.81 AT HIGH RISK FOR FALLS: ICD-10-CM
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
## 2022-05-31 DIAGNOSIS — N18.30 STAGE 3 CHRONIC KIDNEY DISEASE, UNSPECIFIED WHETHER STAGE 3A OR 3B CKD (HCC): ICD-10-CM

## 2022-05-31 DIAGNOSIS — I50.9 CHRONIC CONGESTIVE HEART FAILURE, UNSPECIFIED HEART FAILURE TYPE (HCC): ICD-10-CM
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
## 2022-05-31 DIAGNOSIS — I48.91 ATRIAL FIBRILLATION WITH RAPID VENTRICULAR RESPONSE (HCC): ICD-10-CM

## 2022-05-31 PROCEDURE — 99214 OFFICE O/P EST MOD 30 MIN: CPT | Performed by: NURSE PRACTITIONER

## 2022-05-31 PROCEDURE — 3288F FALL RISK ASSESSMENT DOCD: CPT | Performed by: NURSE PRACTITIONER

## 2022-05-31 PROCEDURE — 1123F ACP DISCUSS/DSCN MKR DOCD: CPT | Performed by: NURSE PRACTITIONER

## 2022-05-31 RX ORDER — LOSARTAN POTASSIUM 25 MG/1
25 TABLET ORAL NIGHTLY
Qty: 90 TABLET | Refills: 1 | Status: SHIPPED
Start: 2022-05-31 | End: 2022-09-07 | Stop reason: SDUPTHER

## 2022-05-31 RX ORDER — LOSARTAN POTASSIUM 50 MG/1
50 TABLET ORAL DAILY
Qty: 90 TABLET | Refills: 1 | Status: SHIPPED
Start: 2022-05-31 | End: 2022-09-07 | Stop reason: SDUPTHER

## 2022-05-31 SDOH — ECONOMIC STABILITY: TRANSPORTATION INSECURITY
IN THE PAST 12 MONTHS, HAS THE LACK OF TRANSPORTATION KEPT YOU FROM MEDICAL APPOINTMENTS OR FROM GETTING MEDICATIONS?: NO

## 2022-05-31 SDOH — ECONOMIC STABILITY: HOUSING INSECURITY
IN THE LAST 12 MONTHS, WAS THERE A TIME WHEN YOU DID NOT HAVE A STEADY PLACE TO SLEEP OR SLEPT IN A SHELTER (INCLUDING NOW)?: NO

## 2022-05-31 SDOH — ECONOMIC STABILITY: FOOD INSECURITY: WITHIN THE PAST 12 MONTHS, YOU WORRIED THAT YOUR FOOD WOULD RUN OUT BEFORE YOU GOT MONEY TO BUY MORE.: NEVER TRUE

## 2022-05-31 SDOH — ECONOMIC STABILITY: INCOME INSECURITY: IN THE LAST 12 MONTHS, WAS THERE A TIME WHEN YOU WERE NOT ABLE TO PAY THE MORTGAGE OR RENT ON TIME?: NO

## 2022-05-31 SDOH — ECONOMIC STABILITY: FOOD INSECURITY: WITHIN THE PAST 12 MONTHS, THE FOOD YOU BOUGHT JUST DIDN'T LAST AND YOU DIDN'T HAVE MONEY TO GET MORE.: NEVER TRUE

## 2022-05-31 ASSESSMENT — ENCOUNTER SYMPTOMS
SINUS PAIN: 0
SORE THROAT: 0
ABDOMINAL PAIN: 0
DIARRHEA: 0
NAUSEA: 0
RHINORRHEA: 0
BACK PAIN: 0
CONSTIPATION: 0
SINUS PRESSURE: 0
WHEEZING: 0
VOMITING: 0
SHORTNESS OF BREATH: 0
COLOR CHANGE: 0
FACIAL SWELLING: 0
TROUBLE SWALLOWING: 0
CHEST TIGHTNESS: 0
VOICE CHANGE: 0
COUGH: 0

## 2022-05-31 ASSESSMENT — SOCIAL DETERMINANTS OF HEALTH (SDOH): HOW HARD IS IT FOR YOU TO PAY FOR THE VERY BASICS LIKE FOOD, HOUSING, MEDICAL CARE, AND HEATING?: NOT HARD AT ALL

## 2022-05-31 ASSESSMENT — PATIENT HEALTH QUESTIONNAIRE - PHQ9
1. LITTLE INTEREST OR PLEASURE IN DOING THINGS: 0
SUM OF ALL RESPONSES TO PHQ QUESTIONS 1-9: 0
2. FEELING DOWN, DEPRESSED OR HOPELESS: 0
SUM OF ALL RESPONSES TO PHQ QUESTIONS 1-9: 0
SUM OF ALL RESPONSES TO PHQ9 QUESTIONS 1 & 2: 0

## 2022-05-31 ASSESSMENT — LIFESTYLE VARIABLES: HOW OFTEN DO YOU HAVE A DRINK CONTAINING ALCOHOL: NEVER

## 2022-05-31 NOTE — ASSESSMENT & PLAN NOTE
Unclear tender on exam no step offs noted, will send for rib x ray for possible fracture.  She is on Eliquis

## 2022-05-31 NOTE — ASSESSMENT & PLAN NOTE
well controlled, no significant medication side effects noted and continue losartan 50 mg daily and get fasting labs done.   -Discussed taking medication and directed every day. -Discussed exercising daily 30 minutes 5 times a week for 150 minutes weekly.  -Discussed weight reduction if needed.  -Discussed low sodium diet.  -Discussed limiting caffeine consumption and tobacco cessation and the effects they have on the heart and blood pressure.

## 2022-05-31 NOTE — PROGRESS NOTES
OFFICE PROGRESS NOTE  [address]  [address] [facility] [address]  Dept: [phone]   Chief Complaint   Patient presents with    Hypertension    Fall     hit left side of ribs when she fell, hurts with deep breath. ASSESSMENT/PLAN   1. Essential hypertension  Assessment & Plan:   well controlled, no significant medication side effects noted and continue losartan 50 mg daily and get fasting labs done.   -Discussed taking medication and directed every day. -Discussed exercising daily 30 minutes 5 times a week for 150 minutes weekly.  -Discussed weight reduction if needed.  -Discussed low sodium diet.  -Discussed limiting caffeine consumption and tobacco cessation and the effects they have on the heart and blood pressure. Orders:  -     CBC with Auto Differential; Future  -     Comprehensive Metabolic Panel; Future  -     Lipid Panel; Future  -     losartan (COZAAR) 25 MG tablet; Take 1 tablet by mouth at bedtime, Disp-90 tablet, R-1Normal  -     losartan (COZAAR) 50 mg tablet; Take 1 tablet by mouth daily TAKE 1 TABLET BY MOUTH EVERY MORNING, Disp-90 tablet, R-1This is last refill until patient is seen or can find a new providerNormal  2. Fall in home, initial encounter  Assessment & Plan: On the basis of positive falls risk screening, assessment and plan is as follows: in-office gait and balance testing performed using The Timed Up and Go Test was negative for increased falls risk- no further intervention is currently indicated. Orders:  -     XR RIBS LEFT INCLUDE CHEST (MIN 3 VIEWS); Future  3. Rib pain on left side  Assessment & Plan:   Unclear tender on exam no step offs noted, will send for rib x ray for possible fracture. She is on Eliquis   Orders:  -     XR RIBS LEFT INCLUDE CHEST (MIN 3 VIEWS); Future  4.  Chronic congestive heart failure, unspecified heart failure type [facility])  Assessment & Plan:   Monitored by specialist- no acute findings meriting change in the plan  5. Stage 3 chronic kidney disease, unspecified whether stage 3a or 3b CKD ([facility]  Assessment & Plan:    Unclear control didn't get labs drawn from last year. 6. Atrial fibrillation with rapid ventricular response (HCC)  -     apixaban (ELIQUIS) 2.5 MG TABS tablet; Take 1 tablet by mouth 2 times daily, Disp-60 tablet, R-6Normal           Discussed exercising 30 minutes daily and Discussed taking medications as directed and adverse effects    Return in about 3 months (around 8/31/2022) for Medicare well visit and HTN.     HPI:   Hypertension: Patient here for follow-up of elevated blood pressure. She is exercising and is adherent to low salt diet. Blood pressure is well controlled at home. Cardiac symptoms none. Patient denies chest pain, chest pressure/discomfort, claudication, dyspnea, exertional chest pressure/discomfort, fatigue, irregular heart beat, lower extremity edema, near-syncope, orthopnea, palpitations, paroxysmal nocturnal dyspnea, syncope and tachypnea. Cardiovascular risk factors: advanced age (older than 54 for men, 72 for women) and hypertension. Use of agents associated with hypertension: none. History of target organ damage: chronic kidney disease and heart failure. She was cleaning today and tripped trying to catch the cat before coming to the office today and fell against the bench and hit her left ribs she says it is sore and when she takes a deep breath it is sore. When she moves it is also sore.          Current Outpatient Medications:     losartan (COZAAR) 25 MG tablet, Take 1 tablet by mouth at bedtime, Disp: 90 tablet, Rfl: 1    losartan (COZAAR) 50 mg tablet, Take 1 tablet by mouth daily TAKE 1 TABLET BY MOUTH EVERY MORNING, Disp: 90 tablet, Rfl: 1    apixaban (ELIQUIS) 2.5 MG TABS tablet, Take 1 tablet by mouth 2 times daily, Disp: 60 tablet, Rfl: 6    gabapentin (NEURONTIN) 600 MG tablet, Take 1 tablet by mouth 4 times daily (after meals and at bedtime) for 363 days. , Disp: 120 tablet, Rfl: 3    oxyCODONE-acetaminophen (PERCOCET) 7.5-325 MG per tablet, Take 1 tablet by mouth every 6 hours as needed for Pain., Disp: , Rfl:     fluticasone (FLONASE) 50 MCG/ACT nasal spray, 1 spray by Each Nostril route daily, Disp: , Rfl:       Surgical History:  has a past surgical history that includes Appendectomy and hernia repair. Social History:  reports that she has never smoked. She has never used smokeless tobacco. She reports that she does not drink alcohol and does not use drugs. Family History: family history includes Cancer in her daughter; Heart Disease in her father. I have reviewed Jackelyn's allergies, medications, problem list, medical, social and family history and have updated as needed in the electronic medical record    Review of Systems   Constitutional: Negative for activity change, appetite change, chills, diaphoresis, fatigue, fever and unexpected weight change. HENT: Positive for hearing loss. Negative for congestion, dental problem, drooling, ear discharge, ear pain, facial swelling, mouth sores, nosebleeds, postnasal drip, rhinorrhea, sinus pressure, sinus pain, sneezing, sore throat, tinnitus, trouble swallowing and voice change. Eyes: Negative for visual disturbance. Respiratory: Negative for cough, chest tightness, shortness of breath and wheezing. Cardiovascular: Negative for chest pain, palpitations and leg swelling. Gastrointestinal: Negative for abdominal pain, constipation, diarrhea, nausea and vomiting. Endocrine: Negative for cold intolerance, heat intolerance, polydipsia, polyphagia and polyuria. Genitourinary: Negative for difficulty urinating, frequency and urgency. Musculoskeletal: Negative for arthralgias, back pain, gait problem, joint swelling, myalgias, neck pain and neck stiffness. Left rib pain after fall today   Skin: Negative for color change, pallor, rash and wound. Allergic/Immunologic: Negative for environmental allergies, food allergies and immunocompromised state. Neurological: Negative for dizziness, tremors, seizures, syncope, facial asymmetry, speech difficulty, weakness, light-headedness, numbness and headaches. Hematological: Negative for adenopathy. Does not bruise/bleed easily. Psychiatric/Behavioral: Negative for agitation, behavioral problems, confusion, decreased concentration, dysphoric mood, hallucinations, self-injury, sleep disturbance and suicidal ideas. The patient is not nervous/anxious and is not hyperactive. OBJECTIVE:     VS:  Wt Readings from Last 3 Encounters:   05/31/22 147 lb (66.7 kg)   05/20/22 143 lb (64.9 kg)   11/29/21 143 lb (64.9 kg)                       Vitals:    05/31/22 1645   BP: 138/64   Pulse: 71   Resp: 16   Temp: 97.6 °F (36.4 °C)   SpO2: 96%   Weight: 147 lb (66.7 kg)   Height: 5' 4\" (1.626 m)       General: Alert and oriented to person, place, and time, well developed and well nourished, in no acute distress  SKIN: Warm and dry, intact without any rash, masses or lesions  HEAD: normocephalic, atraumatic  Neck: supple and non-tender without mass, trachea midline, no cervical lymphadenopathy, no bruit, no thyromegaly or nodules  Cardiovascular: regular rate and regular rhythm, normal S1 and S2,  ,no murmurs, rubs, clicks, or gallop. Distal pulses intact, no carotid bruits. No edema  Pulmonary/Chest: clear to auscultation bilaterally, no wheezes, rales or rhonchi, normal air movement, no respiratory distress  Abdomen: soft, non-tender, non-distended, normal bowel sounds, no masses or hepatosplenomegaly  Musculoskeletal: Normal ROM tender over the left side of the ribs at the lateral aspect   Neurologic: gait, coordination and speech normal  Extremities: no clubbing, cyanosis, or edema.    Psychiatric: Good eye contact, normal mood and affect, answers questions appropriately    I have reviewed my findings and recommendations with Irasema Houston.     Charlynn Bloch, MEJIA - CNP, NP-C, FNP-BC

## 2022-06-02 DIAGNOSIS — I10 ESSENTIAL HYPERTENSION: ICD-10-CM

## 2022-06-02 LAB
ALBUMIN SERPL-MCNC: 3.8 G/DL (ref 3.5–5.2)
ALP BLD-CCNC: 102 U/L (ref 35–104)
ALT SERPL-CCNC: 6 U/L (ref 0–32)
ANION GAP SERPL CALCULATED.3IONS-SCNC: 11 MMOL/L (ref 7–16)
AST SERPL-CCNC: 17 U/L (ref 0–31)
BASOPHILS ABSOLUTE: 0.07 E9/L (ref 0–0.2)
BASOPHILS RELATIVE PERCENT: 1 % (ref 0–2)
BILIRUB SERPL-MCNC: 0.3 MG/DL (ref 0–1.2)
BUN BLDV-MCNC: 24 MG/DL (ref 6–23)
CALCIUM SERPL-MCNC: 9 MG/DL (ref 8.6–10.2)
CHLORIDE BLD-SCNC: 109 MMOL/L (ref 98–107)
CHOLESTEROL, TOTAL: 165 MG/DL (ref 0–199)
CO2: 19 MMOL/L (ref 22–29)
CREAT SERPL-MCNC: 1 MG/DL (ref 0.5–1)
EOSINOPHILS ABSOLUTE: 0.2 E9/L (ref 0.05–0.5)
EOSINOPHILS RELATIVE PERCENT: 3 % (ref 0–6)
GFR AFRICAN AMERICAN: >60
GFR NON-AFRICAN AMERICAN: 52 ML/MIN/1.73
GLUCOSE BLD-MCNC: 101 MG/DL (ref 74–99)
HCT VFR BLD CALC: 34.2 % (ref 34–48)
HDLC SERPL-MCNC: 50 MG/DL
HEMOGLOBIN: 11 G/DL (ref 11.5–15.5)
IMMATURE GRANULOCYTES #: 0.02 E9/L
IMMATURE GRANULOCYTES %: 0.3 % (ref 0–5)
LDL CHOLESTEROL CALCULATED: 95 MG/DL (ref 0–99)
LYMPHOCYTES ABSOLUTE: 1.7 E9/L (ref 1.5–4)
LYMPHOCYTES RELATIVE PERCENT: 25.2 % (ref 20–42)
MCH RBC QN AUTO: 32.6 PG (ref 26–35)
MCHC RBC AUTO-ENTMCNC: 32.2 % (ref 32–34.5)
MCV RBC AUTO: 101.5 FL (ref 80–99.9)
MONOCYTES ABSOLUTE: 0.64 E9/L (ref 0.1–0.95)
MONOCYTES RELATIVE PERCENT: 9.5 % (ref 2–12)
NEUTROPHILS ABSOLUTE: 4.12 E9/L (ref 1.8–7.3)
NEUTROPHILS RELATIVE PERCENT: 61 % (ref 43–80)
PDW BLD-RTO: 12.6 FL (ref 11.5–15)
PLATELET # BLD: 286 E9/L (ref 130–450)
PMV BLD AUTO: 10.8 FL (ref 7–12)
POTASSIUM SERPL-SCNC: 4 MMOL/L (ref 3.5–5)
RBC # BLD: 3.37 E12/L (ref 3.5–5.5)
SODIUM BLD-SCNC: 139 MMOL/L (ref 132–146)
TOTAL PROTEIN: 7.3 G/DL (ref 6.4–8.3)
TRIGL SERPL-MCNC: 100 MG/DL (ref 0–149)
VLDLC SERPL CALC-MCNC: 20 MG/DL
WBC # BLD: 6.8 E9/L (ref 4.5–11.5)

## 2022-06-03 ENCOUNTER — TELEPHONE (OUTPATIENT)
Dept: FAMILY MEDICINE CLINIC | Age: 87
End: 2022-06-03

## 2022-06-03 NOTE — TELEPHONE ENCOUNTER
Nichole please call Mrs. Montes she has subtle rib fractures on left 8, 9, 10.  Nothing to do but needs to do deep breathing several times a day to prevent pneumonia

## 2022-06-03 NOTE — TELEPHONE ENCOUNTER
Right wrist: Substantially healed distal radial fracture.  Ununited ulnar   styloid avulsion.  Severe osteoarthritis at the base of the thumb, milder at   multiple additional sites in the wrist and hand.  Normal soft tissues.      Her labs are stable looks good

## 2022-06-16 ENCOUNTER — TELEPHONE (OUTPATIENT)
Dept: FAMILY MEDICINE CLINIC | Age: 87
End: 2022-06-16

## 2022-06-16 NOTE — TELEPHONE ENCOUNTER
Patient's , Tracey Denson, called stating patient is having dizziness today and burning upon urination. Tracey Denson asked if patient can see Raza Dacosta. Informed that Yudith Alexander does not have an appt and advised patient to go to [address] [facility]. Advised that patient does not drive. Tracey Denson was agreeable. Msg routed for informational purposes.     Last seen 5/31/2022  Next appt 9/7/2022

## 2022-06-29 ENCOUNTER — OFFICE VISIT (OUTPATIENT)
Dept: ORTHOPEDIC SURGERY | Age: 87
End: 2022-06-29
Payer: MEDICARE

## 2022-06-29 VITALS — WEIGHT: 147 LBS | BODY MASS INDEX: 25.1 KG/M2 | HEIGHT: 64 IN | TEMPERATURE: 98 F

## 2022-06-29 DIAGNOSIS — R20.2 NUMBNESS AND TINGLING IN RIGHT HAND: Primary | ICD-10-CM

## 2022-06-29 DIAGNOSIS — R20.0 NUMBNESS AND TINGLING IN RIGHT HAND: Primary | ICD-10-CM

## 2022-06-29 PROCEDURE — 99213 OFFICE O/P EST LOW 20 MIN: CPT | Performed by: ORTHOPAEDIC SURGERY

## 2022-06-29 PROCEDURE — 1123F ACP DISCUSS/DSCN MKR DOCD: CPT | Performed by: ORTHOPAEDIC SURGERY

## 2022-06-29 NOTE — PROGRESS NOTES
Mega Santos is a 80 y.o. female, who presents   Chief Complaint   Patient presents with    Hand Pain     Right hand numbness over the last 9 or 10 months. First three fingers are all numb. No previous treatment. HPI[de-identified] Hand numbness and tingling is been present for some time. This seems to be mostly in the median innervated fingers of the right hand. This is apparently been since the fracture of her wrist which was back in the middle of 2021. He has some weakness in the right hand. There is numbness and tingling. She has used a brace on occasion but not much lately. Allergies; medications; past medical, surgical, family, and social history; and problem list have been reviewed today and updated as indicated in this encounter - see below following Ortho specifics. Musculoskeletal: Skin condition and circulation are grossly good in right upper extremity. Right wrist range of motion is limited in all planes slightly. There is slight crepitus with no pain. There is no swelling. There is no discoloration. There is decreased pinprick and perspiration pattern in the median distribution of the right hand more so than ulnar. In prick is definitely decreased in the radial 3-1/2 fingers. Radiologic Studies: Imaging of the right wrist 6/2/2022 shows osteopenia with diffuse degenerative changes which are worst at the thumb carpometacarpal joint which looks almost as if it may be trying to auto fuse. ASSESSMENT:  Robert De Oliveira was seen today for hand pain. Diagnoses and all orders for this visit:    Numbness and tingling in right hand     Treatment alternatives were reviewed including medical and physical therapies, injections, and surgical options, expected risks benefits and likely outcome of each were discussed in detail, questions asked and answered and understood. Discussed symptom as well as physical findings. I see no electrodiagnostic testing results.     PLAN: We will order electrodiagnostics to check the severity and nature of the nerve compression to see what prognosis would be for gentle decompression.         Patient Active Problem List   Diagnosis    Primary osteoarthritis of both knees    Other contact with raccoon, sequela    Essential hypertension    Acute on chronic combined systolic and diastolic CHF (congestive heart failure) (HCC)    Atrial fibrillation with rapid ventricular response (HCC)    Bilateral lower extremity edema    Age-related osteoporosis without current pathological fracture    Senile hyperkeratosis    Arthritis of hip    Varicose veins of lower extremity    Synovial cyst of right knee    Spondylosis without myelopathy or radiculopathy, lumbar region    Spinal stenosis, lumbar region without neurogenic claudication    Stage 3 chronic kidney disease ([address]    Nontoxic multinodular goiter    Macular degeneration    Long term (current) use of opiate analgesic    Hernia of anterior abdominal wall    Sensorineural hearing loss (SNHL) of both ears    Vitamin D deficiency    Neuropathy    Fall at home    Skin lesions    Thyroid lump    Numbness    Rib pain on left side    Chronic congestive heart failure ([address]       Past Medical History:   Diagnosis Date    Acute metabolic encephalopathy 61/6/9343    Acute respiratory failure with hypoxia ([address] 12/5/2019    JOVANI (acute kidney injury) ([address] 12/5/2019    Anticoagulant long-term use     Atrial fibrillation (HCC)     Calculus of kidney 5/19/2021    CH[facility]congest[facility] failure) [facility]     Chronic back pain     Chronic kidney disease     Community acquired pneumonia of left lung     COVID-19     Dental caries     Diabetes mellitus ([address]     Elevated brain natriuretic peptide (BNP) level 12/5/2019    Gall bladder stones     Gastro-esophageal reflux disease without esophagitis 4/15/2021    GERD (gastroesophageal reflux disease)     Gram-positive cocci bacteremia     H/O cardiovascular stress test 03/30/2021    Lexiscan    Hearing loss     Hernia     Hypertension     Hypoxia     Kidney stone     Multiple pulmonary nodules 5/19/2021    Neuropathy 5/26/2021    Osteoarthritis     Other contact with raccoon, sequela 8/10/2020    This Diagnosis was added to the Problem List based on transcribed orders from Dr. Shirley Carlos NP      Sepsis due to pneumonia [facility]) 12/5/2019    Sepsis due to Streptococcus pneumoniae with acute hypoxic respiratory failure and septic shock (HCC)     Type 2 diabetes mellitus ([facility] 4/15/2021    Type 2 diabetes mellitus without complication (HCC)        Past Surgical History:   Procedure Laterality Date    APPENDECTOMY      HERNIA REPAIR         Current Outpatient Medications   Medication Sig Dispense Refill    losartan (COZAAR) 25 MG tablet Take 1 tablet by mouth at bedtime 90 tablet 1    losartan (COZAAR) 50 mg tablet Take 1 tablet by mouth daily TAKE 1 TABLET BY MOUTH EVERY MORNING 90 tablet 1    apixaban (ELIQUIS) 2.5 MG TABS tablet Take 1 tablet by mouth 2 times daily 60 tablet 6    gabapentin (NEURONTIN) 600 MG tablet Take 1 tablet by mouth 4 times daily (after meals and at bedtime) for 363 days. 120 tablet 3    oxyCODONE-acetaminophen (PERCOCET) 7.5-325 MG per tablet Take 1 tablet by mouth every 6 hours as needed for Pain.  fluticasone (FLONASE) 50 MCG/ACT nasal spray 1 spray by Each Nostril route daily       No current facility-administered medications for this visit.        Allergies   Allergen Reactions    Influenza Vaccines Other (See Comments)     Patient states had paralytic experience    Alginate [Alginic Acid]     Novocain [Procaine] Other (See Comments)     \"Rapid Heart Beat\"     Tape Jazzmine Mean Tape] Other (See Comments)     \"Sensitive Skin\"     Clindamycin Itching and Rash       Social History     Socioeconomic History    Marital status:      Spouse name: None    Number of children: None    Years of education: None    Highest education level: None   Occupational History    None   Tobacco Use    Smoking status: Never Smoker    Smokeless tobacco: Never Used   Vaping Use    Vaping Use: Never used   Substance and Sexual Activity    Alcohol use: No     Comment: 1/2 CUP OF COFFEE PER DAY    Drug use: Never    Sexual activity: Not Currently     Partners: Male   Other Topics Concern    None   Social History Narrative    None     Social Determinants of Health     Financial Resource Strain: Low Risk     Difficulty of Paying Living Expenses: Not hard at all   Food Insecurity: No Food Insecurity    Worried About Running Out of Food in the Last Year: Never true    Gerardo of Food in the Last Year: Never true   Transportation Needs: No Transportation Needs    Lack of Transportation (Medical): No    Lack of Transportation (Non-Medical):  No   Physical Activity:     Days of Exercise per Week: Not on file    Minutes of Exercise per Session: Not on file   Stress:     Feeling of Stress : Not on file   Social Connections:     Frequency of Communication with Friends and Family: Not on file    Frequency of Social Gatherings with Friends and Family: Not on file    Attends [employer] Services: Not on file    Active Member of [address] [place] [address] or Organizations: Not on file    Attends Club or Organization Meetings: Not on file    Marital Status: Not on file   Intimate Partner Violence:     Fear of Current or Ex-Partner: Not on file    Emotionally Abused: Not on file    Physically Abused: Not on file    Sexually Abused: Not on file   Housing Stability: Unknown    Unable to Pay for Housing in the Last Year: No    Number of [place] in the Last Year: Not on file    Unstable Housing in the Last Year: No       Family History   Problem Relation Age of Onset    Heart Disease Father     Cancer Daughter          Review of Systems:   As follows except as previously noted in HPI:  Constitutional: Negative for chills, diaphoresis,  fever   Respiratory: Negative for cough, shortness of breath and wheezing. Cardiovascular: Negative for chest pain and palpitations. Neurological: Negative for dizziness, syncope,   GI / : abdominal pain or cramping  Musculoskeletal: see HPI       Objective:   Physical Exam   Constitutional: Oriented to person, place, and time. and appears well-developed and well-nourished. :   Head: Normocephalic and atraumatic. Neck: Neck supple. Eyes: EOM are normal.   Pulmonary/Chest: Effort normal.  No respiratory distress, no wheezes. Neurological: Alert and oriented to person  Skin: Skin is warm and dry. Og Wells DO    6/29/22  2:52 PM    All reasonable efforts have been made to minimize the risk of errors that may occur in the use of voice recognition and other electronic means of charting.

## 2022-07-06 DIAGNOSIS — I50.43 ACUTE ON CHRONIC COMBINED SYSTOLIC AND DIASTOLIC CHF (CONGESTIVE HEART FAILURE) (HCC): ICD-10-CM

## 2022-07-07 RX ORDER — BUMETANIDE 1 MG/1
TABLET ORAL
Qty: 90 TABLET | Refills: 0 | Status: SHIPPED | OUTPATIENT
Start: 2022-07-07

## 2022-08-01 ENCOUNTER — TELEPHONE (OUTPATIENT)
Dept: FAMILY MEDICINE CLINIC | Age: 87
End: 2022-08-01

## 2022-08-01 DIAGNOSIS — R35.0 URINARY FREQUENCY: Primary | ICD-10-CM

## 2022-08-01 NOTE — TELEPHONE ENCOUNTER
Patient called C/O UTI symptoms. Stating she has frequency and burning upon urination  X 3 days. Asking for Rx please advise.     Last seen 5/31/2022  Next appt 9/7/2022    [facility] [address]

## 2022-08-11 ENCOUNTER — OFFICE VISIT (OUTPATIENT)
Dept: PHYSICAL MEDICINE AND REHAB | Age: 87
End: 2022-08-11
Payer: MEDICARE

## 2022-08-11 VITALS — BODY MASS INDEX: 25.1 KG/M2 | WEIGHT: 147 LBS | HEIGHT: 64 IN

## 2022-08-11 DIAGNOSIS — G56.01 RIGHT CARPAL TUNNEL SYNDROME: Primary | ICD-10-CM

## 2022-08-11 PROCEDURE — 95885 MUSC TST DONE W/NERV TST LIM: CPT | Performed by: PHYSICAL MEDICINE & REHABILITATION

## 2022-08-11 PROCEDURE — 95909 NRV CNDJ TST 5-6 STUDIES: CPT | Performed by: PHYSICAL MEDICINE & REHABILITATION

## 2022-08-11 NOTE — PATIENT INSTRUCTIONS
Electrodiagnotic Laboratory  Accredited by the [facility] with Exemplary status  ANGELITO Nicole D.O. [facility]  [address] [facility] [place]  Phone: [phone]  Fax: [phone]        Today you had an electrodiagnostic exam which included nerve conduction studies (NCS) and electromyography (EMG). This test evaluated the electrical activity of your nerves and muscles to help determine if you have a nerve or muscle disease. This test can help determine the location and type of a nerve or muscle problem. This will help your referring doctor diagnose your condition and determine the appropriate next step in your treatment plan. After your test:    1. There are no long lasting side effects of the test.     2. You may resume your normal activities without restrictions. 3.  Resume any medications that were stopped for the test.     4  If you have sore areas or bruising in your muscles where the needle was placed, apply a cold pack to the sore area for 15-20 minutes three to four times a day as needed for pain. The soreness should go away in about 1-2 days. 5. Your results were provided  Briefly at the end of your test and the final detailed report will be provided to your referring physician, and/or primary care physician and any other parties you requested within 1-2 days of the examination. You may wish to contact your referring provider after a few days to determine what they would like you to do next. 6.  Please call [phone] with any questions or concerns and if you develop increased body temperature/fever, swelling, tenderness, increased pain and/or drainage from the sites where the needle was placed. Thank you for choosing us for your health care needs.

## 2022-08-11 NOTE — PROGRESS NOTES
[facility]  Electrodiagnostic Laboratory  *Accredited by the [address] [place] [address] with exemplary status  [address] [facility] [place]  Phone: [phone]  Fax: [phone]    Referring Provider: Amrik Barrett DO  Primary Care Physician: MEJIA Gaines CNP  Patient Name: Felix Castro  Patient YOB: 1935  Gender: female  BMI: Body mass index is 25.23 kg/m². Height 5' 4\" (1.626 m), weight 147 lb (66.7 kg). 8/11/2022    Reason for Referral: right upper extremity numbness and tingling    Description of clinical problem:   Chief Complaint   Patient presents with    Extremity Pain     Pain in the right wrist up to mid forearm. Pain described as achy. Pain rated 7/10. Symptoms for 1 year after a broken wrist.     Numbness     In the right thumb, index, and middle finger up through the palm of the hand. Extremity Weakness     none       Brief physical exam:   Sensory deficit Yes; Weakness Yes; Atrophy  Yes;    Sensory NCS      Nerve / Sites Rec. Site Peak Lat PP Amp Segments Distance Velocity Temp. ms µV  cm m/s °C   R Median - Digit II (Antidromic)      Palm Dig II 2.50 10.1 Palm - Dig II 7 38 32      Wrist Dig II 6.77 4.1 Wrist - Dig II 14 26 32   R Ulnar - Digit V (Antidromic)      Wrist Dig V 4.22 21.8 Wrist - Dig V 14 42 32   R Radial - Anatomical snuff box (Forearm)      Forearm Wrist 2.76 19.8 Forearm - Wrist 10 47 32       Motor NCS      Nerve / Sites Muscle Onset Amplitude Segments Distance Velocity Temp.     ms mV  cm m/s °C   R Median - APB      Palm APB 2.03 2.0 Palm - APB   32      Wrist APB 5.26 0.7 Wrist - Palm 8 25 32      Elbow APB 10.42 0.7 Elbow - Wrist 20 39 32   R Ulnar - ADM      Wrist ADM 3.39 8.8 Wrist - ADM 8  32      B. Elbow ADM 6.51 8.2 B. Elbow - Wrist 19 61 32      A. Elbow ADM 8.44 7.3 A. Elbow - B. Elbow 10 52 32       F Wave      Nerve Fmin % F    ms %   R Median - APB NR NR   R Ulnar - ADM 28.65 40       EMG      EMG Summary Table Spontaneous MUAP Recruitment   Muscle Nerve Roots IA Fib PSW Fasc Amp Dur. PPP Pattern   R. Pronator teres Median C6-C7 N None None None N N N N   R. First dorsal interosseous Ulnar C8-T1 N None None None N N N N   R. Abductor pollicis brevis Median G5-W9 1+ 1+ 1+ None N N 1+ Reduced       Study Limitations:  none     Summary of Findings:   Nerve conduction studies: The following nerve conduction studies were abnormal: sensory studies of right median nerve showed prolonged peak wrist latency, small amplitude and slowing across the wrist. Motor studies of right median nerve showed prolonged distal wrist latency, small amplitude and slowing across the wrist. There is evidence for conduction block at the wrist. F-wave response was absent for the right median nerve. All other nerve conduction studies, as listed in the table were normal in latency, amplitude and conduction velocity. Needle EMG:   Needle EMG was performed using a concentric needle. The following abnormalities were seen on needle EMG: there was evidence for active denervation with chronic re-innervation in the right abductor pollicis brevis muscle. Otherwise, observed motor units were normal in amplitude, duration, phases and recruitment and no active denervation signs were seen. Diagnostic Interpretation: This study was abnormal.     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 It is chronic in duration. This is consistent with the clinical diagnosis of carpal tunnel syndrome. Prognosis of recovery for demyelinating lesions is good, poor for axonal.     Previous Study: There is not a prior study for comparison. Follow up EMG is recommended if clinically indicated.      Technologist: [facility]  [id number] DO [place], [address] [place] [address]   Board Certified Physical Medicine and Rehabilitation      Nerve conduction studies and electromyography were performed according to our laboratory policies and procedures which can be provided upon request. All abnormal values are identified in the table.  Laboratory normal values can also be provided upon request.       Cc: DO Halima Baez, APRN - CNP

## 2022-09-07 ENCOUNTER — OFFICE VISIT (OUTPATIENT)
Dept: FAMILY MEDICINE CLINIC | Age: 87
End: 2022-09-07
Payer: MEDICARE

## 2022-09-07 VITALS
RESPIRATION RATE: 16 BRPM | WEIGHT: 141.2 LBS | DIASTOLIC BLOOD PRESSURE: 60 MMHG | TEMPERATURE: 97.6 F | SYSTOLIC BLOOD PRESSURE: 122 MMHG | BODY MASS INDEX: 24.11 KG/M2 | OXYGEN SATURATION: 96 % | HEIGHT: 64 IN | HEART RATE: 69 BPM

## 2022-09-07 DIAGNOSIS — G62.9 NEUROPATHY: ICD-10-CM

## 2022-09-07 DIAGNOSIS — Z00.00 MEDICARE ANNUAL WELLNESS VISIT, SUBSEQUENT: Primary | ICD-10-CM

## 2022-09-07 DIAGNOSIS — Z23 NEED FOR PROPHYLACTIC VACCINATION WITH TETANUS-DIPHTHERIA (TD): ICD-10-CM

## 2022-09-07 DIAGNOSIS — R35.0 FREQUENCY OF URINATION: ICD-10-CM

## 2022-09-07 DIAGNOSIS — Z23 NEED FOR PROPHYLACTIC VACCINATION AND INOCULATION AGAINST VARICELLA: ICD-10-CM

## 2022-09-07 DIAGNOSIS — I10 ESSENTIAL HYPERTENSION: ICD-10-CM

## 2022-09-07 DIAGNOSIS — Z23 NEED FOR PROPHYLACTIC VACCINATION AGAINST STREPTOCOCCUS PNEUMONIAE (PNEUMOCOCCUS): ICD-10-CM

## 2022-09-07 LAB
BILIRUBIN, POC: NEGATIVE
BLOOD URINE, POC: NEGATIVE
CLARITY, POC: CLEAR
COLOR, POC: YELLOW
GLUCOSE URINE, POC: NEGATIVE
KETONES, POC: NEGATIVE
LEUKOCYTE EST, POC: ABNORMAL
NITRITE, POC: NEGATIVE
PH, POC: 5
PROTEIN, POC: NEGATIVE
SPECIFIC GRAVITY, POC: 1.02
UROBILINOGEN, POC: 0.2

## 2022-09-07 PROCEDURE — 90471 IMMUNIZATION ADMIN: CPT | Performed by: NURSE PRACTITIONER

## 2022-09-07 PROCEDURE — 81002 URINALYSIS NONAUTO W/O SCOPE: CPT | Performed by: NURSE PRACTITIONER

## 2022-09-07 PROCEDURE — 1123F ACP DISCUSS/DSCN MKR DOCD: CPT | Performed by: NURSE PRACTITIONER

## 2022-09-07 PROCEDURE — G0439 PPPS, SUBSEQ VISIT: HCPCS | Performed by: NURSE PRACTITIONER

## 2022-09-07 PROCEDURE — 90677 PCV20 VACCINE IM: CPT | Performed by: NURSE PRACTITIONER

## 2022-09-07 RX ORDER — LOSARTAN POTASSIUM 50 MG/1
50 TABLET ORAL DAILY
Qty: 90 TABLET | Refills: 1 | Status: SHIPPED | OUTPATIENT
Start: 2022-09-07

## 2022-09-07 RX ORDER — TETANUS AND DIPHTHERIA TOXOIDS ADSORBED 2; 2 [LF]/.5ML; [LF]/.5ML
0.5 INJECTION INTRAMUSCULAR ONCE
Qty: 0.5 ML | Refills: 0 | Status: SHIPPED | OUTPATIENT
Start: 2022-09-07 | End: 2022-09-07

## 2022-09-07 RX ORDER — SULFAMETHOXAZOLE AND TRIMETHOPRIM 800; 160 MG/1; MG/1
1 TABLET ORAL 2 TIMES DAILY
Qty: 20 TABLET | Refills: 0 | Status: SHIPPED | OUTPATIENT
Start: 2022-09-07 | End: 2022-09-17

## 2022-09-07 RX ORDER — LOSARTAN POTASSIUM 25 MG/1
25 TABLET ORAL NIGHTLY
Qty: 90 TABLET | Refills: 1 | Status: SHIPPED | OUTPATIENT
Start: 2022-09-07

## 2022-09-07 RX ORDER — GABAPENTIN 600 MG/1
600 TABLET ORAL
Qty: 90 TABLET | Refills: 3 | Status: SHIPPED | OUTPATIENT
Start: 2022-09-07 | End: 2023-09-05

## 2022-09-07 ASSESSMENT — PATIENT HEALTH QUESTIONNAIRE - PHQ9
SUM OF ALL RESPONSES TO PHQ QUESTIONS 1-9: 0
1. LITTLE INTEREST OR PLEASURE IN DOING THINGS: 0
SUM OF ALL RESPONSES TO PHQ QUESTIONS 1-9: 0
2. FEELING DOWN, DEPRESSED OR HOPELESS: 0
SUM OF ALL RESPONSES TO PHQ9 QUESTIONS 1 & 2: 0
SUM OF ALL RESPONSES TO PHQ QUESTIONS 1-9: 0
SUM OF ALL RESPONSES TO PHQ QUESTIONS 1-9: 0

## 2022-09-07 ASSESSMENT — LIFESTYLE VARIABLES
HOW MANY STANDARD DRINKS CONTAINING ALCOHOL DO YOU HAVE ON A TYPICAL DAY: 1 OR 2
HOW OFTEN DO YOU HAVE A DRINK CONTAINING ALCOHOL: MONTHLY OR LESS

## 2022-09-07 NOTE — PROGRESS NOTES
Medicare Annual Wellness Visit    Felix Castro is here for Medicare AWV    Assessment & Plan   Medicare annual wellness visit, subsequent  Neuropathy  -     gabapentin (NEURONTIN) 600 MG tablet; Take 1 tablet by mouth 3 times daily (with meals) for 363 days. , Disp-90 tablet, R-3Normal  Essential hypertension  -     losartan (COZAAR) 25 MG tablet; Take 1 tablet by mouth at bedtime, Disp-90 tablet, R-1Normal  -     losartan (COZAAR) 50 MG tablet; Take 1 tablet by mouth daily TAKE 1 TABLET BY MOUTH EVERY MORNING, Disp-90 tablet, R-1This is last refill until patient is seen or can find a new providerNormal  Need for prophylactic vaccination against Streptococcus pneumoniae (pneumococcus)  -     Pneumococcal Conjugate PCV20, PF (Prevnar 20)  Need for prophylactic vaccination with tetanus-diphtheria (Td)  -     diptheria-tetanus toxoids (DECAVAC) 2-2 LF/0.5ML injection; Inject 0.5 mLs into the muscle once for 1 dose, Disp-0.5 mL, R-0Print  Need for prophylactic vaccination and inoculation against varicella  -     zoster recombinant adjuvanted vaccine [facility]) 50 MCG/0.5ML SUSR injection; Inject 0.5 mLs into the muscle once for 1 dose, Disp-0.5 mL, R-0Print  Frequency of urination  -     POCT Urinalysis no Micro    Recommendations for Preventive Services Due: see orders and patient instructions/AVS.  Recommended screening schedule for the next 5-10 years is provided to the patient in written form: see Patient Instructions/AVS.     Return in 3 months (on 12/7/2022) for Medicare Annual Wellness Visit in 1 year, HTN, hyperlipidemia. Subjective       Patient's complete Health Risk Assessment and screening values have been reviewed and are found in Flowsheets. The following problems were reviewed today and where indicated follow up appointments were made and/or referrals ordered.     Positive Risk Factor Screenings with Interventions:    Fall Risk:  Do you feel unsteady or are you worried about falling? : (!) yes  2 or more falls in past year?: (!) yes  Fall with injury in past year?: (!) yes   Fall Risk Interventions:    Home safety tips provided  Home exercises provided to promote strength and balance  Patient declines any further evaluation/treatment for this issue  Falls assessment timed get up and go is no falls risk but does state she gets dizzy. Cognitive:   Words recalled: 1 Word Recalled  Clock Drawing Test (CDT): (!) Abnormal  Total Score Interpretation: Abnormal Mini-Cog  Did the patient refuse to take the cognition test?: No  Cognitive Impairment Interventions:  Patient declines any further evaluation/treatment for cognitive impairment           General Health and ACP:  General  In general, how would you say your health is?: Fair  In the past 7 days, have you experienced any of the following: New or Increased Pain, New or Increased Fatigue, Loneliness, Social Isolation, Stress or Anger?: (!) Yes  Select all that apply: (!) New or Increased Pain  Do you get the social and emotional support that you need?: Yes  Do you have a Living Will?: (!) No    Advance Directives       Power of  Living Will ACP-Advance Directive ACP-Power of     Not on File Filed on 05/17/12 Filed Not on File        General Health Risk Interventions:  Pain issues: patient will be having knee surgery in near future  No Living Will: [address] addressed with patient today    Health Habits/Nutrition:  Physical Activity: Unknown    Days of Exercise per Week: 0 days    Minutes of Exercise per Session: Not on file     Have you lost any weight without trying in the past 3 months?: No  Body mass index: 24.23  Have you seen the dentist within the past year?: (!) No  Health Habits/Nutrition Interventions:  Inadequate physical activity:  patient is not ready to increase his/her physical activity level at this time  Nutritional issues:  educational materials for healthy, well-balanced diet provided  Dental exam overdue:  patient well- nourished, in no acute distress  Skin: warm and dry, no rash or erythema  Head: normocephalic and atraumatic  Eyes: pupils equal, round, and reactive to light, extraocular eye movements intact, conjunctivae normal  ENT: tympanic membrane, external ear and ear canal normal bilaterally, very hard of hearing, nose without deformity, nasal mucosa and turbinates normal without polyps, tongue midline, no masses or lesions, poor dentition. Neck: supple and non-tender without mass, no thyromegaly or thyroid nodules, no cervical lymphadenopathy  Pulmonary/Chest: clear to auscultation bilaterally- no wheezes, rales or rhonchi, normal air movement, no respiratory distress  Cardiovascular: normal rate, regular rhythm, normal S1 and S2, no murmurs, rubs, clicks, or gallops, distal pulses intact, no carotid bruits  Abdomen: soft, non-tender, non-distended, normal bowel sounds, no masses or organomegaly  Extremities: no cyanosis, clubbing or edema  Musculoskeletal: normal range of motion in knees crepitus noted no joint swelling, deformity or tenderness. Timed get up and go is normal.   Neurologic: reflexes normal and symmetric, no cranial nerve deficit, gait, coordination and speech normal       Allergies   Allergen Reactions    Influenza Vaccines Other (See Comments)     Patient states had paralytic experience    Alginate [Alginic Acid]     Novocain [Procaine] Other (See Comments)     \"Rapid Heart Beat\"     Tape [Adhesive Tape] Other (See Comments)     \"Sensitive Skin\"     Clindamycin Itching and Rash     Prior to Visit Medications    Medication Sig Taking? Authorizing Provider   gabapentin (NEURONTIN) 600 MG tablet Take 1 tablet by mouth 3 times daily (with meals) for 363 days.  Yes MEJIA Tabares CNP   losartan (COZAAR) 25 MG tablet Take 1 tablet by mouth at bedtime Yes MEJIA Tabares CNP   losartan (COZAAR) 50 MG tablet Take 1 tablet by mouth daily TAKE 1 TABLET BY MOUTH EVERY MORNING Yes Osmani Riley APRN - CNP   diptheria-tetanus toxoids (DECAVAC) 2-2 LF/0.5ML injection Inject 0.5 mLs into the muscle once for 1 dose Yes MEJIA Conrteras CNP   zoster recombinant adjuvanted vaccine [facility]) 50 MCG/0.5ML SUSR injection Inject 0.5 mLs into the muscle once for 1 dose Yes MEJIA Contreras CNP   sulfamethoxazole-trimethoprim (BACTRIM DS;SEPTRA DS) 800-160 MG per tablet Take 1 tablet by mouth 2 times daily for 10 days Yes MEJIA Contreras CNP   bumetanide (BUMEX) 1 MG tablet TAKE 1 TABLET BY MOUTH AS NEEDED FOR EDEMA. TAKE ONLY IF LEGS ARE SWELLING Yes Selin Wright MD   apixaban (ELIQUIS) 2.5 MG TABS tablet Take 1 tablet by mouth 2 times daily Yes MEJIA Contreras CNP   oxyCODONE-acetaminophen (PERCOCET) 7.5-325 MG per tablet Take 1 tablet by mouth every 6 hours as needed for Pain. Yes Historical Provider, MD   fluticasone (FLONASE) 50 MCG/ACT nasal spray 1 spray by Each Nostril route daily Yes Historical Provider, MD Ervin (Including outside providers/suppliers regularly involved in providing care):   Patient Care Team:  MEJIA Contreras CNP as PCP - General (Family Nurse Practitioner)  MEJIA Contreras CNP as PCP - [facility] Empaneled Provider  Margy Stafford MD as Consulting Physician (Cardiology)  Gladys Jefferson MD as Consulting Physician (Electrophysiology)     Reviewed and updated this visit:  Tobacco  Allergies  Meds  Problems  Med Hx  Surg Hx  Soc Hx  Fam Hx               Advance Care Planning   Advanced Care Planning: Discussed the patients choices for care and treatment in case of a health event that adversely affects decision-making abilities. Also discussed the patients long-term treatment options. Reviewed with the patient the appropriate state-specific advance directive documents. Reviewed the process of designating a competent adult as an Agent (or -in-fact) that could take make health care decisions for the patient if incompetent.  Patient was asked to complete the declaration forms, either acknowledge the forms by a public notary or an eligible witness and provide a signed copy to the practice office. Time spent (minutes): 15 patient will think about it and once documents completed will bring to the office     Cardiovascular Disease Risk Counseling: Assessed the patient's risk to develop cardiovascular disease and reviewed main risk factors. Reviewed steps to reduce disease risk including:   Quitting tobacco use, reducing amount smoked, or not starting the habit  Making healthy food choices  Being physically active and gradualy increasing activity levels   Reduce weight and determine a healthy BMI goal  Monitor blood pressure and treat if higher than 140/90 mmHg  Maintain blood total cholesterol levels under 5 mmol/l or 190 mg/dl  Maintain LDL cholesterol levels under 3.0 mmol/l or 115 mg/dl   Control blood glucose levels  Consider taking aspirin (75 mg daily), once blood pressure is controlled   Provided a follow up plan.   Time spent (minutes): 10

## 2022-09-07 NOTE — PATIENT INSTRUCTIONS
Personalized Preventive Plan for Linzie Nageotte - 9/7/2022  Medicare offers a range of preventive health benefits. Some of the tests and screenings are paid in full while other may be subject to a deductible, co-insurance, and/or copay. Some of these benefits include a comprehensive review of your medical history including lifestyle, illnesses that may run in your family, and various assessments and screenings as appropriate. After reviewing your medical record and screening and assessments performed today your provider may have ordered immunizations, labs, imaging, and/or referrals for you. A list of these orders (if applicable) as well as your Preventive Care list are included within your After Visit Summary for your review. Other Preventive Recommendations:    A preventive eye exam performed by an eye specialist is recommended every 1-2 years to screen for glaucoma; cataracts, macular degeneration, and other eye disorders. A preventive dental visit is recommended every 6 months. Try to get at least 150 minutes of exercise per week or 10,000 steps per day on a pedometer . Order or download the FREE \"Exercise & Physical Activity: Your Everyday Guide\" from [employer]. Call 3-917.235.1906 or search [employer] online. You need 7726-5593 mg of calcium and 3581-2437 IU of vitamin D per day. It is possible to meet your calcium requirement with diet alone, but a vitamin D supplement is usually necessary to meet this goal.  When exposed to the sun, use a sunscreen that protects against both UVA and UVB radiation with an SPF of 30 or greater. Reapply every 2 to 3 hours or after sweating, drying off with a towel, or swimming. Always wear a seat belt when traveling in a car. Always wear a helmet when riding a bicycle or motorcycle.

## 2022-09-08 DIAGNOSIS — R35.0 URINARY FREQUENCY: ICD-10-CM

## 2022-09-11 LAB — URINE CULTURE, ROUTINE: NORMAL

## 2022-09-21 ENCOUNTER — OFFICE VISIT (OUTPATIENT)
Dept: ORTHOPEDIC SURGERY | Age: 87
End: 2022-09-21
Payer: MEDICARE

## 2022-09-21 VITALS — WEIGHT: 141 LBS | BODY MASS INDEX: 24.07 KG/M2 | TEMPERATURE: 98 F | HEIGHT: 64 IN

## 2022-09-21 DIAGNOSIS — G56.01 CARPAL TUNNEL SYNDROME ON RIGHT: Primary | ICD-10-CM

## 2022-09-21 PROCEDURE — 1123F ACP DISCUSS/DSCN MKR DOCD: CPT | Performed by: ORTHOPAEDIC SURGERY

## 2022-09-21 PROCEDURE — 99213 OFFICE O/P EST LOW 20 MIN: CPT | Performed by: ORTHOPAEDIC SURGERY

## 2022-09-21 NOTE — PROGRESS NOTES
Chief Complaint:   Chief Complaint   Patient presents with    Hand Pain     Right Hand numbness F/U, had EMG done 08/11/2022       Priyanka Ridley is up to go over results of electrodiagnostics right upper extremity. Symptoms have not changed. She would like to get something done if there is something that can help her. Allergies; medications; past medical, surgical, family, and social history; and problem list have been reviewed today and updated as indicated in this encounter seen below. Exam: Skin condition and circulation are grossly good in right upper extremity. Right wrist range of motion is limited in all planes slightly. There is slight crepitus with no pain. There is no swelling. There is no discoloration. There is decreased pinprick and perspiration pattern in the median distribution of the right hand more so than ulnar. In prick is definitely decreased in the radial 3-1/2 fingers    Radiographs: Electrodiagnostics were reviewed. These show prolonged latencies median motor and sensory nerves. There is also abnormality in EMG for abductor pollicis longus on the right. F wave is absent in right hand. ASSESSMENT:    Chuyita Garcia was seen today for hand pain. Diagnoses and all orders for this visit:    Carpal tunnel syndrome on right        PLAN: We discussed the results of electrodiagnostics. Mrs. Montes wants to have something done to will alleviate her symptoms and improve overall function of her hand. We discussed the surgery, risk, prognosis and limitations with parent good understanding. Her  was present throughout the visit. We will schedule this as an outpatient after medical evaluation of risk. No follow-ups on file. Current Outpatient Medications   Medication Sig Dispense Refill    gabapentin (NEURONTIN) 600 MG tablet Take 1 tablet by mouth 3 times daily (with meals) for 363 days.  90 tablet 3    losartan (COZAAR) 25 MG tablet Take 1 tablet by mouth at bedtime 90 tablet 1    losartan (COZAAR) 50 MG tablet Take 1 tablet by mouth daily TAKE 1 TABLET BY MOUTH EVERY MORNING 90 tablet 1    bumetanide (BUMEX) 1 MG tablet TAKE 1 TABLET BY MOUTH AS NEEDED FOR EDEMA. TAKE ONLY IF LEGS ARE SWELLING 90 tablet 0    apixaban (ELIQUIS) 2.5 MG TABS tablet Take 1 tablet by mouth 2 times daily 60 tablet 6    oxyCODONE-acetaminophen (PERCOCET) 7.5-325 MG per tablet Take 1 tablet by mouth every 6 hours as needed for Pain. fluticasone (FLONASE) 50 MCG/ACT nasal spray 1 spray by Each Nostril route daily       No current facility-administered medications for this visit.        Patient Active Problem List   Diagnosis    Primary osteoarthritis of both knees    Other contact with raccoon, sequela    Essential hypertension    Acute on chronic combined systolic and diastolic CHF (congestive heart failure) (HCC)    Atrial fibrillation with rapid ventricular response (HCC)    Bilateral lower extremity edema    Age-related osteoporosis without current pathological fracture    Senile hyperkeratosis    Arthritis of hip    Varicose veins of lower extremity    Synovial cyst of right knee    Spondylosis without myelopathy or radiculopathy, lumbar region    Spinal stenosis, lumbar region without neurogenic claudication    Stage 3 chronic kidney disease (HCC)    Nontoxic multinodular goiter    Macular degeneration    Long term (current) use of opiate analgesic    Hernia of anterior abdominal wall    Sensorineural hearing loss (SNHL) of both ears    Vitamin D deficiency    Neuropathy    Fall at home    Skin lesions    Thyroid lump    Numbness    Rib pain on left side    Chronic congestive heart failure ([address]       Past Medical History:   Diagnosis Date    Acute metabolic encephalopathy 07/9/8835    Acute respiratory failure with hypoxia ([address] 12/5/2019    JOVANI (acute kidney injury) ([address] 12/5/2019    Anticoagulant long-term use     Atrial fibrillation (HCC)     Calculus of kidney 5/19/2021    CHF (congestive heart failure) (HCC)     Chronic back pain     Chronic kidney disease     Community acquired pneumonia of left lung     COVID-19     Dental caries     Diabetes mellitus (HCC)     Elevated brain natriuretic peptide (BNP) level 12/5/2019    Gall bladder stones     Gastro-esophageal reflux disease without esophagitis 4/15/2021    GERD (gastroesophageal reflux disease)     Gram-positive cocci bacteremia     H/O cardiovascular stress test 03/30/2021    Lexiscan    Hearing loss     Hernia     Hypertension     Hypoxia     Kidney stone     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 Lacie Richard NP      Sepsis due to pneumonia ([facility] 12/5/2019    Sepsis due to Streptococcus pneumoniae with acute hypoxic respiratory failure and septic shock ([facility])     Type 2 diabetes mellitus ([facility] 4/15/2021    Type 2 diabetes mellitus without complication ([facility]        Past Surgical History:   Procedure Laterality Date    APPENDECTOMY      HERNIA REPAIR         Allergies   Allergen Reactions    Influenza Vaccines Other (See Comments)     Patient states had paralytic experience    Alginate [Alginic Acid]     Novocain [Procaine] Other (See Comments)     \"Rapid Heart Beat\"     Tape Jonne Knows Tape] Other (See Comments)     \"Sensitive Skin\"     Clindamycin Itching and Rash       Social History     Socioeconomic History    Marital status:      Spouse name: None    Number of children: None    Years of education: None    Highest education level: None   Tobacco Use    Smoking status: Never    Smokeless tobacco: Never   Vaping Use    Vaping Use: Never used   Substance and Sexual Activity    Alcohol use: No     Comment: 1/2 CUP OF COFFEE PER DAY    Drug use: Never    Sexual activity: Not Currently     Partners: Male     Social Determinants of Health     Financial Resource Strain: Low Risk     Difficulty of Paying Living Expenses: Not hard at all   Food Insecurity: No Food Insecurity    Worried About [facility] in the Last Year: Never true    Ran Out of Food in the Last Year: Never true   Transportation Needs: No Transportation Needs    Lack of Transportation (Medical): No    Lack of Transportation (Non-Medical): No   Physical Activity: Unknown    Days of Exercise per Week: 0 days   Housing Stability: Unknown    Unable to Pay for Housing in the Last Year: No    Unstable Housing in the Last Year: No       Review of Systems  As follows except as previously noted in HPI:  Constitutional: Negative for chills, diaphoresis, fatigue, fever and unexpected weight change. Respiratory: Negative for cough, shortness of breath and wheezing. Cardiovascular: Negative for chest pain and palpitations. Neurological: Negative for dizziness, syncope, cephalgia. GI / : negative  Musculoskeletal: see HPI       Objective:   Physical Exam   Constitutional: Oriented to person, place, and time. and appears well-developed and well-nourished. :   Head: Normocephalic and atraumatic. Eyes: EOM are normal.   Neck: Neck supple. Cardiovascular: Normal rate and regular rhythm. Pulmonary/Chest: Effort normal. No stridor. No respiratory distress, no wheezes. Abdominal:  No abnormal distension. Neurological: Alert and oriented to person, place, and time. Skin: Skin is warm and dry. Psychiatric: Normal mood and affect.  Behavior is normal. Thought content normal.    PADDY Chow DO    9/21/22  3:40 PM

## 2022-09-22 ENCOUNTER — TELEPHONE (OUTPATIENT)
Dept: ORTHOPEDIC SURGERY | Age: 87
End: 2022-09-22

## 2022-10-03 ENCOUNTER — TELEPHONE (OUTPATIENT)
Dept: CARDIOLOGY CLINIC | Age: 87
End: 2022-10-03

## 2022-10-03 NOTE — TELEPHONE ENCOUNTER
Patient needing clearance & the ok to hold Eliquis for carpal tunnel release with Dr. Osiris Domingo on 10/6/22. JOSE block anesthesia will be used. Please advise.

## 2022-10-31 ENCOUNTER — TELEPHONE (OUTPATIENT)
Dept: FAMILY MEDICINE CLINIC | Age: 87
End: 2022-10-31

## 2022-11-01 NOTE — TELEPHONE ENCOUNTER
Patient's  called on behalf of patient who has a UTI. Sonido Kerr informed [facility] stating that he's in bad shape and it's difficult for him to drive. Sonido Kerr asked if Percy Gibson would send RX in as she's done in the past.  I informed that the providers prefer for patients to be seen prior to sending antibiotics in. Sonido Kerr informed they are unable to come into the ofc. Sonido Kerr stated patient is c/o frequency and burning with urination. Sonido Kerr informed patient did take AZO which helped. Please advise.       Last seen 9/7/2022  Next appt   Mamadou/[facility]

## 2022-11-01 NOTE — TELEPHONE ENCOUNTER
Per Jaren Tate -    If the [facility] is closer have her go there. Not all antibiotics are good for UTI's and would like to know what bacteria she has.

## 2023-03-06 DIAGNOSIS — I10 ESSENTIAL HYPERTENSION: ICD-10-CM

## 2023-03-06 RX ORDER — LOSARTAN POTASSIUM 25 MG/1
25 TABLET ORAL NIGHTLY
Qty: 90 TABLET | Refills: 1 | OUTPATIENT
Start: 2023-03-06

## 2023-03-06 RX ORDER — LOSARTAN POTASSIUM 50 MG/1
TABLET ORAL
Qty: 90 TABLET | Refills: 1 | OUTPATIENT
Start: 2023-03-06

## 2023-03-06 RX ORDER — LOSARTAN POTASSIUM 50 MG/1
50 TABLET ORAL DAILY
Qty: 30 TABLET | Refills: 0 | Status: SHIPPED | OUTPATIENT
Start: 2023-03-06

## 2023-03-06 RX ORDER — LOSARTAN POTASSIUM 25 MG/1
25 TABLET ORAL NIGHTLY
Qty: 30 TABLET | Refills: 0 | Status: SHIPPED | OUTPATIENT
Start: 2023-03-06

## 2023-03-09 ENCOUNTER — TELEPHONE (OUTPATIENT)
Dept: FAMILY MEDICINE CLINIC | Age: 88
End: 2023-03-09

## 2023-03-09 NOTE — TELEPHONE ENCOUNTER
Patient spouse called in to inform patient has possible UTI. Frequent urination, burning when urinating. Onset 03/07/2023.  Asking for an ATB  Last seen 9/7/2022  Next appt 4/13/2023  [facility] Vicente Budge

## 2023-03-10 NOTE — TELEPHONE ENCOUNTER
Per Jovanni BA-CNP  I don't treat without seeing the patient she has either cancelled or no show several appt she can come to [facility] for treatment

## 2023-03-21 DIAGNOSIS — I48.91 ATRIAL FIBRILLATION WITH RAPID VENTRICULAR RESPONSE (HCC): ICD-10-CM

## 2023-03-21 RX ORDER — APIXABAN 2.5 MG/1
TABLET, FILM COATED ORAL
Qty: 60 TABLET | Refills: 6 | OUTPATIENT
Start: 2023-03-21

## 2023-04-17 ENCOUNTER — TELEPHONE (OUTPATIENT)
Dept: FAMILY MEDICINE CLINIC | Age: 88
End: 2023-04-17

## 2023-04-17 NOTE — TELEPHONE ENCOUNTER
Patient's , Fei Hui, called asking if [facility] would send RX in for a UTI, stating Ervin Ramos would know what to send since patient has had this before. I noted phone encounter from 10/31/22, requesting the same, and informed Fei Hui as Ervin Ramos noted regarding antibiotics. I advised that patient go to the [address]. Fei Hui stated he just got out of the hospital and cannot drive; patient is unable to drive and there is no one else to bring her in. Please advise.     Last seen 9/7/2022  Next appt Visit date not found  Mamaodu/[facility] Excision Depth: adipose tissue

## 2023-04-18 DIAGNOSIS — I48.91 ATRIAL FIBRILLATION WITH RAPID VENTRICULAR RESPONSE (HCC): ICD-10-CM

## 2023-04-18 RX ORDER — APIXABAN 2.5 MG/1
TABLET, FILM COATED ORAL
Qty: 60 TABLET | Refills: 0 | OUTPATIENT
Start: 2023-04-18

## 2023-04-18 RX ORDER — SULFAMETHOXAZOLE AND TRIMETHOPRIM 800; 160 MG/1; MG/1
1 TABLET ORAL 2 TIMES DAILY
Qty: 20 TABLET | Refills: 0 | Status: SHIPPED | OUTPATIENT
Start: 2023-04-18 | End: 2023-04-28

## 2023-04-18 NOTE — TELEPHONE ENCOUNTER
I called Brenda Ramey and informed, as noted by Krystyna Lai, RX was sent and she recommends that patient look for a new provider due to non-compliance. Brenda Ramey verbalized understanding and stated he will let patient know.

## 2023-04-18 NOTE — TELEPHONE ENCOUNTER
Per Tess Roberson -     This is the last time she is being treated without being seen. She is on the list to be discharged due to non compliance and cancellations. Refill RX then she doesn't come to her appointment. I would suggest they look for a new provider.      Sweta     RX bactrim sent to pharmacy

## 2023-04-19 DIAGNOSIS — I48.91 ATRIAL FIBRILLATION WITH RAPID VENTRICULAR RESPONSE (HCC): ICD-10-CM

## 2023-04-19 NOTE — TELEPHONE ENCOUNTER
Patient called in to phone room, she received her dismissal letter received yesterday, pharmacy received a refusal for her eliquis, patient wants to know what she is suppose to do.

## 2023-05-03 DIAGNOSIS — I10 ESSENTIAL HYPERTENSION: ICD-10-CM

## 2023-05-04 DIAGNOSIS — I10 ESSENTIAL HYPERTENSION: ICD-10-CM

## 2023-05-04 RX ORDER — LOSARTAN POTASSIUM 50 MG/1
50 TABLET ORAL DAILY
Qty: 90 TABLET | Refills: 3 | Status: SHIPPED | OUTPATIENT
Start: 2023-05-04

## 2023-05-04 RX ORDER — LOSARTAN POTASSIUM 50 MG/1
50 TABLET ORAL DAILY
Qty: 30 TABLET | Refills: 0 | Status: SHIPPED
Start: 2023-05-04 | End: 2023-05-05

## 2023-05-05 RX ORDER — LOSARTAN POTASSIUM 50 MG/1
TABLET ORAL
Qty: 90 TABLET | Refills: 0 | Status: SHIPPED | OUTPATIENT
Start: 2023-05-05

## 2023-05-17 DIAGNOSIS — I48.91 ATRIAL FIBRILLATION WITH RAPID VENTRICULAR RESPONSE (HCC): ICD-10-CM

## 2023-08-30 ENCOUNTER — HOSPITAL ENCOUNTER (INPATIENT)
Age: 88
LOS: 4 days | Discharge: HOME | DRG: 291 | End: 2023-09-03
Attending: EMERGENCY MEDICINE | Admitting: INTERNAL MEDICINE
Payer: MEDICARE

## 2023-08-30 ENCOUNTER — APPOINTMENT (OUTPATIENT)
Dept: GENERAL RADIOLOGY | Age: 88
DRG: 291 | End: 2023-08-30
Payer: MEDICARE

## 2023-08-30 ENCOUNTER — OFFICE VISIT (OUTPATIENT)
Dept: FAMILY MEDICINE CLINIC | Age: 88
End: 2023-08-30
Payer: MEDICARE

## 2023-08-30 VITALS
RESPIRATION RATE: 17 BRPM | HEART RATE: 111 BPM | OXYGEN SATURATION: 98 % | BODY MASS INDEX: 24.24 KG/M2 | DIASTOLIC BLOOD PRESSURE: 87 MMHG | TEMPERATURE: 97.4 F | HEIGHT: 64 IN | SYSTOLIC BLOOD PRESSURE: 135 MMHG | WEIGHT: 142 LBS

## 2023-08-30 DIAGNOSIS — I48.91 ATRIAL FIBRILLATION, UNSPECIFIED TYPE (HCC): ICD-10-CM

## 2023-08-30 DIAGNOSIS — I48.91 ATRIAL FIBRILLATION WITH RVR (HCC): ICD-10-CM

## 2023-08-30 DIAGNOSIS — R00.0 TACHYCARDIA: ICD-10-CM

## 2023-08-30 DIAGNOSIS — I50.9 ACUTE ON CHRONIC CONGESTIVE HEART FAILURE, UNSPECIFIED HEART FAILURE TYPE (HCC): Primary | ICD-10-CM

## 2023-08-30 DIAGNOSIS — R60.0 BILATERAL LEG EDEMA: Primary | ICD-10-CM

## 2023-08-30 DIAGNOSIS — N18.30 STAGE 3 CHRONIC KIDNEY DISEASE, UNSPECIFIED WHETHER STAGE 3A OR 3B CKD (HCC): ICD-10-CM

## 2023-08-30 LAB
ALBUMIN SERPL-MCNC: 4.2 G/DL (ref 3.5–5.2)
ALP SERPL-CCNC: 121 U/L (ref 35–104)
ALT SERPL-CCNC: 8 U/L (ref 0–32)
ANION GAP SERPL CALCULATED.3IONS-SCNC: 10 MMOL/L (ref 7–16)
AST SERPL-CCNC: 22 U/L (ref 0–31)
BASOPHILS # BLD: 0.07 K/UL (ref 0–0.2)
BASOPHILS NFR BLD: 1 % (ref 0–2)
BILIRUB SERPL-MCNC: 0.6 MG/DL (ref 0–1.2)
BILIRUB UR QL STRIP: NEGATIVE
BNP SERPL-MCNC: 2743 PG/ML (ref 0–450)
BUN SERPL-MCNC: 23 MG/DL (ref 6–23)
CALCIUM SERPL-MCNC: 9.4 MG/DL (ref 8.6–10.2)
CHLORIDE SERPL-SCNC: 101 MMOL/L (ref 98–107)
CLARITY UR: CLEAR
CO2 SERPL-SCNC: 28 MMOL/L (ref 22–29)
COLOR UR: YELLOW
COMMENT: ABNORMAL
CREAT SERPL-MCNC: 1 MG/DL (ref 0.5–1)
EKG ATRIAL RATE: 174 BPM
EKG Q-T INTERVAL: 320 MS
EKG QRS DURATION: 98 MS
EKG QTC CALCULATION (BAZETT): 470 MS
EKG R AXIS: -46 DEGREES
EKG T AXIS: 11 DEGREES
EKG VENTRICULAR RATE: 130 BPM
EOSINOPHIL # BLD: 0.21 K/UL (ref 0.05–0.5)
EOSINOPHILS RELATIVE PERCENT: 3 % (ref 0–6)
ERYTHROCYTE [DISTWIDTH] IN BLOOD BY AUTOMATED COUNT: 13.4 % (ref 11.5–15)
GFR SERPL CREATININE-BSD FRML MDRD: 56 ML/MIN/1.73M2
GLUCOSE SERPL-MCNC: 105 MG/DL (ref 74–99)
GLUCOSE UR STRIP-MCNC: NEGATIVE MG/DL
HCT VFR BLD AUTO: 40.8 % (ref 34–48)
HGB BLD-MCNC: 12.6 G/DL (ref 11.5–15.5)
HGB UR QL STRIP.AUTO: NEGATIVE
IMM GRANULOCYTES # BLD AUTO: 0.03 K/UL (ref 0–0.58)
IMM GRANULOCYTES NFR BLD: 0 % (ref 0–5)
KETONES UR STRIP-MCNC: NEGATIVE MG/DL
LEUKOCYTE ESTERASE UR QL STRIP: NEGATIVE
LYMPHOCYTES NFR BLD: 2.44 K/UL (ref 1.5–4)
LYMPHOCYTES RELATIVE PERCENT: 33 % (ref 20–42)
MCH RBC QN AUTO: 29.9 PG (ref 26–35)
MCHC RBC AUTO-ENTMCNC: 30.9 G/DL (ref 32–34.5)
MCV RBC AUTO: 96.7 FL (ref 80–99.9)
MONOCYTES NFR BLD: 1.06 K/UL (ref 0.1–0.95)
MONOCYTES NFR BLD: 14 % (ref 2–12)
NEUTROPHILS NFR BLD: 48 % (ref 43–80)
NEUTS SEG NFR BLD: 3.54 K/UL (ref 1.8–7.3)
NITRITE UR QL STRIP: NEGATIVE
PH UR STRIP: 7 [PH] (ref 5–9)
PLATELET # BLD AUTO: 338 K/UL (ref 130–450)
PMV BLD AUTO: 9.6 FL (ref 7–12)
POTASSIUM SERPL-SCNC: 3.8 MMOL/L (ref 3.5–5)
PROT SERPL-MCNC: 8.3 G/DL (ref 6.4–8.3)
PROT UR STRIP-MCNC: NEGATIVE MG/DL
RBC # BLD AUTO: 4.22 M/UL (ref 3.5–5.5)
SODIUM SERPL-SCNC: 139 MMOL/L (ref 132–146)
SP GR UR STRIP: <1.005 (ref 1–1.03)
TROPONIN I SERPL HS-MCNC: 12 NG/L (ref 0–9)
TROPONIN I SERPL HS-MCNC: 14 NG/L (ref 0–9)
TSH SERPL DL<=0.05 MIU/L-ACNC: 1.11 UIU/ML (ref 0.27–4.2)
UROBILINOGEN UR STRIP-ACNC: 0.2 EU/DL (ref 0–1)
WBC OTHER # BLD: 7.4 K/UL (ref 4.5–11.5)

## 2023-08-30 PROCEDURE — 80053 COMPREHEN METABOLIC PANEL: CPT

## 2023-08-30 PROCEDURE — 96365 THER/PROPH/DIAG IV INF INIT: CPT

## 2023-08-30 PROCEDURE — 93005 ELECTROCARDIOGRAM TRACING: CPT | Performed by: EMERGENCY MEDICINE

## 2023-08-30 PROCEDURE — 2580000003 HC RX 258: Performed by: EMERGENCY MEDICINE

## 2023-08-30 PROCEDURE — 1090F PRES/ABSN URINE INCON ASSESS: CPT

## 2023-08-30 PROCEDURE — 6370000000 HC RX 637 (ALT 250 FOR IP): Performed by: NURSE PRACTITIONER

## 2023-08-30 PROCEDURE — G8427 DOCREV CUR MEDS BY ELIG CLIN: HCPCS

## 2023-08-30 PROCEDURE — 93010 ELECTROCARDIOGRAM REPORT: CPT | Performed by: INTERNAL MEDICINE

## 2023-08-30 PROCEDURE — 84443 ASSAY THYROID STIM HORMONE: CPT

## 2023-08-30 PROCEDURE — 99213 OFFICE O/P EST LOW 20 MIN: CPT

## 2023-08-30 PROCEDURE — 2500000003 HC RX 250 WO HCPCS

## 2023-08-30 PROCEDURE — APPSS45 APP SPLIT SHARED TIME 31-45 MINUTES: Performed by: NURSE PRACTITIONER

## 2023-08-30 PROCEDURE — 2500000003 HC RX 250 WO HCPCS: Performed by: EMERGENCY MEDICINE

## 2023-08-30 PROCEDURE — 84484 ASSAY OF TROPONIN QUANT: CPT

## 2023-08-30 PROCEDURE — 85025 COMPLETE CBC W/AUTO DIFF WBC: CPT

## 2023-08-30 PROCEDURE — 6360000002 HC RX W HCPCS: Performed by: NURSE PRACTITIONER

## 2023-08-30 PROCEDURE — 1036F TOBACCO NON-USER: CPT

## 2023-08-30 PROCEDURE — 96376 TX/PRO/DX INJ SAME DRUG ADON: CPT

## 2023-08-30 PROCEDURE — 1123F ACP DISCUSS/DSCN MKR DOCD: CPT

## 2023-08-30 PROCEDURE — 2060000000 HC ICU INTERMEDIATE R&B

## 2023-08-30 PROCEDURE — 71045 X-RAY EXAM CHEST 1 VIEW: CPT

## 2023-08-30 PROCEDURE — 81003 URINALYSIS AUTO W/O SCOPE: CPT

## 2023-08-30 PROCEDURE — 83880 ASSAY OF NATRIURETIC PEPTIDE: CPT

## 2023-08-30 PROCEDURE — 99285 EMERGENCY DEPT VISIT HI MDM: CPT

## 2023-08-30 PROCEDURE — G8420 CALC BMI NORM PARAMETERS: HCPCS

## 2023-08-30 RX ORDER — POLYETHYLENE GLYCOL 3350 17 G/17G
17 POWDER, FOR SOLUTION ORAL DAILY PRN
Status: DISCONTINUED | OUTPATIENT
Start: 2023-08-30 | End: 2023-09-03 | Stop reason: HOSPADM

## 2023-08-30 RX ORDER — DILTIAZEM HYDROCHLORIDE 5 MG/ML
10 INJECTION INTRAVENOUS ONCE
Status: COMPLETED | OUTPATIENT
Start: 2023-08-30 | End: 2023-08-30

## 2023-08-30 RX ORDER — OXYCODONE HYDROCHLORIDE AND ACETAMINOPHEN 5; 325 MG/1; MG/1
1 TABLET ORAL EVERY 6 HOURS PRN
Status: DISCONTINUED | OUTPATIENT
Start: 2023-08-30 | End: 2023-09-03 | Stop reason: HOSPADM

## 2023-08-30 RX ORDER — ACETAMINOPHEN 650 MG/1
650 SUPPOSITORY RECTAL EVERY 6 HOURS PRN
Status: DISCONTINUED | OUTPATIENT
Start: 2023-08-30 | End: 2023-09-03 | Stop reason: HOSPADM

## 2023-08-30 RX ORDER — ONDANSETRON 4 MG/1
4 TABLET, ORALLY DISINTEGRATING ORAL EVERY 8 HOURS PRN
Status: DISCONTINUED | OUTPATIENT
Start: 2023-08-30 | End: 2023-09-03 | Stop reason: HOSPADM

## 2023-08-30 RX ORDER — LOSARTAN POTASSIUM 50 MG/1
50 TABLET ORAL EVERY MORNING
Status: DISCONTINUED | OUTPATIENT
Start: 2023-08-31 | End: 2023-09-03 | Stop reason: HOSPADM

## 2023-08-30 RX ORDER — ONDANSETRON 2 MG/ML
4 INJECTION INTRAMUSCULAR; INTRAVENOUS EVERY 6 HOURS PRN
Status: DISCONTINUED | OUTPATIENT
Start: 2023-08-30 | End: 2023-09-03 | Stop reason: HOSPADM

## 2023-08-30 RX ORDER — OXYCODONE HYDROCHLORIDE 5 MG/1
2.5 TABLET ORAL EVERY 6 HOURS PRN
Status: DISCONTINUED | OUTPATIENT
Start: 2023-08-30 | End: 2023-09-03 | Stop reason: HOSPADM

## 2023-08-30 RX ORDER — SODIUM CHLORIDE 9 MG/ML
INJECTION, SOLUTION INTRAVENOUS PRN
Status: DISCONTINUED | OUTPATIENT
Start: 2023-08-30 | End: 2023-09-03 | Stop reason: HOSPADM

## 2023-08-30 RX ORDER — ACETAMINOPHEN 325 MG/1
650 TABLET ORAL EVERY 6 HOURS PRN
Status: DISCONTINUED | OUTPATIENT
Start: 2023-08-30 | End: 2023-09-03 | Stop reason: HOSPADM

## 2023-08-30 RX ORDER — SODIUM CHLORIDE 0.9 % (FLUSH) 0.9 %
5-40 SYRINGE (ML) INJECTION EVERY 12 HOURS SCHEDULED
Status: DISCONTINUED | OUTPATIENT
Start: 2023-08-30 | End: 2023-09-03 | Stop reason: HOSPADM

## 2023-08-30 RX ORDER — BUMETANIDE 0.25 MG/ML
0.5 INJECTION INTRAMUSCULAR; INTRAVENOUS ONCE
Status: DISCONTINUED | OUTPATIENT
Start: 2023-08-30 | End: 2023-08-30

## 2023-08-30 RX ORDER — FUROSEMIDE 10 MG/ML
40 INJECTION INTRAMUSCULAR; INTRAVENOUS 2 TIMES DAILY
Status: DISCONTINUED | OUTPATIENT
Start: 2023-08-30 | End: 2023-09-02

## 2023-08-30 RX ORDER — BUMETANIDE 0.25 MG/ML
INJECTION INTRAMUSCULAR; INTRAVENOUS
Status: COMPLETED
Start: 2023-08-30 | End: 2023-08-30

## 2023-08-30 RX ORDER — OXYCODONE AND ACETAMINOPHEN 7.5; 325 MG/1; MG/1
1 TABLET ORAL EVERY 6 HOURS PRN
Status: DISCONTINUED | OUTPATIENT
Start: 2023-08-30 | End: 2023-08-30 | Stop reason: CLARIF

## 2023-08-30 RX ORDER — LOSARTAN POTASSIUM 50 MG/1
25 TABLET ORAL NIGHTLY
Status: DISCONTINUED | OUTPATIENT
Start: 2023-08-30 | End: 2023-09-03 | Stop reason: HOSPADM

## 2023-08-30 RX ORDER — SODIUM CHLORIDE 0.9 % (FLUSH) 0.9 %
5-40 SYRINGE (ML) INJECTION PRN
Status: DISCONTINUED | OUTPATIENT
Start: 2023-08-30 | End: 2023-09-03 | Stop reason: HOSPADM

## 2023-08-30 RX ORDER — GABAPENTIN 300 MG/1
600 CAPSULE ORAL
Status: DISCONTINUED | OUTPATIENT
Start: 2023-08-30 | End: 2023-09-03 | Stop reason: HOSPADM

## 2023-08-30 RX ADMIN — DILTIAZEM HYDROCHLORIDE 15 MG/HR: 5 INJECTION INTRAVENOUS at 18:51

## 2023-08-30 RX ADMIN — FUROSEMIDE 40 MG: 10 INJECTION, SOLUTION INTRAMUSCULAR; INTRAVENOUS at 20:10

## 2023-08-30 RX ADMIN — DILTIAZEM HYDROCHLORIDE 5 MG/HR: 5 INJECTION INTRAVENOUS at 17:05

## 2023-08-30 RX ADMIN — OXYCODONE AND ACETAMINOPHEN 1 TABLET: 325; 5 TABLET ORAL at 20:10

## 2023-08-30 RX ADMIN — LOSARTAN POTASSIUM 25 MG: 50 TABLET, FILM COATED ORAL at 19:03

## 2023-08-30 RX ADMIN — DILTIAZEM HYDROCHLORIDE 10 MG: 5 INJECTION INTRAVENOUS at 16:11

## 2023-08-30 RX ADMIN — BUMETANIDE 0.5 MG: 0.25 INJECTION INTRAMUSCULAR; INTRAVENOUS at 18:58

## 2023-08-30 RX ADMIN — GABAPENTIN 600 MG: 300 CAPSULE ORAL at 20:10

## 2023-08-30 RX ADMIN — APIXABAN 2.5 MG: 2.5 TABLET, FILM COATED ORAL at 20:10

## 2023-08-30 ASSESSMENT — PAIN - FUNCTIONAL ASSESSMENT: PAIN_FUNCTIONAL_ASSESSMENT: PREVENTS OR INTERFERES SOME ACTIVE ACTIVITIES AND ADLS

## 2023-08-30 ASSESSMENT — PAIN DESCRIPTION - DESCRIPTORS: DESCRIPTORS: PRESSURE;ACHING

## 2023-08-30 ASSESSMENT — PAIN DESCRIPTION - LOCATION: LOCATION: LEG

## 2023-08-30 ASSESSMENT — PAIN DESCRIPTION - FREQUENCY: FREQUENCY: CONTINUOUS

## 2023-08-30 ASSESSMENT — PAIN DESCRIPTION - ORIENTATION: ORIENTATION: RIGHT;LEFT

## 2023-08-30 ASSESSMENT — PAIN DESCRIPTION - ONSET: ONSET: ON-GOING

## 2023-08-30 ASSESSMENT — PAIN DESCRIPTION - PAIN TYPE: TYPE: CHRONIC PAIN

## 2023-08-30 ASSESSMENT — PAIN SCALES - GENERAL: PAINLEVEL_OUTOF10: 7

## 2023-08-30 NOTE — PROGRESS NOTES
23  Radha Santana : 1935 Sex: female  Age 80 y.o. Subjective:  Chief Complaint   Patient presents with    Leg Swelling     Both legs are swollen and hurt       HPI:   Radha Santana , 80 y.o. female presents to the clinic for evaluation of bilateral leg edema x 10 days. The patient reports associated pain in legs. The patient denies any injury. The patient states pain is exacerbated by ambulation. The patient has taken Bumex water pill for 3 days for symptoms. The patient reports worsening  symptoms over time. Denies any erythema, ecchymosis, paresthesia, and loss of ROM /  strength. The patient also denies headache, fever, chest pain, abdominal pain, shortness of breath, and nausea / vomiting / diarrhea. ROS:   Unless otherwise stated in this report the patient's positive and negative responses for review of systems for constitutional, eyes, ENT, cardiovascular, respiratory, gastrointestinal, neurological, , musculoskeletal, and integument systems and related systems to the presenting problem are either stated in the history of present illness or were not pertinent or were negative for the symptoms and/or complaints related to the presenting medical problem. Positives and pertinent negatives as per HPI. All others reviewed and are negative.       PMH:     Past Medical History:   Diagnosis Date    Acute metabolic encephalopathy 82/15/4771    Acute respiratory failure with hypoxia ([address]) 2019    was hospitalized for 2 months and then had to go to rehab  (states was not on a vent)    JOVANI (acute kidney injury) ([address]) 2019    Anticoagulant long-term use     Atrial fibrillation ([address])     on Eliquis    Calculus of kidney 2021    CHF (congestive heart failure) ([address])     last hospitalized    echo from 2021  EF 30%    Chronic back pain     Community acquired pneumonia of left lung     COVID-19 2022    body aches sinus issues    Dental caries     Diabetes mellitus

## 2023-08-30 NOTE — ED PROVIDER NOTES
icteric  ENT:  Oropharynx clear, handling secretions, no trismus, no asymmetry of the posterior oropharynx or uvular edema  Neck: Supple, full ROM, no stridor, no meningeal signs  Respiratory: Diminished at bases, not in respiratory distress  Cardiovascular: Irregularly irregular tachycardic no murmurs, no gallops, no rubs. 2+ distal pulses. Equal extremity pulses. Chest: No chest wall tenderness  GI:  Abdomen Soft, Non tender, Non distended. No rebound, guarding, or rigidity. No pulsatile masses. Musculoskeletal: Moves all extremities x 4. Warm and well perfused, no clubbing, no cyanosis, 3+ pitting edema bilaterally. Capillary refill <3 seconds  Integument: skin warm and dry. No rashes.    Neurologic: GCS 15, no focal deficits, symmetric strength 5/5 in the upper and lower extremities bilaterally  Psychiatric: Normal Affect            DIAGNOSTIC RESULTS   LABS: Interpreted by Josh Dodson DO    Labs Reviewed   TROPONIN - Abnormal; Notable for the following components:       Result Value    Troponin, High Sensitivity 14 (*)     All other components within normal limits   CBC WITH AUTO DIFFERENTIAL - Abnormal; Notable for the following components:    MCHC 30.9 (*)     Monocytes % 14 (*)     Monocytes Absolute 1.06 (*)     All other components within normal limits   COMPREHENSIVE METABOLIC PANEL - Abnormal; Notable for the following components:    Glucose 105 (*)     Est, Glom Filt Rate 56 (*)     Alkaline Phosphatase 121 (*)     All other components within normal limits   URINALYSIS - Abnormal; Notable for the following components:    Specific Gravity, UA <1.005 (*)     All other components within normal limits   BRAIN NATRIURETIC PEPTIDE - Abnormal; Notable for the following components:    Pro-BNP 2,743 (*)     All other components within normal limits   TROPONIN       As interpreted by me, the above displayed labs are abnormal. All other labs obtained during this visit were within normal range or not returned

## 2023-08-31 PROBLEM — G89.29 OTHER CHRONIC PAIN: Status: ACTIVE | Noted: 2023-08-31

## 2023-08-31 PROBLEM — I10 HYPERTENSION: Status: ACTIVE | Noted: 2023-08-31

## 2023-08-31 PROBLEM — I42.8 NICM (NONISCHEMIC CARDIOMYOPATHY) (HCC): Status: ACTIVE | Noted: 2023-08-31

## 2023-08-31 PROBLEM — I49.5 TACHY-BRADY SYNDROME (HCC): Status: ACTIVE | Noted: 2023-08-31

## 2023-08-31 PROBLEM — I50.20 HFREF (HEART FAILURE WITH REDUCED EJECTION FRACTION) (HCC): Status: ACTIVE | Noted: 2023-08-31

## 2023-08-31 LAB
ANION GAP SERPL CALCULATED.3IONS-SCNC: 10 MMOL/L (ref 7–16)
BUN SERPL-MCNC: 23 MG/DL (ref 6–23)
CALCIUM SERPL-MCNC: 9.4 MG/DL (ref 8.6–10.2)
CHLORIDE SERPL-SCNC: 103 MMOL/L (ref 98–107)
CO2 SERPL-SCNC: 29 MMOL/L (ref 22–29)
CREAT SERPL-MCNC: 1 MG/DL (ref 0.5–1)
ERYTHROCYTE [DISTWIDTH] IN BLOOD BY AUTOMATED COUNT: 13.5 % (ref 11.5–15)
FOLATE SERPL-MCNC: 15 NG/ML (ref 4.8–24.2)
GFR SERPL CREATININE-BSD FRML MDRD: 55 ML/MIN/1.73M2
GLUCOSE SERPL-MCNC: 93 MG/DL (ref 74–99)
HCT VFR BLD AUTO: 35.7 % (ref 34–48)
HGB BLD-MCNC: 11.3 G/DL (ref 11.5–15.5)
LV EF: 50 %
LVEF MODALITY: NORMAL
MAGNESIUM SERPL-MCNC: 2 MG/DL (ref 1.6–2.6)
MCH RBC QN AUTO: 30.3 PG (ref 26–35)
MCHC RBC AUTO-ENTMCNC: 31.7 G/DL (ref 32–34.5)
MCV RBC AUTO: 95.7 FL (ref 80–99.9)
PLATELET # BLD AUTO: 320 K/UL (ref 130–450)
PMV BLD AUTO: 9.8 FL (ref 7–12)
POTASSIUM SERPL-SCNC: 3.7 MMOL/L (ref 3.5–5)
RBC # BLD AUTO: 3.73 M/UL (ref 3.5–5.5)
SODIUM SERPL-SCNC: 142 MMOL/L (ref 132–146)
VIT B12 SERPL-MCNC: 412 PG/ML (ref 211–946)
WBC OTHER # BLD: 6.5 K/UL (ref 4.5–11.5)

## 2023-08-31 PROCEDURE — 83921 ORGANIC ACID SINGLE QUANT: CPT

## 2023-08-31 PROCEDURE — 2580000003 HC RX 258: Performed by: NURSE PRACTITIONER

## 2023-08-31 PROCEDURE — 82607 VITAMIN B-12: CPT

## 2023-08-31 PROCEDURE — 97535 SELF CARE MNGMENT TRAINING: CPT

## 2023-08-31 PROCEDURE — 83735 ASSAY OF MAGNESIUM: CPT

## 2023-08-31 PROCEDURE — 97530 THERAPEUTIC ACTIVITIES: CPT

## 2023-08-31 PROCEDURE — 85027 COMPLETE CBC AUTOMATED: CPT

## 2023-08-31 PROCEDURE — 2500000003 HC RX 250 WO HCPCS: Performed by: NURSE PRACTITIONER

## 2023-08-31 PROCEDURE — APPSS30 APP SPLIT SHARED TIME 16-30 MINUTES: Performed by: NURSE PRACTITIONER

## 2023-08-31 PROCEDURE — 97161 PT EVAL LOW COMPLEX 20 MIN: CPT

## 2023-08-31 PROCEDURE — 99232 SBSQ HOSP IP/OBS MODERATE 35: CPT | Performed by: INTERNAL MEDICINE

## 2023-08-31 PROCEDURE — 97165 OT EVAL LOW COMPLEX 30 MIN: CPT

## 2023-08-31 PROCEDURE — 82746 ASSAY OF FOLIC ACID SERUM: CPT

## 2023-08-31 PROCEDURE — 93005 ELECTROCARDIOGRAM TRACING: CPT | Performed by: NURSE PRACTITIONER

## 2023-08-31 PROCEDURE — 2060000000 HC ICU INTERMEDIATE R&B

## 2023-08-31 PROCEDURE — 36415 COLL VENOUS BLD VENIPUNCTURE: CPT

## 2023-08-31 PROCEDURE — APPSS60 APP SPLIT SHARED TIME 46-60 MINUTES: Performed by: PHYSICIAN ASSISTANT

## 2023-08-31 PROCEDURE — 99223 1ST HOSP IP/OBS HIGH 75: CPT | Performed by: INTERNAL MEDICINE

## 2023-08-31 PROCEDURE — C8929 TTE W OR WO FOL WCON,DOPPLER: HCPCS

## 2023-08-31 PROCEDURE — 6370000000 HC RX 637 (ALT 250 FOR IP): Performed by: NURSE PRACTITIONER

## 2023-08-31 PROCEDURE — 6370000000 HC RX 637 (ALT 250 FOR IP): Performed by: PHYSICIAN ASSISTANT

## 2023-08-31 PROCEDURE — 80048 BASIC METABOLIC PNL TOTAL CA: CPT

## 2023-08-31 PROCEDURE — 6360000002 HC RX W HCPCS: Performed by: NURSE PRACTITIONER

## 2023-08-31 RX ADMIN — OXYCODONE AND ACETAMINOPHEN 1 TABLET: 325; 5 TABLET ORAL at 15:13

## 2023-08-31 RX ADMIN — GABAPENTIN 600 MG: 300 CAPSULE ORAL at 17:11

## 2023-08-31 RX ADMIN — DILTIAZEM HYDROCHLORIDE 12.5 MG/HR: 5 INJECTION INTRAVENOUS at 15:44

## 2023-08-31 RX ADMIN — LOSARTAN POTASSIUM 50 MG: 50 TABLET, FILM COATED ORAL at 09:49

## 2023-08-31 RX ADMIN — APIXABAN 5 MG: 5 TABLET, FILM COATED ORAL at 20:33

## 2023-08-31 RX ADMIN — GABAPENTIN 600 MG: 300 CAPSULE ORAL at 09:49

## 2023-08-31 RX ADMIN — LOSARTAN POTASSIUM 25 MG: 50 TABLET, FILM COATED ORAL at 20:33

## 2023-08-31 RX ADMIN — FUROSEMIDE 40 MG: 10 INJECTION, SOLUTION INTRAMUSCULAR; INTRAVENOUS at 09:49

## 2023-08-31 RX ADMIN — SODIUM CHLORIDE, PRESERVATIVE FREE 10 ML: 5 INJECTION INTRAVENOUS at 09:50

## 2023-08-31 RX ADMIN — APIXABAN 2.5 MG: 2.5 TABLET, FILM COATED ORAL at 09:49

## 2023-08-31 RX ADMIN — ACETAMINOPHEN 650 MG: 325 TABLET ORAL at 09:48

## 2023-08-31 RX ADMIN — OXYCODONE HYDROCHLORIDE 2.5 MG: 5 TABLET ORAL at 21:38

## 2023-08-31 RX ADMIN — SODIUM CHLORIDE, PRESERVATIVE FREE 10 ML: 5 INJECTION INTRAVENOUS at 21:40

## 2023-08-31 RX ADMIN — FUROSEMIDE 40 MG: 10 INJECTION, SOLUTION INTRAMUSCULAR; INTRAVENOUS at 17:11

## 2023-08-31 RX ADMIN — DILTIAZEM HYDROCHLORIDE 12.5 MG/HR: 5 INJECTION INTRAVENOUS at 09:19

## 2023-08-31 RX ADMIN — OXYCODONE AND ACETAMINOPHEN 1 TABLET: 325; 5 TABLET ORAL at 21:38

## 2023-08-31 ASSESSMENT — PAIN DESCRIPTION - ORIENTATION
ORIENTATION: RIGHT;LEFT
ORIENTATION: RIGHT;LEFT

## 2023-08-31 ASSESSMENT — PAIN SCALES - GENERAL
PAINLEVEL_OUTOF10: 8
PAINLEVEL_OUTOF10: 5
PAINLEVEL_OUTOF10: 0
PAINLEVEL_OUTOF10: 2
PAINLEVEL_OUTOF10: 9

## 2023-08-31 ASSESSMENT — PAIN DESCRIPTION - LOCATION
LOCATION: LEG

## 2023-08-31 ASSESSMENT — PAIN DESCRIPTION - DESCRIPTORS
DESCRIPTORS: ACHING;DISCOMFORT
DESCRIPTORS: ACHING;DISCOMFORT
DESCRIPTORS: ACHING;DISCOMFORT;SORE

## 2023-08-31 ASSESSMENT — PAIN SCALES - WONG BAKER: WONGBAKER_NUMERICALRESPONSE: 0

## 2023-08-31 NOTE — CARE COORDINATION
Case Management Assessment  Initial Evaluation    Date/Time of Evaluation: 8/31/2023 11:30 AM  Assessment Completed by: Nasima Guerrero RN    If patient is discharged prior to next notation, then this note serves as note for discharge by case management. Patient Name: Rissa Coe                   YOB: 1935  Diagnosis: Atrial fibrillation with RVR ([address]) [I48.91]  Atrial fibrillation, unspecified type ([address]) [I48.91]  Acute on chronic congestive heart failure, unspecified heart failure type [facility]) [I50.9]                   Date / Time: 8/30/2023  3:24 PM    Patient Admission Status: Inpatient   Readmission Risk (Low < 19, Mod (19-27), High > 27): Readmission Risk Score: 10.5    Current PCP: MEJIA Goodson CNP  PCP verified by CM? Yes    Chart Reviewed: Yes      History Provided by: Patient, Spouse  Patient Orientation: Alert and Oriented    Patient Cognition: Alert    Hospitalization in the last 30 days (Readmission):  No    If yes, Readmission Assessment in CM Navigator will be completed. Advance Directives:      Code Status: Full Code   Patient's Primary Decision Maker is: Legal Next of Kin    Primary Decision MakerAleitan Faria Spouse - [phone]    Discharge Planning:    Patient lives with: Spouse/Significant Other Type of Home: House  Primary Care Giver: Self  Patient Support Systems include: Spouse/Significant Other   Current Financial resources:    Current community resources:    Current services prior to admission: None            Current DME:              Type of Home Care services:  None    ADLS  Prior functional level: Independent in ADLs/IADLs  Current functional level: Independent in ADLs/IADLs    PT AM-PAC: 17 /24  OT AM-PAC: 20 /24    Family can provide assistance at DC: Other (comment) (No needs identified)  Would you like Case Management to discuss the discharge plan with any other family members/significant others, and if so, who?  No  Plans to Return to Present

## 2023-08-31 NOTE — ACP (ADVANCE CARE PLANNING)
Advance Care Planning   Healthcare Decision Maker:    Primary Decision Maker: Santos Truong [facility] - [phone]

## 2023-09-01 LAB
ANION GAP SERPL CALCULATED.3IONS-SCNC: 11 MMOL/L (ref 7–16)
BUN SERPL-MCNC: 25 MG/DL (ref 6–23)
CALCIUM SERPL-MCNC: 9.1 MG/DL (ref 8.6–10.2)
CHLORIDE SERPL-SCNC: 100 MMOL/L (ref 98–107)
CO2 SERPL-SCNC: 29 MMOL/L (ref 22–29)
CREAT SERPL-MCNC: 1.2 MG/DL (ref 0.5–1)
EKG ATRIAL RATE: 79 BPM
EKG Q-T INTERVAL: 366 MS
EKG QRS DURATION: 100 MS
EKG QTC CALCULATION (BAZETT): 447 MS
EKG R AXIS: -64 DEGREES
EKG T AXIS: -78 DEGREES
EKG VENTRICULAR RATE: 90 BPM
GFR SERPL CREATININE-BSD FRML MDRD: 44 ML/MIN/1.73M2
GLUCOSE SERPL-MCNC: 101 MG/DL (ref 74–99)
MAGNESIUM SERPL-MCNC: 1.9 MG/DL (ref 1.6–2.6)
POTASSIUM SERPL-SCNC: 3.5 MMOL/L (ref 3.5–5)
SODIUM SERPL-SCNC: 140 MMOL/L (ref 132–146)

## 2023-09-01 PROCEDURE — 6370000000 HC RX 637 (ALT 250 FOR IP): Performed by: PHYSICIAN ASSISTANT

## 2023-09-01 PROCEDURE — 93010 ELECTROCARDIOGRAM REPORT: CPT | Performed by: INTERNAL MEDICINE

## 2023-09-01 PROCEDURE — 2500000003 HC RX 250 WO HCPCS: Performed by: NURSE PRACTITIONER

## 2023-09-01 PROCEDURE — 2580000003 HC RX 258: Performed by: NURSE PRACTITIONER

## 2023-09-01 PROCEDURE — 36415 COLL VENOUS BLD VENIPUNCTURE: CPT

## 2023-09-01 PROCEDURE — 6370000000 HC RX 637 (ALT 250 FOR IP): Performed by: NURSE PRACTITIONER

## 2023-09-01 PROCEDURE — 99233 SBSQ HOSP IP/OBS HIGH 50: CPT | Performed by: INTERNAL MEDICINE

## 2023-09-01 PROCEDURE — 6360000002 HC RX W HCPCS: Performed by: NURSE PRACTITIONER

## 2023-09-01 PROCEDURE — 97530 THERAPEUTIC ACTIVITIES: CPT

## 2023-09-01 PROCEDURE — 2060000000 HC ICU INTERMEDIATE R&B

## 2023-09-01 PROCEDURE — 97110 THERAPEUTIC EXERCISES: CPT

## 2023-09-01 PROCEDURE — 80048 BASIC METABOLIC PNL TOTAL CA: CPT

## 2023-09-01 PROCEDURE — APPSS30 APP SPLIT SHARED TIME 16-30 MINUTES: Performed by: NURSE PRACTITIONER

## 2023-09-01 PROCEDURE — 83735 ASSAY OF MAGNESIUM: CPT

## 2023-09-01 RX ADMIN — SODIUM CHLORIDE, PRESERVATIVE FREE 10 ML: 5 INJECTION INTRAVENOUS at 20:03

## 2023-09-01 RX ADMIN — FUROSEMIDE 40 MG: 10 INJECTION, SOLUTION INTRAMUSCULAR; INTRAVENOUS at 17:57

## 2023-09-01 RX ADMIN — SODIUM CHLORIDE, PRESERVATIVE FREE 10 ML: 5 INJECTION INTRAVENOUS at 08:19

## 2023-09-01 RX ADMIN — LOSARTAN POTASSIUM 50 MG: 50 TABLET, FILM COATED ORAL at 08:21

## 2023-09-01 RX ADMIN — OXYCODONE AND ACETAMINOPHEN 1 TABLET: 325; 5 TABLET ORAL at 11:05

## 2023-09-01 RX ADMIN — APIXABAN 5 MG: 5 TABLET, FILM COATED ORAL at 20:04

## 2023-09-01 RX ADMIN — DILTIAZEM HYDROCHLORIDE 7.5 MG/HR: 5 INJECTION INTRAVENOUS at 20:29

## 2023-09-01 RX ADMIN — GABAPENTIN 600 MG: 300 CAPSULE ORAL at 11:04

## 2023-09-01 RX ADMIN — GABAPENTIN 600 MG: 300 CAPSULE ORAL at 08:20

## 2023-09-01 RX ADMIN — FUROSEMIDE 40 MG: 10 INJECTION, SOLUTION INTRAMUSCULAR; INTRAVENOUS at 08:21

## 2023-09-01 RX ADMIN — LOSARTAN POTASSIUM 25 MG: 50 TABLET, FILM COATED ORAL at 20:03

## 2023-09-01 RX ADMIN — OXYCODONE AND ACETAMINOPHEN 1 TABLET: 325; 5 TABLET ORAL at 17:56

## 2023-09-01 RX ADMIN — DILTIAZEM HYDROCHLORIDE 10 MG/HR: 5 INJECTION INTRAVENOUS at 06:31

## 2023-09-01 RX ADMIN — APIXABAN 5 MG: 5 TABLET, FILM COATED ORAL at 08:21

## 2023-09-01 RX ADMIN — GABAPENTIN 600 MG: 300 CAPSULE ORAL at 17:56

## 2023-09-01 ASSESSMENT — PAIN DESCRIPTION - LOCATION
LOCATION: LEG
LOCATION: COCCYX;SHOULDER

## 2023-09-01 ASSESSMENT — PAIN DESCRIPTION - DIRECTION
RADIATING_TOWARDS: LEGS
RADIATING_TOWARDS: LEGS

## 2023-09-01 ASSESSMENT — PAIN - FUNCTIONAL ASSESSMENT
PAIN_FUNCTIONAL_ASSESSMENT: ACTIVITIES ARE NOT PREVENTED
PAIN_FUNCTIONAL_ASSESSMENT: ACTIVITIES ARE NOT PREVENTED

## 2023-09-01 ASSESSMENT — PAIN DESCRIPTION - PAIN TYPE
TYPE: CHRONIC PAIN
TYPE: CHRONIC PAIN

## 2023-09-01 ASSESSMENT — PAIN SCALES - GENERAL
PAINLEVEL_OUTOF10: 9
PAINLEVEL_OUTOF10: 9
PAINLEVEL_OUTOF10: 1
PAINLEVEL_OUTOF10: 9

## 2023-09-01 ASSESSMENT — PAIN DESCRIPTION - DESCRIPTORS
DESCRIPTORS: ACHING;BURNING
DESCRIPTORS: ACHING;BURNING

## 2023-09-01 ASSESSMENT — PAIN DESCRIPTION - ONSET
ONSET: ON-GOING
ONSET: ON-GOING

## 2023-09-01 ASSESSMENT — PAIN DESCRIPTION - FREQUENCY
FREQUENCY: CONTINUOUS
FREQUENCY: CONTINUOUS

## 2023-09-01 ASSESSMENT — EJECTION FRACTION
EF_SOURCE: 2D ECHO
EF_VALUE: 50%

## 2023-09-01 ASSESSMENT — PAIN DESCRIPTION - ORIENTATION
ORIENTATION: RIGHT;LEFT
ORIENTATION: LEFT

## 2023-09-01 NOTE — CARE COORDINATION
SOCIAL WORK / DISCHARGE PLANNING:   Sw met with pt at bedside. She remains on Cardizem drip. IV Lasix to change to po. Eliquis PTA She currently does not have PCP but declines assistance, has someone she wishes to reach out to . Dc plan is to return home with spouse, denies any dc needs. Pt is ambulatory. Spouse will provide home going transport.              Electronically signed by RENETTA Ceballos on 9/1/2023 at 1:44 PM

## 2023-09-02 LAB
ANION GAP SERPL CALCULATED.3IONS-SCNC: 12 MMOL/L (ref 7–16)
BUN SERPL-MCNC: 32 MG/DL (ref 6–23)
CALCIUM SERPL-MCNC: 9.2 MG/DL (ref 8.6–10.2)
CHLORIDE SERPL-SCNC: 99 MMOL/L (ref 98–107)
CO2 SERPL-SCNC: 29 MMOL/L (ref 22–29)
CREAT SERPL-MCNC: 1.3 MG/DL (ref 0.5–1)
GFR SERPL CREATININE-BSD FRML MDRD: 38 ML/MIN/1.73M2
GLUCOSE SERPL-MCNC: 105 MG/DL (ref 74–99)
MAGNESIUM SERPL-MCNC: 2 MG/DL (ref 1.6–2.6)
POTASSIUM SERPL-SCNC: 3.5 MMOL/L (ref 3.5–5)
SODIUM SERPL-SCNC: 140 MMOL/L (ref 132–146)

## 2023-09-02 PROCEDURE — 6370000000 HC RX 637 (ALT 250 FOR IP): Performed by: PHYSICIAN ASSISTANT

## 2023-09-02 PROCEDURE — 2580000003 HC RX 258: Performed by: NURSE PRACTITIONER

## 2023-09-02 PROCEDURE — 6370000000 HC RX 637 (ALT 250 FOR IP): Performed by: NURSE PRACTITIONER

## 2023-09-02 PROCEDURE — 2060000000 HC ICU INTERMEDIATE R&B

## 2023-09-02 PROCEDURE — 6370000000 HC RX 637 (ALT 250 FOR IP): Performed by: INTERNAL MEDICINE

## 2023-09-02 PROCEDURE — 36415 COLL VENOUS BLD VENIPUNCTURE: CPT

## 2023-09-02 PROCEDURE — 99233 SBSQ HOSP IP/OBS HIGH 50: CPT | Performed by: INTERNAL MEDICINE

## 2023-09-02 PROCEDURE — 83735 ASSAY OF MAGNESIUM: CPT

## 2023-09-02 PROCEDURE — 80048 BASIC METABOLIC PNL TOTAL CA: CPT

## 2023-09-02 PROCEDURE — 6360000002 HC RX W HCPCS: Performed by: NURSE PRACTITIONER

## 2023-09-02 PROCEDURE — APPSS30 APP SPLIT SHARED TIME 16-30 MINUTES: Performed by: NURSE PRACTITIONER

## 2023-09-02 RX ORDER — METOPROLOL SUCCINATE 25 MG/1
12.5 TABLET, EXTENDED RELEASE ORAL DAILY
Status: DISCONTINUED | OUTPATIENT
Start: 2023-09-02 | End: 2023-09-02

## 2023-09-02 RX ORDER — METOPROLOL SUCCINATE 25 MG/1
12.5 TABLET, EXTENDED RELEASE ORAL 2 TIMES DAILY
Status: DISCONTINUED | OUTPATIENT
Start: 2023-09-02 | End: 2023-09-03

## 2023-09-02 RX ORDER — LIDOCAINE 4 G/G
1 PATCH TOPICAL DAILY PRN
Status: DISCONTINUED | OUTPATIENT
Start: 2023-09-02 | End: 2023-09-03 | Stop reason: HOSPADM

## 2023-09-02 RX ORDER — FUROSEMIDE 40 MG/1
40 TABLET ORAL DAILY
Status: DISCONTINUED | OUTPATIENT
Start: 2023-09-03 | End: 2023-09-03 | Stop reason: HOSPADM

## 2023-09-02 RX ADMIN — GABAPENTIN 600 MG: 300 CAPSULE ORAL at 17:12

## 2023-09-02 RX ADMIN — LOSARTAN POTASSIUM 25 MG: 50 TABLET, FILM COATED ORAL at 21:36

## 2023-09-02 RX ADMIN — SODIUM CHLORIDE, PRESERVATIVE FREE 10 ML: 5 INJECTION INTRAVENOUS at 08:49

## 2023-09-02 RX ADMIN — SODIUM CHLORIDE, PRESERVATIVE FREE 10 ML: 5 INJECTION INTRAVENOUS at 21:36

## 2023-09-02 RX ADMIN — METOPROLOL SUCCINATE 12.5 MG: 25 TABLET, EXTENDED RELEASE ORAL at 21:35

## 2023-09-02 RX ADMIN — GABAPENTIN 600 MG: 300 CAPSULE ORAL at 08:48

## 2023-09-02 RX ADMIN — GABAPENTIN 600 MG: 300 CAPSULE ORAL at 12:08

## 2023-09-02 RX ADMIN — APIXABAN 5 MG: 5 TABLET, FILM COATED ORAL at 21:36

## 2023-09-02 RX ADMIN — FUROSEMIDE 40 MG: 10 INJECTION, SOLUTION INTRAMUSCULAR; INTRAVENOUS at 08:49

## 2023-09-02 RX ADMIN — APIXABAN 5 MG: 5 TABLET, FILM COATED ORAL at 08:49

## 2023-09-02 RX ADMIN — METOPROLOL SUCCINATE 12.5 MG: 25 TABLET, EXTENDED RELEASE ORAL at 12:08

## 2023-09-02 RX ADMIN — OXYCODONE AND ACETAMINOPHEN 1 TABLET: 325; 5 TABLET ORAL at 19:44

## 2023-09-02 RX ADMIN — OXYCODONE AND ACETAMINOPHEN 1 TABLET: 325; 5 TABLET ORAL at 08:48

## 2023-09-02 RX ADMIN — ACETAMINOPHEN 650 MG: 325 TABLET ORAL at 23:41

## 2023-09-02 RX ADMIN — LOSARTAN POTASSIUM 50 MG: 50 TABLET, FILM COATED ORAL at 08:49

## 2023-09-02 ASSESSMENT — PAIN DESCRIPTION - FREQUENCY
FREQUENCY: CONTINUOUS
FREQUENCY: CONTINUOUS

## 2023-09-02 ASSESSMENT — PAIN DESCRIPTION - ORIENTATION
ORIENTATION: LEFT;RIGHT
ORIENTATION: RIGHT;LEFT
ORIENTATION: RIGHT;LEFT

## 2023-09-02 ASSESSMENT — PAIN DESCRIPTION - LOCATION
LOCATION: LEG

## 2023-09-02 ASSESSMENT — PAIN DESCRIPTION - DESCRIPTORS
DESCRIPTORS: ACHING;DISCOMFORT;SORE
DESCRIPTORS: ACHING;BURNING;SORE
DESCRIPTORS: ACHING;DISCOMFORT;SORE

## 2023-09-02 ASSESSMENT — PAIN SCALES - GENERAL
PAINLEVEL_OUTOF10: 5
PAINLEVEL_OUTOF10: 7
PAINLEVEL_OUTOF10: 0
PAINLEVEL_OUTOF10: 10
PAINLEVEL_OUTOF10: 0
PAINLEVEL_OUTOF10: 0
PAINLEVEL_OUTOF10: 9
PAINLEVEL_OUTOF10: 9

## 2023-09-02 ASSESSMENT — PAIN DESCRIPTION - PAIN TYPE
TYPE: CHRONIC PAIN
TYPE: CHRONIC PAIN

## 2023-09-02 ASSESSMENT — PAIN DESCRIPTION - ONSET: ONSET: ON-GOING

## 2023-09-03 VITALS
HEART RATE: 110 BPM | RESPIRATION RATE: 20 BRPM | SYSTOLIC BLOOD PRESSURE: 143 MMHG | DIASTOLIC BLOOD PRESSURE: 85 MMHG | WEIGHT: 138.67 LBS | OXYGEN SATURATION: 96 % | TEMPERATURE: 97.5 F | BODY MASS INDEX: 23.67 KG/M2 | HEIGHT: 64 IN

## 2023-09-03 LAB
ANION GAP SERPL CALCULATED.3IONS-SCNC: 11 MMOL/L (ref 7–16)
BUN SERPL-MCNC: 32 MG/DL (ref 6–23)
CALCIUM SERPL-MCNC: 9.3 MG/DL (ref 8.6–10.2)
CHLORIDE SERPL-SCNC: 100 MMOL/L (ref 98–107)
CO2 SERPL-SCNC: 30 MMOL/L (ref 22–29)
CREAT SERPL-MCNC: 1.2 MG/DL (ref 0.5–1)
GFR SERPL CREATININE-BSD FRML MDRD: 44 ML/MIN/1.73M2
GLUCOSE BLD-MCNC: 125 MG/DL (ref 74–99)
GLUCOSE SERPL-MCNC: 105 MG/DL (ref 74–99)
METHYLMALONATE SERPL-SCNC: 0.62 UMOL/L (ref 0–0.4)
POTASSIUM SERPL-SCNC: 3.6 MMOL/L (ref 3.5–5)
SODIUM SERPL-SCNC: 141 MMOL/L (ref 132–146)

## 2023-09-03 PROCEDURE — 99233 SBSQ HOSP IP/OBS HIGH 50: CPT | Performed by: INTERNAL MEDICINE

## 2023-09-03 PROCEDURE — 36415 COLL VENOUS BLD VENIPUNCTURE: CPT

## 2023-09-03 PROCEDURE — 6370000000 HC RX 637 (ALT 250 FOR IP): Performed by: NURSE PRACTITIONER

## 2023-09-03 PROCEDURE — 80048 BASIC METABOLIC PNL TOTAL CA: CPT

## 2023-09-03 PROCEDURE — 82962 GLUCOSE BLOOD TEST: CPT

## 2023-09-03 PROCEDURE — 2580000003 HC RX 258: Performed by: NURSE PRACTITIONER

## 2023-09-03 PROCEDURE — APPSS45 APP SPLIT SHARED TIME 31-45 MINUTES: Performed by: NURSE PRACTITIONER

## 2023-09-03 PROCEDURE — 6370000000 HC RX 637 (ALT 250 FOR IP): Performed by: PHYSICIAN ASSISTANT

## 2023-09-03 PROCEDURE — 6370000000 HC RX 637 (ALT 250 FOR IP): Performed by: INTERNAL MEDICINE

## 2023-09-03 RX ORDER — METOPROLOL SUCCINATE 25 MG/1
25 TABLET, EXTENDED RELEASE ORAL DAILY
Status: DISCONTINUED | OUTPATIENT
Start: 2023-09-03 | End: 2023-09-03 | Stop reason: HOSPADM

## 2023-09-03 RX ORDER — METOPROLOL SUCCINATE 25 MG/1
25 TABLET, EXTENDED RELEASE ORAL DAILY
Qty: 30 TABLET | Refills: 0 | Status: SHIPPED | OUTPATIENT
Start: 2023-09-04

## 2023-09-03 RX ORDER — FUROSEMIDE 40 MG/1
40 TABLET ORAL DAILY
Qty: 5 TABLET | Refills: 0 | Status: SHIPPED | OUTPATIENT
Start: 2023-09-04 | End: 2023-09-09

## 2023-09-03 RX ADMIN — SODIUM CHLORIDE, PRESERVATIVE FREE 10 ML: 5 INJECTION INTRAVENOUS at 08:03

## 2023-09-03 RX ADMIN — FUROSEMIDE 40 MG: 40 TABLET ORAL at 08:03

## 2023-09-03 RX ADMIN — GABAPENTIN 600 MG: 300 CAPSULE ORAL at 08:03

## 2023-09-03 RX ADMIN — OXYCODONE AND ACETAMINOPHEN 1 TABLET: 325; 5 TABLET ORAL at 04:29

## 2023-09-03 RX ADMIN — LOSARTAN POTASSIUM 50 MG: 50 TABLET, FILM COATED ORAL at 08:03

## 2023-09-03 RX ADMIN — GABAPENTIN 600 MG: 300 CAPSULE ORAL at 12:57

## 2023-09-03 RX ADMIN — METOPROLOL SUCCINATE 25 MG: 25 TABLET, EXTENDED RELEASE ORAL at 08:02

## 2023-09-03 RX ADMIN — APIXABAN 5 MG: 5 TABLET, FILM COATED ORAL at 08:02

## 2023-09-03 ASSESSMENT — PAIN SCALES - GENERAL
PAINLEVEL_OUTOF10: 2
PAINLEVEL_OUTOF10: 9
PAINLEVEL_OUTOF10: 5

## 2023-09-03 ASSESSMENT — PAIN DESCRIPTION - ORIENTATION: ORIENTATION: RIGHT;LEFT

## 2023-09-03 ASSESSMENT — PAIN DESCRIPTION - PAIN TYPE: TYPE: CHRONIC PAIN

## 2023-09-03 ASSESSMENT — PAIN DESCRIPTION - ONSET: ONSET: ON-GOING

## 2023-09-03 ASSESSMENT — PAIN DESCRIPTION - DESCRIPTORS: DESCRIPTORS: ACHING;DISCOMFORT;SORE

## 2023-09-03 ASSESSMENT — PAIN DESCRIPTION - FREQUENCY: FREQUENCY: CONTINUOUS

## 2023-09-03 ASSESSMENT — PAIN - FUNCTIONAL ASSESSMENT: PAIN_FUNCTIONAL_ASSESSMENT: ACTIVITIES ARE NOT PREVENTED

## 2023-09-03 ASSESSMENT — PAIN DESCRIPTION - LOCATION: LOCATION: LEG

## 2023-09-03 NOTE — DISCHARGE SUMMARY
[address] [place] [address] Hospitalist Group   Discharge Summary      Cassy Zepeda MD  [address] [address] [facility] [address] [phone]    Call in 2 day(s)  [address]  Call in 2 day(s)  Follow-up      Activity level: As tolerated    Diet: ADULT DIET; Regular; Low Fat/Low Chol/High Fiber/PRINCE    Labs:Per PCP    Condition at discharge: Stable    Dispo:Discharge home      Patient ID:  Rissa Coe  [id number]  48 y.o.  1935      Discharge date and time:  9/3/2023  1:31 PM    Admission Diagnoses: Principal Problem:    Atrial fibrillation with RVR ([address])  Active Problems:    Acute on chronic congestive heart failure (HCC)    NICM (nonischemic cardiomyopathy) (HCC)    Tachy-desmond syndrome (HCC)    HFrEF (heart failure with reduced ejection fraction) ([address])    Other chronic pain    Hypertension  Resolved Problems:    * No resolved hospital problems. *      Discharge Diagnoses: Principal Problem:    Atrial fibrillation with RVR (HCC)  Active Problems:    Acute on chronic congestive heart failure (HCC)    NICM (nonischemic cardiomyopathy) (HCC)    Tachy-desmond syndrome (HCC)    HFrEF (heart failure with reduced ejection fraction) ([address])    Other chronic pain    Hypertension  Resolved Problems:    * No resolved hospital problems.  *      Past Medical History:  has a past medical history of Acute metabolic encephalopathy, Acute respiratory failure with hypoxia ([address]), JOVANI (acute kidney injury) ([address]), Anticoagulant long-term use, Atrial fibrillation ([address]), Calculus of kidney, CHF (congestive heart failure) ([address]), Chronic back pain, Community acquired pneumonia of left lung, COVID-19, Dental caries, Diabetes mellitus ([address]), Elevated brain natriuretic peptide (BNP) level, Gall bladder stones, GERD (gastroesophageal reflux disease), Gram-positive cocci bacteremia, H/O cardiovascular stress test, Hearing loss, Hypertension, Multiple pulmonary

## 2023-09-05 ENCOUNTER — TELEPHONE (OUTPATIENT)
Dept: CARDIOLOGY CLINIC | Age: 88
End: 2023-09-05

## 2023-09-05 NOTE — TELEPHONE ENCOUNTER
Received MD alert from [employer] for patient's monitor. AF at  bpm (119 avg rate) 90 seconds on 9/3 @ 11:40 am.  Monitor in [place]. Please advise.

## 2023-09-05 NOTE — TELEPHONE ENCOUNTER
MD Larry Erickson MA    Needs close follow up with Dr Denisse Antonio and [facility] EP previously saw [facility] in 2021. PAF and tachybrady syndrome, I'm dc'ing with a jennyo AT, in AF on low dose BB concerned that will have significant desmond if she converts to sinus, may need PPM patient wanted to be dc and f/u outpt   Thanks     Patient has an appointment 9/14/2023.

## 2023-10-25 ENCOUNTER — OFFICE VISIT (OUTPATIENT)
Dept: CARDIOLOGY CLINIC | Age: 88
End: 2023-10-25
Payer: MEDICARE

## 2023-10-25 VITALS
BODY MASS INDEX: 23.56 KG/M2 | HEART RATE: 131 BPM | RESPIRATION RATE: 16 BRPM | HEIGHT: 64 IN | DIASTOLIC BLOOD PRESSURE: 58 MMHG | WEIGHT: 138 LBS | SYSTOLIC BLOOD PRESSURE: 116 MMHG

## 2023-10-25 DIAGNOSIS — I10 ESSENTIAL HYPERTENSION: Primary | ICD-10-CM

## 2023-10-25 PROCEDURE — G8484 FLU IMMUNIZE NO ADMIN: HCPCS | Performed by: INTERNAL MEDICINE

## 2023-10-25 PROCEDURE — 1123F ACP DISCUSS/DSCN MKR DOCD: CPT | Performed by: INTERNAL MEDICINE

## 2023-10-25 PROCEDURE — 1036F TOBACCO NON-USER: CPT | Performed by: INTERNAL MEDICINE

## 2023-10-25 PROCEDURE — G8420 CALC BMI NORM PARAMETERS: HCPCS | Performed by: INTERNAL MEDICINE

## 2023-10-25 PROCEDURE — G8427 DOCREV CUR MEDS BY ELIG CLIN: HCPCS | Performed by: INTERNAL MEDICINE

## 2023-10-25 PROCEDURE — 93000 ELECTROCARDIOGRAM COMPLETE: CPT | Performed by: INTERNAL MEDICINE

## 2023-10-25 PROCEDURE — 1090F PRES/ABSN URINE INCON ASSESS: CPT | Performed by: INTERNAL MEDICINE

## 2023-10-25 PROCEDURE — 99215 OFFICE O/P EST HI 40 MIN: CPT | Performed by: INTERNAL MEDICINE

## 2023-10-25 RX ORDER — AMIODARONE HYDROCHLORIDE 200 MG/1
200 TABLET ORAL SEE ADMIN INSTRUCTIONS
Qty: 35 TABLET | Refills: 0 | Status: SHIPPED | OUTPATIENT
Start: 2023-10-25

## 2023-10-25 RX ORDER — METOPROLOL SUCCINATE 50 MG/1
50 TABLET, EXTENDED RELEASE ORAL DAILY
Qty: 90 TABLET | Refills: 3 | Status: SHIPPED | OUTPATIENT
Start: 2023-10-25

## 2023-10-25 RX ORDER — APIXABAN 2.5 MG/1
2.5 TABLET, FILM COATED ORAL 2 TIMES DAILY
COMMUNITY
Start: 2023-09-25

## 2023-10-25 NOTE — PROGRESS NOTES
Spouse name: Not on file    Number of children: Not on file    Years of education: Not on file    Highest education level: Not on file   Occupational History    Not on file   Tobacco Use    Smoking status: Never    Smokeless tobacco: Never   Vaping Use    Vaping Use: Never used   Substance and Sexual Activity    Alcohol use: Never    Drug use: Never    Sexual activity: Not Currently     Partners: Male   Other Topics Concern    Not on file   Social History Narrative    Not on file     Social Determinants of Health     Financial Resource Strain: Low Risk  (5/31/2022)    Overall Financial Resource Strain (CARDIA)     Difficulty of Paying Living Expenses: Not hard at all   Food Insecurity: No Food Insecurity (5/31/2022)    Hunger Vital Sign     Worried About Running Out of Food in the Last Year: Never true     [address] in the Last Year: Never true   Transportation Needs: No Transportation Needs (5/31/2022)    PRAPARE - Transportation     Lack of Transportation (Medical): No     Lack of Transportation (Non-Medical):  No   Physical Activity: Unknown (9/7/2022)    Exercise Vital Sign     Days of Exercise per Week: 0 days     Minutes of Exercise per Session: Not on file   Stress: Not on file   Social Connections: Not on file   Intimate Partner Violence: Not on file   Housing Stability: Unknown (5/31/2022)    Housing Stability Vital Sign     Unable to Pay for Housing in the Last Year: No     Number of [address] in the Last Year: Not on file     Unstable Housing in the Last Year: No       Family History   Problem Relation Age of Onset    Heart Disease Father     Cancer Daughter        REVIEW OF SYSTEMS:     CONSTITUTIONAL:  negative for  fevers, chills, sweats, + fatigue  HEENT:  negative for  tinnitus, earaches, nasal congestion and epistaxis  RESPIRATORY:  negative for  dry cough, cough with sputum,wheezing and hemoptysis  GASTROINTESTINAL:  negative for nausea, vomiting, diarrhea, constipation,

## 2023-10-30 RX ORDER — AMIODARONE HYDROCHLORIDE 200 MG/1
200 TABLET ORAL DAILY
Qty: 90 TABLET | Refills: 0 | Status: SHIPPED | OUTPATIENT
Start: 2023-10-30

## 2023-11-07 PROBLEM — M25.551 RIGHT HIP PAIN: Status: ACTIVE | Noted: 2023-11-07

## 2023-11-07 PROBLEM — M71.21 POPLITEAL CYST, RIGHT: Status: ACTIVE | Noted: 2023-11-07

## 2023-11-07 PROBLEM — M17.0 ARTHRITIS OF BOTH KNEES: Status: ACTIVE | Noted: 2023-11-07

## 2023-11-07 PROBLEM — M48.062 NEUROGENIC CLAUDICATION DUE TO LUMBAR SPINAL STENOSIS: Status: ACTIVE | Noted: 2023-11-07

## 2023-11-07 PROBLEM — M79.606 LEG PAIN: Status: ACTIVE | Noted: 2023-11-07

## 2023-11-07 PROBLEM — R91.8 LUNG NODULES: Status: ACTIVE | Noted: 2023-11-07

## 2023-11-07 PROBLEM — N17.9 AKI (ACUTE KIDNEY INJURY) (CMS-HCC): Status: ACTIVE | Noted: 2023-11-07

## 2023-11-07 PROBLEM — E11.9 DM TYPE 2 (DIABETES MELLITUS, TYPE 2) (MULTI): Status: ACTIVE | Noted: 2023-11-07

## 2023-11-07 PROBLEM — R13.10 SWALLOWING PROBLEM: Status: ACTIVE | Noted: 2023-11-07

## 2023-11-07 PROBLEM — R49.0 HOARSENESS: Status: ACTIVE | Noted: 2023-11-07

## 2023-11-07 PROBLEM — I10 HYPERTENSION: Status: ACTIVE | Noted: 2023-11-07

## 2023-11-07 PROBLEM — E04.9 ENLARGEMENT OF THYROID: Status: ACTIVE | Noted: 2023-11-07

## 2023-11-07 PROBLEM — G57.02: Status: ACTIVE | Noted: 2023-11-07

## 2023-11-07 PROBLEM — S51.859A: Status: ACTIVE | Noted: 2023-11-07

## 2023-11-07 PROBLEM — M81.0 OSTEOPOROSIS: Status: ACTIVE | Noted: 2023-11-07

## 2023-11-07 PROBLEM — M48.061 LUMBAR CANAL STENOSIS: Status: ACTIVE | Noted: 2023-11-07

## 2023-11-07 PROBLEM — M79.671 ARCH PAIN OF RIGHT FOOT: Status: ACTIVE | Noted: 2023-11-07

## 2023-11-07 PROBLEM — E04.2 NONTOXIC MULTINODULAR GOITER: Status: ACTIVE | Noted: 2023-11-07

## 2023-11-07 PROBLEM — S73.102A SPRAIN OF LEFT HIP: Status: ACTIVE | Noted: 2023-11-07

## 2023-11-07 PROBLEM — R19.7 DIARRHEA: Status: ACTIVE | Noted: 2023-11-07

## 2023-11-07 PROBLEM — R82.2 BILIRUBIN IN URINE: Status: ACTIVE | Noted: 2023-11-07

## 2023-11-07 PROBLEM — I83.893 VARICOSE VEINS OF LEG WITH EDEMA, BILATERAL: Status: ACTIVE | Noted: 2023-11-07

## 2023-11-07 PROBLEM — R22.0 LEFT FACIAL SWELLING: Status: ACTIVE | Noted: 2023-11-07

## 2023-11-07 PROBLEM — M79.89 LEFT LEG SWELLING: Status: ACTIVE | Noted: 2023-11-07

## 2023-11-07 PROBLEM — K04.7 DENTAL INFECTION: Status: ACTIVE | Noted: 2023-11-07
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
## 2023-11-07 PROBLEM — M47.816 LUMBAR SPONDYLOSIS: Status: ACTIVE | Noted: 2023-11-07

## 2023-11-07 PROBLEM — M62.838 MUSCLE SPASM: Status: ACTIVE | Noted: 2023-11-07

## 2023-11-07 PROBLEM — L82.1 SEBORRHEIC KERATOSIS: Status: ACTIVE | Noted: 2023-11-07

## 2023-11-07 PROBLEM — R20.8 BURNING SENSATION: Status: ACTIVE | Noted: 2023-11-07

## 2023-11-07 PROBLEM — R93.5 ABNORMAL ULTRASOUND OF ABDOMEN: Status: ACTIVE | Noted: 2023-11-07

## 2023-11-07 PROBLEM — I48.0 PAROXYSMAL ATRIAL FIBRILLATION (MULTI): Status: ACTIVE | Noted: 2023-11-07

## 2023-11-07 PROBLEM — E07.9 THYROID MASS: Status: ACTIVE | Noted: 2023-11-07

## 2023-11-07 PROBLEM — K80.20 CHOLELITHIASIS: Status: ACTIVE | Noted: 2023-11-07

## 2023-11-07 PROBLEM — S93.409A ANKLE SPRAIN: Status: ACTIVE | Noted: 2023-11-07

## 2023-11-07 PROBLEM — T14.8XXA BRUISING: Status: ACTIVE | Noted: 2023-11-07

## 2023-11-07 PROBLEM — M16.12 ARTHRITIS OF LEFT HIP: Status: ACTIVE | Noted: 2023-11-07

## 2023-11-07 PROBLEM — K43.9 VENTRAL HERNIA: Status: ACTIVE | Noted: 2023-11-07

## 2023-11-07 PROBLEM — H65.93 BSOM (BILATERAL SEROUS OTITIS MEDIA): Status: ACTIVE | Noted: 2023-11-07

## 2023-11-07 PROBLEM — R42 DIZZINESS: Status: ACTIVE | Noted: 2023-11-07

## 2023-11-07 PROBLEM — M25.579 ANKLE JOINT PAIN: Status: ACTIVE | Noted: 2023-11-07

## 2023-11-07 PROBLEM — Z79.891 CHRONIC PRESCRIPTION OPIATE USE: Status: ACTIVE | Noted: 2023-11-07

## 2023-11-07 PROBLEM — M16.11 DEGENERATIVE JOINT DISEASE OF RIGHT HIP: Status: ACTIVE | Noted: 2023-11-07

## 2023-11-07 PROBLEM — M79.604 PAIN OF RIGHT LOWER EXTREMITY: Status: ACTIVE | Noted: 2023-11-07

## 2023-11-07 PROBLEM — K82.9 GALL BLADDER DISEASE: Status: ACTIVE | Noted: 2023-11-07

## 2023-11-07 PROBLEM — M54.16 LUMBAR RADICULITIS: Status: ACTIVE | Noted: 2023-11-07

## 2023-11-07 PROBLEM — R14.0 ABDOMINAL DISTENTION: Status: ACTIVE | Noted: 2023-11-07

## 2023-11-07 PROBLEM — N20.0 NEPHROLITHIASIS: Status: ACTIVE | Noted: 2023-11-07

## 2023-11-07 PROBLEM — R35.0 FREQUENT URINATION: Status: ACTIVE | Noted: 2023-11-07

## 2023-11-07 PROBLEM — M25.562 LEFT KNEE PAIN: Status: ACTIVE | Noted: 2023-11-07

## 2023-11-07 PROBLEM — R94.4 ABNORMAL KIDNEY FUNCTION STUDY: Status: ACTIVE | Noted: 2023-11-07

## 2023-11-07 PROBLEM — R30.0 DYSURIA: Status: ACTIVE | Noted: 2023-11-07

## 2023-11-07 PROBLEM — S02.5XXA: Status: ACTIVE | Noted: 2023-11-07

## 2023-11-07 PROBLEM — J32.9 RECURRENT SINUSITIS: Status: ACTIVE | Noted: 2023-11-07

## 2023-11-07 PROBLEM — M79.18 MYOFASCIAL PAIN: Status: ACTIVE | Noted: 2023-11-07
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
## 2023-11-07 PROBLEM — M17.9 KNEE OSTEOARTHRITIS: Status: ACTIVE | Noted: 2023-11-07

## 2023-11-07 PROBLEM — M54.50 LOWER BACK PAIN: Status: ACTIVE | Noted: 2023-11-07

## 2023-11-07 PROBLEM — K21.9 GERD WITHOUT ESOPHAGITIS: Status: ACTIVE | Noted: 2023-11-07

## 2023-11-07 RX ORDER — BUMETANIDE 1 MG/1
TABLET ORAL
COMMUNITY
Start: 2021-03-31

## 2023-11-07 RX ORDER — CARVEDILOL 12.5 MG/1
1 TABLET ORAL
COMMUNITY
Start: 2015-11-19

## 2023-11-07 RX ORDER — DILTIAZEM HYDROCHLORIDE 120 MG/1
CAPSULE, EXTENDED RELEASE ORAL
COMMUNITY
Start: 2021-03-31

## 2023-11-07 RX ORDER — LOSARTAN POTASSIUM 50 MG/1
1 TABLET ORAL
COMMUNITY
Start: 2022-09-07

## 2023-11-07 RX ORDER — NALOXONE HYDROCHLORIDE 4 MG/.1ML
4 SPRAY NASAL AS NEEDED
COMMUNITY
Start: 2019-10-28

## 2023-11-07 RX ORDER — FLUTICASONE PROPIONATE 50 MCG
1 SPRAY, SUSPENSION (ML) NASAL 2 TIMES DAILY
COMMUNITY
Start: 2017-06-21

## 2023-11-07 RX ORDER — METOPROLOL SUCCINATE 25 MG/1
1 TABLET, EXTENDED RELEASE ORAL DAILY
COMMUNITY
Start: 2023-09-04

## 2023-11-07 RX ORDER — LOSARTAN POTASSIUM 25 MG/1
1 TABLET ORAL EVERY EVENING
COMMUNITY
Start: 2021-12-14

## 2023-11-07 RX ORDER — AMIODARONE HYDROCHLORIDE 200 MG/1
1 TABLET ORAL 2 TIMES DAILY
COMMUNITY
Start: 2021-05-20

## 2023-11-07 RX ORDER — OXYCODONE AND ACETAMINOPHEN 7.5; 325 MG/1; MG/1
1 TABLET ORAL 4 TIMES DAILY PRN
COMMUNITY
Start: 2020-06-30 | End: 2023-11-08 | Stop reason: SDUPTHER

## 2023-11-07 RX ORDER — GABAPENTIN 600 MG/1
1 TABLET ORAL 4 TIMES DAILY
COMMUNITY
Start: 2018-12-31 | End: 2023-11-08 | Stop reason: SDUPTHER

## 2023-11-07 NOTE — PROGRESS NOTES
Subjective   Patient ID: Roxanne Shrestha is a 88 y.o. female.   HPI    Roxanne follows up for her interval reevaluation of her chronic low back pain from lumbar stenosis and spondylosis, chronic left knee pain from osteoarthritis and left-sided radiating leg pain.      Percocet 7.5 mg 4 times a day as needed, voltaren gel applied up to 4 times daily as needed, gabapentin 2400 mg daily from primary help to reduce pain and improve function to 40%. Average pain score is 5-6 out of 10 with medication. Denies negative side effects from medication.    Roxanne feels that she has had increase of left knee pain since last office visit and overall for the last 9 months.  Asking for increase in medication.  I will not increase the Percocet.  However I did advise that she can take acetaminophen 500 mg in addition to the Percocet throughout the day for a total of no greater than 4000 mg a day.  I also advised that she would best also benefit from a neoprene brace for the weakness and giving of the knee.  She is not wearing one today.  Does follow-up with orthopedic surgeon in [facility] for cortisone injections to the knee.    Would like to have total knee joint replacement.  However, she is with heart failure and last hospitalized several weeks ago in [place].     Offered physical therapy for left knee arthritis and pain.  She declines stating she is very active at home with her cycle and elliptical machine.     Has below average quality family and social current condition and treatment. Toxicology consistent May 15, 2023. Annual controlled substance agreement and opioid risk tool are completed and scanned into the chart May 15, 2023.     Knee pain has been increased over the last 6 months with weightbearing activity. Now is in wheelchair when out and about. No falls since last office visit. Does wear neoprene braces with knee support. Does see orthopedic surgeon in [facility] for cortisone injections.     For continuity:    Given the patient's report of reduced pain and improved functional ability without adverse effects, it is reasonable to treat with narcotic medications. The terms of the opioid agreement as well as the potential risks and adverse effects of the patient's medication regimen were discussed in detail. This includes if applicable due to dosage of medication permission to discuss and coordinate care with other treatment providers relevant to the patients condition. The patient verbalized understanding.      Risks and side effects of chronic opioid therapy including but not limited to tolerance, dependence, constipation, hyperalgesia, cognitive side effects, addiction and possible death due to overuse and or misuse were discussed. I also discussed that such medications when co-administered with other sedative agents including but not limited to alcohol, benzodiazepines, sedative hypnotics and illegal drugs could pose life threatening consequences including death. I also explained the impact that the administration of such medication has on a patient with obstructive sleep apnea and continued recommendations for use of apnea devices if ordered are prescribed by other physicians. In order to effectively and safely treat the pain, I also emphasized the importance of compliance with the treatment plan, as well as compliance with the terms of the opioid agreement, which was reviewed in detail. I explained the importance of being responsible with the medications and to take these only as prescribed, never in excess and never for reasons other than pain reduction. The patient was counseled on keeping the medications safe and locked away from children and other adults as well as disposal methods and options. The patient understood the risks and instructions.      I also discussed with the patient in detail that based on the clinical response to the opioid medications and improvements of activities of daily living, sleep, and  work performance in light of compliance with the treatment plan we can continue this form of therapy for the above chronic pain. The goal and rationale used for current treatment with chronic opioid medication is to control the pain and alleviate disability induced by the chronic pain condition noted above after failures of other non-opioid and nonpharmacological modalities to treat the chronic pain and the symptoms associated with have failed. The patient understood the goals in terms of the above treatment plan and had no further questions prior to leaving the office today.      Of note, the above-mentioned diagnoses/conditions and expected fluctuating nature of pain, and pain characteristic changes may lead to prolonged functional impairment requiring frequent and multiple reassessments with continued high level medical decision making. As noted, medication and medication management may require opiate therapy in excess of a routine less than 30 day medication requirement. The patient may require daily opiate therapy necessitating month-long prescription medication as noted above in order to perform activities of daily living and achieve acceptable quality of life with respect to their chronic pain condition for the foreseeable future. We monitor our patient's carefully through drug monitoring, medication counts, urine drug testing specific to their medication as well as a myriad of other substances and with frequent follow-ups with interval reassement of the chronic pain condition, its pathophysiology and prognosis.      The level of clinical decision making at this office visit is high due to high risks and complications including mortality and morbidity related to acute and chronic pain with respects to life, bodily function, and treatment. Risks and clinical decisions with respect to under treatment, failure to maintain adequate treatment, and/or overtreatment complications and outcomes were discussed with the  patient with respect to their chronic pain conditions, interventional therapies, as well as the use of various medications including possible controlled/dangerous medications. The amount and complexity of reviewed data at this in subsequent office visits is high given patient's fluctuating clinical presentation, laboratory and radiographic reports, prescription monitoring program data, and medication history as well as other relevant data as noted above. Pertinent negatives and positives data was used in consideration for the above-mentioned high complexity.       Given the patient's total MED, general use of daily opiates, or other coadministered medications in various classes the patient was offered a prescription for Narcan. I instructed the patient that it is important that patient fill this medication in order to demonstrate understanding of the gravity of possible side effects including respiratory depression and risk of overdose of this opiate load or medication combination. As such patient will be required to bring Narcan prescription to follow-up appointments as part of compliance with continued opiate care.      With respect to opiate induced constipation I discussed multiple ways to combat this problem including staying hydrated and taking over-the-counter medications such as Dulcolax, Miralax and Senna. If these treatments are not effective we could consider such medications as Amitiza, Linzess and Movantik.      Disclaimer: This note was transcribed using an audio transcription device. As such, minor errors may be present with regard to spelling, punctuation, and inadvertent word insertion. Please disregard such errors.    Results/Data  Xray Foot Complete Min 3 View 30Apr2019 02:43PM Gerald [place]   [May 6, 2019 1:39PM Gerald [place]]  she has osteoporosis of rt foot, an old fracture of 2nd toe. mild arthritis, achilles tendon is calcified, plz fax the result to Dr [facility]-    she should get teeth fixed soon so we may start on fosamax for osteoporosis and strengthen the bones.   [Apr 30, 2019 1:39PM Brittany [place]]  Reason: Unspecified for Xray Foot Complete Min 3 View      Test Name Result Flag Reference   Xray Foot Complete Min 3 View (Report)       Interpreted by: SIXTO PRIDE  05/06/19 13:29  MRN: [id number]  Patient Name: MEMO NG     STUDY:  FOOT COMPLETE, MIN 3 VIEWS;Right; 4/30/2019 2:43 pm     INDICATION:  foot arch pain after streatching, eval for bone fracture.     COMPARISON:  None.     ACCESSION NUMBER(S):  [id number]     ORDERING CLINICIAN:  BRITTANY ONEIL     FINDINGS:  The bones are osteoporotic. An old healed fracture is seen in the  distal portion of the proximal phalanx of the 2nd toe. Mild  degenerative changes are present, at the interphalangeal joint of the  1st toe, and at the 1st metatarsophalangeal joint. The  tarsometatarsal joints are normal. A small calcaneal spur is present.  Calcification is seen at the insertion of the Achilles tendon.     IMPRESSION:  Osteoporosis. No acute disease.  Old healed fracture in the distal portion of the proximal phalanx of  the 2nd toe.     Electronically signed by: SIXTO PRIDE  05/06/19 13:29      Xray Bone Density, Dexa 1 or More Sites 30Apr2019 02:36PM Brittany [place]   [May 3, 2019 11:52AM Brittany [place]]  she has osteoporosis on left hip, after she is done with dental work, we will start her fosamax to improve bone health, for now take vitamin d 1000 iu daily and calcium 600 bid   [May 2, 2019 11:35AM Katelynn Vieira]  AMA Intake Activity Log Entry by Katelynn Vieira ([email or web address]) on 5/2/2019 11:30 AM  Status Change: To Closed - Confirmed-Appt Past   [May 2, 2019 11:35AM Katelynn Vieira]  AMA Intake updated by Katelynn Vieira ([email or web address]) on 5/2/2019 11:30 AM  New Appointment Date: Apr 30 2019 2:36PM   [Apr 30, 2019 1:40PM Brittany [place]]  Reason: Unspecified for Bone Density, Dexa 1 or More  Sites      Test Name Result Flag Reference   Bone Density, Dexa 1 or More Sites (Report)       Interpreted by: LATISHA RAZA EMILY  05/02/19 17:30  =================================================================              Bone Density Report              =================================================================     Height:      64.0 in  Weight:      170.0 lb  Fractures:  Treatments:     -----------------------------------------------------------------     Indication: osteoporosis  Referring Provider: BRITTANY ONEIL     Study: Bone densitometry was performed.     Exam Date: April 30, 2019     Accession number: [id number]     Findings: Standard measurements were obtained utilizing a Dual   Energy X-ray Absorptiometry bone densitometer. Data obtained   includes planar bone density measurements over the Left Femur   and Lumbar Spine. Comparison of measured data and standardized   mean data for a young adult population (when peak bone mass   occurs) results in a T score. This represents the number of   standard deviations above or below the mean of a young adult   population. Comparison of measured data to standards from an   age adjusted population similarly yields a Z score.            Bone Density:  -----------------------------------------------------------------  Region          BMD  T-score Z-score  Classification  -----------------------------------------------------------------  AP Spine(L1-L4)     1.026  -0.2   2.6    Normal  Femoral Neck (Left)   0.518  -3.0   -0.5    Osteoporosis  Total Hip (Left)     0.787  -1.3   1.0    Osteopenia  -----------------------------------------------------------------     World Health Organization criteria for BMD impression   classify patients as:   Normal (T-score at or above -1.0),   Osteopenia (T-score between -1.0 and -2.5), or   Osteoporosis (T-score at or below -2.5).      10-year Fracture  Risk:  -----------------------------------------------------------------  FRAX not reported because:   Some T-score for Spine Total or Hip Total or Femoral Neck at   or below -2.5   -----------------------------------------------------------------              Impression: The patient has osteoporosis as determined by WHO   criteria. Based on the results of the patient?s bone density   assessment, the risk of future fracture increases approximately   two fold for each 1.0 SD decrease in T-score.   However, low BMD is not the only risk factor for a future   fragility fracture. Other clinical risk factors for   osteoporotic fracture should be considered in ascertaining this   patient?s future fracture risk including the patient?s age,   previous osteoporotic (fragility) fracture, estrogen   deficiency/hypogonadal, risk of falling, use of medications   implicated in bone loss (glucocorticoids), family history of   osteoporotic fracture, diseases and conditions associated with   bone loss, low body weight, smoking, high bone turnover, etc.   Combining low BMD and other clinical risk factors result in a   more precise assessment of future fracture risk. Secondary   causes for osteoporosis, such as osteomalacia, other metabolic   bone disorders, and diseases and conditions that may contribute   to accelerated bone loss may have to be considered depending on   the clinical situation.   A repeat bone density assessment should be considered in two   years.      All images and detailed analysis are available on the    Radiology PACS.   Reported by: -precious on 04/30/2019 2:43:00 PM.     Electronically signed by: LATISHA DIAZ  05/02/19 17:30      CT Abdomen and Pelvis with IV Contrast 28Jun2017 12:00PM Gerald [place]   [Jun 19, 2017 12:55PM Maddi Obrien]  AMA Intake Activity Log Entry by Maddi Obrien ([email or web address]) on 6/19/2017 12:51 PM  Status Change: To Confirmed - N/A   [Jun 19, 2017 12:55PM Joce Obrien  AMA Intake  updated by Maddi Obrien ([email or web address]) on 6/19/2017 12:51 PM  New Appointment Date: Jun 28 2017 11:00AM   [Manjit 15, 2017 11:59AM Brittany [place]]  Reason: Unspecified for CT Abdomen and Pelvis with Contrast      Test Name Result Flag Reference   CT Abdomen and Pelvis with Contrast (Report)       Interpreted by: CHARLES JC  07/03/17 10:00  MRN: [id number]  Patient Name: MEMO NG     STUDY:  CT ABDOMEN AND PELVIS WITH CONTRAST; 6/28/2017 12:00 pm     INDICATION:  Abnormal findings on diagnostic imaging of other abdominal regions,  including retroperitoneum  Ventral hernia without obstruction or  gangrene  Abdominal distension (gaseous) .     COMPARISON:  None.     ACCESSION NUMBER(S):  [id number]     ORDERING CLINICIAN:  BRITTANY ONEIL     TECHNIQUE:  Contiguous axial images were obtained at 3mm slice thickness through  the abdomen and pelvis following intravenous contrast administration.  Coronal and sagittal reconstructions at 3 mm slice thickness were  performed. 120 ml of Optiray 350 were administered intravenously  without immediate complication.     FINDINGS:  LOWER CHEST:  Evaluation of the visualized lung bases demonstrates mild scarring  and/or atelectasis bilaterally. The heart is within normal limits for  size. A small hiatal hernia is present.     ABDOMEN:     LIVER:  The liver is within normal limits for appearance, without evidence of  focal masses.     BILE DUCTS:  No definite intra or extrahepatic biliary dilatation is identified.     GALLBLADDER:  The gallbladder is nondilated. No definite calcified gallstones are  seen.     PANCREAS:  The pancreas is within normal limits for appearance, without evidence  of focal masses.     SPLEEN:  The spleen is within normal limits for size. No focal splenic mass is  seen.     ADRENAL GLANDS:  No definite adrenal nodules or masses are seen bilaterally.     KIDNEYS AND URETERS:  A 4 mm nonobstructive calculus is seen in the lower pole of the  left  kidney. There is no hydronephrosis, hydroureter or renal/ ureteral  calculus identified bilaterally. Scattered tiny hypodensities are  seen in the kidneys bilaterally and are too small to accurately  characterize.     PELVIS:     BLADDER:  The bladder is decompressed.     REPRODUCTIVE ORGANS:  The uterus and ovaries are grossly unremarkable for CT appearance.     BOWEL:  Numerous diverticuli are seen throughout the sigmoid colon. The colon  and small bowel are within normal limits for course, caliber and  appearance, without evidence of wall thickening or obstruction. The  appendix is not visualized. No CT evidence of acute diverticulitis or  appendicitis is seen.     VESSELS:  Moderate atherosclerotic calcifications are seen throughout the  infrarenal abdominal aorta and iliac arteries. The abdominal aorta is  within normal limits for course, caliber and appearance, without  evidence of aneurysm.     PERITONEUM/RETROPERITONEUM/LYMPH NODES:  There is no free intraperitoneal air or free fluid identified. No  gross mesenteric or retroperitoneal lymphadenopathy is identified.     BONE AND SOFT TISSUE:  There is no evidence of acute fracture identified. No evidence of  abdominal wall mass or hernia is identified.     IMPRESSION:  1. Nonobstructive calculus in the left kidney.  2. Tiny hypodensities in the kidneys bilaterally, too small to  accurately characterize.  3. Colonic diverticulosis without evidence of acute diverticulitis.  4. No evidence of bowel obstruction, free intraperitoneal air or  abnormal intra-abdominal fluid collection.  Transcribed by: [email or web address], User  Electronically signed by: [email or web address], User  07/03/17 10:00      Xray Knee Complete 4 or more View 21Apr2017 10:40AM Neha Garcia      Test Name Result Flag Reference   Xray Knee Complete 4 or more View (Report)       Interpreted by: KARSTEN RAMSEY  04/24/17 12:39  MRN: [id number]  Patient Name: BERENICE MEMO     STUDY:  KNEE; COMPLT,  4 OR MORE VIEWS; 4/21/2017 10:40 am     INDICATION:  Signs/Symptoms: s/p fall.     COMPARISON:  None.     ACCESSION NUMBER(S):  [id number]     ORDERING CLINICIAN:  NEHA BLACKWELL     FINDINGS:  Spurs are seen off of the distal femur and proximal tibia, including  the tibial spines. Spurs are also seen off of the patella. Mild  narrowing of the medial patellofemoral compartment is noted.     No acute fracture or dislocation is noted.     No periosteal reaction is seen.     There is a trace suprapatellar effusion.     IMPRESSION:  Degenerative changes of the knee.     Trace suprapatellar effusion.     Transcribed by: [email or web address], User  Electronically signed by: [id number], User  04/24/17 12:39      Xray Knee 3 View 21Apr2017 10:40AM Neha Garcia   [Apr 21, 2017 10:28AM Neha Garcia]  Reason: Unspecified for Xray Knee 3 View      Test Name Result Flag Reference   Xray Knee 3 View         Please click on the link to view the study images.      Xray Ankle 3 View 21Apr2017 10:36AM Neha Garcia   [Apr 21, 2017 10:41AM Neha Garcia]  Reason: Unspecified for Xray Ankle 3 View      Test Name Result Flag Reference   Xray Ankle 3 View (Report)       Interpreted by: KARSTEN RAMESY  04/24/17 12:38  MRN: [id number]  Patient Name: MEMO NG     STUDY:  ANKLE, COMPLETE, MIN 3 VIEWS; 4/21/2017 10:36 am     INDICATION:  Signs/Symptoms: s/p fall.     COMPARISON:  None.     ACCESSION NUMBER(S):  [id number]     ORDERING CLINICIAN:  NEHA BLACKWELL     FINDINGS:  There is no evidence of acute fracture or dislocation.     Ankle mortise is preserved.     Soft tissue edema is noted.     No evidence for radiopaque foreign body.     Calcaneal spurs are noted.     IMPRESSION:  No acute fracture or dislocation.     Calcaneal spur.  Transcribed by: [email or web address], User  Electronically signed by: [email or web address], User  04/24/17 12:38      Xray BN Spine, Lumbosacral; 2 or 3 Views 09Jan2017 12:33PM Neha Garcia   [Jan 9, 2017 12:20PM Radha  Neha]  Reason: Unspecified for Xray Lumbar Spine AP + Lateral   [Jan 9, 2017 12:20PM Frisina, Neha]  Reason: Unspecified for Xray Lumbar Spine AP + Lateral      Test Name Result Flag Reference   Xray Lumbar Spine AP + Lateral (Report)       Interpreted by: STACY VILCHIS CRYSTAL  01/10/17 11:22  MRN: [id number]  Patient Name: MEMO NG     STUDY:  SPINE, LUMBOSACRAL; 2 OR 3 VIEWS; HIPS, BIALT, MIN 2 VIEWS EACH, AP  PELVIS; 1/9/2017 12:33 pm     INDICATION:  Signs/Symptoms: Left hip pain s/p fall.     COMPARISON:  None.     ACCESSION NUMBER(S):  [id number]; [id number]     ORDERING CLINICIAN:  NEHA BLACKWELL     FINDINGS:  There are five lumbar-appearing vertebra.     There is no compression fracture. There is no acute bony abnormality.     There is 7 mm anterior subluxation of L4 with respect L5. There is  disc space narrowing and mild anterior osteophytic spurring at  T12-L1, L1-2 and L4-5.     There is moderate facet joint hypertrophy of the lower lumbar spine.     The sacroiliac joints are intact.     Five views bilateral hips.     There is no fracture, dislocation or acute bony abnormality.     IMPRESSION:  No acute bony abnormality lumbar spine and bilateral hips.  Transcribed by: [email or web address], User  Electronically signed by: [email or web address], User  01/10/17 11:22      Xray Hips, Bilat, Min 2 Views Each, AP Pelvis 09Jan2017 12:33PM Friinaa, Neha      Test Name Result Flag Reference   HIPS, BIALT, MIN 2 VIEWS EACH, AP PELVIS (Report)       Interpreted by: STACY ROJAS  01/10/17 11:22  MRN: [id number]  Patient Name: MEMO NG     STUDY:  SPINE, LUMBOSACRAL; 2 OR 3 VIEWS; HIPS, BIALT, MIN 2 VIEWS EACH, AP  PELVIS; 1/9/2017 12:33 pm     INDICATION:  Signs/Symptoms: Left hip pain s/p fall.     COMPARISON:  None.     ACCESSION NUMBER(S):  [id number]; [id number]     ORDERING CLINICIAN:  NEHA BLACKWELL     FINDINGS:  There are five lumbar-appearing vertebra.     There is no compression fracture. There is no acute bony  abnormality.     There is 7 mm anterior subluxation of L4 with respect L5. There is  disc space narrowing and mild anterior osteophytic spurring at  T12-L1, L1-2 and L4-5.     There is moderate facet joint hypertrophy of the lower lumbar spine.     The sacroiliac joints are intact.     Five views bilateral hips.     There is no fracture, dislocation or acute bony abnormality.     IMPRESSION:  No acute bony abnormality lumbar spine and bilateral hips.  Transcribed by: [email or web address], User  Electronically signed by: [email or web address], User  01/10/17 11:22      CT L Spine without Contrast 16Jun2016 11:36AM Ce Orlando   [Jun 9, 2016 2:55PM Capri Armas]  AMA Intake Activity Log Entry by Capri Armas ([email or web address]) on 6/9/2016 2:54 PM  Status Change: To Closed - Unable To Schedule-Patient Will Schedule With    [Jun 9, 2016 11:18AM Ce [place]]  Reason: Unspecified for CT L Spine without Contrast      Test Name Result Flag Reference   CT L Spine without Contrast (Report)       Interpreted by: DUNCAN MONTESINOS  06/16/16 12:36  [id number]     Ordering Physician: CE THORNTON, [phone]     CT L-SPINE WO C: 6/16/2016 11:36 AM     COMPARISON:None     CLINICAL INFORMATION: Signs/Symptoms: Low back pain      CT OF THE LUMBAR SPINE     Sequential transaxial images were obtained , for bony   evaluation.Therefore, if further evaluation for soft tissue   structures including discs etc. is needed correlation with an MRI or   CT myelography if MR contraindicated would be recommended. Sagittal   and coronal reconstructed images were generated.          The alignment: Normal.  The visualized lower thoracic spine:Appears unremarkable       The T12-L1 level: There is mild narrowing of the disc interspace.   There appears to be mild posterior central disc protrusion x 2.5 mm.   There is also suspected moderate 6 mm right foraminal disc   protrusion, but without nerve root impingement.     The L1-2 level:Mild  degenerative change with disc space narrowing and   anterior osteophytic lipping.       The L2-3 level: The disc space is maintained.       The L3-4 level: The disc space is maintained. There is mild   hypertrophy of the posterior elements.     The L4-5 level: Marked narrowing of the disc interspace with mild   vacuum phenomena and 2 mm anterolisthesis. There is suspected mild   posterior disc bulge x 2.5 mm extending foraminally.There is mild   hypertrophy of the posterior elements, with a mild canal stenosis.     The L5-S1 level: The disc space is maintained. There is mild   hypertrophy of the posterior elements, with vacuum phenomena at the   apophyseal joints.     The bony structures: appear intact.  Other findings: None significant       IMPRESSION: Overall mild degenerative change with greatest at the   L4-5 level with a mild canal stenosis..  Transcribed by: Maryuri "[employer]  Electronically signed by: Maryuri "[employer]  06/16/16 12:36       Review of Systems   Constitutional: Negative.    Respiratory: Negative.     Cardiovascular: Negative.    Gastrointestinal: Negative.    Endocrine: Negative.    Genitourinary: Negative.    Musculoskeletal:  Positive for arthralgias, back pain, gait problem, joint swelling and myalgias.   Skin:  Positive for color change.   Allergic/Immunologic: Negative.    Neurological:  Positive for weakness and numbness. Negative for dizziness, tremors, seizures, syncope, facial asymmetry, speech difficulty, light-headedness and headaches.   Hematological: Negative.    Psychiatric/Behavioral: Negative.         Objective   Physical Exam  Vitals and nursing note reviewed.   Constitutional:       Appearance: Normal appearance.   HENT:      Head: Normocephalic and atraumatic.   Cardiovascular:      Pulses: Normal pulses.   Pulmonary:      Effort: Pulmonary effort is normal. No respiratory distress.   Musculoskeletal:      Right lower leg: Edema present.      Left lower leg: Edema  present.      Comments: Arises from sitting to standing position with pushoff from wheelchair.  Posture forward flexion.  Tenderness to palpation over the posterior hip and posterior thigh. No tenderness with palpation over the cervical, thoracic, lumbar spine.      Left knee with generalized bogginess and warmth.  Tenderness along the medial and lateral joint line.  Unable to perform active range of motion secondary to knee pain with extension and flexion.     Skin:     Capillary Refill: Capillary refill takes 2 to 3 seconds.   Neurological:      Mental Status: She is alert and oriented to person, place, and time.      Cranial Nerves: No cranial nerve deficit.      Sensory: No sensory deficit.      Motor: Weakness present.      Gait: Gait abnormal.   Psychiatric:         Mood and Affect: Mood normal.         Behavior: Behavior normal.         Assessment/Plan   Problem List Items Addressed This Visit    None  Visit Diagnoses         Codes    Primary osteoarthritis of right knee    -  Primary M17.11    Relevant Medications    oxyCODONE-acetaminophen (Percocet) 7.5-325 mg tablet (Start on 11/19/2023)    oxyCODONE-acetaminophen (Percocet) 7.5-325 mg tablet (Start on 12/17/2023)    oxyCODONE-acetaminophen (Percocet) 7.5-325 mg tablet (Start on 1/14/2024)    gabapentin (Neurontin) 600 mg tablet (Start on 12/22/2023)    Primary osteoarthritis of left knee     M17.12    Relevant Medications    oxyCODONE-acetaminophen (Percocet) 7.5-325 mg tablet (Start on 11/19/2023)    oxyCODONE-acetaminophen (Percocet) 7.5-325 mg tablet (Start on 12/17/2023)    oxyCODONE-acetaminophen (Percocet) 7.5-325 mg tablet (Start on 1/14/2024)    gabapentin (Neurontin) 600 mg tablet (Start on 12/22/2023)    Primary osteoarthritis of left hip     M16.12    Relevant Medications    oxyCODONE-acetaminophen (Percocet) 7.5-325 mg tablet (Start on 11/19/2023)    oxyCODONE-acetaminophen (Percocet) 7.5-325 mg tablet (Start on 12/17/2023)     oxyCODONE-acetaminophen (Percocet) 7.5-325 mg tablet (Start on 1/14/2024)    gabapentin (Neurontin) 600 mg tablet (Start on 12/22/2023)    Lumbar spondylosis     M47.816    Relevant Medications    oxyCODONE-acetaminophen (Percocet) 7.5-325 mg tablet (Start on 11/19/2023)    oxyCODONE-acetaminophen (Percocet) 7.5-325 mg tablet (Start on 12/17/2023)    oxyCODONE-acetaminophen (Percocet) 7.5-325 mg tablet (Start on 1/14/2024)    gabapentin (Neurontin) 600 mg tablet (Start on 12/22/2023)    Neurogenic claudication due to lumbar spinal stenosis     M48.062    Relevant Medications    oxyCODONE-acetaminophen (Percocet) 7.5-325 mg tablet (Start on 11/19/2023)    oxyCODONE-acetaminophen (Percocet) 7.5-325 mg tablet (Start on 12/17/2023)    oxyCODONE-acetaminophen (Percocet) 7.5-325 mg tablet (Start on 1/14/2024)    gabapentin (Neurontin) 600 mg tablet (Start on 12/22/2023)          Follow-up 12 weeks.    Reviewed and approved by [employer] LAO on 11/8/23 at 2:15 PM.

## 2023-11-08 ENCOUNTER — APPOINTMENT (OUTPATIENT)
Dept: PAIN MEDICINE | Facility: CLINIC | Age: 88
End: 2023-11-08
Payer: MEDICARE

## 2023-11-08 ENCOUNTER — OFFICE VISIT (OUTPATIENT)
Dept: PAIN MEDICINE | Facility: CLINIC | Age: 88
End: 2023-11-08
Payer: MEDICARE

## 2023-11-08 VITALS
DIASTOLIC BLOOD PRESSURE: 110 MMHG | RESPIRATION RATE: 18 BRPM | BODY MASS INDEX: 32.19 KG/M2 | HEIGHT: 62 IN | SYSTOLIC BLOOD PRESSURE: 174 MMHG | HEART RATE: 83 BPM

## 2023-11-08 DIAGNOSIS — M17.11 PRIMARY OSTEOARTHRITIS OF RIGHT KNEE: Primary | ICD-10-CM

## 2023-11-08 DIAGNOSIS — M47.816 LUMBAR SPONDYLOSIS: ICD-10-CM

## 2023-11-08 DIAGNOSIS — M16.12 PRIMARY OSTEOARTHRITIS OF LEFT HIP: ICD-10-CM

## 2023-11-08 DIAGNOSIS — M17.12 PRIMARY OSTEOARTHRITIS OF LEFT KNEE: ICD-10-CM

## 2023-11-08 DIAGNOSIS — M48.062 NEUROGENIC CLAUDICATION DUE TO LUMBAR SPINAL STENOSIS: ICD-10-CM

## 2023-11-08 PROCEDURE — 1159F MED LIST DOCD IN RCRD: CPT | Performed by: NURSE PRACTITIONER

## 2023-11-08 PROCEDURE — 3080F DIAST BP >= 90 MM HG: CPT | Performed by: NURSE PRACTITIONER

## 2023-11-08 PROCEDURE — 1160F RVW MEDS BY RX/DR IN RCRD: CPT | Performed by: NURSE PRACTITIONER

## 2023-11-08 PROCEDURE — 1036F TOBACCO NON-USER: CPT | Performed by: NURSE PRACTITIONER

## 2023-11-08 PROCEDURE — 1158F ADVNC CARE PLAN TLK DOCD: CPT | Performed by: NURSE PRACTITIONER

## 2023-11-08 PROCEDURE — 99213 OFFICE O/P EST LOW 20 MIN: CPT | Performed by: NURSE PRACTITIONER

## 2023-11-08 PROCEDURE — 1125F AMNT PAIN NOTED PAIN PRSNT: CPT | Performed by: NURSE PRACTITIONER

## 2023-11-08 PROCEDURE — 3077F SYST BP >= 140 MM HG: CPT | Performed by: NURSE PRACTITIONER

## 2023-11-08 RX ORDER — METOPROLOL SUCCINATE 50 MG/1
50 TABLET, EXTENDED RELEASE ORAL DAILY
COMMUNITY
Start: 2023-10-25

## 2023-11-08 RX ORDER — OXYCODONE AND ACETAMINOPHEN 7.5; 325 MG/1; MG/1
1 TABLET ORAL 4 TIMES DAILY PRN
Qty: 112 TABLET | Refills: 0 | Status: SHIPPED | OUTPATIENT
Start: 2023-11-19 | End: 2024-02-20 | Stop reason: SDUPTHER

## 2023-11-08 RX ORDER — FUROSEMIDE 40 MG/1
40 TABLET ORAL DAILY
COMMUNITY
Start: 2023-09-03 | End: 2023-09-08

## 2023-11-08 RX ORDER — OXYCODONE AND ACETAMINOPHEN 7.5; 325 MG/1; MG/1
1 TABLET ORAL 4 TIMES DAILY
Qty: 112 TABLET | Refills: 0 | Status: SHIPPED | OUTPATIENT
Start: 2023-12-17 | End: 2023-12-20 | Stop reason: SDUPTHER

## 2023-11-08 RX ORDER — GABAPENTIN 600 MG/1
600 TABLET ORAL 4 TIMES DAILY
Qty: 360 TABLET | Refills: 1 | Status: SHIPPED | OUTPATIENT
Start: 2023-12-22 | End: 2024-01-03 | Stop reason: SDUPTHER

## 2023-11-08 RX ORDER — OXYCODONE AND ACETAMINOPHEN 7.5; 325 MG/1; MG/1
1 TABLET ORAL 4 TIMES DAILY
Qty: 112 TABLET | Refills: 0 | Status: SHIPPED | OUTPATIENT
Start: 2024-01-14 | End: 2024-04-24 | Stop reason: SDUPTHER

## 2023-11-08 ASSESSMENT — ENCOUNTER SYMPTOMS
LIGHT-HEADEDNESS: 0
ENDOCRINE NEGATIVE: 1
TREMORS: 0
FACIAL ASYMMETRY: 0
CONSTITUTIONAL NEGATIVE: 1
HEADACHES: 0
SEIZURES: 0
DIZZINESS: 0
SPEECH DIFFICULTY: 0
RESPIRATORY NEGATIVE: 1
CARDIOVASCULAR NEGATIVE: 1
MYALGIAS: 1
COLOR CHANGE: 1
ARTHRALGIAS: 1
BACK PAIN: 1
HEMATOLOGIC/LYMPHATIC NEGATIVE: 1
NUMBNESS: 1
ALLERGIC/IMMUNOLOGIC NEGATIVE: 1
JOINT SWELLING: 1
GASTROINTESTINAL NEGATIVE: 1
PSYCHIATRIC NEGATIVE: 1
WEAKNESS: 1

## 2023-11-08 ASSESSMENT — PAIN SCALES - GENERAL: PAINLEVEL: 9

## 2023-11-13 ENCOUNTER — APPOINTMENT (OUTPATIENT)
Dept: PAIN MEDICINE | Facility: CLINIC | Age: 88
End: 2023-11-13
Payer: MEDICARE

## 2023-12-19 NOTE — PROGRESS NOTES
Pulmonary/Critical Care Progress Note    We are following patient for pneumonia with sepsis    SUBJECTIVE:  59-year-old woman presented with acute pneumonia with extensive left pneumonia. Positive urine antigen for pneumococcus extensive involvement by CT scan and chest x-ray. Clinically improving. MEDICATIONS:   vancomycin  1,250 mg Intravenous Q24H    pantoprazole  40 mg Intravenous Daily    And    sodium chloride (PF)  10 mL Intravenous Daily    carvedilol  6.25 mg Oral BID WC    sodium chloride flush  10 mL Intravenous 2 times per day    heparin (porcine)  5,000 Units Subcutaneous 3 times per day    levofloxacin  500 mg Intravenous Q48H    ipratropium-albuterol  1 ampule Inhalation Q4H    thiamine (VITAMIN B1) IVPB  200 mg Intravenous Q12H    vancomycin (VANCOCIN) intermittent dosing (placeholder)   Other RX Placeholder    IVPB builder   Intravenous Q6H      dextrose 5% in lactated ringers 75 mL/hr at 12/07/19 0029     sodium chloride flush, magnesium hydroxide, ondansetron, acetaminophen, perflutren lipid microspheres    Review of Systems   Constitutional: Positive for fatigue. HENT: Negative. Eyes: Negative. Respiratory: Positive for shortness of breath. Cardiovascular: Negative. Gastrointestinal: Negative. Endocrine: Negative. Genitourinary: Negative. Musculoskeletal: Negative. Skin: Negative. Allergic/Immunologic: Negative. Neurological: Negative. Hematological: Negative. Psychiatric/Behavioral: Negative.         OBJECTIVE:  Vitals:    12/07/19 0700   BP: 120/64   Pulse: 76   Resp: 26   Temp:    SpO2: 98%     FiO2 : 60 %  O2 Flow Rate (L/min): 30 L/min  O2 Device: Heated high flow cannula    PHYSICAL EXAM:  Constitutional: Alert and interactive  Skin: Color is appropriate no ischemia  HEENT: Normocephalic neck is supple PERRLA EOMI no sinus tenderness  Neck: No thyroid enlargement no goiter  Cardiovascular: Rhythm regular no gallop  Respiratory: Clear overall no consolidation of the right lung there is diminished breath sounds and consolidation mid left and lower lung some dullness  Gastrointestinal: Soft bowel sounds present nontender  Genitourinary: Deferred  Extremities: No signs of ischemia peripheral pulses intact appropriate  Neurological: Appropriate cranial nerves gross sensorimotor intact gait was not observed she is taking some p.o.   Psychological: Appropriate    LABS:  WBC   Date Value Ref Range Status   12/07/2019 25.9 (H) 4.5 - 11.5 E9/L Final   12/06/2019 27.0 (H) 4.5 - 11.5 E9/L Final   12/05/2019 24.8 (H) 4.5 - 11.5 E9/L Final     Hemoglobin   Date Value Ref Range Status   12/07/2019 12.5 11.5 - 15.5 g/dL Final   12/06/2019 12.6 11.5 - 15.5 g/dL Final   12/05/2019 13.4 11.5 - 15.5 g/dL Final     Hematocrit   Date Value Ref Range Status   12/07/2019 38.0 34.0 - 48.0 % Final   12/06/2019 38.6 34.0 - 48.0 % Final   12/05/2019 42.0 34.0 - 48.0 % Final     MCV   Date Value Ref Range Status   12/07/2019 89.2 80.0 - 99.9 fL Final   12/06/2019 91.9 80.0 - 99.9 fL Final   12/05/2019 92.5 80.0 - 99.9 fL Final     Platelets   Date Value Ref Range Status   12/07/2019 213 130 - 450 E9/L Final   12/06/2019 220 130 - 450 E9/L Final   12/05/2019 232 130 - 450 E9/L Final     Sodium   Date Value Ref Range Status   12/07/2019 140 132 - 146 mmol/L Final   12/06/2019 131 (L) 132 - 146 mmol/L Final   12/05/2019 134 132 - 146 mmol/L Final     Potassium   Date Value Ref Range Status   12/07/2019 3.0 (L) 3.5 - 5.0 mmol/L Final   12/06/2019 3.8 3.5 - 5.0 mmol/L Final   05/07/2017 3.9 3.5 - 5.0 mmol/L Final     Potassium reflex Magnesium   Date Value Ref Range Status   12/05/2019 4.0 3.5 - 5.0 mmol/L Final     Chloride   Date Value Ref Range Status   12/07/2019 103 98 - 107 mmol/L Final   12/06/2019 96 (L) 98 - 107 mmol/L Final   12/05/2019 95 (L) 98 - 107 mmol/L Final     CO2   Date Value Ref Range Status   12/07/2019 21 (L) 22 - 29 mmol/L Final   12/06/2019 16 (L) 22 - 29 mmol/L Final   12/05/2019 20 (L) 22 - 29 mmol/L Final     BUN   Date Value Ref Range Status   12/07/2019 57 (H) 8 - 23 mg/dL Final   12/06/2019 63 (H) 8 - 23 mg/dL Final   12/05/2019 47 (H) 8 - 23 mg/dL Final     CREATININE   Date Value Ref Range Status   12/07/2019 1.7 (H) 0.5 - 1.0 mg/dL Final   12/06/2019 2.6 (H) 0.5 - 1.0 mg/dL Final   12/05/2019 2.6 (H) 0.5 - 1.0 mg/dL Final     Glucose   Date Value Ref Range Status   12/07/2019 179 (H) 74 - 99 mg/dL Final   12/06/2019 139 (H) 74 - 99 mg/dL Final   12/05/2019 69 (L) 74 - 99 mg/dL Final     Calcium   Date Value Ref Range Status   12/07/2019 8.7 8.6 - 10.2 mg/dL Final   12/06/2019 8.0 (L) 8.6 - 10.2 mg/dL Final   12/05/2019 8.4 (L) 8.6 - 10.2 mg/dL Final     Total Protein   Date Value Ref Range Status   12/05/2019 6.7 6.4 - 8.3 g/dL Final   09/14/2015 7.6 6.4 - 8.3 g/dL Final     Alb   Date Value Ref Range Status   12/05/2019 3.1 (L) 3.5 - 5.2 g/dL Final   09/14/2015 4.0 3.5 - 5.2 g/dL Final     Total Bilirubin   Date Value Ref Range Status   12/05/2019 0.7 0.0 - 1.2 mg/dL Final   09/14/2015 0.5 0.0 - 1.2 mg/dL Final     Alkaline Phosphatase   Date Value Ref Range Status   12/05/2019 90 35 - 104 U/L Final   09/14/2015 88 40 - 129 U/L Final     AST   Date Value Ref Range Status   12/05/2019 26 0 - 31 U/L Final   09/14/2015 17 0 - 39 U/L Final     ALT   Date Value Ref Range Status   12/05/2019 14 0 - 32 U/L Final   09/14/2015 9 0 - 40 U/L Final     GFR Non-   Date Value Ref Range Status   12/07/2019 29 >=60 mL/min/1.73 Final     Comment:     Chronic Kidney Disease: less than 60 ml/min/1.73 sq.m. Kidney Failure: less than 15 ml/min/1.73 sq.m. Results valid for patients 18 years and older. 12/06/2019 18 >=60 mL/min/1.73 Final     Comment:     Chronic Kidney Disease: less than 60 ml/min/1.73 sq.m. Kidney Failure: less than 15 ml/min/1.73 sq.m. Results valid for patients 18 years and older.      12/05/2019 18 >=60 mL/min/1.73 Final     Comment:     Chronic Kidney Disease: less than 60 ml/min/1.73 sq.m. Kidney Failure: less than 15 ml/min/1.73 sq.m. Results valid for patients 18 years and older. GFR    Date Value Ref Range Status   12/07/2019 35  Final   12/06/2019 21  Final   12/05/2019 21  Final     Magnesium   Date Value Ref Range Status   12/07/2019 2.3 1.6 - 2.6 mg/dL Final   12/06/2019 1.7 1.6 - 2.6 mg/dL Final     Phosphorus   Date Value Ref Range Status   12/07/2019 2.4 (L) 2.5 - 4.5 mg/dL Final   12/06/2019 4.0 2.5 - 4.5 mg/dL Final     Recent Labs     12/07/19  0524   PH 7.411   PO2 95.5   PCO2 32.5*   HCO3 20.2*   BE -3.5*   O2SAT 97.2       RADIOLOGY:  XR CHEST PORTABLE   Final Result   No change left lung pneumonia. XR CHEST PORTABLE   Final Result   Stable dense left lung consolidation. CT CHEST WO CONTRAST   Final Result   Large left lung infiltrate compatible pneumonia. Minimal   patchy infiltrate developing pneumonia right middle lobe. XR CHEST STANDARD (2 VW)   Final Result   Large left pleural effusion with left lung airspace disease. CT Head WO Contrast   Final Result   No acute intracranial hemorrhage or mass effect. CT CERVICAL SPINE WO CONTRAST   Final Result   1. No acute cervical spine fracture. 2. Mild multilevel cervical spondylosis. XR CHEST PORTABLE    (Results Pending)           PROBLEM LIST:  Principal Problem:    Sepsis due to pneumonia [facility])  Active Problems:    Acute respiratory failure with hypoxia (HCC)    JOVANI (acute kidney injury) ([facility] [address]    Elevated brain natriuretic peptide (BNP) level    Acute metabolic encephalopathy    Community acquired pneumonia of left lung    Hypoxia  Resolved Problems:    * No resolved hospital problems. *      IMPRESSION:  1. Sepsis with extensive left lung pneumonia pneumococcal  2. Respiratory failure acute hypoxic  3. Acute kidney injury  4.  Improved metabolic regional/monitored anesthesia care (MAC)

## 2023-12-20 DIAGNOSIS — M17.11 PRIMARY OSTEOARTHRITIS OF RIGHT KNEE: ICD-10-CM

## 2023-12-20 DIAGNOSIS — M48.062 NEUROGENIC CLAUDICATION DUE TO LUMBAR SPINAL STENOSIS: ICD-10-CM

## 2023-12-20 DIAGNOSIS — M17.12 PRIMARY OSTEOARTHRITIS OF LEFT KNEE: ICD-10-CM

## 2023-12-20 DIAGNOSIS — M16.12 PRIMARY OSTEOARTHRITIS OF LEFT HIP: ICD-10-CM

## 2023-12-20 DIAGNOSIS — M47.816 LUMBAR SPONDYLOSIS: ICD-10-CM

## 2023-12-20 RX ORDER — OXYCODONE AND ACETAMINOPHEN 7.5; 325 MG/1; MG/1
1 TABLET ORAL 4 TIMES DAILY
Qty: 112 TABLET | Refills: 0 | Status: SHIPPED | OUTPATIENT
Start: 2023-12-20 | End: 2024-04-24 | Stop reason: SDUPTHER

## 2023-12-26 RX ORDER — APIXABAN 2.5 MG/1
2.5 TABLET, FILM COATED ORAL 2 TIMES DAILY
Qty: 180 TABLET | Refills: 3 | Status: SHIPPED | OUTPATIENT
Start: 2023-12-26

## 2024-01-03 DIAGNOSIS — M16.12 PRIMARY OSTEOARTHRITIS OF LEFT HIP: ICD-10-CM

## 2024-01-03 DIAGNOSIS — M48.062 NEUROGENIC CLAUDICATION DUE TO LUMBAR SPINAL STENOSIS: ICD-10-CM

## 2024-01-03 DIAGNOSIS — M17.11 PRIMARY OSTEOARTHRITIS OF RIGHT KNEE: ICD-10-CM

## 2024-01-03 DIAGNOSIS — M47.816 LUMBAR SPONDYLOSIS: ICD-10-CM

## 2024-01-03 DIAGNOSIS — M17.12 PRIMARY OSTEOARTHRITIS OF LEFT KNEE: ICD-10-CM

## 2024-01-03 RX ORDER — GABAPENTIN 600 MG/1
600 TABLET ORAL 4 TIMES DAILY
Qty: 360 TABLET | Refills: 1 | Status: SHIPPED | OUTPATIENT
Start: 2024-01-03 | End: 2024-04-24 | Stop reason: SDUPTHER

## 2024-01-18 ENCOUNTER — TELEPHONE (OUTPATIENT)
Dept: PAIN MEDICINE | Facility: CLINIC | Age: 89
End: 2024-01-18
Payer: MEDICARE

## 2024-01-18 NOTE — TELEPHONE ENCOUNTER
Spoke with pharmacist, prescription available at [facility] to fill today. Phoned patient and updated.

## 2024-01-18 NOTE — TELEPHONE ENCOUNTER
Patient states her pharmacy has not received the script for her oxycodone and she is almost out of meds.

## 2024-01-21 DIAGNOSIS — I10 ESSENTIAL HYPERTENSION: ICD-10-CM

## 2024-01-21 RX ORDER — LOSARTAN POTASSIUM 50 MG/1
50 TABLET ORAL EVERY MORNING
Qty: 90 TABLET | Refills: 3 | OUTPATIENT
Start: 2024-01-21

## 2024-01-21 RX ORDER — APIXABAN 2.5 MG/1
2.5 TABLET, FILM COATED ORAL 2 TIMES DAILY
Qty: 180 TABLET | Refills: 3 | OUTPATIENT
Start: 2024-01-21

## 2024-02-05 ENCOUNTER — APPOINTMENT (OUTPATIENT)
Dept: PAIN MEDICINE | Facility: CLINIC | Age: 89
End: 2024-02-05
Payer: MEDICARE

## 2024-02-20 DIAGNOSIS — M16.12 PRIMARY OSTEOARTHRITIS OF LEFT HIP: ICD-10-CM

## 2024-02-20 DIAGNOSIS — M47.816 LUMBAR SPONDYLOSIS: ICD-10-CM

## 2024-02-20 DIAGNOSIS — M17.11 PRIMARY OSTEOARTHRITIS OF RIGHT KNEE: ICD-10-CM

## 2024-02-20 DIAGNOSIS — M17.12 PRIMARY OSTEOARTHRITIS OF LEFT KNEE: ICD-10-CM

## 2024-02-20 DIAGNOSIS — M48.062 NEUROGENIC CLAUDICATION DUE TO LUMBAR SPINAL STENOSIS: ICD-10-CM

## 2024-02-20 RX ORDER — OXYCODONE AND ACETAMINOPHEN 7.5; 325 MG/1; MG/1
1 TABLET ORAL 4 TIMES DAILY PRN
Qty: 112 TABLET | Refills: 0 | Status: SHIPPED | OUTPATIENT
Start: 2024-02-20 | End: 2024-04-24 | Stop reason: SDUPTHER

## 2024-02-27 ENCOUNTER — APPOINTMENT (OUTPATIENT)
Dept: PAIN MEDICINE | Facility: CLINIC | Age: 89
End: 2024-02-27
Payer: MEDICARE

## 2024-03-11 DIAGNOSIS — I10 ESSENTIAL HYPERTENSION: ICD-10-CM

## 2024-03-12 RX ORDER — LOSARTAN POTASSIUM 50 MG/1
50 TABLET ORAL EVERY MORNING
Qty: 90 TABLET | Refills: 1 | Status: SHIPPED | OUTPATIENT
Start: 2024-03-12

## 2024-03-18 ENCOUNTER — TELEPHONE (OUTPATIENT)
Dept: PAIN MEDICINE | Facility: CLINIC | Age: 89
End: 2024-03-18
Payer: MEDICARE

## 2024-03-18 NOTE — TELEPHONE ENCOUNTER
Please call spouse- Mason. He wants another script called into [facility] for her oxycodone. Patient has not been seen since last year in November. Thanks

## 2024-03-19 NOTE — TELEPHONE ENCOUNTER
Spoke with Neha Garcia CNP regarding patient request for refill. Last OV Nov 8, 2023. Patient needs to make appt to be seen in office. No further prescriptions will be given until visit. Attempted to call patient. Mobile phone disconnected. No answer at home phone. Will attempt to call again at a later time.

## 2024-03-20 ENCOUNTER — TELEPHONE (OUTPATIENT)
Dept: PAIN MEDICINE | Facility: CLINIC | Age: 89
End: 2024-03-20
Payer: MEDICARE

## 2024-03-20 NOTE — TELEPHONE ENCOUNTER
Patient  Mason called in requesting patient's medication refill. Patient needs to be seen in office before refills can be sent.  verbalized understanding. Phoned in from different phone number, contact information for both patients updated.

## 2024-04-02 ENCOUNTER — TELEPHONE (OUTPATIENT)
Dept: PAIN MEDICINE | Facility: CLINIC | Age: 89
End: 2024-04-02
Payer: MEDICARE

## 2024-04-12 ENCOUNTER — APPOINTMENT (OUTPATIENT)
Dept: PAIN MEDICINE | Facility: CLINIC | Age: 89
End: 2024-04-12
Payer: MEDICARE

## 2024-04-24 ENCOUNTER — OFFICE VISIT (OUTPATIENT)
Dept: PAIN MEDICINE | Facility: CLINIC | Age: 89
End: 2024-04-24
Payer: MEDICARE

## 2024-04-24 VITALS
BODY MASS INDEX: 34.37 KG/M2 | DIASTOLIC BLOOD PRESSURE: 104 MMHG | HEART RATE: 118 BPM | RESPIRATION RATE: 16 BRPM | OXYGEN SATURATION: 94 % | SYSTOLIC BLOOD PRESSURE: 168 MMHG | HEIGHT: 60 IN

## 2024-04-24 DIAGNOSIS — M48.062 NEUROGENIC CLAUDICATION DUE TO LUMBAR SPINAL STENOSIS: ICD-10-CM

## 2024-04-24 DIAGNOSIS — M17.12 PRIMARY OSTEOARTHRITIS OF LEFT KNEE: ICD-10-CM

## 2024-04-24 DIAGNOSIS — M17.11 PRIMARY OSTEOARTHRITIS OF RIGHT KNEE: ICD-10-CM

## 2024-04-24 DIAGNOSIS — M47.816 LUMBAR SPONDYLOSIS: ICD-10-CM

## 2024-04-24 DIAGNOSIS — M16.12 PRIMARY OSTEOARTHRITIS OF LEFT HIP: ICD-10-CM

## 2024-04-24 PROCEDURE — 99214 OFFICE O/P EST MOD 30 MIN: CPT | Performed by: NURSE PRACTITIONER

## 2024-04-24 PROCEDURE — 1036F TOBACCO NON-USER: CPT | Performed by: NURSE PRACTITIONER

## 2024-04-24 PROCEDURE — 3077F SYST BP >= 140 MM HG: CPT | Performed by: NURSE PRACTITIONER

## 2024-04-24 PROCEDURE — 1125F AMNT PAIN NOTED PAIN PRSNT: CPT | Performed by: NURSE PRACTITIONER

## 2024-04-24 PROCEDURE — 1159F MED LIST DOCD IN RCRD: CPT | Performed by: NURSE PRACTITIONER

## 2024-04-24 PROCEDURE — 1160F RVW MEDS BY RX/DR IN RCRD: CPT | Performed by: NURSE PRACTITIONER

## 2024-04-24 PROCEDURE — 3080F DIAST BP >= 90 MM HG: CPT | Performed by: NURSE PRACTITIONER

## 2024-04-24 RX ORDER — OXYCODONE AND ACETAMINOPHEN 7.5; 325 MG/1; MG/1
1 TABLET ORAL 4 TIMES DAILY PRN
Qty: 112 TABLET | Refills: 0 | Status: SHIPPED | OUTPATIENT
Start: 2024-04-24 | End: 2024-05-22

## 2024-04-24 RX ORDER — GABAPENTIN 600 MG/1
600 TABLET ORAL 4 TIMES DAILY
Qty: 360 TABLET | Refills: 1 | Status: SHIPPED | OUTPATIENT
Start: 2024-04-24 | End: 2024-10-21

## 2024-04-24 RX ORDER — OXYCODONE AND ACETAMINOPHEN 7.5; 325 MG/1; MG/1
1 TABLET ORAL 4 TIMES DAILY PRN
Qty: 112 TABLET | Refills: 0 | Status: SHIPPED | OUTPATIENT
Start: 2024-05-22 | End: 2024-06-19

## 2024-04-24 RX ORDER — OXYCODONE AND ACETAMINOPHEN 7.5; 325 MG/1; MG/1
1 TABLET ORAL 4 TIMES DAILY PRN
Qty: 112 TABLET | Refills: 0 | Status: SHIPPED | OUTPATIENT
Start: 2024-06-19 | End: 2024-07-17

## 2024-04-24 ASSESSMENT — ENCOUNTER SYMPTOMS
HEMATOLOGIC/LYMPHATIC NEGATIVE: 1
NUMBNESS: 0
EYES NEGATIVE: 1
WEAKNESS: 1
ALLERGIC/IMMUNOLOGIC NEGATIVE: 1
FACIAL ASYMMETRY: 0
ENDOCRINE NEGATIVE: 1
PSYCHIATRIC NEGATIVE: 1
CONSTITUTIONAL NEGATIVE: 1
SEIZURES: 0
MYALGIAS: 1
BACK PAIN: 1
SPEECH DIFFICULTY: 0
DIZZINESS: 0
LIGHT-HEADEDNESS: 0
NECK STIFFNESS: 0
RESPIRATORY NEGATIVE: 1
JOINT SWELLING: 1
NECK PAIN: 0
GASTROINTESTINAL NEGATIVE: 1
HEADACHES: 0
TREMORS: 0
ARTHRALGIAS: 1
PALPITATIONS: 0

## 2024-04-24 ASSESSMENT — PAIN SCALES - GENERAL
PAINLEVEL_OUTOF10: 8
PAINLEVEL: 8

## 2024-04-24 ASSESSMENT — LIFESTYLE VARIABLES: TOTAL SCORE: 0

## 2024-04-24 ASSESSMENT — PAIN DESCRIPTION - DESCRIPTORS: DESCRIPTORS: DULL

## 2024-04-24 ASSESSMENT — PAIN - FUNCTIONAL ASSESSMENT: PAIN_FUNCTIONAL_ASSESSMENT: 0-10

## 2024-04-24 NOTE — PROGRESS NOTES
Subjective   Patient ID: Roxanne Shrestha is a 89 y.o. female who presents for Back Pain.  HPI  Roxanne follows up for her interval reevaluation of her chronic low back pain from lumbar stenosis and spondylosis, chronic left knee pain from osteoarthritis and left-sided radiating leg pain.     Have not seen Roxanne since November 8, 2023.  Had a fall at home when getting up out of a bed and struck the dresser and landing on the floor x 2.  This fall knocked out several of her front teeth.  Blames this on a bad mattress about 4 months ago.  Did report to the [facility] where she was evaluated.  I do not have access to their medical records therefore I could not evaluate the situation.    Last fill of Percocet 7.5 milligrams 4 times daily as needed February 20, 2024.  Has not been taking 4 times a day and stretching out medication to once or twice a day as needed.  Requesting a increase in her medication.  Will continue to keep the medication as the same dose and frequency as she has not had the full prescription in 2 months.     Percocet 7.5 mg 4 times a day as needed, voltaren gel applied up to 4 times daily as needed, gabapentin 2400 mg daily from primary help to reduce pain and improve function to 40%. Average pain score is 8 out of 10 with medication. Denies negative side effects from medication.     Roxanne feels that she has had increase of left knee pain since last office visit and overall for the last 18 months.       Would like to have total knee joint replacement.  However, she is with history of hospitalization for heart failure and is not a candidate for joint replacement.     Offered physical therapy for left knee arthritis and pain.  She declines stating she is very active at home with her cycle and elliptical machine.     Has below average quality family and social current condition and treatment. Toxicology consistent May 15, 2023.  Toxicology today.  Annual controlled substance agreement and opioid  risk tool are completed and scanned into the chart April 24, 2024.       For continuity:   Given the patient's report of reduced pain and improved functional ability without adverse effects, it is reasonable to treat with narcotic medications. The terms of the opioid agreement as well as the potential risks and adverse effects of the patient's medication regimen were discussed in detail. This includes if applicable due to dosage of medication permission to discuss and coordinate care with other treatment providers relevant to the patients condition. The patient verbalized understanding.      Risks and side effects of chronic opioid therapy including but not limited to tolerance, dependence, constipation, hyperalgesia, cognitive side effects, addiction and possible death due to overuse and or misuse were discussed. I also discussed that such medications when co-administered with other sedative agents including but not limited to alcohol, benzodiazepines, sedative hypnotics and illegal drugs could pose life threatening consequences including death. I also explained the impact that the administration of such medication has on a patient with obstructive sleep apnea and continued recommendations for use of apnea devices if ordered are prescribed by other physicians. In order to effectively and safely treat the pain, I also emphasized the importance of compliance with the treatment plan, as well as compliance with the terms of the opioid agreement, which was reviewed in detail. I explained the importance of being responsible with the medications and to take these only as prescribed, never in excess and never for reasons other than pain reduction. The patient was counseled on keeping the medications safe and locked away from children and other adults as well as disposal methods and options. The patient understood the risks and instructions.      I also discussed with the patient in detail that based on the clinical  response to the opioid medications and improvements of activities of daily living, sleep, and work performance in light of compliance with the treatment plan we can continue this form of therapy for the above chronic pain. The goal and rationale used for current treatment with chronic opioid medication is to control the pain and alleviate disability induced by the chronic pain condition noted above after failures of other non-opioid and nonpharmacological modalities to treat the chronic pain and the symptoms associated with have failed. The patient understood the goals in terms of the above treatment plan and had no further questions prior to leaving the office today.      Of note, the above-mentioned diagnoses/conditions and expected fluctuating nature of pain, and pain characteristic changes may lead to prolonged functional impairment requiring frequent and multiple reassessments with continued high level medical decision making. As noted, medication and medication management may require opiate therapy in excess of a routine less than 30 day medication requirement. The patient may require daily opiate therapy necessitating month-long prescription medication as noted above in order to perform activities of daily living and achieve acceptable quality of life with respect to their chronic pain condition for the foreseeable future. We monitor our patient's carefully through drug monitoring, medication counts, urine drug testing specific to their medication as well as a myriad of other substances and with frequent follow-ups with interval reassement of the chronic pain condition, its pathophysiology and prognosis.      The level of clinical decision making at this office visit is high due to high risks and complications including mortality and morbidity related to acute and chronic pain with respects to life, bodily function, and treatment. Risks and clinical decisions with respect to under treatment, failure to  maintain adequate treatment, and/or overtreatment complications and outcomes were discussed with the patient with respect to their chronic pain conditions, interventional therapies, as well as the use of various medications including possible controlled/dangerous medications. The amount and complexity of reviewed data at this in subsequent office visits is high given patient's fluctuating clinical presentation, laboratory and radiographic reports, prescription monitoring program data, and medication history as well as other relevant data as noted above. Pertinent negatives and positives data was used in consideration for the above-mentioned high complexity.       Given the patient's total MED, general use of daily opiates, or other coadministered medications in various classes the patient was offered a prescription for Narcan. I instructed the patient that it is important that patient fill this medication in order to demonstrate understanding of the gravity of possible side effects including respiratory depression and risk of overdose of this opiate load or medication combination. As such patient will be required to bring Narcan prescription to follow-up appointments as part of compliance with continued opiate care.      With respect to opiate induced constipation I discussed multiple ways to combat this problem including staying hydrated and taking over-the-counter medications such as Dulcolax, Miralax and Senna. If these treatments are not effective we could consider such medications as Amitiza, Linzess and Movantik.      Disclaimer: This note was transcribed using an audio transcription device. As such, minor errors may be present with regard to spelling, punctuation, and inadvertent word insertion. Please disregard such errors.     Results/Data  Xray Foot Complete Min 3 View 30Apr2019 02:43PM Gerald Matthews   [May 6, 2019 1:39PM Gerald Matthews]  she has osteoporosis of rt foot, an old fracture of 2nd  toe. mild arthritis, achilles tendon is calcified, plz fax the result to Dr [facility]-   she should get teeth fixed soon so we may start on fosamax for osteoporosis and strengthen the bones.   [Apr 30, 2019 1:39PM Brtitany Matthews]  Reason: Unspecified for Xray Foot Complete Min 3 View      Test Name Result Flag Reference   Xray Foot Complete Min 3 View (Report)       Interpreted by: SIXTO PRIDE  05/06/19 13:29  MRN: [id number]  Patient Name: MEMO NG     STUDY:  FOOT COMPLETE, MIN 3 VIEWS;Right; 4/30/2019 2:43 pm     INDICATION:  foot arch pain after streatching, eval for bone fracture.     COMPARISON:  None.     ACCESSION NUMBER(S):  [id number]     ORDERING CLINICIAN:  BRITTANY ONEIL     FINDINGS:  The bones are osteoporotic. An old healed fracture is seen in the  distal portion of the proximal phalanx of the 2nd toe. Mild  degenerative changes are present, at the interphalangeal joint of the  1st toe, and at the 1st metatarsophalangeal joint. The  tarsometatarsal joints are normal. A small calcaneal spur is present.  Calcification is seen at the insertion of the Achilles tendon.     IMPRESSION:  Osteoporosis. No acute disease.  Old healed fracture in the distal portion of the proximal phalanx of  the 2nd toe.     Electronically signed by: SIXTO PRIDE  05/06/19 13:29      Xray Bone Density, Dexa 1 or More Sites 30Apr2019 02:36PM Brittany Matthews   [May 3, 2019 11:52AM Brittany Matthews]  she has osteoporosis on left hip, after she is done with dental work, we will start her fosamax to improve bone health, for now take vitamin d 1000 iu daily and calcium 600 bid   [May 2, 2019 11:35AM Katelynn Vieira]  AMA Intake Activity Log Entry by Katelynn Vieira ([email or web address]) on 5/2/2019 11:30 AM  Status Change: To Closed - Confirmed-Appt Past   [May 2, 2019 11:35AM Katelynn Vieira]  AMA Intake updated by Katelynn Vieira ([email or web address]) on 5/2/2019 11:30 AM  New Appointment Date: Apr 30 2019 2:36PM    [Apr 30, 2019 1:40PM Brittany Matthews]  Reason: Unspecified for Bone Density, Dexa 1 or More Sites      Test Name Result Flag Reference   Bone Density, Dexa 1 or More Sites (Report)       Interpreted by: LATISHA DIAZ  05/02/19 17:30  =================================================================              Bone Density Report              =================================================================     Height:      64.0 in  Weight:      170.0 lb  Fractures:  Treatments:     -----------------------------------------------------------------     Indication: osteoporosis  Referring Provider: BRITTANY ONEIL     Study: Bone densitometry was performed.     Exam Date: April 30, 2019     Accession number: [id number]     Findings: Standard measurements were obtained utilizing a Dual   Energy X-ray Absorptiometry bone densitometer. Data obtained   includes planar bone density measurements over the Left Femur   and Lumbar Spine. Comparison of measured data and standardized   mean data for a young adult population (when peak bone mass   occurs) results in a T score. This represents the number of   standard deviations above or below the mean of a young adult   population. Comparison of measured data to standards from an   age adjusted population similarly yields a Z score.            Bone Density:  -----------------------------------------------------------------  Region          BMD  T-score Z-score  Classification  -----------------------------------------------------------------  AP Spine(L1-L4)     1.026  -0.2   2.6    Normal  Femoral Neck (Left)   0.518  -3.0   -0.5    Osteoporosis  Total Hip (Left)     0.787  -1.3   1.0    Osteopenia  -----------------------------------------------------------------     World Health Organization criteria for BMD impression   classify patients as:   Normal (T-score at or above -1.0),   Osteopenia (T-score between -1.0 and -2.5), or   Osteoporosis (T-score at or below  -2.5).      10-year Fracture Risk:  -----------------------------------------------------------------  FRAX not reported because:   Some T-score for Spine Total or Hip Total or Femoral Neck at   or below -2.5   -----------------------------------------------------------------              Impression: The patient has osteoporosis as determined by WHO   criteria. Based on the results of the patient?s bone density   assessment, the risk of future fracture increases approximately   two fold for each 1.0 SD decrease in T-score.   However, low BMD is not the only risk factor for a future   fragility fracture. Other clinical risk factors for   osteoporotic fracture should be considered in ascertaining this   patient?s future fracture risk including the patient?s age,   previous osteoporotic (fragility) fracture, estrogen   deficiency/hypogonadal, risk of falling, use of medications   implicated in bone loss (glucocorticoids), family history of   osteoporotic fracture, diseases and conditions associated with   bone loss, low body weight, smoking, high bone turnover, etc.   Combining low BMD and other clinical risk factors result in a   more precise assessment of future fracture risk. Secondary   causes for osteoporosis, such as osteomalacia, other metabolic   bone disorders, and diseases and conditions that may contribute   to accelerated bone loss may have to be considered depending on   the clinical situation.   A repeat bone density assessment should be considered in two   years.      All images and detailed analysis are available on the    Radiology PACS.   Reported by: -precious on 04/30/2019 2:43:00 PM.     Electronically signed by: LATISHA DIAZ  05/02/19 17:30      CT Abdomen and Pelvis with IV Contrast 28Jun2017 12:00PM Gerald Matthews   [Jun 19, 2017 12:55PM Maddi Obrien]  AMA Intake Activity Log Entry by Maddi Obrien ([email or web address]) on 6/19/2017 12:51 PM  Status Change: To Confirmed - N/A   [Jun 19, 2017 12:55PM  Joce Obrien  AMA Intake updated by Maddi Obrien ([email or web address]) on 6/19/2017 12:51 PM  New Appointment Date: Jun 28 2017 11:00AM   [Manjit 15, 2017 11:59AM Brittany Matthews]  Reason: Unspecified for CT Abdomen and Pelvis with Contrast      Test Name Result Flag Reference   CT Abdomen and Pelvis with Contrast (Report)       Interpreted by: CHARLES JC  07/03/17 10:00  MRN: [id number]  Patient Name: MEMO NG     STUDY:  CT ABDOMEN AND PELVIS WITH CONTRAST; 6/28/2017 12:00 pm     INDICATION:  Abnormal findings on diagnostic imaging of other abdominal regions,  including retroperitoneum  Ventral hernia without obstruction or  gangrene  Abdominal distension (gaseous) .     COMPARISON:  None.     ACCESSION NUMBER(S):  [id number]     ORDERING CLINICIAN:  BRITTANY ONEIL     TECHNIQUE:  Contiguous axial images were obtained at 3mm slice thickness through  the abdomen and pelvis following intravenous contrast administration.  Coronal and sagittal reconstructions at 3 mm slice thickness were  performed. 120 ml of Optiray 350 were administered intravenously  without immediate complication.     FINDINGS:  LOWER CHEST:  Evaluation of the visualized lung bases demonstrates mild scarring  and/or atelectasis bilaterally. The heart is within normal limits for  size. A small hiatal hernia is present.     ABDOMEN:     LIVER:  The liver is within normal limits for appearance, without evidence of  focal masses.     BILE DUCTS:  No definite intra or extrahepatic biliary dilatation is identified.     GALLBLADDER:  The gallbladder is nondilated. No definite calcified gallstones are  seen.     PANCREAS:  The pancreas is within normal limits for appearance, without evidence  of focal masses.     SPLEEN:  The spleen is within normal limits for size. No focal splenic mass is  seen.     ADRENAL GLANDS:  No definite adrenal nodules or masses are seen bilaterally.     KIDNEYS AND URETERS:  A 4 mm nonobstructive calculus is seen  in the lower pole of the left  kidney. There is no hydronephrosis, hydroureter or renal/ ureteral  calculus identified bilaterally. Scattered tiny hypodensities are  seen in the kidneys bilaterally and are too small to accurately  characterize.     PELVIS:     BLADDER:  The bladder is decompressed.     REPRODUCTIVE ORGANS:  The uterus and ovaries are grossly unremarkable for CT appearance.     BOWEL:  Numerous diverticuli are seen throughout the sigmoid colon. The colon  and small bowel are within normal limits for course, caliber and  appearance, without evidence of wall thickening or obstruction. The  appendix is not visualized. No CT evidence of acute diverticulitis or  appendicitis is seen.     VESSELS:  Moderate atherosclerotic calcifications are seen throughout the  infrarenal abdominal aorta and iliac arteries. The abdominal aorta is  within normal limits for course, caliber and appearance, without  evidence of aneurysm.     PERITONEUM/RETROPERITONEUM/LYMPH NODES:  There is no free intraperitoneal air or free fluid identified. No  gross mesenteric or retroperitoneal lymphadenopathy is identified.     BONE AND SOFT TISSUE:  There is no evidence of acute fracture identified. No evidence of  abdominal wall mass or hernia is identified.     IMPRESSION:  1. Nonobstructive calculus in the left kidney.  2. Tiny hypodensities in the kidneys bilaterally, too small to  accurately characterize.  3. Colonic diverticulosis without evidence of acute diverticulitis.  4. No evidence of bowel obstruction, free intraperitoneal air or  abnormal intra-abdominal fluid collection.  Transcribed by: [email or web address], User  Electronically signed by: [email or web address], User  07/03/17 10:00      Xray Knee Complete 4 or more View 21Apr2017 10:40AM Neha Garcia      Test Name Result Flag Reference   Xray Knee Complete 4 or more View (Report)       Interpreted by: KARSTEN RAMSEY  04/24/17 12:39  MRN: [id number]  Patient Name: BERENICE  MEMO     STUDY:  KNEE; COMPLT, 4 OR MORE VIEWS; 4/21/2017 10:40 am     INDICATION:  Signs/Symptoms: s/p fall.     COMPARISON:  None.     ACCESSION NUMBER(S):  [id number]     ORDERING CLINICIAN:  NEHA BLACKWELL     FINDINGS:  Spurs are seen off of the distal femur and proximal tibia, including  the tibial spines. Spurs are also seen off of the patella. Mild  narrowing of the medial patellofemoral compartment is noted.     No acute fracture or dislocation is noted.     No periosteal reaction is seen.     There is a trace suprapatellar effusion.     IMPRESSION:  Degenerative changes of the knee.     Trace suprapatellar effusion.     Transcribed by: [email or web address], User  Electronically signed by: [email or web address], User  04/24/17 12:39      Xray Knee 3 View 21Apr2017 10:40AM Neha Garcia   [Apr 21, 2017 10:28AM FriNicholas lopeza]  Reason: Unspecified for Xray Knee 3 View      Test Name Result Flag Reference   Xray Knee 3 View         Please click on the link to view the study images.      Xray Ankle 3 View 21Apr2017 10:36AM Neha Garcia   [Apr 21, 2017 10:41AM FriNicholas lopeza]  Reason: Unspecified for Xray Ankle 3 View      Test Name Result Flag Reference   Xray Ankle 3 View (Report)       Interpreted by: KARSTEN RAMSEY  04/24/17 12:38  MRN: [id number]  Patient Name: MEMO NG     STUDY:  ANKLE, COMPLETE, MIN 3 VIEWS; 4/21/2017 10:36 am     INDICATION:  Signs/Symptoms: s/p fall.     COMPARISON:  None.     ACCESSION NUMBER(S):  [id number]     ORDERING CLINICIAN:  NEHA BLACKWELL     FINDINGS:  There is no evidence of acute fracture or dislocation.     Ankle mortise is preserved.     Soft tissue edema is noted.     No evidence for radiopaque foreign body.     Calcaneal spurs are noted.     IMPRESSION:  No acute fracture or dislocation.     Calcaneal spur.  Transcribed by: [email or web address], User  Electronically signed by: [email or web address], User  04/24/17 12:38      Xray BN Spine, Lumbosacral; 2 or 3 Views 09Jan2017 12:33PM Neha Garcia    [Jan 9, 2017 12:20PM Frisina, Neha]  Reason: Unspecified for Xray Lumbar Spine AP + Lateral   [Jan 9, 2017 12:20PM Frisina, Neha]  Reason: Unspecified for Xray Lumbar Spine AP + Lateral      Test Name Result Flag Reference   Xray Lumbar Spine AP + Lateral (Report)       Interpreted by: STACY DENG ROJAS  01/10/17 11:22  MRN: [id number]  Patient Name: MEMO NG     STUDY:  SPINE, LUMBOSACRAL; 2 OR 3 VIEWS; HIPS, BIALT, MIN 2 VIEWS EACH, AP  PELVIS; 1/9/2017 12:33 pm     INDICATION:  Signs/Symptoms: Left hip pain s/p fall.     COMPARISON:  None.     ACCESSION NUMBER(S):  [id number]; [id number]     ORDERING CLINICIAN:  NEHA BLACKWELL     FINDINGS:  There are five lumbar-appearing vertebra.     There is no compression fracture. There is no acute bony abnormality.     There is 7 mm anterior subluxation of L4 with respect L5. There is  disc space narrowing and mild anterior osteophytic spurring at  T12-L1, L1-2 and L4-5.     There is moderate facet joint hypertrophy of the lower lumbar spine.     The sacroiliac joints are intact.     Five views bilateral hips.     There is no fracture, dislocation or acute bony abnormality.     IMPRESSION:  No acute bony abnormality lumbar spine and bilateral hips.  Transcribed by: [email or web address], User  Electronically signed by: [email or web address], User  01/10/17 11:22      Xray Hips, Bilat, Min 2 Views Each, AP Pelvis 09Jan2017 12:33PM Frisina, Neha      Test Name Result Flag Reference   HIPS, BIALT, MIN 2 VIEWS EACH, AP PELVIS (Report)       Interpreted by: STACY ROJAS  01/10/17 11:22  MRN: [id number]  Patient Name: MEMO NG     STUDY:  SPINE, LUMBOSACRAL; 2 OR 3 VIEWS; HIPS, BIALT, MIN 2 VIEWS EACH, AP  PELVIS; 1/9/2017 12:33 pm     INDICATION:  Signs/Symptoms: Left hip pain s/p fall.     COMPARISON:  None.     ACCESSION NUMBER(S):  [id number]; [id number]     ORDERING CLINICIAN:  NEHA BLACKWELL     FINDINGS:  There are five lumbar-appearing vertebra.     There is no compression  fracture. There is no acute bony abnormality.     There is 7 mm anterior subluxation of L4 with respect L5. There is  disc space narrowing and mild anterior osteophytic spurring at  T12-L1, L1-2 and L4-5.     There is moderate facet joint hypertrophy of the lower lumbar spine.     The sacroiliac joints are intact.     Five views bilateral hips.     There is no fracture, dislocation or acute bony abnormality.     IMPRESSION:  No acute bony abnormality lumbar spine and bilateral hips.  Transcribed by: [email or web address], User  Electronically signed by: [email or web address], User  01/10/17 11:22      CT L Spine without Contrast 16Jun2016 11:36AM Ce Orlando   [Jun 9, 2016 2:55PM Capri Armas]  AMA Intake Activity Log Entry by Capri Armas ([email or web address]) on 6/9/2016 2:54 PM  Status Change: To Closed - Unable To Schedule-Patient Will Schedule With      Reason: Unspecified for CT L Spine without Contrast      Test Name Result Flag Reference   CT L Spine without Contrast (Report)       Interpreted by: DUNCAN MONTESINOS  06/16/16 12:36  [id number]     Ordering Physician: CE THORNTON, [phone]     CT L-SPINE WO C: 6/16/2016 11:36 AM     COMPARISON:None     CLINICAL INFORMATION: Signs/Symptoms: Low back pain      CT OF THE LUMBAR SPINE     Sequential transaxial images were obtained , for bony   evaluation.Therefore, if further evaluation for soft tissue   structures including discs etc. is needed correlation with an MRI or   CT myelography if MR contraindicated would be recommended. Sagittal   and coronal reconstructed images were generated.          The alignment: Normal.  The visualized lower thoracic spine:Appears unremarkable       The T12-L1 level: There is mild narrowing of the disc interspace.   There appears to be mild posterior central disc protrusion x 2.5 mm.   There is also suspected moderate 6 mm right foraminal disc   protrusion, but without nerve root impingement.     The L1-2 level:Mild degenerative change  with disc space narrowing and   anterior osteophytic lipping.       The L2-3 level: The disc space is maintained.       The L3-4 level: The disc space is maintained. There is mild   hypertrophy of the posterior elements.     The L4-5 level: Marked narrowing of the disc interspace with mild   vacuum phenomena and 2 mm anterolisthesis. There is suspected mild   posterior disc bulge x 2.5 mm extending foraminally.There is mild   hypertrophy of the posterior elements, with a mild canal stenosis.     The L5-S1 level: The disc space is maintained. There is mild   hypertrophy of the posterior elements, with vacuum phenomena at the   apophyseal joints.     The bony structures: appear intact.  Other findings: None significant       IMPRESSION: Overall mild degenerative change with greatest at the   L4-5 level with a mild canal stenosis..  Transcribed by: Maryuri [employer]isabel  Electronically signed by: Maryuri [employer]  06/16/16 12:36       Review of Systems   Constitutional: Negative.    HENT: Negative.     Eyes: Negative.    Respiratory: Negative.     Cardiovascular:  Positive for leg swelling. Negative for chest pain and palpitations.   Gastrointestinal: Negative.    Endocrine: Negative.    Genitourinary: Negative.    Musculoskeletal:  Positive for arthralgias, back pain, gait problem, joint swelling and myalgias. Negative for neck pain and neck stiffness.   Skin: Negative.    Allergic/Immunologic: Negative.    Neurological:  Positive for weakness. Negative for dizziness, tremors, seizures, syncope, facial asymmetry, speech difficulty, light-headedness, numbness and headaches.   Hematological: Negative.    Psychiatric/Behavioral: Negative.         Objective   Physical Exam  Vitals and nursing note reviewed.   Constitutional:       Appearance: Normal appearance.   HENT:      Head: Normocephalic and atraumatic.      Mouth/Throat:      Comments: Poor dentition.  Missing several teeth upper and lower.  Eyes:       Conjunctiva/sclera: Conjunctivae normal.   Musculoskeletal:      Right lower leg: Edema present.      Left lower leg: Edema present.      Comments: +1 to +2 pitting edema to ankles and pretibial areas.    Multiple torturous varicosities to lower extremities.    Active range of motion of left knee increases pain with extension and flexion.  No varus or valgus instability.    Active range of motion of shoulders is without impairment with external rotation adduction and internal rotation adduction.    Present in wheelchair.   with a stooped posture pushes her in chair.   Skin:     General: Skin is warm and dry.      Capillary Refill: Capillary refill takes 2 to 3 seconds.   Neurological:      Mental Status: She is alert and oriented to person, place, and time.      Cranial Nerves: No cranial nerve deficit.      Sensory: No sensory deficit.      Motor: Weakness present.      Coordination: Coordination normal.      Gait: Gait abnormal.   Psychiatric:         Behavior: Behavior normal.         Assessment/Plan   Problem List Items Addressed This Visit             ICD-10-CM    Knee osteoarthritis M17.9    Relevant Medications    gabapentin (Neurontin) 600 mg tablet    oxyCODONE-acetaminophen (Percocet) 7.5-325 mg tablet    oxyCODONE-acetaminophen (Percocet) 7.5-325 mg tablet (Start on 5/22/2024)    oxyCODONE-acetaminophen (Percocet) 7.5-325 mg tablet (Start on 6/19/2024)    Lumbar spondylosis M47.816    Relevant Medications    gabapentin (Neurontin) 600 mg tablet    oxyCODONE-acetaminophen (Percocet) 7.5-325 mg tablet    oxyCODONE-acetaminophen (Percocet) 7.5-325 mg tablet (Start on 5/22/2024)    oxyCODONE-acetaminophen (Percocet) 7.5-325 mg tablet (Start on 6/19/2024)    Neurogenic claudication due to lumbar spinal stenosis M48.062    Relevant Medications    gabapentin (Neurontin) 600 mg tablet    oxyCODONE-acetaminophen (Percocet) 7.5-325 mg tablet    oxyCODONE-acetaminophen (Percocet) 7.5-325 mg tablet (Start on  5/22/2024)    oxyCODONE-acetaminophen (Percocet) 7.5-325 mg tablet (Start on 6/19/2024)     Other Visit Diagnoses         Codes    Primary osteoarthritis of left hip     M16.12    Relevant Medications    gabapentin (Neurontin) 600 mg tablet    oxyCODONE-acetaminophen (Percocet) 7.5-325 mg tablet    oxyCODONE-acetaminophen (Percocet) 7.5-325 mg tablet (Start on 5/22/2024)    oxyCODONE-acetaminophen (Percocet) 7.5-325 mg tablet (Start on 6/19/2024)             Follow-up in 12 weeks.  Continue with medication of Percocet for 7.5 mg 4 times daily and gabapentin 600 mg 4 times daily.    LISBETH Hawkins-CNP 04/24/24 11:57 AM

## 2024-06-04 ENCOUNTER — APPOINTMENT (OUTPATIENT)
Dept: PAIN MEDICINE | Facility: CLINIC | Age: 89
End: 2024-06-04
Payer: MEDICARE

## 2024-07-17 ENCOUNTER — OFFICE VISIT (OUTPATIENT)
Dept: PAIN MEDICINE | Facility: CLINIC | Age: 89
End: 2024-07-17
Payer: MEDICARE

## 2024-07-17 VITALS
HEART RATE: 105 BPM | OXYGEN SATURATION: 99 % | RESPIRATION RATE: 16 BRPM | SYSTOLIC BLOOD PRESSURE: 140 MMHG | DIASTOLIC BLOOD PRESSURE: 81 MMHG

## 2024-07-17 DIAGNOSIS — M17.12 PRIMARY OSTEOARTHRITIS OF LEFT KNEE: ICD-10-CM

## 2024-07-17 DIAGNOSIS — M16.12 PRIMARY OSTEOARTHRITIS OF LEFT HIP: ICD-10-CM

## 2024-07-17 DIAGNOSIS — M48.062 NEUROGENIC CLAUDICATION DUE TO LUMBAR SPINAL STENOSIS: ICD-10-CM

## 2024-07-17 DIAGNOSIS — M47.816 LUMBAR SPONDYLOSIS: ICD-10-CM

## 2024-07-17 DIAGNOSIS — M17.11 PRIMARY OSTEOARTHRITIS OF RIGHT KNEE: Primary | ICD-10-CM

## 2024-07-17 PROCEDURE — 3079F DIAST BP 80-89 MM HG: CPT | Performed by: NURSE PRACTITIONER

## 2024-07-17 PROCEDURE — 1160F RVW MEDS BY RX/DR IN RCRD: CPT | Performed by: NURSE PRACTITIONER

## 2024-07-17 PROCEDURE — 1125F AMNT PAIN NOTED PAIN PRSNT: CPT | Performed by: NURSE PRACTITIONER

## 2024-07-17 PROCEDURE — 1159F MED LIST DOCD IN RCRD: CPT | Performed by: NURSE PRACTITIONER

## 2024-07-17 PROCEDURE — 3077F SYST BP >= 140 MM HG: CPT | Performed by: NURSE PRACTITIONER

## 2024-07-17 PROCEDURE — 1036F TOBACCO NON-USER: CPT | Performed by: NURSE PRACTITIONER

## 2024-07-17 PROCEDURE — 99213 OFFICE O/P EST LOW 20 MIN: CPT | Performed by: NURSE PRACTITIONER

## 2024-07-17 RX ORDER — OXYCODONE AND ACETAMINOPHEN 7.5; 325 MG/1; MG/1
1 TABLET ORAL 4 TIMES DAILY PRN
Qty: 112 TABLET | Refills: 0 | Status: SHIPPED | OUTPATIENT
Start: 2024-08-14 | End: 2024-09-11

## 2024-07-17 RX ORDER — GABAPENTIN 600 MG/1
600 TABLET ORAL 4 TIMES DAILY
Qty: 360 TABLET | Refills: 1 | Status: SHIPPED | OUTPATIENT
Start: 2024-07-17 | End: 2025-01-13

## 2024-07-17 RX ORDER — OXYCODONE AND ACETAMINOPHEN 7.5; 325 MG/1; MG/1
1 TABLET ORAL 4 TIMES DAILY PRN
Qty: 112 TABLET | Refills: 0 | Status: SHIPPED | OUTPATIENT
Start: 2024-07-17 | End: 2024-08-14

## 2024-07-17 RX ORDER — OXYCODONE AND ACETAMINOPHEN 7.5; 325 MG/1; MG/1
1 TABLET ORAL 4 TIMES DAILY PRN
Qty: 112 TABLET | Refills: 0 | Status: SHIPPED | OUTPATIENT
Start: 2024-09-11 | End: 2024-10-09

## 2024-07-17 ASSESSMENT — ENCOUNTER SYMPTOMS
CONSTITUTIONAL NEGATIVE: 1
BACK PAIN: 1
LIGHT-HEADEDNESS: 0
EYES NEGATIVE: 1
TREMORS: 0
GASTROINTESTINAL NEGATIVE: 1
NECK PAIN: 0
OCCASIONAL FEELINGS OF UNSTEADINESS: 1
SEIZURES: 0
MYALGIAS: 1
HEADACHES: 0
SPEECH DIFFICULTY: 0
PALPITATIONS: 0
RESPIRATORY NEGATIVE: 1
LOSS OF SENSATION IN FEET: 1
DIZZINESS: 0
NUMBNESS: 0
NECK STIFFNESS: 0
WEAKNESS: 1
JOINT SWELLING: 1
PSYCHIATRIC NEGATIVE: 1
ENDOCRINE NEGATIVE: 1
FACIAL ASYMMETRY: 0
ARTHRALGIAS: 1
HEMATOLOGIC/LYMPHATIC NEGATIVE: 1
ALLERGIC/IMMUNOLOGIC NEGATIVE: 1
DEPRESSION: 0

## 2024-07-17 ASSESSMENT — PAIN SCALES - GENERAL
PAINLEVEL_OUTOF10: 8
PAINLEVEL: 8

## 2024-07-17 ASSESSMENT — PAIN - FUNCTIONAL ASSESSMENT: PAIN_FUNCTIONAL_ASSESSMENT: 0-10

## 2024-07-17 NOTE — PROGRESS NOTES
Subjective   Patient ID: Roxanne Shrestha is a 89 y.o. female who presents for Back Pain and Knee Pain.  Back Pain  Associated symptoms include weakness. Pertinent negatives include no chest pain, headaches or numbness.   Knee Pain   Pertinent negatives include no numbness.     Roxanne follows up for her interval reevaluation of her chronic low back pain from lumbar stenosis and spondylosis, chronic left knee pain from osteoarthritis and left-sided radiating leg pain.     Percocet 7.5 mg 4 times a day as needed, voltaren gel applied up to 4 times daily as needed, gabapentin 2400 mg daily from primary help to reduce pain and improve function to 40%. Average pain score is 8 out of 10 with medication. Denies negative side effects from medication.      Would like to have total knee joint replacement.  However, she is with history of hospitalization for heart failure and is not a candidate for joint replacement.     Offered physical therapy for left knee arthritis and pain.  She declines stating she is very active at home with her cycle and elliptical machine.     Has below average quality family and social current condition and treatment. Toxicology consistent April 24, 2024.  Annual controlled substance agreement and opioid risk tool are completed and scanned into the chart April 24, 2024.       For continuity:   Given the patient's report of reduced pain and improved functional ability without adverse effects, it is reasonable to treat with narcotic medications. The terms of the opioid agreement as well as the potential risks and adverse effects of the patient's medication regimen were discussed in detail. This includes if applicable due to dosage of medication permission to discuss and coordinate care with other treatment providers relevant to the patients condition. The patient verbalized understanding.      Risks and side effects of chronic opioid therapy including but not limited to tolerance, dependence,  constipation, hyperalgesia, cognitive side effects, addiction and possible death due to overuse and or misuse were discussed. I also discussed that such medications when co-administered with other sedative agents including but not limited to alcohol, benzodiazepines, sedative hypnotics and illegal drugs could pose life threatening consequences including death. I also explained the impact that the administration of such medication has on a patient with obstructive sleep apnea and continued recommendations for use of apnea devices if ordered are prescribed by other physicians. In order to effectively and safely treat the pain, I also emphasized the importance of compliance with the treatment plan, as well as compliance with the terms of the opioid agreement, which was reviewed in detail. I explained the importance of being responsible with the medications and to take these only as prescribed, never in excess and never for reasons other than pain reduction. The patient was counseled on keeping the medications safe and locked away from children and other adults as well as disposal methods and options. The patient understood the risks and instructions.      I also discussed with the patient in detail that based on the clinical response to the opioid medications and improvements of activities of daily living, sleep, and work performance in light of compliance with the treatment plan we can continue this form of therapy for the above chronic pain. The goal and rationale used for current treatment with chronic opioid medication is to control the pain and alleviate disability induced by the chronic pain condition noted above after failures of other non-opioid and nonpharmacological modalities to treat the chronic pain and the symptoms associated with have failed. The patient understood the goals in terms of the above treatment plan and had no further questions prior to leaving the office today.      Of note, the  above-mentioned diagnoses/conditions and expected fluctuating nature of pain, and pain characteristic changes may lead to prolonged functional impairment requiring frequent and multiple reassessments with continued high level medical decision making. As noted, medication and medication management may require opiate therapy in excess of a routine less than 30 day medication requirement. The patient may require daily opiate therapy necessitating month-long prescription medication as noted above in order to perform activities of daily living and achieve acceptable quality of life with respect to their chronic pain condition for the foreseeable future. We monitor our patient's carefully through drug monitoring, medication counts, urine drug testing specific to their medication as well as a myriad of other substances and with frequent follow-ups with interval reassement of the chronic pain condition, its pathophysiology and prognosis.      The level of clinical decision making at this office visit is high due to high risks and complications including mortality and morbidity related to acute and chronic pain with respects to life, bodily function, and treatment. Risks and clinical decisions with respect to under treatment, failure to maintain adequate treatment, and/or overtreatment complications and outcomes were discussed with the patient with respect to their chronic pain conditions, interventional therapies, as well as the use of various medications including possible controlled/dangerous medications. The amount and complexity of reviewed data at this in subsequent office visits is high given patient's fluctuating clinical presentation, laboratory and radiographic reports, prescription monitoring program data, and medication history as well as other relevant data as noted above. Pertinent negatives and positives data was used in consideration for the above-mentioned high complexity.       Given the patient's total  MED, general use of daily opiates, or other coadministered medications in various classes the patient was offered a prescription for Narcan. I instructed the patient that it is important that patient fill this medication in order to demonstrate understanding of the gravity of possible side effects including respiratory depression and risk of overdose of this opiate load or medication combination. As such patient will be required to bring Narcan prescription to follow-up appointments as part of compliance with continued opiate care.      With respect to opiate induced constipation I discussed multiple ways to combat this problem including staying hydrated and taking over-the-counter medications such as Dulcolax, Miralax and Senna. If these treatments are not effective we could consider such medications as Amitiza, Linzess and Movantik.      Disclaimer: This note was transcribed using an audio transcription device. As such, minor errors may be present with regard to spelling, punctuation, and inadvertent word insertion. Please disregard such errors.     Results/Data  Xray Foot Complete Min 3 View 30Apr2019 02:43PM Gerald Matthews   [May 6, 2019 1:39PM Gerald Matthews]  she has osteoporosis of rt foot, an old fracture of 2nd toe. mild arthritis, achilles tendon is calcified, plz fax the result to Dr Buck office- [facility]-   she should get teeth fixed soon so we may start on fosamax for osteoporosis and strengthen the bones.   [Apr 30, 2019 1:39PM Gerald Matthews]  Reason: Unspecified for Xray Foot Complete Min 3 View      Test Name Result Flag Reference   Xray Foot Complete Min 3 View (Report)       Interpreted by: SIXTO PRIDE  05/06/19 13:29  MRN: [id number]  Patient Name: MEMO NG     STUDY:  FOOT COMPLETE, MIN 3 VIEWS;Right; 4/30/2019 2:43 pm     INDICATION:  foot arch pain after streatching, eval for bone fracture.     COMPARISON:  None.     ACCESSION NUMBER(S):  [id number]     ORDERING  CLINICIAN:  BRITTANY ONEIL     FINDINGS:  The bones are osteoporotic. An old healed fracture is seen in the  distal portion of the proximal phalanx of the 2nd toe. Mild  degenerative changes are present, at the interphalangeal joint of the  1st toe, and at the 1st metatarsophalangeal joint. The  tarsometatarsal joints are normal. A small calcaneal spur is present.  Calcification is seen at the insertion of the Achilles tendon.     IMPRESSION:  Osteoporosis. No acute disease.  Old healed fracture in the distal portion of the proximal phalanx of  the 2nd toe.     Electronically signed by: SIXTO PRIDE  05/06/19 13:29      Xray Bone Density, Dexa 1 or More Sites 30Apr2019 02:36PM Brittany Matthews   [May 3, 2019 11:52AM Brittany Matthews]  she has osteoporosis on left hip, after she is done with dental work, we will start her fosamax to improve bone health, for now take vitamin d 1000 iu daily and calcium 600 bid   [May 2, 2019 11:35AM Katelynn Vieira]  AMA Intake Activity Log Entry by Katelynn Vieira ([email or web address]) on 5/2/2019 11:30 AM  Status Change: To Closed - Confirmed-Appt Past   [May 2, 2019 11:35AM Katelynn Vieira]  AMA Intake updated by Katelynn Vieira ([email or web address]) on 5/2/2019 11:30 AM  New Appointment Date: Apr 30 2019 2:36PM   [Apr 30, 2019 1:40PM Brittany Matthews]  Reason: Unspecified for Bone Density, Dexa 1 or More Sites      Test Name Result Flag Reference   Bone Density, Dexa 1 or More Sites (Report)       Interpreted by: LATISHA DIAZ  05/02/19 17:30  =================================================================              Bone Density Report              =================================================================     Height:      64.0 in  Weight:      170.0 lb  Fractures:  Treatments:     -----------------------------------------------------------------     Indication: osteoporosis  Referring Provider: BRITTANY ONEIL     Study: Bone densitometry was performed.     Exam Date:  April 30, 2019     Accession number: [id number]     Findings: Standard measurements were obtained utilizing a Dual   Energy X-ray Absorptiometry bone densitometer. Data obtained   includes planar bone density measurements over the Left Femur   and Lumbar Spine. Comparison of measured data and standardized   mean data for a young adult population (when peak bone mass   occurs) results in a T score. This represents the number of   standard deviations above or below the mean of a young adult   population. Comparison of measured data to standards from an   age adjusted population similarly yields a Z score.            Bone Density:  -----------------------------------------------------------------  Region          BMD  T-score Z-score  Classification  -----------------------------------------------------------------  AP Spine(L1-L4)     1.026  -0.2   2.6    Normal  Femoral Neck (Left)   0.518  -3.0   -0.5    Osteoporosis  Total Hip (Left)     0.787  -1.3   1.0    Osteopenia  -----------------------------------------------------------------     World Health Organization criteria for BMD impression   classify patients as:   Normal (T-score at or above -1.0),   Osteopenia (T-score between -1.0 and -2.5), or   Osteoporosis (T-score at or below -2.5).      10-year Fracture Risk:  -----------------------------------------------------------------  FRAX not reported because:   Some T-score for Spine Total or Hip Total or Femoral Neck at   or below -2.5   -----------------------------------------------------------------              Impression: The patient has osteoporosis as determined by WHO   criteria. Based on the results of the patient?s bone density   assessment, the risk of future fracture increases approximately   two fold for each 1.0 SD decrease in T-score.   However, low BMD is not the only risk factor for a future   fragility fracture. Other clinical risk factors for   osteoporotic fracture should be considered in  ascertaining this   patient?s future fracture risk including the patient?s age,   previous osteoporotic (fragility) fracture, estrogen   deficiency/hypogonadal, risk of falling, use of medications   implicated in bone loss (glucocorticoids), family history of   osteoporotic fracture, diseases and conditions associated with   bone loss, low body weight, smoking, high bone turnover, etc.   Combining low BMD and other clinical risk factors result in a   more precise assessment of future fracture risk. Secondary   causes for osteoporosis, such as osteomalacia, other metabolic   bone disorders, and diseases and conditions that may contribute   to accelerated bone loss may have to be considered depending on   the clinical situation.   A repeat bone density assessment should be considered in two   years.      All images and detailed analysis are available on the    Radiology PACS.   Reported by: -precious on 04/30/2019 2:43:00 PM.     Electronically signed by: LATISHA DIAZ  05/02/19 17:30      CT Abdomen and Pelvis with IV Contrast 28Jun2017 12:00PM Gerald Matthews   [Jun 19, 2017 12:55PM Maddi Obrien]  AMA Intake Activity Log Entry by Maddi Obrien ([email or web address]) on 6/19/2017 12:51 PM  Status Change: To Confirmed - N/A   [Jun 19, 2017 12:55PM Maddi Obrien]  AMA Intake updated by Maddi Obrien ([email or web address]) on 6/19/2017 12:51 PM  New Appointment Date: Jun 28 2017 11:00AM   [Manjit 15, 2017 11:59AM Gerald Matthews]  Reason: Unspecified for CT Abdomen and Pelvis with Contrast      Test Name Result Flag Reference   CT Abdomen and Pelvis with Contrast (Report)       Interpreted by: CHARLES JC  07/03/17 10:00  MRN: [id number]  Patient Name: MEMO NG     STUDY:  CT ABDOMEN AND PELVIS WITH CONTRAST; 6/28/2017 12:00 pm     INDICATION:  Abnormal findings on diagnostic imaging of other abdominal regions,  including retroperitoneum  Ventral hernia without obstruction or  gangrene  Abdominal distension (gaseous) .      COMPARISON:  None.     ACCESSION NUMBER(S):  [id number]     ORDERING CLINICIAN:  BRITTANY ONEIL     TECHNIQUE:  Contiguous axial images were obtained at 3mm slice thickness through  the abdomen and pelvis following intravenous contrast administration.  Coronal and sagittal reconstructions at 3 mm slice thickness were  performed. 120 ml of Optiray 350 were administered intravenously  without immediate complication.     FINDINGS:  LOWER CHEST:  Evaluation of the visualized lung bases demonstrates mild scarring  and/or atelectasis bilaterally. The heart is within normal limits for  size. A small hiatal hernia is present.     ABDOMEN:     LIVER:  The liver is within normal limits for appearance, without evidence of  focal masses.     BILE DUCTS:  No definite intra or extrahepatic biliary dilatation is identified.     GALLBLADDER:  The gallbladder is nondilated. No definite calcified gallstones are  seen.     PANCREAS:  The pancreas is within normal limits for appearance, without evidence  of focal masses.     SPLEEN:  The spleen is within normal limits for size. No focal splenic mass is  seen.     ADRENAL GLANDS:  No definite adrenal nodules or masses are seen bilaterally.     KIDNEYS AND URETERS:  A 4 mm nonobstructive calculus is seen in the lower pole of the left  kidney. There is no hydronephrosis, hydroureter or renal/ ureteral  calculus identified bilaterally. Scattered tiny hypodensities are  seen in the kidneys bilaterally and are too small to accurately  characterize.     PELVIS:     BLADDER:  The bladder is decompressed.     REPRODUCTIVE ORGANS:  The uterus and ovaries are grossly unremarkable for CT appearance.     BOWEL:  Numerous diverticuli are seen throughout the sigmoid colon. The colon  and small bowel are within normal limits for course, caliber and  appearance, without evidence of wall thickening or obstruction. The  appendix is not visualized. No CT evidence of acute diverticulitis  or  appendicitis is seen.     VESSELS:  Moderate atherosclerotic calcifications are seen throughout the  infrarenal abdominal aorta and iliac arteries. The abdominal aorta is  within normal limits for course, caliber and appearance, without  evidence of aneurysm.     PERITONEUM/RETROPERITONEUM/LYMPH NODES:  There is no free intraperitoneal air or free fluid identified. No  gross mesenteric or retroperitoneal lymphadenopathy is identified.     BONE AND SOFT TISSUE:  There is no evidence of acute fracture identified. No evidence of  abdominal wall mass or hernia is identified.     IMPRESSION:  1. Nonobstructive calculus in the left kidney.  2. Tiny hypodensities in the kidneys bilaterally, too small to  accurately characterize.  3. Colonic diverticulosis without evidence of acute diverticulitis.  4. No evidence of bowel obstruction, free intraperitoneal air or  abnormal intra-abdominal fluid collection.  Transcribed by: [email or web address], User  Electronically signed by: [email or web address], User  07/03/17 10:00      Xray Knee Complete 4 or more View 21Apr2017 10:40AM Esme Garcia      Test Name Result Flag Reference   Xray Knee Complete 4 or more View (Report)       Interpreted by: KARSTEN RAMSEY  04/24/17 12:39  MRN: [id number]  Patient Name: MEMO NG     STUDY:  KNEE; COMPLT, 4 OR MORE VIEWS; 4/21/2017 10:40 am     INDICATION:  Signs/Symptoms: s/p fall.     COMPARISON:  None.     ACCESSION NUMBER(S):  [id number]     ORDERING CLINICIAN:  ESME BLACKWELL     FINDINGS:  Spurs are seen off of the distal femur and proximal tibia, including  the tibial spines. Spurs are also seen off of the patella. Mild  narrowing of the medial patellofemoral compartment is noted.     No acute fracture or dislocation is noted.     No periosteal reaction is seen.     There is a trace suprapatellar effusion.     IMPRESSION:  Degenerative changes of the knee.     Trace suprapatellar effusion.     Transcribed by: [email or web address], User  Electronically  signed by: [email or web address], User  04/24/17 12:39      Xray Knee 3 View 21Apr2017 10:40AM Frisina, Neha   [Apr 21, 2017 10:28AM Frisina, Neha]  Reason: Unspecified for Xray Knee 3 View      Test Name Result Flag Reference   Xray Knee 3 View         Please click on the link to view the study images.      Xray Ankle 3 View 21Apr2017 10:36AM Frisina, Neha   [Apr 21, 2017 10:41AM Frisina, Neha]  Reason: Unspecified for Xray Ankle 3 View      Test Name Result Flag Reference   Xray Ankle 3 View (Report)       Interpreted by: KARSTEN RAMSEY  04/24/17 12:38  MRN: [id number]  Patient Name: MEMO NG     STUDY:  ANKLE, COMPLETE, MIN 3 VIEWS; 4/21/2017 10:36 am     INDICATION:  Signs/Symptoms: s/p fall.     COMPARISON:  None.     ACCESSION NUMBER(S):  [id number]     ORDERING CLINICIAN:  NEHA BLACKWELL     FINDINGS:  There is no evidence of acute fracture or dislocation.     Ankle mortise is preserved.     Soft tissue edema is noted.     No evidence for radiopaque foreign body.     Calcaneal spurs are noted.     IMPRESSION:  No acute fracture or dislocation.     Calcaneal spur.  Transcribed by: [email or web address], User  Electronically signed by: [email or web address], User  04/24/17 12:38      Xray BN Spine, Lumbosacral; 2 or 3 Views 09Jan2017 12:33PM Frisina, Neha   [Jan 9, 2017 12:20PM Frisina, Neha]  Reason: Unspecified for Xray Lumbar Spine AP + Lateral   [Jan 9, 2017 12:20PM Frisina, Neha]  Reason: Unspecified for Xray Lumbar Spine AP + Lateral      Test Name Result Flag Reference   Xray Lumbar Spine AP + Lateral (Report)       Interpreted by: STACY ROJAS  01/10/17 11:22  MRN: [id number]  Patient Name: MEMO NG     STUDY:  SPINE, LUMBOSACRAL; 2 OR 3 VIEWS; HIPS, BIALT, MIN 2 VIEWS EACH, AP  PELVIS; 1/9/2017 12:33 pm     INDICATION:  Signs/Symptoms: Left hip pain s/p fall.     COMPARISON:  None.     ACCESSION NUMBER(S):  [id number]; [id number]     ORDERING CLINICIAN:  NEHA BLACKWELL     FINDINGS:  There are five lumbar-appearing  vertebra.     There is no compression fracture. There is no acute bony abnormality.     There is 7 mm anterior subluxation of L4 with respect L5. There is  disc space narrowing and mild anterior osteophytic spurring at  T12-L1, L1-2 and L4-5.     There is moderate facet joint hypertrophy of the lower lumbar spine.     The sacroiliac joints are intact.     Five views bilateral hips.     There is no fracture, dislocation or acute bony abnormality.     IMPRESSION:  No acute bony abnormality lumbar spine and bilateral hips.  Transcribed by: [email or web address], User  Electronically signed by: Pauline, User  01/10/17 11:22      Xray Hips, Bilat, Min 2 Views Each, AP Pelvis 09Jan2017 12:33PM Neha Garcia      Test Name Result Flag Reference   HIPS, BIALT, MIN 2 VIEWS EACH, AP PELVIS (Report)       Interpreted by: STACY ROJAS  01/10/17 11:22  MRN: [id number]  Patient Name: MEMO NG     STUDY:  SPINE, LUMBOSACRAL; 2 OR 3 VIEWS; HIPS, BIALT, MIN 2 VIEWS EACH, AP  PELVIS; 1/9/2017 12:33 pm     INDICATION:  Signs/Symptoms: Left hip pain s/p fall.     COMPARISON:  None.     ACCESSION NUMBER(S):  [id number]; [id number]     ORDERING CLINICIAN:  NEHA BLACKWELL     FINDINGS:  There are five lumbar-appearing vertebra.     There is no compression fracture. There is no acute bony abnormality.     There is 7 mm anterior subluxation of L4 with respect L5. There is  disc space narrowing and mild anterior osteophytic spurring at  T12-L1, L1-2 and L4-5.     There is moderate facet joint hypertrophy of the lower lumbar spine.     The sacroiliac joints are intact.     Five views bilateral hips.     There is no fracture, dislocation or acute bony abnormality.     IMPRESSION:  No acute bony abnormality lumbar spine and bilateral hips.  Transcribed by: [email or web address], User  Electronically signed by: [email or web address], User  01/10/17 11:22      CT L Spine without Contrast 16Jun2016 11:36AM Ce Orlando   [Jun 9, 2016 2:55PM [facility]  [place]]  AMA Intake Activity Log Entry by Capri Armas ([email or web address]) on 6/9/2016 2:54 PM  Status Change: To Closed - Unable To Schedule-Patient Will Schedule With      Reason: Unspecified for CT L Spine without Contrast      Test Name Result Flag Reference   CT L Spine without Contrast (Report)       Interpreted by: DUNCAN MONTESINOS  06/16/16 12:36  [id number]     Ordering Physician: MARISA THORNTON, [phone]     CT L-SPINE WO C: 6/16/2016 11:36 AM     COMPARISON:None     CLINICAL INFORMATION: Signs/Symptoms: Low back pain      CT OF THE LUMBAR SPINE     Sequential transaxial images were obtained , for bony   evaluation.Therefore, if further evaluation for soft tissue   structures including discs etc. is needed correlation with an MRI or   CT myelography if MR contraindicated would be recommended. Sagittal   and coronal reconstructed images were generated.          The alignment: Normal.  The visualized lower thoracic spine:Appears unremarkable       The T12-L1 level: There is mild narrowing of the disc interspace.   There appears to be mild posterior central disc protrusion x 2.5 mm.   There is also suspected moderate 6 mm right foraminal disc   protrusion, but without nerve root impingement.     The L1-2 level:Mild degenerative change with disc space narrowing and   anterior osteophytic lipping.       The L2-3 level: The disc space is maintained.       The L3-4 level: The disc space is maintained. There is mild   hypertrophy of the posterior elements.     The L4-5 level: Marked narrowing of the disc interspace with mild   vacuum phenomena and 2 mm anterolisthesis. There is suspected mild   posterior disc bulge x 2.5 mm extending foraminally.There is mild   hypertrophy of the posterior elements, with a mild canal stenosis.     The L5-S1 level: The disc space is maintained. There is mild   hypertrophy of the posterior elements, with vacuum phenomena at the   apophyseal joints.     The bony structures: appear  intact.  Other findings: None significant       IMPRESSION: Overall mild degenerative change with greatest at the   L4-5 level with a mild canal stenosis..  Transcribed by: Arianna Wahl  Electronically signed by: Arianna Wahl  06/16/16 12:36       Review of Systems   Constitutional: Negative.    HENT: Negative.     Eyes: Negative.    Respiratory: Negative.     Cardiovascular:  Positive for leg swelling. Negative for chest pain and palpitations.   Gastrointestinal: Negative.    Endocrine: Negative.    Genitourinary: Negative.    Musculoskeletal:  Positive for arthralgias, back pain, gait problem, joint swelling and myalgias. Negative for neck pain and neck stiffness.   Skin: Negative.    Allergic/Immunologic: Negative.    Neurological:  Positive for weakness. Negative for dizziness, tremors, seizures, syncope, facial asymmetry, speech difficulty, light-headedness, numbness and headaches.   Hematological: Negative.    Psychiatric/Behavioral: Negative.         Objective   Physical Exam  Vitals and nursing note reviewed.   Constitutional:       Appearance: Normal appearance.   HENT:      Head: Normocephalic and atraumatic.      Mouth/Throat:      Comments: Poor dentition.  Missing several teeth upper and lower.  Eyes:      Conjunctiva/sclera: Conjunctivae normal.   Musculoskeletal:      Right lower leg: Edema present.      Left lower leg: Edema present.      Comments: +1 to +2 pitting edema to ankles and pretibial areas.    Multiple torturous varicosities to lower extremities.    Active range of motion of left knee increases pain with extension and flexion.  No varus or valgus instability.    Active range of motion of shoulders is without impairment with external rotation adduction and internal rotation adduction.    Present in wheelchair.   with a stooped posture pushes her in chair.   Skin:     General: Skin is warm and dry.      Capillary Refill: Capillary refill takes 2 to 3 seconds.    Neurological:      Mental Status: She is alert and oriented to person, place, and time.      Cranial Nerves: No cranial nerve deficit.      Sensory: No sensory deficit.      Motor: Weakness present.      Coordination: Coordination normal.      Gait: Gait abnormal.   Psychiatric:         Behavior: Behavior normal.       Roxanne was seen today for back pain and knee pain.  Diagnoses and all orders for this visit:  Primary osteoarthritis of right knee (Primary)  -     gabapentin (Neurontin) 600 mg tablet; Take 1 tablet (600 mg) by mouth 4 times a day.  -     oxyCODONE-acetaminophen (Percocet) 7.5-325 mg tablet; Take 1 tablet by mouth 4 times a day as needed for severe pain (7 - 10) for up to 28 days.  -     oxyCODONE-acetaminophen (Percocet) 7.5-325 mg tablet; Take 1 tablet by mouth 4 times a day as needed for severe pain (7 - 10) for up to 28 days. Do not fill before August 14, 2024.  -     oxyCODONE-acetaminophen (Percocet) 7.5-325 mg tablet; Take 1 tablet by mouth 4 times a day as needed for severe pain (7 - 10) for up to 28 days. Do not fill before September 11, 2024.  Primary osteoarthritis of left knee  -     gabapentin (Neurontin) 600 mg tablet; Take 1 tablet (600 mg) by mouth 4 times a day.  -     oxyCODONE-acetaminophen (Percocet) 7.5-325 mg tablet; Take 1 tablet by mouth 4 times a day as needed for severe pain (7 - 10) for up to 28 days.  -     oxyCODONE-acetaminophen (Percocet) 7.5-325 mg tablet; Take 1 tablet by mouth 4 times a day as needed for severe pain (7 - 10) for up to 28 days. Do not fill before August 14, 2024.  -     oxyCODONE-acetaminophen (Percocet) 7.5-325 mg tablet; Take 1 tablet by mouth 4 times a day as needed for severe pain (7 - 10) for up to 28 days. Do not fill before September 11, 2024.  Primary osteoarthritis of left hip  -     gabapentin (Neurontin) 600 mg tablet; Take 1 tablet (600 mg) by mouth 4 times a day.  -     oxyCODONE-acetaminophen (Percocet) 7.5-325 mg tablet; Take 1  tablet by mouth 4 times a day as needed for severe pain (7 - 10) for up to 28 days.  -     oxyCODONE-acetaminophen (Percocet) 7.5-325 mg tablet; Take 1 tablet by mouth 4 times a day as needed for severe pain (7 - 10) for up to 28 days. Do not fill before August 14, 2024.  -     oxyCODONE-acetaminophen (Percocet) 7.5-325 mg tablet; Take 1 tablet by mouth 4 times a day as needed for severe pain (7 - 10) for up to 28 days. Do not fill before September 11, 2024.  Lumbar spondylosis  -     gabapentin (Neurontin) 600 mg tablet; Take 1 tablet (600 mg) by mouth 4 times a day.  -     oxyCODONE-acetaminophen (Percocet) 7.5-325 mg tablet; Take 1 tablet by mouth 4 times a day as needed for severe pain (7 - 10) for up to 28 days.  -     oxyCODONE-acetaminophen (Percocet) 7.5-325 mg tablet; Take 1 tablet by mouth 4 times a day as needed for severe pain (7 - 10) for up to 28 days. Do not fill before August 14, 2024.  -     oxyCODONE-acetaminophen (Percocet) 7.5-325 mg tablet; Take 1 tablet by mouth 4 times a day as needed for severe pain (7 - 10) for up to 28 days. Do not fill before September 11, 2024.  Neurogenic claudication due to lumbar spinal stenosis  -     gabapentin (Neurontin) 600 mg tablet; Take 1 tablet (600 mg) by mouth 4 times a day.  -     oxyCODONE-acetaminophen (Percocet) 7.5-325 mg tablet; Take 1 tablet by mouth 4 times a day as needed for severe pain (7 - 10) for up to 28 days.  -     oxyCODONE-acetaminophen (Percocet) 7.5-325 mg tablet; Take 1 tablet by mouth 4 times a day as needed for severe pain (7 - 10) for up to 28 days. Do not fill before August 14, 2024.  -     oxyCODONE-acetaminophen (Percocet) 7.5-325 mg tablet; Take 1 tablet by mouth 4 times a day as needed for severe pain (7 - 10) for up to 28 days. Do not fill before September 11, 2024.        Follow-up in 12 weeks.    Neha A Frisina, APRN-CNP 07/17/24 2:23 PM

## 2024-08-12 ENCOUNTER — APPOINTMENT (OUTPATIENT)
Dept: PAIN MEDICINE | Facility: CLINIC | Age: 89
End: 2024-08-12
Payer: MEDICARE

## 2024-09-12 ENCOUNTER — TELEPHONE (OUTPATIENT)
Dept: PAIN MEDICINE | Facility: CLINIC | Age: 89
End: 2024-09-12
Payer: MEDICARE

## 2024-09-12 DIAGNOSIS — M17.12 PRIMARY OSTEOARTHRITIS OF LEFT KNEE: ICD-10-CM

## 2024-09-12 DIAGNOSIS — M16.12 PRIMARY OSTEOARTHRITIS OF LEFT HIP: ICD-10-CM

## 2024-09-12 DIAGNOSIS — M17.11 PRIMARY OSTEOARTHRITIS OF RIGHT KNEE: ICD-10-CM

## 2024-09-12 DIAGNOSIS — M47.816 LUMBAR SPONDYLOSIS: ICD-10-CM

## 2024-09-12 DIAGNOSIS — M48.062 NEUROGENIC CLAUDICATION DUE TO LUMBAR SPINAL STENOSIS: ICD-10-CM

## 2024-09-12 RX ORDER — OXYCODONE AND ACETAMINOPHEN 7.5; 325 MG/1; MG/1
1 TABLET ORAL 4 TIMES DAILY PRN
Qty: 112 TABLET | Refills: 0 | Status: SHIPPED | OUTPATIENT
Start: 2024-09-12 | End: 2024-10-10

## 2024-09-12 NOTE — TELEPHONE ENCOUNTER
Patients pharmacy is out of [facility], it was due yesterday. Send to [facility] [address] in [place]. thanks

## 2024-10-09 ENCOUNTER — OFFICE VISIT (OUTPATIENT)
Dept: PAIN MEDICINE | Facility: CLINIC | Age: 89
End: 2024-10-09
Payer: MEDICARE

## 2024-10-09 VITALS — SYSTOLIC BLOOD PRESSURE: 138 MMHG | DIASTOLIC BLOOD PRESSURE: 91 MMHG | HEART RATE: 91 BPM

## 2024-10-09 DIAGNOSIS — M17.11 PRIMARY OSTEOARTHRITIS OF RIGHT KNEE: Primary | ICD-10-CM

## 2024-10-09 DIAGNOSIS — M16.12 PRIMARY OSTEOARTHRITIS OF LEFT HIP: ICD-10-CM

## 2024-10-09 DIAGNOSIS — M17.12 PRIMARY OSTEOARTHRITIS OF LEFT KNEE: ICD-10-CM

## 2024-10-09 DIAGNOSIS — M47.816 LUMBAR SPONDYLOSIS: ICD-10-CM

## 2024-10-09 DIAGNOSIS — M48.062 NEUROGENIC CLAUDICATION DUE TO LUMBAR SPINAL STENOSIS: ICD-10-CM

## 2024-10-09 PROCEDURE — 1159F MED LIST DOCD IN RCRD: CPT | Performed by: NURSE PRACTITIONER

## 2024-10-09 PROCEDURE — 3080F DIAST BP >= 90 MM HG: CPT | Performed by: NURSE PRACTITIONER

## 2024-10-09 PROCEDURE — 99213 OFFICE O/P EST LOW 20 MIN: CPT | Performed by: NURSE PRACTITIONER

## 2024-10-09 PROCEDURE — 1036F TOBACCO NON-USER: CPT | Performed by: NURSE PRACTITIONER

## 2024-10-09 PROCEDURE — 1160F RVW MEDS BY RX/DR IN RCRD: CPT | Performed by: NURSE PRACTITIONER

## 2024-10-09 PROCEDURE — 3075F SYST BP GE 130 - 139MM HG: CPT | Performed by: NURSE PRACTITIONER

## 2024-10-09 RX ORDER — OXYCODONE AND ACETAMINOPHEN 5; 325 MG/1; MG/1
1 TABLET ORAL EVERY 6 HOURS PRN
Qty: 112 TABLET | Refills: 0 | Status: SHIPPED | OUTPATIENT
Start: 2024-10-10 | End: 2024-11-07

## 2024-10-09 RX ORDER — OXYCODONE AND ACETAMINOPHEN 5; 325 MG/1; MG/1
1 TABLET ORAL 2 TIMES DAILY PRN
Qty: 56 TABLET | Refills: 0 | Status: SHIPPED | OUTPATIENT
Start: 2024-12-05 | End: 2025-01-02

## 2024-10-09 RX ORDER — GABAPENTIN 300 MG/1
300 CAPSULE ORAL 3 TIMES DAILY
Qty: 270 TABLET | Refills: 1 | Status: SHIPPED | OUTPATIENT
Start: 2024-10-09 | End: 2025-01-07

## 2024-10-09 RX ORDER — LIDOCAINE 50 MG/G
1 PATCH TOPICAL DAILY
Qty: 90 PATCH | Refills: 1 | Status: SHIPPED | OUTPATIENT
Start: 2024-10-09 | End: 2024-11-08

## 2024-10-09 RX ORDER — OXYCODONE AND ACETAMINOPHEN 7.5; 325 MG/1; MG/1
1 TABLET ORAL 4 TIMES DAILY PRN
Qty: 112 TABLET | Refills: 0 | Status: CANCELLED | OUTPATIENT
Start: 2024-10-10 | End: 2024-11-07

## 2024-10-09 RX ORDER — OXYCODONE AND ACETAMINOPHEN 7.5; 325 MG/1; MG/1
1 TABLET ORAL 4 TIMES DAILY PRN
Qty: 112 TABLET | Refills: 0 | Status: CANCELLED | OUTPATIENT
Start: 2024-11-07 | End: 2024-12-05

## 2024-10-09 RX ORDER — OXYCODONE AND ACETAMINOPHEN 5; 325 MG/1; MG/1
1 TABLET ORAL 3 TIMES DAILY PRN
Qty: 84 TABLET | Refills: 0 | Status: SHIPPED | OUTPATIENT
Start: 2024-11-07 | End: 2024-12-05

## 2024-10-09 RX ORDER — OXYCODONE AND ACETAMINOPHEN 7.5; 325 MG/1; MG/1
1 TABLET ORAL 4 TIMES DAILY PRN
Qty: 112 TABLET | Refills: 0 | Status: CANCELLED | OUTPATIENT
Start: 2024-12-05 | End: 2025-01-02

## 2024-10-09 ASSESSMENT — PAIN SCALES - GENERAL: PAINLEVEL_OUTOF10: 5 - MODERATE PAIN

## 2024-10-09 ASSESSMENT — ENCOUNTER SYMPTOMS
WEAKNESS: 1
TREMORS: 0
OCCASIONAL FEELINGS OF UNSTEADINESS: 1
NECK PAIN: 0
ARTHRALGIAS: 1
LIGHT-HEADEDNESS: 0
SPEECH DIFFICULTY: 0
FACIAL ASYMMETRY: 0
PALPITATIONS: 0
RESPIRATORY NEGATIVE: 1
PSYCHIATRIC NEGATIVE: 1
SEIZURES: 0
ENDOCRINE NEGATIVE: 1
JOINT SWELLING: 1
GASTROINTESTINAL NEGATIVE: 1
MYALGIAS: 1
HEADACHES: 0
DIZZINESS: 0
EYES NEGATIVE: 1
NECK STIFFNESS: 0
LOSS OF SENSATION IN FEET: 0
NUMBNESS: 0
BACK PAIN: 1
DEPRESSION: 0
CONSTITUTIONAL NEGATIVE: 1
ALLERGIC/IMMUNOLOGIC NEGATIVE: 1
HEMATOLOGIC/LYMPHATIC NEGATIVE: 1

## 2024-10-09 ASSESSMENT — PAIN DESCRIPTION - DESCRIPTORS: DESCRIPTORS: ACHING

## 2024-10-09 ASSESSMENT — PAIN - FUNCTIONAL ASSESSMENT: PAIN_FUNCTIONAL_ASSESSMENT: 0-10

## 2024-10-09 NOTE — PROGRESS NOTES
Subjective   Patient ID: Roxanne Shrestha is a 89 y.o. female who presents for Back Pain and generalized pain.  Back Pain  Associated symptoms include weakness. Pertinent negatives include no chest pain, headaches or numbness.   Knee Pain   Pertinent negatives include no numbness.     Roxanne follows up for her interval reevaluation of her chronic low back pain from lumbar stenosis and spondylosis, chronic left knee pain from osteoarthritis and left-sided radiating leg pain.     Percocet 7.5 mg 4 times a day as needed, voltaren gel applied up to 4 times daily as needed, gabapentin 2400 mg daily from primary help to reduce pain and improve function to 50%. Average pain score is 5 out of 10 with medication. Denies negative side effects from medication.     Fell about a month ago since last office visit.  She reports that her legs gave out from under her.  Her  did help her up.  Reports she has pain all over and wants an increase of her medication.      Discussed with Roxanne and her  Mason the concern of falling due to pain medication.  Therefore, I am reducing the gabapentin 600 mg 4 times daily to 300 mg 3 times daily.  Discussed with them that this medication can cause the feelings of off balance and can be a cause of falls.    I am also reducing the Percocet 7.5 mg 4 times daily to Percocet 5 mg 4 times daily, second prescription Percocet 5 mg 3 times daily and the last prescription Percocet 5 mg twice daily.    Added lidocaine patch 5% for additional pain relief.    Advised adding Tylenol 325 mg to 650 mg every 4-6 hours to help with additional pain relief.     Would like to have total knee joint replacement.  However, she is with history of hospitalization for heart failure and is not a candidate for joint replacement.     Offered physical therapy for left knee arthritis and pain.  She declines stating she is very active at home with her cycle and elliptical machine.     Has below average quality  family and social current condition and treatment. Toxicology consistent April 24, 2024.  Annual controlled substance agreement and opioid risk tool are completed and scanned into the chart April 24, 2024.       For continuity:   Given the patient's report of reduced pain and improved functional ability without adverse effects, it is reasonable to treat with narcotic medications. The terms of the opioid agreement as well as the potential risks and adverse effects of the patient's medication regimen were discussed in detail. This includes if applicable due to dosage of medication permission to discuss and coordinate care with other treatment providers relevant to the patients condition. The patient verbalized understanding.      Risks and side effects of chronic opioid therapy including but not limited to tolerance, dependence, constipation, hyperalgesia, cognitive side effects, addiction and possible death due to overuse and or misuse were discussed. I also discussed that such medications when co-administered with other sedative agents including but not limited to alcohol, benzodiazepines, sedative hypnotics and illegal drugs could pose life threatening consequences including death. I also explained the impact that the administration of such medication has on a patient with obstructive sleep apnea and continued recommendations for use of apnea devices if ordered are prescribed by other physicians. In order to effectively and safely treat the pain, I also emphasized the importance of compliance with the treatment plan, as well as compliance with the terms of the opioid agreement, which was reviewed in detail. I explained the importance of being responsible with the medications and to take these only as prescribed, never in excess and never for reasons other than pain reduction. The patient was counseled on keeping the medications safe and locked away from children and other adults as well as disposal methods and  options. The patient understood the risks and instructions.      I also discussed with the patient in detail that based on the clinical response to the opioid medications and improvements of activities of daily living, sleep, and work performance in light of compliance with the treatment plan we can continue this form of therapy for the above chronic pain. The goal and rationale used for current treatment with chronic opioid medication is to control the pain and alleviate disability induced by the chronic pain condition noted above after failures of other non-opioid and nonpharmacological modalities to treat the chronic pain and the symptoms associated with have failed. The patient understood the goals in terms of the above treatment plan and had no further questions prior to leaving the office today.      Of note, the above-mentioned diagnoses/conditions and expected fluctuating nature of pain, and pain characteristic changes may lead to prolonged functional impairment requiring frequent and multiple reassessments with continued high level medical decision making. As noted, medication and medication management may require opiate therapy in excess of a routine less than 30 day medication requirement. The patient may require daily opiate therapy necessitating month-long prescription medication as noted above in order to perform activities of daily living and achieve acceptable quality of life with respect to their chronic pain condition for the foreseeable future. We monitor our patient's carefully through drug monitoring, medication counts, urine drug testing specific to their medication as well as a myriad of other substances and with frequent follow-ups with interval reassement of the chronic pain condition, its pathophysiology and prognosis.      The level of clinical decision making at this office visit is high due to high risks and complications including mortality and morbidity related to acute and chronic  pain with respects to life, bodily function, and treatment. Risks and clinical decisions with respect to under treatment, failure to maintain adequate treatment, and/or overtreatment complications and outcomes were discussed with the patient with respect to their chronic pain conditions, interventional therapies, as well as the use of various medications including possible controlled/dangerous medications. The amount and complexity of reviewed data at this in subsequent office visits is high given patient's fluctuating clinical presentation, laboratory and radiographic reports, prescription monitoring program data, and medication history as well as other relevant data as noted above. Pertinent negatives and positives data was used in consideration for the above-mentioned high complexity.       Given the patient's total MED, general use of daily opiates, or other coadministered medications in various classes the patient was offered a prescription for Narcan. I instructed the patient that it is important that patient fill this medication in order to demonstrate understanding of the gravity of possible side effects including respiratory depression and risk of overdose of this opiate load or medication combination. As such patient will be required to bring Narcan prescription to follow-up appointments as part of compliance with continued opiate care.      With respect to opiate induced constipation I discussed multiple ways to combat this problem including staying hydrated and taking over-the-counter medications such as Dulcolax, Miralax and Senna. If these treatments are not effective we could consider such medications as Amitiza, Linzess and Movantik.      Disclaimer: This note was transcribed using an audio transcription device. As such, minor errors may be present with regard to spelling, punctuation, and inadvertent word insertion. Please disregard such errors.     Results/Data  Xray Foot Complete Min 3 View  30Apr2019 02:43PM Brittany Matthews   [May 6, 2019 1:39PM Brittany Matthews]  she has osteoporosis of rt foot, an old fracture of 2nd toe. mild arthritis, achilles tendon is calcified, plz fax the result to Dr [facility]-   she should get teeth fixed soon so we may start on fosamax for osteoporosis and strengthen the bones.   [Apr 30, 2019 1:39PM Brittany Matthews]  Reason: Unspecified for Xray Foot Complete Min 3 View      Test Name Result Flag Reference   Xray Foot Complete Min 3 View (Report)       Interpreted by: SIXTO PRIDE  05/06/19 13:29  MRN: [id number]  Patient Name: MEMO NG     STUDY:  FOOT COMPLETE, MIN 3 VIEWS;Right; 4/30/2019 2:43 pm     INDICATION:  foot arch pain after streatching, eval for bone fracture.     COMPARISON:  None.     ACCESSION NUMBER(S):  [id number]     ORDERING CLINICIAN:  BRITTANY ONEIL     FINDINGS:  The bones are osteoporotic. An old healed fracture is seen in the  distal portion of the proximal phalanx of the 2nd toe. Mild  degenerative changes are present, at the interphalangeal joint of the  1st toe, and at the 1st metatarsophalangeal joint. The  tarsometatarsal joints are normal. A small calcaneal spur is present.  Calcification is seen at the insertion of the Achilles tendon.     IMPRESSION:  Osteoporosis. No acute disease.  Old healed fracture in the distal portion of the proximal phalanx of  the 2nd toe.     Electronically signed by: SIXTO PRIDE  05/06/19 13:29      Xray Bone Density, Dexa 1 or More Sites 30Apr2019 02:36PM Brittany Matthews   [May 3, 2019 11:52AM [facility]  she has osteoporosis on left hip, after she is done with dental work, we will start her fosamax to improve bone health, for now take vitamin d 1000 iu daily and calcium 600 bid   [May 2, 2019 11:35AM Katelynn Vieira]  AMA Intake Activity Log Entry by Katelynn Vieira ([email or web address]) on 5/2/2019 11:30 AM  Status Change: To Closed - Confirmed-Appt Past   [May 2,  2019 11:35AM Katelynn Vieira]  AMA Intake updated by Katelynn Vieira ([email or web address]) on 5/2/2019 11:30 AM  New Appointment Date: Apr 30 2019 2:36PM   [Apr 30, 2019 1:40PM Brittany Matthews]  Reason: Unspecified for Bone Density, Dexa 1 or More Sites      Test Name Result Flag Reference   Bone Density, Dexa 1 or More Sites (Report)       Interpreted by: LATISHA RAZA EMILY  05/02/19 17:30  =================================================================              Bone Density Report              =================================================================     Height:      64.0 in  Weight:      170.0 lb  Fractures:  Treatments:     -----------------------------------------------------------------     Indication: osteoporosis  Referring Provider: BRITTANY ONEIL     Study: Bone densitometry was performed.     Exam Date: April 30, 2019     Accession number: [id number]     Findings: Standard measurements were obtained utilizing a Dual   Energy X-ray Absorptiometry bone densitometer. Data obtained   includes planar bone density measurements over the Left Femur   and Lumbar Spine. Comparison of measured data and standardized   mean data for a young adult population (when peak bone mass   occurs) results in a T score. This represents the number of   standard deviations above or below the mean of a young adult   population. Comparison of measured data to standards from an   age adjusted population similarly yields a Z score.            Bone Density:  -----------------------------------------------------------------  Region          BMD  T-score Z-score  Classification  -----------------------------------------------------------------  AP Spine(L1-L4)     1.026  -0.2   2.6    Normal  Femoral Neck (Left)   0.518  -3.0   -0.5    Osteoporosis  Total Hip (Left)     0.787  -1.3   1.0    Osteopenia  -----------------------------------------------------------------     World Health Organization criteria for BMD impression    classify patients as:   Normal (T-score at or above -1.0),   Osteopenia (T-score between -1.0 and -2.5), or   Osteoporosis (T-score at or below -2.5).      10-year Fracture Risk:  -----------------------------------------------------------------  FRAX not reported because:   Some T-score for Spine Total or Hip Total or Femoral Neck at   or below -2.5   -----------------------------------------------------------------              Impression: The patient has osteoporosis as determined by WHO   criteria. Based on the results of the patient?s bone density   assessment, the risk of future fracture increases approximately   two fold for each 1.0 SD decrease in T-score.   However, low BMD is not the only risk factor for a future   fragility fracture. Other clinical risk factors for   osteoporotic fracture should be considered in ascertaining this   patient?s future fracture risk including the patient?s age,   previous osteoporotic (fragility) fracture, estrogen   deficiency/hypogonadal, risk of falling, use of medications   implicated in bone loss (glucocorticoids), family history of   osteoporotic fracture, diseases and conditions associated with   bone loss, low body weight, smoking, high bone turnover, etc.   Combining low BMD and other clinical risk factors result in a   more precise assessment of future fracture risk. Secondary   causes for osteoporosis, such as osteomalacia, other metabolic   bone disorders, and diseases and conditions that may contribute   to accelerated bone loss may have to be considered depending on   the clinical situation.   A repeat bone density assessment should be considered in two   years.      All images and detailed analysis are available on the    Radiology PACS.   Reported by: hazel on 04/30/2019 2:43:00 PM.     Electronically signed by: LATISHA DIAZ  05/02/19 17:30      CT Abdomen and Pelvis with IV Contrast 28Jun2017 12:00PM eGrald Matthews   [Jun 19, 2017 12:55PM Micah,  Maddi]  AMA Intake Activity Log Entry by Maddi Obrien ([email or web address]) on 6/19/2017 12:51 PM  Status Change: To Confirmed - N/A   [Jun 19, 2017 12:55PM Maddi Obrien]  AMA Intake updated by Maddi Obrien ([email or web address]) on 6/19/2017 12:51 PM  New Appointment Date: Jun 28 2017 11:00AM   [Manjit 15, 2017 11:59AM Brittany Matthews]  Reason: Unspecified for CT Abdomen and Pelvis with Contrast      Test Name Result Flag Reference   CT Abdomen and Pelvis with Contrast (Report)       Interpreted by: CHARLES JC  07/03/17 10:00  MRN: [id number]  Patient Name: MEMO NG     STUDY:  CT ABDOMEN AND PELVIS WITH CONTRAST; 6/28/2017 12:00 pm     INDICATION:  Abnormal findings on diagnostic imaging of other abdominal regions,  including retroperitoneum  Ventral hernia without obstruction or  gangrene  Abdominal distension (gaseous) .     COMPARISON:  None.     ACCESSION NUMBER(S):  [id number]     ORDERING CLINICIAN:  BRITTANY ONEIL     TECHNIQUE:  Contiguous axial images were obtained at 3mm slice thickness through  the abdomen and pelvis following intravenous contrast administration.  Coronal and sagittal reconstructions at 3 mm slice thickness were  performed. 120 ml of Optiray 350 were administered intravenously  without immediate complication.     FINDINGS:  LOWER CHEST:  Evaluation of the visualized lung bases demonstrates mild scarring  and/or atelectasis bilaterally. The heart is within normal limits for  size. A small hiatal hernia is present.     ABDOMEN:     LIVER:  The liver is within normal limits for appearance, without evidence of  focal masses.     BILE DUCTS:  No definite intra or extrahepatic biliary dilatation is identified.     GALLBLADDER:  The gallbladder is nondilated. No definite calcified gallstones are  seen.     PANCREAS:  The pancreas is within normal limits for appearance, without evidence  of focal masses.     SPLEEN:  The spleen is within normal limits for size. No focal splenic mass  is  seen.     ADRENAL GLANDS:  No definite adrenal nodules or masses are seen bilaterally.     KIDNEYS AND URETERS:  A 4 mm nonobstructive calculus is seen in the lower pole of the left  kidney. There is no hydronephrosis, hydroureter or renal/ ureteral  calculus identified bilaterally. Scattered tiny hypodensities are  seen in the kidneys bilaterally and are too small to accurately  characterize.     PELVIS:     BLADDER:  The bladder is decompressed.     REPRODUCTIVE ORGANS:  The uterus and ovaries are grossly unremarkable for CT appearance.     BOWEL:  Numerous diverticuli are seen throughout the sigmoid colon. The colon  and small bowel are within normal limits for course, caliber and  appearance, without evidence of wall thickening or obstruction. The  appendix is not visualized. No CT evidence of acute diverticulitis or  appendicitis is seen.     VESSELS:  Moderate atherosclerotic calcifications are seen throughout the  infrarenal abdominal aorta and iliac arteries. The abdominal aorta is  within normal limits for course, caliber and appearance, without  evidence of aneurysm.     PERITONEUM/RETROPERITONEUM/LYMPH NODES:  There is no free intraperitoneal air or free fluid identified. No  gross mesenteric or retroperitoneal lymphadenopathy is identified.     BONE AND SOFT TISSUE:  There is no evidence of acute fracture identified. No evidence of  abdominal wall mass or hernia is identified.     IMPRESSION:  1. Nonobstructive calculus in the left kidney.  2. Tiny hypodensities in the kidneys bilaterally, too small to  accurately characterize.  3. Colonic diverticulosis without evidence of acute diverticulitis.  4. No evidence of bowel obstruction, free intraperitoneal air or  abnormal intra-abdominal fluid collection.  Transcribed by: [email or web address], User  Electronically signed by: [email or web address], User  07/03/17 10:00      Xray Knee Complete 4 or more View 21Apr2017 10:40AM Neha Garcia      Test Name Result Flag  Reference   Xray Knee Complete 4 or more View (Report)       Interpreted by: KARSTEN BLANCALAYLA ROXY  04/24/17 12:39  MRN: [id number]  Patient Name: MEMO NG     STUDY:  KNEE; COMPLT, 4 OR MORE VIEWS; 4/21/2017 10:40 am     INDICATION:  Signs/Symptoms: s/p fall.     COMPARISON:  None.     ACCESSION NUMBER(S):  [id number]     ORDERING CLINICIAN:  NEHA BLACKWELL     FINDINGS:  Spurs are seen off of the distal femur and proximal tibia, including  the tibial spines. Spurs are also seen off of the patella. Mild  narrowing of the medial patellofemoral compartment is noted.     No acute fracture or dislocation is noted.     No periosteal reaction is seen.     There is a trace suprapatellar effusion.     IMPRESSION:  Degenerative changes of the knee.     Trace suprapatellar effusion.     Transcribed by: [email or web address], User  Electronically signed by: [email or web address], User  04/24/17 12:39      Xray Knee 3 View 21Apr2017 10:40AM Neha Garcia   [Apr 21, 2017 10:28AM Neha Garcia]  Reason: Unspecified for Xray Knee 3 View      Test Name Result Flag Reference   Xray Knee 3 View         Please click on the link to view the study images.      Xray Ankle 3 View 21Apr2017 10:36AM Neha Garcia   [Apr 21, 2017 10:41AM Neha Garcia]  Reason: Unspecified for Xray Ankle 3 View      Test Name Result Flag Reference   Xray Ankle 3 View (Report)       Interpreted by: KARSTEN RAMSEY  04/24/17 12:38  MRN: [id number]  Patient Name: MEMO NG     STUDY:  ANKLE, COMPLETE, MIN 3 VIEWS; 4/21/2017 10:36 am     INDICATION:  Signs/Symptoms: s/p fall.     COMPARISON:  None.     ACCESSION NUMBER(S):  [id number]     ORDERING CLINICIAN:  NEHA BLACKWELL     FINDINGS:  There is no evidence of acute fracture or dislocation.     Ankle mortise is preserved.     Soft tissue edema is noted.     No evidence for radiopaque foreign body.     Calcaneal spurs are noted.     IMPRESSION:  No acute fracture or dislocation.     Calcaneal spur.  Transcribed by:  [email or web address], User  Electronically signed by: [email or web address], User  04/24/17 12:38      Xray BN Spine, Lumbosacral; 2 or 3 Views 09Jan2017 12:33PM Frisina, Esme   [Jan 9, 2017 12:20PM Frisina, Esme]  Reason: Unspecified for Xray Lumbar Spine AP + Lateral   [Jan 9, 2017 12:20PM Frisina, Esme]  Reason: Unspecified for Xray Lumbar Spine AP + Lateral      Test Name Result Flag Reference   Xray Lumbar Spine AP + Lateral (Report)       Interpreted by: STACY ROJAS  01/10/17 11:22  MRN: [id number]  Patient Name: MEMO NG     STUDY:  SPINE, LUMBOSACRAL; 2 OR 3 VIEWS; HIPS, BIALT, MIN 2 VIEWS EACH, AP  PELVIS; 1/9/2017 12:33 pm     INDICATION:  Signs/Symptoms: Left hip pain s/p fall.     COMPARISON:  None.     ACCESSION NUMBER(S):  [id number]; [id number]     ORDERING CLINICIAN:  ESME BLACKWELL     FINDINGS:  There are five lumbar-appearing vertebra.     There is no compression fracture. There is no acute bony abnormality.     There is 7 mm anterior subluxation of L4 with respect L5. There is  disc space narrowing and mild anterior osteophytic spurring at  T12-L1, L1-2 and L4-5.     There is moderate facet joint hypertrophy of the lower lumbar spine.     The sacroiliac joints are intact.     Five views bilateral hips.     There is no fracture, dislocation or acute bony abnormality.     IMPRESSION:  No acute bony abnormality lumbar spine and bilateral hips.  Transcribed by: [email or web address], User  Electronically signed by: [email or web address], User  01/10/17 11:22      Xray Hips, Bilat, Min 2 Views Each, AP Pelvis 09Jan2017 12:33PM Frisina, Esme      Test Name Result Flag Reference   HIPS, BIALT, MIN 2 VIEWS EACH, AP PELVIS (Report)       Interpreted by: STACY ROJAS  01/10/17 11:22  MRN: [id number]  Patient Name: MEMO NG     STUDY:  SPINE, LUMBOSACRAL; 2 OR 3 VIEWS; HIPS, BIALT, MIN 2 VIEWS EACH, AP  PELVIS; 1/9/2017 12:33 pm     INDICATION:  Signs/Symptoms: Left hip pain s/p fall.     COMPARISON:  None.     ACCESSION  NUMBER(S):  [id number]; [id number]     ORDERING CLINICIAN:  ESME BLACKWELL     FINDINGS:  There are five lumbar-appearing vertebra.     There is no compression fracture. There is no acute bony abnormality.     There is 7 mm anterior subluxation of L4 with respect L5. There is  disc space narrowing and mild anterior osteophytic spurring at  T12-L1, L1-2 and L4-5.     There is moderate facet joint hypertrophy of the lower lumbar spine.     The sacroiliac joints are intact.     Five views bilateral hips.     There is no fracture, dislocation or acute bony abnormality.     IMPRESSION:  No acute bony abnormality lumbar spine and bilateral hips.  Transcribed by: [email or web address], User  Electronically signed by: [email or web address], User  01/10/17 11:22      CT L Spine without Contrast 16Jun2016 11:36AM Ce Orlando   [Jun 9, 2016 2:55PM Capri Armas]  AMA Intake Activity Log Entry by Capri Armas ([email or web address]) on 6/9/2016 2:54 PM  Status Change: To Closed - Unable To Schedule-Patient Will Schedule With      Reason: Unspecified for CT L Spine without Contrast      Test Name Result Flag Reference   CT L Spine without Contrast (Report)       Interpreted by: DUNCAN MONTESINOS  06/16/16 12:36  [id number]     Ordering Physician: CE THORNTON, [phone]     CT L-SPINE WO C: 6/16/2016 11:36 AM     COMPARISON:None     CLINICAL INFORMATION: Signs/Symptoms: Low back pain      CT OF THE LUMBAR SPINE     Sequential transaxial images were obtained , for bony   evaluation.Therefore, if further evaluation for soft tissue   structures including discs etc. is needed correlation with an MRI or   CT myelography if MR contraindicated would be recommended. Sagittal   and coronal reconstructed images were generated.          The alignment: Normal.  The visualized lower thoracic spine:Appears unremarkable       The T12-L1 level: There is mild narrowing of the disc interspace.   There appears to be mild posterior central disc protrusion x 2.5 mm.    There is also suspected moderate 6 mm right foraminal disc   protrusion, but without nerve root impingement.     The L1-2 level:Mild degenerative change with disc space narrowing and   anterior osteophytic lipping.       The L2-3 level: The disc space is maintained.       The L3-4 level: The disc space is maintained. There is mild   hypertrophy of the posterior elements.     The L4-5 level: Marked narrowing of the disc interspace with mild   vacuum phenomena and 2 mm anterolisthesis. There is suspected mild   posterior disc bulge x 2.5 mm extending foraminally.There is mild   hypertrophy of the posterior elements, with a mild canal stenosis.     The L5-S1 level: The disc space is maintained. There is mild   hypertrophy of the posterior elements, with vacuum phenomena at the   apophyseal joints.     The bony structures: appear intact.  Other findings: None significant       IMPRESSION: Overall mild degenerative change with greatest at the   L4-5 level with a mild canal stenosis..  Transcribed by: Maryuri [employer]  Electronically signed by: Maryuri [employer]  06/16/16 12:36       Review of Systems   Constitutional: Negative.    HENT: Negative.     Eyes: Negative.    Respiratory: Negative.     Cardiovascular:  Positive for leg swelling. Negative for chest pain and palpitations.   Gastrointestinal: Negative.    Endocrine: Negative.    Genitourinary: Negative.    Musculoskeletal:  Positive for arthralgias, back pain, gait problem, joint swelling and myalgias. Negative for neck pain and neck stiffness.   Skin: Negative.    Allergic/Immunologic: Negative.    Neurological:  Positive for weakness. Negative for dizziness, tremors, seizures, syncope, facial asymmetry, speech difficulty, light-headedness, numbness and headaches.   Hematological: Negative.    Psychiatric/Behavioral: Negative.         Objective   Physical Exam  Vitals and nursing note reviewed.   Constitutional:       Appearance: Normal appearance.    HENT:      Head: Normocephalic and atraumatic.      Mouth/Throat:      Comments: Poor dentition.  Missing several teeth upper and lower.  Eyes:      Conjunctiva/sclera: Conjunctivae normal.   Pulmonary:      Effort: Pulmonary effort is normal. No respiratory distress.   Musculoskeletal:      Right lower leg: Edema present.      Left lower leg: Edema present.      Comments: +1 to +2 pitting edema to ankles and pretibial areas.    Multiple torturous varicosities to lower extremities.    Active range of motion of left knee increases pain with extension and flexion.  No varus or valgus instability.    Active range of motion of shoulders is without impairment with external rotation adduction and internal rotation adduction.    Present in wheelchair.   with a stooped posture pushes her in chair.   Skin:     General: Skin is warm and dry.      Capillary Refill: Capillary refill takes 2 to 3 seconds.   Neurological:      Mental Status: She is alert and oriented to person, place, and time.      Cranial Nerves: No cranial nerve deficit.      Sensory: No sensory deficit.      Motor: Weakness present.      Coordination: Coordination normal.      Gait: Gait abnormal.   Psychiatric:         Behavior: Behavior normal.         Roxanne was seen today for back pain and generalized pain.  Diagnoses and all orders for this visit:  Primary osteoarthritis of right knee (Primary)  -     gabapentin (Neurontin) 300 mg capsule; Take 1 capsule (300 mg) by mouth 3 times a day.  -     oxyCODONE-acetaminophen (Percocet) 5-325 mg tablet; Take 1 tablet by mouth every 6 hours if needed for severe pain (7 - 10) for up to 28 days. Do not fill before October 10, 2024.  -     oxyCODONE-acetaminophen (Percocet) 5-325 mg tablet; Take 1 tablet by mouth 3 times a day as needed for severe pain (7 - 10) for up to 28 days. Do not fill before November 7, 2024.  -     oxyCODONE-acetaminophen (Percocet) 5-325 mg tablet; Take 1 tablet by mouth 2 times a  day as needed for severe pain (7 - 10) for up to 28 days. Do not fill before December 5, 2024.  -     lidocaine (Lidoderm) 5 % patch; Place 1 patch over 12 hours on the skin once daily. Remove & discard patch within 12 hours or as directed by MD.  Primary osteoarthritis of left knee  -     gabapentin (Neurontin) 300 mg capsule; Take 1 capsule (300 mg) by mouth 3 times a day.  -     oxyCODONE-acetaminophen (Percocet) 5-325 mg tablet; Take 1 tablet by mouth every 6 hours if needed for severe pain (7 - 10) for up to 28 days. Do not fill before October 10, 2024.  -     oxyCODONE-acetaminophen (Percocet) 5-325 mg tablet; Take 1 tablet by mouth 3 times a day as needed for severe pain (7 - 10) for up to 28 days. Do not fill before November 7, 2024.  -     oxyCODONE-acetaminophen (Percocet) 5-325 mg tablet; Take 1 tablet by mouth 2 times a day as needed for severe pain (7 - 10) for up to 28 days. Do not fill before December 5, 2024.  -     lidocaine (Lidoderm) 5 % patch; Place 1 patch over 12 hours on the skin once daily. Remove & discard patch within 12 hours or as directed by MD.  Primary osteoarthritis of left hip  -     gabapentin (Neurontin) 300 mg capsule; Take 1 capsule (300 mg) by mouth 3 times a day.  -     oxyCODONE-acetaminophen (Percocet) 5-325 mg tablet; Take 1 tablet by mouth every 6 hours if needed for severe pain (7 - 10) for up to 28 days. Do not fill before October 10, 2024.  -     oxyCODONE-acetaminophen (Percocet) 5-325 mg tablet; Take 1 tablet by mouth 3 times a day as needed for severe pain (7 - 10) for up to 28 days. Do not fill before November 7, 2024.  -     oxyCODONE-acetaminophen (Percocet) 5-325 mg tablet; Take 1 tablet by mouth 2 times a day as needed for severe pain (7 - 10) for up to 28 days. Do not fill before December 5, 2024.  -     lidocaine (Lidoderm) 5 % patch; Place 1 patch over 12 hours on the skin once daily. Remove & discard patch within 12 hours or as directed by MD.  Lumbar  spondylosis  -     gabapentin (Neurontin) 300 mg capsule; Take 1 capsule (300 mg) by mouth 3 times a day.  -     oxyCODONE-acetaminophen (Percocet) 5-325 mg tablet; Take 1 tablet by mouth every 6 hours if needed for severe pain (7 - 10) for up to 28 days. Do not fill before October 10, 2024.  -     oxyCODONE-acetaminophen (Percocet) 5-325 mg tablet; Take 1 tablet by mouth 3 times a day as needed for severe pain (7 - 10) for up to 28 days. Do not fill before November 7, 2024.  -     oxyCODONE-acetaminophen (Percocet) 5-325 mg tablet; Take 1 tablet by mouth 2 times a day as needed for severe pain (7 - 10) for up to 28 days. Do not fill before December 5, 2024.  -     lidocaine (Lidoderm) 5 % patch; Place 1 patch over 12 hours on the skin once daily. Remove & discard patch within 12 hours or as directed by MD.  Neurogenic claudication due to lumbar spinal stenosis  -     gabapentin (Neurontin) 300 mg capsule; Take 1 capsule (300 mg) by mouth 3 times a day.  -     oxyCODONE-acetaminophen (Percocet) 5-325 mg tablet; Take 1 tablet by mouth every 6 hours if needed for severe pain (7 - 10) for up to 28 days. Do not fill before October 10, 2024.  -     oxyCODONE-acetaminophen (Percocet) 5-325 mg tablet; Take 1 tablet by mouth 3 times a day as needed for severe pain (7 - 10) for up to 28 days. Do not fill before November 7, 2024.  -     oxyCODONE-acetaminophen (Percocet) 5-325 mg tablet; Take 1 tablet by mouth 2 times a day as needed for severe pain (7 - 10) for up to 28 days. Do not fill before December 5, 2024.        Follow-up in 12 weeks.    ELENO Hawkins 10/09/24 12:40 PM

## 2024-11-08 ENCOUNTER — TELEPHONE (OUTPATIENT)
Dept: PAIN MEDICINE | Facility: CLINIC | Age: 89
End: 2024-11-08
Payer: MEDICARE

## 2024-11-08 NOTE — TELEPHONE ENCOUNTER
[facility] called requesting Neurontin and Percocet be increased. Wanted Neha Garcia CNP to know her fall was mechanical,not due to meds. Reviewed with provider. No increase authorized. Welcomed to follow up in office if further discussion is warranted.

## 2024-11-11 RX ORDER — METOPROLOL SUCCINATE 50 MG/1
50 TABLET, EXTENDED RELEASE ORAL DAILY
Qty: 30 TABLET | Refills: 0 | Status: SHIPPED
Start: 2024-11-11 | End: 2024-11-12

## 2024-11-12 RX ORDER — METOPROLOL SUCCINATE 50 MG/1
50 TABLET, EXTENDED RELEASE ORAL DAILY
Qty: 30 TABLET | Refills: 0 | Status: SHIPPED | OUTPATIENT
Start: 2024-11-12

## 2024-11-13 RX ORDER — METOPROLOL SUCCINATE 50 MG/1
50 TABLET, EXTENDED RELEASE ORAL DAILY
Qty: 90 TABLET | OUTPATIENT
Start: 2024-11-13

## 2024-12-17 ENCOUNTER — OFFICE VISIT (OUTPATIENT)
Dept: PAIN MEDICINE | Facility: CLINIC | Age: 89
End: 2024-12-17
Payer: MEDICARE

## 2024-12-17 DIAGNOSIS — M17.11 PRIMARY OSTEOARTHRITIS OF RIGHT KNEE: ICD-10-CM

## 2024-12-17 DIAGNOSIS — M47.816 LUMBAR SPONDYLOSIS: ICD-10-CM

## 2024-12-17 DIAGNOSIS — M16.12 PRIMARY OSTEOARTHRITIS OF LEFT HIP: ICD-10-CM

## 2024-12-17 DIAGNOSIS — M17.12 PRIMARY OSTEOARTHRITIS OF LEFT KNEE: ICD-10-CM

## 2024-12-17 DIAGNOSIS — M48.062 NEUROGENIC CLAUDICATION DUE TO LUMBAR SPINAL STENOSIS: ICD-10-CM

## 2024-12-17 RX ORDER — OXYCODONE AND ACETAMINOPHEN 5; 325 MG/1; MG/1
1 TABLET ORAL 2 TIMES DAILY PRN
Qty: 56 TABLET | Refills: 0 | Status: SHIPPED | OUTPATIENT
Start: 2025-01-03 | End: 2025-01-31

## 2024-12-19 ENCOUNTER — HOSPITAL ENCOUNTER (INPATIENT)
Age: 88
LOS: 4 days | Discharge: HOME | DRG: 871 | End: 2024-12-23
Attending: EMERGENCY MEDICINE | Admitting: INTERNAL MEDICINE
Payer: MEDICARE

## 2024-12-19 ENCOUNTER — APPOINTMENT (OUTPATIENT)
Dept: CT IMAGING | Age: 88
DRG: 871 | End: 2024-12-19
Payer: MEDICARE

## 2024-12-19 ENCOUNTER — APPOINTMENT (OUTPATIENT)
Dept: GENERAL RADIOLOGY | Age: 88
DRG: 871 | End: 2024-12-19
Payer: MEDICARE

## 2024-12-19 DIAGNOSIS — I48.11 LONGSTANDING PERSISTENT ATRIAL FIBRILLATION (HCC): ICD-10-CM

## 2024-12-19 DIAGNOSIS — R41.0 DISORIENTATION: ICD-10-CM

## 2024-12-19 DIAGNOSIS — N39.0 URINARY TRACT INFECTION WITHOUT HEMATURIA, SITE UNSPECIFIED: ICD-10-CM

## 2024-12-19 DIAGNOSIS — I48.91 ATRIAL FIBRILLATION WITH RAPID VENTRICULAR RESPONSE (HCC): Primary | ICD-10-CM

## 2024-12-19 PROBLEM — R41.82 ALTERED MENTAL STATUS: Status: ACTIVE | Noted: 2024-12-19

## 2024-12-19 LAB
ALBUMIN SERPL-MCNC: 2.9 G/DL (ref 3.5–5.2)
ALP SERPL-CCNC: 103 U/L (ref 35–104)
ALT SERPL-CCNC: 7 U/L (ref 0–32)
ANION GAP SERPL CALCULATED.3IONS-SCNC: 11 MMOL/L (ref 7–16)
AST SERPL-CCNC: 19 U/L (ref 0–31)
BACTERIA URNS QL MICRO: ABNORMAL
BASOPHILS # BLD: 0 K/UL (ref 0–0.2)
BASOPHILS NFR BLD: 0 % (ref 0–2)
BILIRUB DIRECT SERPL-MCNC: 0.2 MG/DL (ref 0–0.3)
BILIRUB INDIRECT SERPL-MCNC: 0.2 MG/DL (ref 0–1)
BILIRUB SERPL-MCNC: 0.4 MG/DL (ref 0–1.2)
BILIRUB UR QL STRIP: NEGATIVE
BUN SERPL-MCNC: 11 MG/DL (ref 6–23)
CALCIUM SERPL-MCNC: 8.2 MG/DL (ref 8.6–10.2)
CHLORIDE SERPL-SCNC: 110 MMOL/L (ref 98–107)
CLARITY UR: CLEAR
CO2 SERPL-SCNC: 17 MMOL/L (ref 22–29)
COLOR UR: YELLOW
CREAT SERPL-MCNC: 0.7 MG/DL (ref 0.5–1)
EKG ATRIAL RATE: 129 BPM
EKG Q-T INTERVAL: 316 MS
EKG QRS DURATION: 94 MS
EKG QTC CALCULATION (BAZETT): 384 MS
EKG R AXIS: -64 DEGREES
EKG T AXIS: -67 DEGREES
EKG VENTRICULAR RATE: 89 BPM
EOSINOPHIL # BLD: 0 K/UL (ref 0.05–0.5)
EOSINOPHILS RELATIVE PERCENT: 0 % (ref 0–6)
EPI CELLS #/AREA URNS HPF: ABNORMAL /HPF
ERYTHROCYTE [DISTWIDTH] IN BLOOD BY AUTOMATED COUNT: 15.4 % (ref 11.5–15)
GFR, ESTIMATED: 79 ML/MIN/1.73M2
GLUCOSE BLD-MCNC: 115 MG/DL (ref 74–99)
GLUCOSE SERPL-MCNC: 113 MG/DL (ref 74–99)
GLUCOSE UR STRIP-MCNC: NEGATIVE MG/DL
HCT VFR BLD AUTO: 40.3 % (ref 34–48)
HGB BLD-MCNC: 13.8 G/DL (ref 11.5–15.5)
HGB UR QL STRIP.AUTO: NEGATIVE
KETONES UR STRIP-MCNC: NEGATIVE MG/DL
LEUKOCYTE ESTERASE UR QL STRIP: ABNORMAL
LYMPHOCYTES NFR BLD: 0.11 K/UL (ref 1.5–4)
LYMPHOCYTES RELATIVE PERCENT: 1 % (ref 20–42)
MCH RBC QN AUTO: 40 PG (ref 26–35)
MCHC RBC AUTO-ENTMCNC: 34.2 G/DL (ref 32–34.5)
MCV RBC AUTO: 116.8 FL (ref 80–99.9)
MONOCYTES NFR BLD: 0.34 K/UL (ref 0.1–0.95)
MONOCYTES NFR BLD: 3 % (ref 2–12)
NEUTROPHILS NFR BLD: 97 % (ref 43–80)
NEUTS SEG NFR BLD: 12.94 K/UL (ref 1.8–7.3)
NITRITE UR QL STRIP: POSITIVE
PH UR STRIP: 5 [PH] (ref 5–9)
PLATELET # BLD AUTO: 201 K/UL (ref 130–450)
PMV BLD AUTO: 9.7 FL (ref 7–12)
POTASSIUM SERPL-SCNC: 4.1 MMOL/L (ref 3.5–5)
PROT SERPL-MCNC: 6 G/DL (ref 6.4–8.3)
PROT UR STRIP-MCNC: NEGATIVE MG/DL
RBC # BLD AUTO: 3.45 M/UL (ref 3.5–5.5)
RBC # BLD: ABNORMAL 10*6/UL
RBC #/AREA URNS HPF: ABNORMAL /HPF
SODIUM SERPL-SCNC: 138 MMOL/L (ref 132–146)
SP GR UR STRIP: 1.02 (ref 1–1.03)
T4 FREE SERPL-MCNC: 1.1 NG/DL (ref 0.9–1.7)
TROPONIN I SERPL HS-MCNC: 15 NG/L (ref 0–9)
TROPONIN I SERPL HS-MCNC: 17 NG/L (ref 0–9)
TSH SERPL DL<=0.05 MIU/L-ACNC: 0.82 UIU/ML (ref 0.27–4.2)
UROBILINOGEN UR STRIP-ACNC: 0.2 EU/DL (ref 0–1)
WBC #/AREA URNS HPF: ABNORMAL /HPF
WBC OTHER # BLD: 13.4 K/UL (ref 4.5–11.5)

## 2024-12-19 PROCEDURE — 70450 CT HEAD/BRAIN W/O DYE: CPT

## 2024-12-19 PROCEDURE — 2500000003 HC RX 250 WO HCPCS: Performed by: FAMILY MEDICINE

## 2024-12-19 PROCEDURE — 99285 EMERGENCY DEPT VISIT HI MDM: CPT

## 2024-12-19 PROCEDURE — 2580000003 HC RX 258: Performed by: EMERGENCY MEDICINE

## 2024-12-19 PROCEDURE — 83036 HEMOGLOBIN GLYCOSYLATED A1C: CPT

## 2024-12-19 PROCEDURE — 87088 URINE BACTERIA CULTURE: CPT

## 2024-12-19 PROCEDURE — 84484 ASSAY OF TROPONIN QUANT: CPT

## 2024-12-19 PROCEDURE — 93005 ELECTROCARDIOGRAM TRACING: CPT

## 2024-12-19 PROCEDURE — 6370000000 HC RX 637 (ALT 250 FOR IP): Performed by: INTERNAL MEDICINE

## 2024-12-19 PROCEDURE — 84439 ASSAY OF FREE THYROXINE: CPT

## 2024-12-19 PROCEDURE — 2060000000 HC ICU INTERMEDIATE R&B

## 2024-12-19 PROCEDURE — 6360000002 HC RX W HCPCS: Performed by: INTERNAL MEDICINE

## 2024-12-19 PROCEDURE — 72125 CT NECK SPINE W/O DYE: CPT

## 2024-12-19 PROCEDURE — 99223 1ST HOSP IP/OBS HIGH 75: CPT | Performed by: INTERNAL MEDICINE

## 2024-12-19 PROCEDURE — 87086 URINE CULTURE/COLONY COUNT: CPT

## 2024-12-19 PROCEDURE — 71045 X-RAY EXAM CHEST 1 VIEW: CPT

## 2024-12-19 PROCEDURE — 81001 URINALYSIS AUTO W/SCOPE: CPT

## 2024-12-19 PROCEDURE — 86403 PARTICLE AGGLUT ANTBDY SCRN: CPT

## 2024-12-19 PROCEDURE — 87040 BLOOD CULTURE FOR BACTERIA: CPT

## 2024-12-19 PROCEDURE — 74177 CT ABD & PELVIS W/CONTRAST: CPT

## 2024-12-19 PROCEDURE — 82248 BILIRUBIN DIRECT: CPT

## 2024-12-19 PROCEDURE — 6360000004 HC RX CONTRAST MEDICATION: Performed by: RADIOLOGY

## 2024-12-19 PROCEDURE — 6360000002 HC RX W HCPCS: Performed by: EMERGENCY MEDICINE

## 2024-12-19 PROCEDURE — 2500000003 HC RX 250 WO HCPCS: Performed by: EMERGENCY MEDICINE

## 2024-12-19 PROCEDURE — 82947 ASSAY GLUCOSE BLOOD QUANT: CPT

## 2024-12-19 PROCEDURE — 85025 COMPLETE CBC W/AUTO DIFF WBC: CPT

## 2024-12-19 PROCEDURE — 2500000003 HC RX 250 WO HCPCS: Performed by: INTERNAL MEDICINE

## 2024-12-19 PROCEDURE — 93010 ELECTROCARDIOGRAM REPORT: CPT | Performed by: INTERNAL MEDICINE

## 2024-12-19 PROCEDURE — 96372 THER/PROPH/DIAG INJ SC/IM: CPT

## 2024-12-19 PROCEDURE — 80053 COMPREHEN METABOLIC PANEL: CPT

## 2024-12-19 PROCEDURE — 6370000000 HC RX 637 (ALT 250 FOR IP)

## 2024-12-19 PROCEDURE — 96374 THER/PROPH/DIAG INJ IV PUSH: CPT

## 2024-12-19 PROCEDURE — 96375 TX/PRO/DX INJ NEW DRUG ADDON: CPT

## 2024-12-19 PROCEDURE — 87150 DNA/RNA AMPLIFIED PROBE: CPT

## 2024-12-19 PROCEDURE — 84443 ASSAY THYROID STIM HORMONE: CPT

## 2024-12-19 PROCEDURE — 6360000002 HC RX W HCPCS: Performed by: FAMILY MEDICINE

## 2024-12-19 RX ORDER — ENOXAPARIN SODIUM 100 MG/ML
40 INJECTION SUBCUTANEOUS DAILY
Status: DISCONTINUED | OUTPATIENT
Start: 2024-12-19 | End: 2024-12-19

## 2024-12-19 RX ORDER — SODIUM CHLORIDE 0.9 % (FLUSH) 0.9 %
5-40 SYRINGE (ML) INJECTION PRN
Status: DISCONTINUED | OUTPATIENT
Start: 2024-12-19 | End: 2024-12-23 | Stop reason: HOSPADM

## 2024-12-19 RX ORDER — MAGNESIUM SULFATE IN WATER 40 MG/ML
2000 INJECTION, SOLUTION INTRAVENOUS PRN
Status: DISCONTINUED | OUTPATIENT
Start: 2024-12-19 | End: 2024-12-23 | Stop reason: HOSPADM

## 2024-12-19 RX ORDER — ACETAMINOPHEN 325 MG/1
650 TABLET ORAL EVERY 6 HOURS PRN
Status: DISCONTINUED | OUTPATIENT
Start: 2024-12-19 | End: 2024-12-23 | Stop reason: HOSPADM

## 2024-12-19 RX ORDER — DIPHENHYDRAMINE HYDROCHLORIDE 50 MG/ML
25 INJECTION INTRAMUSCULAR; INTRAVENOUS ONCE
Status: COMPLETED | OUTPATIENT
Start: 2024-12-19 | End: 2024-12-19

## 2024-12-19 RX ORDER — POTASSIUM CHLORIDE 1500 MG/1
40 TABLET, EXTENDED RELEASE ORAL PRN
Status: DISCONTINUED | OUTPATIENT
Start: 2024-12-19 | End: 2024-12-23 | Stop reason: HOSPADM

## 2024-12-19 RX ORDER — GLUCAGON 1 MG/ML
1 KIT INJECTION PRN
Status: DISCONTINUED | OUTPATIENT
Start: 2024-12-19 | End: 2024-12-23 | Stop reason: HOSPADM

## 2024-12-19 RX ORDER — ONDANSETRON 2 MG/ML
4 INJECTION INTRAMUSCULAR; INTRAVENOUS EVERY 6 HOURS PRN
Status: DISCONTINUED | OUTPATIENT
Start: 2024-12-19 | End: 2024-12-23 | Stop reason: HOSPADM

## 2024-12-19 RX ORDER — SODIUM CHLORIDE 0.9 % (FLUSH) 0.9 %
5-40 SYRINGE (ML) INJECTION EVERY 12 HOURS SCHEDULED
Status: DISCONTINUED | OUTPATIENT
Start: 2024-12-19 | End: 2024-12-23 | Stop reason: HOSPADM

## 2024-12-19 RX ORDER — DEXTROSE MONOHYDRATE 100 MG/ML
INJECTION, SOLUTION INTRAVENOUS CONTINUOUS PRN
Status: DISCONTINUED | OUTPATIENT
Start: 2024-12-19 | End: 2024-12-23 | Stop reason: HOSPADM

## 2024-12-19 RX ORDER — 0.9 % SODIUM CHLORIDE 0.9 %
1000 INTRAVENOUS SOLUTION INTRAVENOUS ONCE
Status: COMPLETED | OUTPATIENT
Start: 2024-12-19 | End: 2024-12-19

## 2024-12-19 RX ORDER — INSULIN LISPRO 100 [IU]/ML
0-4 INJECTION, SOLUTION INTRAVENOUS; SUBCUTANEOUS
Status: DISCONTINUED | OUTPATIENT
Start: 2024-12-19 | End: 2024-12-23 | Stop reason: HOSPADM

## 2024-12-19 RX ORDER — METOPROLOL SUCCINATE 50 MG/1
50 TABLET, EXTENDED RELEASE ORAL 2 TIMES DAILY
Status: DISCONTINUED | OUTPATIENT
Start: 2024-12-19 | End: 2024-12-20

## 2024-12-19 RX ORDER — HALOPERIDOL 5 MG/ML
5 INJECTION INTRAMUSCULAR ONCE
Status: COMPLETED | OUTPATIENT
Start: 2024-12-19 | End: 2024-12-19

## 2024-12-19 RX ORDER — DIGOXIN 0.25 MG/ML
500 INJECTION INTRAMUSCULAR; INTRAVENOUS ONCE
Status: COMPLETED | OUTPATIENT
Start: 2024-12-19 | End: 2024-12-19

## 2024-12-19 RX ORDER — ACETAMINOPHEN 650 MG/1
650 SUPPOSITORY RECTAL EVERY 6 HOURS PRN
Status: DISCONTINUED | OUTPATIENT
Start: 2024-12-19 | End: 2024-12-23 | Stop reason: HOSPADM

## 2024-12-19 RX ORDER — LORAZEPAM 2 MG/ML
2 INJECTION INTRAMUSCULAR ONCE
Status: COMPLETED | OUTPATIENT
Start: 2024-12-19 | End: 2024-12-19

## 2024-12-19 RX ORDER — POTASSIUM CHLORIDE 7.45 MG/ML
10 INJECTION INTRAVENOUS PRN
Status: DISCONTINUED | OUTPATIENT
Start: 2024-12-19 | End: 2024-12-23 | Stop reason: HOSPADM

## 2024-12-19 RX ORDER — POLYETHYLENE GLYCOL 3350 17 G/17G
17 POWDER, FOR SOLUTION ORAL DAILY PRN
Status: DISCONTINUED | OUTPATIENT
Start: 2024-12-19 | End: 2024-12-23 | Stop reason: HOSPADM

## 2024-12-19 RX ORDER — ONDANSETRON 4 MG/1
4 TABLET, ORALLY DISINTEGRATING ORAL EVERY 8 HOURS PRN
Status: DISCONTINUED | OUTPATIENT
Start: 2024-12-19 | End: 2024-12-23 | Stop reason: HOSPADM

## 2024-12-19 RX ORDER — DILTIAZEM HYDROCHLORIDE 5 MG/ML
0.25 INJECTION INTRAVENOUS ONCE
Status: COMPLETED | OUTPATIENT
Start: 2024-12-19 | End: 2024-12-19

## 2024-12-19 RX ORDER — IOPAMIDOL 755 MG/ML
75 INJECTION, SOLUTION INTRAVASCULAR
Status: COMPLETED | OUTPATIENT
Start: 2024-12-19 | End: 2024-12-19

## 2024-12-19 RX ORDER — SODIUM CHLORIDE 9 MG/ML
INJECTION, SOLUTION INTRAVENOUS PRN
Status: DISCONTINUED | OUTPATIENT
Start: 2024-12-19 | End: 2024-12-23 | Stop reason: HOSPADM

## 2024-12-19 RX ADMIN — DIGOXIN 500 MCG: 0.25 INJECTION INTRAMUSCULAR; INTRAVENOUS at 05:25

## 2024-12-19 RX ADMIN — DILTIAZEM HYDROCHLORIDE 17 MG: 5 INJECTION, SOLUTION INTRAVENOUS at 04:02

## 2024-12-19 RX ADMIN — SODIUM CHLORIDE 1000 ML: 9 INJECTION, SOLUTION INTRAVENOUS at 03:30

## 2024-12-19 RX ADMIN — HALOPERIDOL LACTATE 5 MG: 5 INJECTION, SOLUTION INTRAMUSCULAR at 06:44

## 2024-12-19 RX ADMIN — ENOXAPARIN SODIUM 40 MG: 100 INJECTION SUBCUTANEOUS at 10:18

## 2024-12-19 RX ADMIN — ACETAMINOPHEN 650 MG: 325 TABLET ORAL at 19:26

## 2024-12-19 RX ADMIN — DILTIAZEM HYDROCHLORIDE 5 MG/HR: 5 INJECTION, SOLUTION INTRAVENOUS at 04:16

## 2024-12-19 RX ADMIN — LORAZEPAM 2 MG: 2 INJECTION INTRAMUSCULAR; INTRAVENOUS at 07:21

## 2024-12-19 RX ADMIN — ZIPRASIDONE MESYLATE 20 MG: 20 INJECTION, POWDER, LYOPHILIZED, FOR SOLUTION INTRAMUSCULAR at 21:49

## 2024-12-19 RX ADMIN — IOPAMIDOL 75 ML: 755 INJECTION, SOLUTION INTRAVENOUS at 07:43

## 2024-12-19 RX ADMIN — DIPHENHYDRAMINE HYDROCHLORIDE 25 MG: 50 INJECTION INTRAMUSCULAR; INTRAVENOUS at 07:23

## 2024-12-19 RX ADMIN — WATER 1000 MG: 1 INJECTION INTRAMUSCULAR; INTRAVENOUS; SUBCUTANEOUS at 06:10

## 2024-12-19 RX ADMIN — DILTIAZEM HYDROCHLORIDE 15 MG/HR: 5 INJECTION, SOLUTION INTRAVENOUS at 13:50

## 2024-12-19 RX ADMIN — METOPROLOL SUCCINATE 50 MG: 25 TABLET, EXTENDED RELEASE ORAL at 18:06

## 2024-12-19 RX ADMIN — WATER 1000 MG: 1 INJECTION INTRAMUSCULAR; INTRAVENOUS; SUBCUTANEOUS at 18:05

## 2024-12-19 RX ADMIN — HALOPERIDOL LACTATE 5 MG: 5 INJECTION, SOLUTION INTRAMUSCULAR at 05:44

## 2024-12-19 ASSESSMENT — PAIN - FUNCTIONAL ASSESSMENT: PAIN_FUNCTIONAL_ASSESSMENT: PREVENTS OR INTERFERES WITH MANY ACTIVE NOT PASSIVE ACTIVITIES

## 2024-12-19 ASSESSMENT — PAIN DESCRIPTION - DESCRIPTORS: DESCRIPTORS: ACHING

## 2024-12-19 ASSESSMENT — PAIN DESCRIPTION - ORIENTATION: ORIENTATION: POSTERIOR

## 2024-12-19 ASSESSMENT — PAIN DESCRIPTION - LOCATION: LOCATION: NECK

## 2024-12-19 NOTE — CONSULTS
INPATIENT CARDIOLOGY CONSULT     Reason for Consult:  Afib-RVR    Cardiologist: TOBIN SORENSEN     Requesting Physician:   Kavon Bryant MD    Date of Consultation: 12/19/2024    HISTORY OF PRESENT ILLNESS:   89 y.o. female with a history of atrial fibrillation on oral anticoagulation congestive heart failure kidney stones chronic back pain dental caries diabetes GERD hearing loss hypertension multiple pulmonary nodules neuropathy osteoarthritis presents with being more confused and a fall,     During her emergency room course her tachycardia became more evident to be A-fib rapid ventricular response thus she was given a Cardizem bolus and then drip.      ED work up showed leukocytosis and Urin analysis was positive for Nitrite and WBC 6 to 9 and 2+ bacteria      losartan to 50 mg daily Toprol-XL 50 mg daily Amiodarone 200 mg by mouth daily   Eliquis 2.5 BID   Gabapentin 300 mg 3 times daily. Percocet 5 mg 4 times daily     Last TSH 8/2023 1.11    When I saw patient she is somnolence and only grimace to painful stimuli,     EKG December 19, 2024 revealed atrial fibrillation 89 bpm, incomplete right bundle branch block and nonspecific ST-T wave changes  Assessment     Atrial fibrillation with RVR:   Fall   Acute metabolic encephalopathy in the setting of leukocytosis and possible UTI   Leukocytosis follow pan culture    Chronic diastolic congestive heart failure: Compensated  Tricuspid valve regurgitation: Mild to moderate  Mitral valve regurgitation: Mild  Hypertension: Controlled  Chronic anticoagulation  Stage III chronic kidney disease    Past cardiac history   ECHO:  8/31/2023,   Atrial fibrillation noted.   Micro-bubble contrast injected to enhance left ventricular visualization.      Normal left ventricular size.   LV systolic function is low normal.   Ejection fraction is visually estimated at 50% with [id number]   variability due to arrhythmia.   Abnormal diastolic function.   No regional wall motion     on Eliquis    Calculus of kidney 05/19/2021    CHF (congestive heart failure) (HCC)     last hospitalized 2021   echo from march 2021  EF 30%    Chronic back pain     Community acquired pneumonia of left lung     COVID-19 03/2022    body aches sinus issues    Dental caries     Diabetes mellitus (HCC)     diet controlled    Elevated brain natriuretic peptide (BNP) level 12/05/2019    Gall bladder stones     GERD (gastroesophageal reflux disease)     Gram-positive cocci bacteremia     H/O cardiovascular stress test 03/30/2021    Lexiscan    Hearing loss     Hypertension     Multiple pulmonary nodules 05/19/2021    Neuropathy 05/26/2021    Osteoarthritis     Other contact with raccoon, sequela 08/10/2020    This Diagnosis was added to the Problem List based on transcribed orders from Dr. Es Fowler NP      Sepsis due to Streptococcus pneumoniae with acute hypoxic respiratory failure and septic shock ([facility]) 12/05/2019       PAST SURGICAL HISTORY:    Past Surgical History:   Procedure Laterality Date    APPENDECTOMY      HERNIA REPAIR         HOME MEDICATIONS:  Prior to Admission medications    Medication Sig Start Date End Date Taking? Authorizing Provider   metoprolol succinate (TOPROL XL) 50 MG extended release tablet TAKE 1 TABLET BY MOUTH DAILY 11/12/24   Marie Hopkins MD   losartan (COZAAR) 50 MG tablet TAKE 1 TABLET BY MOUTH EVERY MORNING 3/12/24   Marie Hopkins MD   ELIQUIS 2.5 MG TABS tablet TAKE 1 TABLET BY MOUTH TWICE DAILY 12/26/23   Franklyn Turcios MD   amiodarone (CORDARONE) 200 MG tablet Take 1 tablet by mouth daily 10/30/23   Marie Hopkins MD   apixaban (ELIQUIS) 5 MG TABS tablet Take 1 tablet by mouth 2 times daily New, higher dose  Patient not taking: Reported on 10/25/2023 9/3/23   Cici Hayes APRN - NP   furosemide (LASIX) 40 MG tablet Take 1 tablet by mouth daily for 5 days  Patient not taking: Reported on 10/25/2023 9/4/23 9/9/23  Cici Hayes APRN - NP   losartan (COZAAR) 25 MG  There is no transient ischemic dilation.  5. Low risk myocardial perfusion study.     Assessment  Atrial fibrillation with RVR:   Borderline trop leak  Lower extremity edema  Left lower extremity erythema  Altered mental status  Reported UTI  Metabolic encephalopathy  Small bilateral pleural effusions   Leukocytosis  Fall, Recurrent  Lethargy  Low grade fever  Acute metabolic encephalopathy in the setting of leukocytosis and possible UTI   Leukocytosis follow pan culture    Chronic diastolic congestive heart failure: Compensated  Tricuspid valve regurgitation: Mild to moderate  Mitral valve regurgitation: Mild  Hypertension: Controlled  Chronic anticoagulation  Stage III chronic kidney disease         Plan:   Patient is normally on 50 mg p.o. metoprolol succinate daily  Given her presentation of A-fib with RVR we will increase metoprolol to 50 mg p.o. twice a day and uptitrate for optimal rate control as blood pressure allows  Wean diltiazem drip and uptitrate beta-blocker for rate control  Continue home dose Eliquis  Hold losartan for now and uptitrate beta-blocker for optimal blood pressure and heart rate control  Monitor volume status closely  Monitor ins and outs closely and document  Echocardiogram to guide therapy  Evaluation and management of UTI and metabolic encephalopathy per primary other specialist involved  Aggressive PT OT  Please obtain TSH and free T4 to guide therapy  Also obtain liver enzymes to guide therapy given patient is on amiodarone  If persistently patient remaining A-fib despite amiodarone therapy we may hold amiodarone at that time  Further recommendation to follow after echo is resulted  Trend cardiac enzymes and EKG  Monitor closely on telemetry  Rest per primary and other specialist involved        Mello Mccoy MD  [facility]

## 2024-12-19 NOTE — ED NOTES
Patient needing constant redirection. Pt attempting to pull out lines, exit the bed, and remove neck brace. Dr. Garcia notified.

## 2024-12-19 NOTE — ED NOTES
Patient placed on monitors @0324 Patient briefly in SVT @192 HR  Patient seen to be in A-fib RVR on EKG. Dr. Wylie notified

## 2024-12-19 NOTE — ED PROVIDER NOTES
[facility]  EMERGENCY DEPARTMENT ENCOUNTER        Pt Name: Jackelyn Montes  MRN: [id number]  Birthdate 1935  Date of evaluation: 12/19/2024  Provider: Reynaldo Wylie DO  PCP: No primary care provider on file.  Note Started: 4:03 AM EST 12/19/24    CHIEF COMPLAINT       Chief Complaint   Patient presents with    Altered Mental Status     Per  all day yesterday new confusion,  had fall approximately an hour ago, cellulitis left lower leg, warm to touch        HISTORY OF PRESENT ILLNESS: 1 or more Elements   History From: patient    Limitations to history : None    Jackelyn Montes is a 89 y.o. female who presents to the ED for evaluation.  Per the  the patient has been more confused today.  Had a fall prior to arrival.  Patient currently being treated for cellulitis of her left lower extremity.  Patient seems alert and oriented now.  Patient states that she did not hit her head and denies any neck or back pain.  Denies any injuries as a result of the fall.  States that there was no preceding chest pain or shortness of breath.    Nursing Notes were all reviewed and agreed with or any disagreements were addressed in the HPI.      REVIEW OF EXTERNAL NOTE :        REVIEW OF SYSTEMS :           Positives and Pertinent negatives as per HPI.     SURGICAL HISTORY     Past Surgical History:   Procedure Laterality Date    APPENDECTOMY      HERNIA REPAIR         CURRENTMEDICATIONS       Current Discharge Medication List        CONTINUE these medications which have NOT CHANGED    Details   metoprolol succinate (TOPROL XL) 50 MG extended release tablet TAKE 1 TABLET BY MOUTH DAILY  Qty: 30 tablet, Refills: 0    Comments: **Patient requests 90 days supply**      !! losartan (COZAAR) 50 MG tablet TAKE 1 TABLET BY MOUTH EVERY MORNING  Qty: 90 tablet, Refills: 1    Associated Diagnoses: Essential hypertension      !! ELIQUIS 2.5 MG TABS tablet TAKE 1 TABLET BY MOUTH TWICE DAILY  Qty: 180 tablet,  Patient states that she did not hit her head and denies any neck or back pain. Differential diagnoses included but not limited to intracranial hemorrhage, skull fracture, cervical spine fracture/subluxation, electrolyte derangement, atrial fibrillation RVR, anemia. Workup in the ED consisted of appropriate labs and imaging.  The following labs were evaluate interpreted by myself.  CBC does show leukocytosis with left shift.  No evidence of anemia.  BMP shows no electrolyte abnormality other than depressed CO2 at 17.  No evidence of renal insufficiency.  Blood cultures pending.  Patient's initial troponin was 17.  Repeat troponin is pending.  Urinalysis did show evidence of infection with positive nitrate and small leukocyte esterase.  CT of the head and neck interpreted by the radiologist as showing no acute intracranial abnormality or acute cervical spine injury.  CT of the abdomen pelvis with IV contrast interpreted by the radiologist as showing small bilateral pleural effusion with some atelectasis changes identified.  Cholelithiasis with some mild wall thickening.  No pericholecystic fluid.  No attenuation defects seen within the spleen which may be related to perfusion such as area of infarction.  Chronicity is uncertain.  No surrounding fluid.  Increased total burden seen throughout.  Chest x-ray interpreted by the radiologist as showing cardiomegaly with underlying chronic changes.  While patient was in the ED she developed atrial fibrillation with rapid ventricular response.  Was given bolus of Cardizem and started on Cardizem drip.  This mildly improved her heart rate.  However she was still tachycardic.  She was then given digoxin and IV fluids. Patient requires continued workup and management of their symptoms and will be admitted to the hospital for further evaluation and treatment.      CONSULTS: (Who and What was discussed)  medicine      I am the Primary Clinician of Record.    FINAL IMPRESSION

## 2024-12-19 NOTE — H&P
[facility]   History and Physical      CHIEF COMPLAINT: Increased confusion    History of Present Illness:  89 y.o. female with a history of atrial fibrillation on oral anticoagulation congestive heart failure kidney stones chronic back pain dental caries diabetes GERD hearing loss hypertension multiple pulmonary nodules neuropathy osteoarthritis presents with increased confusion.  She was brought in with her  who is not currently here in fact the patient is sleeping comfortably.  He has said that she has recent treatment for left leg cellulitis.  Earlier the ER doctor said she was alert and oriented.   said that she was confused and weak in fact she fell.  She denied hitting her head.  She denies any other injury from the fall.  ED workup showed leukocytosis and UA positive for signs of infection blood culture sent  During her emergency room course her tachycardia became more evident to be A-fib rapid ventricular response thus she was given a Cardizem bolus and then drip.  She did not improve enough so digoxin given    Informant(s) for H&P: Chart    REVIEW OF SYSTEMS:  no fevers, chills, cp, sob, n/v, ha, vision/hearing changes, wt changes, hot/cold flashes, other open skin lesions, diarrhea, constipation, dysuria/hematuria unless noted in HPI. Complete ROS performed with the patient and is otherwise negative.      PMH:  Past Medical History:   Diagnosis Date    Acute metabolic encephalopathy 12/05/2019    Acute respiratory failure with hypoxia 12/05/2019    was hospitalized for 2 months and then had to go to rehab  (states was not on a vent)    JOVANI (acute kidney injury) (Former[facility]) 12/05/2019    Anticoagulant long-term use     Atrial fibrillation ([facility])     on Eliquis    Calculus of kidney 05/19/2021    CHF (congestive heart failure) (Formerly [facility])     last hospitalized 2021   echo from march 2021  EF 30%    Chronic back pain     Community acquired pneumonia of left lung      XRAY VIEW OF THE CHEST 12/19/2024 4:08 am COMPARISON: 08/30/2023 HISTORY: ORDERING SYSTEM PROVIDED HISTORY: AMS TECHNOLOGIST PROVIDED HISTORY: Reason for exam:->AMS FINDINGS: Portable chest reveals heart to be enlarged.  Underlying chronic changes seen throughout the lung fields bilaterally.  No focal parenchymal opacification present.  No pleural effusion or pneumothorax.  Bony structures reveal degenerative changes seen within the spine.  Mild increased markings at the right lung base with small right pleural effusion.     Cardiomegaly with underlying chronic changes seen throughout the lung fields bilaterally. Mild increased markings at the right lung base with small right pleural effusion.       EKG: Atrial fibrillation without tachycardia during twelve-lead EKG    ASSESSMENT:      Active Problems:    * No active hospital problems. *  Resolved Problems:    * No resolved hospital problems. *      PLAN:    A-fib RVR with history of heart failure Cardizem drip status post digoxin consult to [facility] cardiology send TSH.  Continue systemic anticoagulation with Eliquis.  Hold arb so that the cardiologist will have room to maneuver with the blood pressure and chronotropes  Metabolic encephalopathy probably multifactorial including #1 and #3 continue supportive care  UTI empiric antibiotics of Rocephin urine culture blood culture pending    No change to outpatient treatment regimen for the existing stable combordities including: kidney stones chronic back pain dental caries GERD hearing loss hypertension multiple pulmonary nodules neuropathy osteoarthritis  Diabetes: Diabetic diet.  No oral diabetic meds here.  Sliding scale coverage.  Hypoglycemic protocol.  Code Status: Full  DVT prophylaxis: Eliquis  Disposition will primarily be based on how bad her metabolic encephalopathy and debility would be    NOTE: This report was transcribed using voice recognition software. Every effort was made to ensure accuracy; however,

## 2024-12-20 ENCOUNTER — HOSPITAL ENCOUNTER (INPATIENT)
Age: 88
Discharge: HOME | DRG: 871 | End: 2024-12-22
Payer: MEDICARE

## 2024-12-20 VITALS
WEIGHT: 150 LBS | BODY MASS INDEX: 25.61 KG/M2 | SYSTOLIC BLOOD PRESSURE: 133 MMHG | DIASTOLIC BLOOD PRESSURE: 92 MMHG | HEIGHT: 64 IN

## 2024-12-20 LAB
BASOPHILS # BLD: 0 K/UL (ref 0–0.2)
BASOPHILS NFR BLD: 0 % (ref 0–2)
ECHO AO ASC DIAM: 2.3 CM
ECHO AO ASCENDING AORTA INDEX: 1.33 CM/M2
ECHO AV AREA PEAK VELOCITY: 1.3 CM2
ECHO AV AREA VTI: 1.3 CM2
ECHO AV AREA/BSA PEAK VELOCITY: 0.8 CM2/M2
ECHO AV AREA/BSA VTI: 0.8 CM2/M2
ECHO AV CUSP MM: 1.6 CM
ECHO AV MEAN GRADIENT: 4 MMHG
ECHO AV MEAN VELOCITY: 1 M/S
ECHO AV PEAK GRADIENT: 8 MMHG
ECHO AV PEAK VELOCITY: 1.4 M/S
ECHO AV VELOCITY RATIO: 0.57
ECHO AV VTI: 22.2 CM
ECHO BSA: 1.75 M2
ECHO EST RA PRESSURE: 8 MMHG
ECHO LA DIAMETER INDEX: 2.14 CM/M2
ECHO LA DIAMETER: 3.7 CM
ECHO LA VOL A-L A2C: 51 ML (ref 22–52)
ECHO LA VOL MOD A2C: 46 ML (ref 22–52)
ECHO LA VOLUME INDEX A-L A2C: 29 ML/M2 (ref 16–34)
ECHO LA VOLUME INDEX MOD A2C: 27 ML/M2 (ref 16–34)
ECHO LV EDV A4C: 49 ML
ECHO LV EDV INDEX A4C: 28 ML/M2
ECHO LV EJECTION FRACTION A4C: 64 %
ECHO LV EJECTION FRACTION BIPLANE: 55 % (ref 55–100)
ECHO LV ESV A4C: 17 ML
ECHO LV ESV INDEX A4C: 10 ML/M2
ECHO LV FRACTIONAL SHORTENING: 29 % (ref 28–44)
ECHO LV INTERNAL DIMENSION DIASTOLE INDEX: 2.43 CM/M2
ECHO LV INTERNAL DIMENSION DIASTOLIC: 4.2 CM (ref 3.9–5.3)
ECHO LV INTERNAL DIMENSION SYSTOLIC INDEX: 1.73 CM/M2
ECHO LV INTERNAL DIMENSION SYSTOLIC: 3 CM
ECHO LV ISOVOLUMETRIC RELAXATION TIME (IVRT): 68.5 MS
ECHO LV IVSD: 0.8 CM (ref 0.6–0.9)
ECHO LV IVSS: 1.1 CM
ECHO LV MASS 2D: 127.8 G (ref 67–162)
ECHO LV MASS INDEX 2D: 73.9 G/M2 (ref 43–95)
ECHO LV POSTERIOR WALL DIASTOLIC: 1.1 CM (ref 0.6–0.9)
ECHO LV POSTERIOR WALL SYSTOLIC: 1.2 CM
ECHO LV RELATIVE WALL THICKNESS RATIO: 0.52
ECHO LVOT AREA: 2.3 CM2
ECHO LVOT AV VTI INDEX: 0.56
ECHO LVOT DIAM: 1.7 CM
ECHO LVOT MEAN GRADIENT: 1 MMHG
ECHO LVOT PEAK GRADIENT: 3 MMHG
ECHO LVOT PEAK VELOCITY: 0.8 M/S
ECHO LVOT STROKE VOLUME INDEX: 16.4 ML/M2
ECHO LVOT SV: 28.4 ML
ECHO LVOT VTI: 12.5 CM
ECHO MV "A" WAVE DURATION: 85.6 MSEC
ECHO MV A VELOCITY: 0.17 M/S
ECHO MV AREA PHT: 3.6 CM2
ECHO MV AREA VTI: 1.1 CM2
ECHO MV E DECELERATION TIME (DT): 134.8 MS
ECHO MV E VELOCITY: 1.22 M/S
ECHO MV E/A RATIO: 7.18
ECHO MV LVOT VTI INDEX: 2.14
ECHO MV MAX VELOCITY: 1.5 M/S
ECHO MV MEAN GRADIENT: 4 MMHG
ECHO MV MEAN VELOCITY: 0.9 M/S
ECHO MV PEAK GRADIENT: 9 MMHG
ECHO MV PRESSURE HALF TIME (PHT): 60.4 MS
ECHO MV VTI: 26.8 CM
ECHO RIGHT VENTRICULAR SYSTOLIC PRESSURE (RVSP): 41 MMHG
ECHO RV INTERNAL DIMENSION: 2.5 CM
ECHO RV LONGITUDINAL DIMENSION: 5 CM
ECHO RV MID DIMENSION: 2.7 CM
ECHO TV REGURGITANT MAX VELOCITY: 2.88 M/S
ECHO TV REGURGITANT PEAK GRADIENT: 33 MMHG
EOSINOPHIL # BLD: 0 K/UL (ref 0.05–0.5)
EOSINOPHILS RELATIVE PERCENT: 0 % (ref 0–6)
ERYTHROCYTE [DISTWIDTH] IN BLOOD BY AUTOMATED COUNT: 15.2 % (ref 11.5–15)
GLUCOSE BLD-MCNC: 121 MG/DL (ref 74–99)
GLUCOSE BLD-MCNC: 160 MG/DL (ref 74–99)
HBA1C MFR BLD: 4.6 % (ref 4–5.6)
HCT VFR BLD AUTO: 40.1 % (ref 34–48)
HGB BLD-MCNC: 14 G/DL (ref 11.5–15.5)
LYMPHOCYTES NFR BLD: 0.12 K/UL (ref 1.5–4)
LYMPHOCYTES RELATIVE PERCENT: 1 % (ref 20–42)
MCH RBC QN AUTO: 39.9 PG (ref 26–35)
MCHC RBC AUTO-ENTMCNC: 34.9 G/DL (ref 32–34.5)
MCV RBC AUTO: 114.2 FL (ref 80–99.9)
MONOCYTES NFR BLD: 0.83 K/UL (ref 0.1–0.95)
MONOCYTES NFR BLD: 6 % (ref 2–12)
NEUTROPHILS NFR BLD: 93 % (ref 43–80)
NEUTS SEG NFR BLD: 12.85 K/UL (ref 1.8–7.3)
PLATELET # BLD AUTO: 202 K/UL (ref 130–450)
PMV BLD AUTO: 10.1 FL (ref 7–12)
RBC # BLD AUTO: 3.51 M/UL (ref 3.5–5.5)
RBC # BLD: ABNORMAL 10*6/UL
RBC # BLD: ABNORMAL 10*6/UL
WBC OTHER # BLD: 13.8 K/UL (ref 4.5–11.5)

## 2024-12-20 PROCEDURE — 85025 COMPLETE CBC W/AUTO DIFF WBC: CPT

## 2024-12-20 PROCEDURE — 93306 TTE W/DOPPLER COMPLETE: CPT | Performed by: INTERNAL MEDICINE

## 2024-12-20 PROCEDURE — 6370000000 HC RX 637 (ALT 250 FOR IP): Performed by: INTERNAL MEDICINE

## 2024-12-20 PROCEDURE — 36415 COLL VENOUS BLD VENIPUNCTURE: CPT

## 2024-12-20 PROCEDURE — 6370000000 HC RX 637 (ALT 250 FOR IP)

## 2024-12-20 PROCEDURE — 2500000003 HC RX 250 WO HCPCS: Performed by: FAMILY MEDICINE

## 2024-12-20 PROCEDURE — 2500000003 HC RX 250 WO HCPCS: Performed by: INTERNAL MEDICINE

## 2024-12-20 PROCEDURE — 6360000002 HC RX W HCPCS: Performed by: FAMILY MEDICINE

## 2024-12-20 PROCEDURE — 2500000003 HC RX 250 WO HCPCS: Performed by: EMERGENCY MEDICINE

## 2024-12-20 PROCEDURE — 82947 ASSAY GLUCOSE BLOOD QUANT: CPT

## 2024-12-20 PROCEDURE — 2060000000 HC ICU INTERMEDIATE R&B

## 2024-12-20 PROCEDURE — 99232 SBSQ HOSP IP/OBS MODERATE 35: CPT | Performed by: INTERNAL MEDICINE

## 2024-12-20 PROCEDURE — 6360000002 HC RX W HCPCS: Performed by: INTERNAL MEDICINE

## 2024-12-20 PROCEDURE — 99233 SBSQ HOSP IP/OBS HIGH 50: CPT | Performed by: INTERNAL MEDICINE

## 2024-12-20 PROCEDURE — 93306 TTE W/DOPPLER COMPLETE: CPT

## 2024-12-20 PROCEDURE — 2580000003 HC RX 258: Performed by: EMERGENCY MEDICINE

## 2024-12-20 RX ORDER — ORPHENADRINE CITRATE 30 MG/ML
60 INJECTION INTRAMUSCULAR; INTRAVENOUS ONCE
Status: COMPLETED | OUTPATIENT
Start: 2024-12-21 | End: 2024-12-21

## 2024-12-20 RX ORDER — FENTANYL CITRATE 0.05 MG/ML
25 INJECTION, SOLUTION INTRAMUSCULAR; INTRAVENOUS ONCE
Status: COMPLETED | OUTPATIENT
Start: 2024-12-21 | End: 2024-12-21

## 2024-12-20 RX ADMIN — APIXABAN 2.5 MG: 2.5 TABLET, FILM COATED ORAL at 08:46

## 2024-12-20 RX ADMIN — DILTIAZEM HYDROCHLORIDE 12.5 MG/HR: 5 INJECTION, SOLUTION INTRAVENOUS at 04:08

## 2024-12-20 RX ADMIN — METOPROLOL SUCCINATE 50 MG: 25 TABLET, EXTENDED RELEASE ORAL at 08:46

## 2024-12-20 RX ADMIN — SODIUM CHLORIDE, PRESERVATIVE FREE 10 ML: 5 INJECTION INTRAVENOUS at 21:08

## 2024-12-20 RX ADMIN — ZIPRASIDONE MESYLATE 20 MG: 20 INJECTION, POWDER, LYOPHILIZED, FOR SOLUTION INTRAMUSCULAR at 19:41

## 2024-12-20 RX ADMIN — WATER 2000 MG: 1 INJECTION INTRAMUSCULAR; INTRAVENOUS; SUBCUTANEOUS at 12:46

## 2024-12-20 RX ADMIN — APIXABAN 2.5 MG: 2.5 TABLET, FILM COATED ORAL at 21:05

## 2024-12-20 RX ADMIN — DILTIAZEM HYDROCHLORIDE 12.5 MG/HR: 5 INJECTION, SOLUTION INTRAVENOUS at 13:31

## 2024-12-20 RX ADMIN — METOPROLOL SUCCINATE 75 MG: 25 TABLET, EXTENDED RELEASE ORAL at 21:05

## 2024-12-20 NOTE — PROGRESS NOTES
Was unable to complete full head to toe and database due to patients AMS. Patient will follow some simple commands but then trails off with hallucinations and talking to her  parents. Pt is severely confused and combative at times while trying to get out of bed. Upon arrival to the unit patient had removed all IV's and was combative towards staff. TSM initiated for patient safety. Will try and contact  to help finish database later this morning.

## 2024-12-20 NOTE — PROGRESS NOTES
INPATIENT CARDIOLOGY CONSULT     Reason for Consult:  Afib-RVR    Cardiologist: TOBIN SORENSEN     Requesting Physician:   Kavon Bryant MD    Date of Consultation: 12/20/2024    HISTORY OF PRESENT ILLNESS:   89 y.o. female with a history of atrial fibrillation on oral anticoagulation congestive heart failure kidney stones chronic back pain dental caries diabetes GERD hearing loss hypertension multiple pulmonary nodules neuropathy osteoarthritis presents with being more confused and a fall,     Interim:  Denies chest pain or shortness of breath  Echo revealed EF of 55 to 60%.      Past cardiac history   ECHO:  8/31/2023,   Atrial fibrillation noted.   Micro-bubble contrast injected to enhance left ventricular visualization.      Normal left ventricular size.   LV systolic function is low normal.   Ejection fraction is visually estimated at 50% with [id number]   variability due to arrhythmia.   Abnormal diastolic function.   No regional wall motion abnormalities seen.   Mild left ventricular concentric hypertrophy noted.   Moderately dilated right ventricle with reduced function.   Biatrial dilation.   Mild mitral regurgitation.   Mild-moderate tricuspid regurgitation.     3/26/21 Summary   Normal left ventricular chamber size.   Moderate global hypokinesis, LV EF 30%.   Mild left ventricular concentric hypertrophy noted.   Indeterminate LV diastolic function. diastolic function.   Left atrium is enlarged.   Increased left atrial volume.   Interatrial septum not well visualized but appears intact.   Normal right ventricle size and function.   Right atrium is enlarged.   Mitral annular calcification is present.   There is mild mitral regurgitation.   No mitral valve prolapse.   The aortic valve appears mildly sclerotic.   No hemodynamically significant aortic stenosis.   There is mild to moderate tricuspid regurgitation.   Moderate pulmonary HTN, RVSP 54mmHg.   Normal aortic root size.   No evidence of

## 2024-12-20 NOTE — PROGRESS NOTES
Admitting Date and Time: 12/19/2024  3:13 AM  Admit Dx: Altered mental status [R41.82]  Disorientation [R41.0]  Atrial fibrillation with rapid ventricular response (HCC) [I48.91]  Urinary tract infection without hematuria, site unspecified [N39.0]  Longstanding persistent atrial fibrillation (HCC) [I48.11]    Subjective:    Good eye contact poor interaction  Per RN: Has been confused    ROS: denies fever, chills, cp, sob, n/v, HA unless stated above.     sodium chloride flush  5-40 mL IntraVENous 2 times per day    metoprolol succinate  50 mg Oral BID    apixaban  2.5 mg Oral BID    insulin lispro  0-4 Units SubCUTAneous 4x Daily AC & HS     sodium chloride flush, 5-40 mL, PRN  sodium chloride, , PRN  potassium chloride, 40 mEq, PRN   Or  potassium alternative oral replacement, 40 mEq, PRN   Or  potassium chloride, 10 mEq, PRN  magnesium sulfate, 2,000 mg, PRN  ondansetron, 4 mg, Q8H PRN   Or  ondansetron, 4 mg, Q6H PRN  polyethylene glycol, 17 g, Daily PRN  acetaminophen, 650 mg, Q6H PRN   Or  acetaminophen, 650 mg, Q6H PRN  glucose, 4 tablet, PRN  dextrose bolus, 125 mL, PRN   Or  dextrose bolus, 250 mL, PRN  glucagon (rDNA), 1 mg, PRN  dextrose, , Continuous PRN         Objective:    BP (!) 133/92   Pulse (!) 111   Temp 97.5 °F (36.4 °C) (Axillary)   Resp 16   Ht 1.626 m (5' 4\")   Wt 68 kg (150 lb)   SpO2 97%   BMI 25.75 kg/m²   General Appearance: alert and oriented to person, place and time and in no acute distress  Skin: warm and dry  Head: normocephalic and atraumatic  Eyes: pupils equal, round, and reactive to light, extraocular eye movements intact, conjunctivae normal  Neck: neck supple and non tender without mass   Pulmonary/Chest: clear to auscultation bilaterally- no wheezes, rales or rhonchi, normal air movement, no respiratory distress  Cardiovascular: normal rate, normal S1 and S2 and no carotid bruits  Abdomen: soft, non-tender, non-distended, normal bowel sounds, no masses or  organomegaly  Extremities: no cyanosis, no clubbing and no  edema  Neurologic: no cranial nerve deficit   Has not spoken to me directly on 12/20 she has been confused memory impaired per staff as I see her she is getting an echo.  She seems appropriate and alert    Recent Labs     12/19/24  0340      K 4.1   *   CO2 17*   BUN 11   CREATININE 0.7   GLUCOSE 113*   CALCIUM 8.2*       Recent Labs     12/19/24  1308   ALKPHOS 103   BILITOT 0.4   AST 19   ALT 7       Recent Labs     12/19/24  0340 12/20/24  0415   WBC 13.4* 13.8*   RBC 3.45* 3.51   HGB 13.8 14.0   HCT 40.3 40.1   .8* 114.2*   MCH 40.0* 39.9*   MCHC 34.2 34.9*   RDW 15.4* 15.2*    202   MPV 9.7 10.1           Radiology:   CT HEAD WO CONTRAST   Final Result   1.  There is no acute intracranial abnormality.  Specifically, there is no   intracranial hemorrhage.   2. Atrophy and periventricular leukomalacia,   .         CT CERVICAL SPINE WO CONTRAST   Final Result   1. There is no acute compression fracture or subluxation of the cervical   spine.   2. Multilevel degenerative disc and degenerative joint disease.         CT ABDOMEN PELVIS W IV CONTRAST Additional Contrast? None   Final Result   1. Small bilateral pleural effusions with some atelectatic changes identified   lung bases bilaterally.   2. Cholelithiasis with some mild wall thickening. No pericholecystic fluid.   3. Low attenuation defects seen within the spleen which may be related to   perfusion such is area of infarction. Chronicity is uncertain. No surrounding   fluid.   4. Increased stool burden seen diffusely throughout the colon to suggest   clinical presentation of constipation. No obvious obstruction.         XR CHEST PORTABLE   Final Result   Cardiomegaly with underlying chronic changes seen throughout the lung fields   bilaterally. Mild increased markings at the right lung base with small right   pleural effusion.             Assessment:  Principal Problem:     Eliquis  Disposition to be determined will get PT OT  NOTE: This report was transcribed using voice recognition software. Every effort was made to ensure accuracy; however, inadvertent computerized transcription errors may be present.     Electronically signed by ANNETTA MCPHERSON MD on 12/20/2024 at 10:46 AM

## 2024-12-20 NOTE — PLAN OF CARE
Problem: Chronic Conditions and Co-morbidities  Goal: Patient's chronic conditions and co-morbidity symptoms are monitored and maintained or improved  12/20/2024 1036 by James Bedoya RN  Outcome: Progressing  Flowsheets (Taken 12/20/2024 0840)  Care Plan - Patient's Chronic Conditions and Co-Morbidity Symptoms are Monitored and Maintained or Improved:   Monitor and assess patient's chronic conditions and comorbid symptoms for stability, deterioration, or improvement   Collaborate with multidisciplinary team to address chronic and comorbid conditions and prevent exacerbation or deterioration   Update acute care plan with appropriate goals if chronic or comorbid symptoms are exacerbated and prevent overall improvement and discharge  12/20/2024 0431 by Jyoti Arrieta, RN  Outcome: Progressing     Problem: Discharge Planning  Goal: Discharge to home or other facility with appropriate resources  12/20/2024 1036 by James Bedoya RN  Outcome: Progressing  Flowsheets (Taken 12/20/2024 0840)  Discharge to home or other facility with appropriate resources:   Identify barriers to discharge with patient and caregiver   Arrange for needed discharge resources and transportation as appropriate   Identify discharge learning needs (meds, wound care, etc)  12/20/2024 0431 by Jyoti Arrieta, RN  Outcome: Progressing     Problem: Safety - Adult  Goal: Free from fall injury  12/20/2024 1036 by James Bedoya RN  Outcome: Progressing  12/20/2024 0431 by Jyoti Arrieta RN  Outcome: Progressing     Problem: Skin/Tissue Integrity  Goal: Absence of new skin breakdown  Description: 1.  Monitor for areas of redness and/or skin breakdown  2.  Assess vascular access sites hourly  3.  Every 4-6 hours minimum:  Change oxygen saturation probe site  4.  Every 4-6 hours:  If on nasal continuous positive airway pressure, respiratory therapy assess nares and determine need for appliance change or resting period.  Outcome: Progressing

## 2024-12-21 LAB
ACB COMPLEX DNA BLD POS QL NAA+NON-PROBE: NOT DETECTED
B FRAGILIS DNA BLD POS QL NAA+NON-PROBE: NOT DETECTED
BIOFIRE TEST COMMENT: ABNORMAL
C ALBICANS DNA BLD POS QL NAA+NON-PROBE: NOT DETECTED
C AURIS DNA BLD POS QL NAA+NON-PROBE: NOT DETECTED
C GATTII+NEOFOR DNA BLD POS QL NAA+N-PRB: NOT DETECTED
C GLABRATA DNA BLD POS QL NAA+NON-PROBE: NOT DETECTED
C KRUSEI DNA BLD POS QL NAA+NON-PROBE: NOT DETECTED
C PARAP DNA BLD POS QL NAA+NON-PROBE: NOT DETECTED
C TROPICLS DNA BLD POS QL NAA+NON-PROBE: NOT DETECTED
E CLOAC COMP DNA BLD POS NAA+NON-PROBE: NOT DETECTED
E COLI DNA BLD POS QL NAA+NON-PROBE: NOT DETECTED
E FAECALIS DNA BLD POS QL NAA+NON-PROBE: NOT DETECTED
E FAECIUM DNA BLD POS QL NAA+NON-PROBE: NOT DETECTED
ENTEROBACTERALES DNA BLD POS NAA+N-PRB: NOT DETECTED
GLUCOSE BLD-MCNC: 103 MG/DL (ref 74–99)
GLUCOSE BLD-MCNC: 105 MG/DL (ref 74–99)
GLUCOSE BLD-MCNC: 123 MG/DL (ref 74–99)
GLUCOSE BLD-MCNC: 127 MG/DL (ref 74–99)
GP B STREP DNA BLD POS QL NAA+NON-PROBE: NOT DETECTED
HAEM INFLU DNA BLD POS QL NAA+NON-PROBE: NOT DETECTED
K OXYTOCA DNA BLD POS QL NAA+NON-PROBE: NOT DETECTED
KLEBSIELLA SP DNA BLD POS QL NAA+NON-PRB: NOT DETECTED
KLEBSIELLA SP DNA BLD POS QL NAA+NON-PRB: NOT DETECTED
L MONOCYTOG DNA BLD POS QL NAA+NON-PROBE: NOT DETECTED
MICROORGANISM SPEC CULT: ABNORMAL
MICROORGANISM/AGENT SPEC: ABNORMAL
MICROORGANISM/AGENT SPEC: ABNORMAL
N MEN DNA BLD POS QL NAA+NON-PROBE: NOT DETECTED
P AERUGINOSA DNA BLD POS NAA+NON-PROBE: NOT DETECTED
PROTEUS SP DNA BLD POS QL NAA+NON-PROBE: NOT DETECTED
S AUREUS DNA BLD POS QL NAA+NON-PROBE: NOT DETECTED
S AUREUS+CONS DNA BLD POS NAA+NON-PROBE: NOT DETECTED
S EPIDERMIDIS DNA BLD POS QL NAA+NON-PRB: NOT DETECTED
S LUGDUNENSIS DNA BLD POS QL NAA+NON-PRB: NOT DETECTED
S MALTOPHILIA DNA BLD POS QL NAA+NON-PRB: NOT DETECTED
S MARCESCENS DNA BLD POS NAA+NON-PROBE: NOT DETECTED
S PNEUM DNA BLD POS QL NAA+NON-PROBE: NOT DETECTED
S PYO DNA BLD POS QL NAA+NON-PROBE: NOT DETECTED
SALMONELLA DNA BLD POS QL NAA+NON-PROBE: NOT DETECTED
SERVICE CMNT-IMP: ABNORMAL
SPECIMEN DESCRIPTION: ABNORMAL
STREPTOCOCCUS DNA BLD POS NAA+NON-PROBE: DETECTED

## 2024-12-21 PROCEDURE — 2580000003 HC RX 258: Performed by: INTERNAL MEDICINE

## 2024-12-21 PROCEDURE — 6370000000 HC RX 637 (ALT 250 FOR IP): Performed by: INTERNAL MEDICINE

## 2024-12-21 PROCEDURE — 6360000002 HC RX W HCPCS: Performed by: FAMILY MEDICINE

## 2024-12-21 PROCEDURE — 99232 SBSQ HOSP IP/OBS MODERATE 35: CPT | Performed by: INTERNAL MEDICINE

## 2024-12-21 PROCEDURE — 6360000002 HC RX W HCPCS: Performed by: INTERNAL MEDICINE

## 2024-12-21 PROCEDURE — 6370000000 HC RX 637 (ALT 250 FOR IP)

## 2024-12-21 PROCEDURE — 2500000003 HC RX 250 WO HCPCS: Performed by: INTERNAL MEDICINE

## 2024-12-21 PROCEDURE — 82947 ASSAY GLUCOSE BLOOD QUANT: CPT

## 2024-12-21 PROCEDURE — 2060000000 HC ICU INTERMEDIATE R&B

## 2024-12-21 RX ORDER — LORAZEPAM 2 MG/ML
0.5 INJECTION INTRAMUSCULAR EVERY 6 HOURS PRN
Status: DISCONTINUED | OUTPATIENT
Start: 2024-12-21 | End: 2024-12-21 | Stop reason: ALTCHOICE

## 2024-12-21 RX ORDER — LORAZEPAM 2 MG/ML
1 INJECTION INTRAMUSCULAR EVERY 6 HOURS PRN
Status: DISCONTINUED | OUTPATIENT
Start: 2024-12-21 | End: 2024-12-23 | Stop reason: HOSPADM

## 2024-12-21 RX ORDER — LORAZEPAM 2 MG/ML
1 INJECTION INTRAMUSCULAR ONCE
Status: COMPLETED | OUTPATIENT
Start: 2024-12-21 | End: 2024-12-21

## 2024-12-21 RX ORDER — HALOPERIDOL 5 MG/ML
2 INJECTION INTRAMUSCULAR ONCE
Status: COMPLETED | OUTPATIENT
Start: 2024-12-21 | End: 2024-12-21

## 2024-12-21 RX ORDER — LORAZEPAM 0.5 MG/1
0.5 TABLET ORAL EVERY 6 HOURS PRN
Status: DISCONTINUED | OUTPATIENT
Start: 2024-12-21 | End: 2024-12-21

## 2024-12-21 RX ADMIN — WATER 2000 MG: 1 INJECTION INTRAMUSCULAR; INTRAVENOUS; SUBCUTANEOUS at 12:41

## 2024-12-21 RX ADMIN — LORAZEPAM 0.5 MG: 2 INJECTION INTRAMUSCULAR; INTRAVENOUS at 12:37

## 2024-12-21 RX ADMIN — APIXABAN 2.5 MG: 2.5 TABLET, FILM COATED ORAL at 21:43

## 2024-12-21 RX ADMIN — LORAZEPAM 1 MG: 2 INJECTION INTRAMUSCULAR; INTRAVENOUS at 21:45

## 2024-12-21 RX ADMIN — FENTANYL CITRATE 25 MCG: 50 INJECTION INTRAMUSCULAR; INTRAVENOUS at 00:12

## 2024-12-21 RX ADMIN — DILTIAZEM HYDROCHLORIDE 15 MG/HR: 5 INJECTION INTRAVENOUS at 00:04

## 2024-12-21 RX ADMIN — HALOPERIDOL LACTATE 2 MG: 5 INJECTION, SOLUTION INTRAMUSCULAR at 03:24

## 2024-12-21 RX ADMIN — SODIUM CHLORIDE, PRESERVATIVE FREE 10 ML: 5 INJECTION INTRAVENOUS at 11:48

## 2024-12-21 RX ADMIN — METOPROLOL SUCCINATE 75 MG: 25 TABLET, EXTENDED RELEASE ORAL at 11:48

## 2024-12-21 RX ADMIN — ACETAMINOPHEN 650 MG: 325 TABLET ORAL at 11:47

## 2024-12-21 RX ADMIN — ORPHENADRINE CITRATE 60 MG: 60 INJECTION INTRAMUSCULAR; INTRAVENOUS at 00:19

## 2024-12-21 RX ADMIN — SODIUM CHLORIDE, PRESERVATIVE FREE 10 ML: 5 INJECTION INTRAVENOUS at 21:48

## 2024-12-21 RX ADMIN — APIXABAN 2.5 MG: 2.5 TABLET, FILM COATED ORAL at 11:48

## 2024-12-21 RX ADMIN — LORAZEPAM 1 MG: 2 INJECTION INTRAMUSCULAR; INTRAVENOUS at 03:24

## 2024-12-21 RX ADMIN — METOPROLOL SUCCINATE 75 MG: 25 TABLET, EXTENDED RELEASE ORAL at 21:42

## 2024-12-21 RX ADMIN — LORAZEPAM 1 MG: 2 INJECTION INTRAMUSCULAR; INTRAVENOUS at 14:23

## 2024-12-21 ASSESSMENT — PAIN SCALES - GENERAL
PAINLEVEL_OUTOF10: 0
PAINLEVEL_OUTOF10: 0
PAINLEVEL_OUTOF10: 8

## 2024-12-21 ASSESSMENT — PAIN DESCRIPTION - DESCRIPTORS: DESCRIPTORS: DISCOMFORT

## 2024-12-21 ASSESSMENT — PAIN SCALES - WONG BAKER
WONGBAKER_NUMERICALRESPONSE: NO HURT
WONGBAKER_NUMERICALRESPONSE: NO HURT

## 2024-12-21 ASSESSMENT — PAIN DESCRIPTION - LOCATION
LOCATION: OTHER (COMMENT)
LOCATION: GENERALIZED
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
## 2024-12-21 NOTE — PROGRESS NOTES
This nurse spoke with spouse, Jimmy, at this time. Admission completed with patient's spouse. Per spouse patient is normally forgetful and had been falling a lot at home. He states, \"I'm pretty sure she has dementia\". Patient states that she was calling out for her  parents prior to coming to the hospital. Verification of code status done. \"Full code\" is what spouse wants.   Spouse also would like for patient to go to [facility] on discharge

## 2024-12-21 NOTE — PROGRESS NOTES
Cardizem gtt decreased to 2.5mg at this time. Will discontinue in 30 minutes if HR remains controlled. Currently remaining in the 80-90's.

## 2024-12-21 NOTE — PROGRESS NOTES
Patient remains tachycardic while on cardizem gtt, and 90 minutes post oral metoprolol. Contacted cardiology who instructed to leave cardizem gtt on this shift to control heart rate.

## 2024-12-21 NOTE — PROGRESS NOTES
Cardizem gtt discontinued per order and protocol at this time. HR remains 80-90's. Patient remains asleep with respirations even and unlabored. Son is at bedside.

## 2024-12-21 NOTE — PROGRESS NOTES
HR sustaining in 70-90. Cardizem gtt weaned to 5mg at this time. Patient continues to be sleeping with eyes closed. Respirations remain even and unlabored. VSS.

## 2024-12-21 NOTE — PROGRESS NOTES
Patient continues to yell out and cry. This nurse spoke with Dr Annette gaytan; patient's continued crying and yelling. Orders received to give an additional 1mg IVP ativan now and monitor for effect. Patient continues to have virtual

## 2024-12-21 NOTE — PROGRESS NOTES
HR continues to sustain in the 70-80's. Will wean to 7.5mg per protocol and wean to completion as ordered. (See MAR for additional details)

## 2024-12-21 NOTE — PROGRESS NOTES
[facility]        CARDIOLOGY                 INPATIENT PROGRESS NOTE          PATIENT SEEN IN FOLLOW UP FOR: DELFINA Singh    Hospital Day: 3     Jackelyn Montes is a 89 year old patient known to Dr. Mccoy from initial consult      SUBJECTIVE: Agitated, no distress. Not answering to questions, no family at bed side    ROS: Review of rest of 10 systems limited due to her overall mentation    OBJECTIVE: No acute distress.  See Assessment     Diagnostics:       Telemetry: Reviewed    Echo 12/20/24    Left Ventricle: The EF by visual approximation is 55 - 60%.    Right Ventricle: Right ventricle size is normal. Normal systolic function.    Aortic Valve: Mildly calcified cusps. Mild stenosis of the aortic valve. AV mean gradient is 4 mmHg. AV Velocity Ratio is 0.57. LVOT:AV VTI Index is 0.56. AV area by continuity VTI is 1.3 cm2. AV area by peak velocity is 1.3 cm2.    Mitral Valve: Mild regurgitation. MV mean gradient is 4 mmHg. MV area by PHT is 3.6 cm2.    Tricuspid Valve: Moderate regurgitation. The estimated RVSP is 41 mmHg.    Pulmonic Valve: Trace regurgitation.    Image quality is adequate. Technically difficult study.      Intake/Output Summary (Last 24 hours) at 12/21/2024 1517  Last data filed at 12/21/2024 1259  Gross per 24 hour   Intake 0 ml   Output --   Net 0 ml       Labs:   CBC:   Recent Labs     12/19/24  0340 12/20/24  0415   WBC 13.4* 13.8*   HGB 13.8 14.0   HCT 40.3 40.1    202     BMP:   Recent Labs     12/19/24  0340      K 4.1   CO2 17*   BUN 11   CREATININE 0.7   LABGLOM 79   CALCIUM 8.2*     Mag: No results for input(s): \"MG\" in the last 72 hours.  Phos: No results for input(s): \"PHOS\" in the last 72 hours.  TSH:   Recent Labs     12/19/24  1308   TSH 0.82     HgA1c:     BNP: No results for input(s): \"BNP\" in the last 72 hours.  PT/INR: No results for input(s): \"PROTIME\", \"INR\" in the last 72 hours.  APTT:No results for input(s): \"APTT\" in the last 72

## 2024-12-21 NOTE — PROGRESS NOTES
Ativan effective at this time. Patient is quietly snoring in room with eyes closed. No s/s of distress noted. HR 94. Remains in Afib (controlled while calm)

## 2024-12-21 NOTE — PLAN OF CARE
Problem: Chronic Conditions and Co-morbidities  Goal: Patient's chronic conditions and co-morbidity symptoms are monitored and maintained or improved  12/21/2024 1039 by Melody Lassiter RN  Outcome: Progressing  12/20/2024 2235 by Cruzito Buck RN  Outcome: Progressing  12/20/2024 2235 by Cruzito Buck RN  Outcome: Progressing  12/20/2024 2235 by Cruzito Buck RN  Outcome: Progressing  12/20/2024 2234 by Cruzito Buck RN  Outcome: Progressing  Flowsheets (Taken 12/20/2024 2050)  Care Plan - Patient's Chronic Conditions and Co-Morbidity Symptoms are Monitored and Maintained or Improved: Monitor and assess patient's chronic conditions and comorbid symptoms for stability, deterioration, or improvement     Problem: Discharge Planning  Goal: Discharge to home or other facility with appropriate resources  12/21/2024 1039 by Melody Lassiter RN  Outcome: Progressing  12/20/2024 2235 by Cruzito Buck RN  Outcome: Progressing  12/20/2024 2235 by Cruzito Buck RN  Outcome: Progressing  12/20/2024 2234 by Cruzito Buck RN  Outcome: Progressing  Flowsheets (Taken 12/20/2024 2050)  Discharge to home or other facility with appropriate resources: Identify barriers to discharge with patient and caregiver     Problem: Safety - Adult  Goal: Free from fall injury  12/21/2024 1039 by Melody Lasister RN  Outcome: Progressing  12/20/2024 2235 by Cruzito Buck RN  Outcome: Progressing  12/20/2024 2235 by Cruzito Buck RN  Outcome: Not Progressing  12/20/2024 2234 by Cruzito Buck RN  Outcome: Progressing     Problem: Skin/Tissue Integrity  Goal: Absence of new skin breakdown  Description: 1.  Monitor for areas of redness and/or skin breakdown  2.  Assess vascular access sites hourly  3.  Every 4-6 hours minimum:  Change oxygen saturation probe site  4.  Every 4-6 hours:  If on nasal continuous positive airway pressure, respiratory therapy assess nares and determine need for appliance change or resting period.  12/21/2024

## 2024-12-21 NOTE — PLAN OF CARE
Problem: Chronic Conditions and Co-morbidities  Goal: Patient's chronic conditions and co-morbidity symptoms are monitored and maintained or improved  12/20/2024 2235 by Cruzito Buck RN  Outcome: Progressing  12/20/2024 2235 by Cruzito Buck RN  Outcome: Progressing  12/20/2024 2235 by Cruzito Buck RN  Outcome: Progressing  12/20/2024 2234 by Cruzito Buck RN  Outcome: Progressing  12/20/2024 1036 by James Bedoya RN  Outcome: Progressing  Flowsheets (Taken 12/20/2024 0840)  Care Plan - Patient's Chronic Conditions and Co-Morbidity Symptoms are Monitored and Maintained or Improved:   Monitor and assess patient's chronic conditions and comorbid symptoms for stability, deterioration, or improvement   Collaborate with multidisciplinary team to address chronic and comorbid conditions and prevent exacerbation or deterioration   Update acute care plan with appropriate goals if chronic or comorbid symptoms are exacerbated and prevent overall improvement and discharge     Problem: Discharge Planning  Goal: Discharge to home or other facility with appropriate resources  12/20/2024 2235 by Cruzito Buck RN  Outcome: Progressing  12/20/2024 2235 by Cruzito Buck RN  Outcome: Progressing  12/20/2024 2234 by Cruzito Buck RN  Outcome: Progressing  12/20/2024 1036 by James Bedoya RN  Outcome: Progressing  Flowsheets (Taken 12/20/2024 0840)  Discharge to home or other facility with appropriate resources:   Identify barriers to discharge with patient and caregiver   Arrange for needed discharge resources and transportation as appropriate   Identify discharge learning needs (meds, wound care, etc)     Problem: Safety - Adult  Goal: Free from fall injury  12/20/2024 2235 by Cruzito Buck RN  Outcome: Progressing  12/20/2024 2235 by Cruzito Buck RN  Outcome: Not Progressing  12/20/2024 2234 by Cruzito Buck RN  Outcome: Progressing  12/20/2024 1036 by James Bedoya RN  Outcome: Progressing     Problem:

## 2024-12-21 NOTE — PROGRESS NOTES
Admitting Date and Time: 12/19/2024  3:13 AM  Admit Dx: Altered mental status [R41.82]  Disorientation [R41.0]  Atrial fibrillation with rapid ventricular response (HCC) [I48.91]  Urinary tract infection without hematuria, site unspecified [N39.0]  Longstanding persistent atrial fibrillation (HCC) [I48.11]    Subjective:     Agitated and confused with behavioral disturbance  Per RN: Has been confused    ROS: denies fever, chills, cp, sob, n/v, HA unless stated above.     cefTRIAXone (ROCEPHIN) IV  2,000 mg IntraVENous Q24H    metoprolol succinate  75 mg Oral BID    sodium chloride flush  5-40 mL IntraVENous 2 times per day    apixaban  2.5 mg Oral BID    insulin lispro  0-4 Units SubCUTAneous 4x Daily AC & HS     LORazepam, 0.5 mg, Q6H PRN  LORazepam, 1 mg, Q6H PRN  sodium chloride flush, 5-40 mL, PRN  sodium chloride, , PRN  potassium chloride, 40 mEq, PRN   Or  potassium alternative oral replacement, 40 mEq, PRN   Or  potassium chloride, 10 mEq, PRN  magnesium sulfate, 2,000 mg, PRN  ondansetron, 4 mg, Q8H PRN   Or  ondansetron, 4 mg, Q6H PRN  polyethylene glycol, 17 g, Daily PRN  acetaminophen, 650 mg, Q6H PRN   Or  acetaminophen, 650 mg, Q6H PRN  glucose, 4 tablet, PRN  dextrose bolus, 125 mL, PRN   Or  dextrose bolus, 250 mL, PRN  glucagon (rDNA), 1 mg, PRN  dextrose, , Continuous PRN         Objective:    BP (!) 152/98   Pulse 96   Temp 98.4 °F (36.9 °C)   Resp 20   Ht 1.626 m (5' 4\")   Wt 68 kg (150 lb)   SpO2 95%   BMI 25.75 kg/m²   General Appearance: Agitated and confused with behavioral disturbance  Skin: warm and dry  Head: normocephalic and atraumatic  Eyes: pupils equal, round, and reactive to light, extraocular eye movements intact, conjunctivae normal  Neck: neck supple and non tender without mass   Pulmonary/Chest: clear to auscultation bilaterally- no wheezes, rales or rhonchi, normal air movement, no respiratory distress  Cardiovascular: normal rate, normal S1 and S2 and no carotid  kidney stones chronic back pain dental caries GERD hearing loss hypertension multiple pulmonary nodules neuropathy osteoarthritis  Diabetes: Diabetic diet.  No oral diabetic meds here.  Sliding scale coverage.  Hypoglycemic protocol.  Code Status: Full  DVT prophylaxis: Eliquis  Disposition to be determined will get PT OT  NOTE: This report was transcribed using voice recognition software. Every effort was made to ensure accuracy; however, inadvertent computerized transcription errors may be present.     Electronically signed by ANNETTA MCPHERSON MD on 12/21/2024 at 1:51 PM

## 2024-12-21 NOTE — PROGRESS NOTES
Patient is currently resting in bed with eyes closed. Respirations are even and unlabored. Due to behaviors detrimental to the patient, she was not aroused at this time. Cardizem gtt weaned to 10mg at this time as HR is sustaining in the 80's. Will continue to wean gtt as patient tolerates.

## 2024-12-21 NOTE — PROGRESS NOTES
Patient is spitting crushed medications in applesauce out at this time. She is crying and banging on the bed rail, despite the best efforts to calm patient. Patient was changed for comfort, repositioned and lights turned on for time orientation, however, patient is not able to be oriented to time, situation, or place. Virtual  is at bedside. Spoke with Dr Gudino re: medications being spit out and HR elevation due to patient being combative and upset. Orders to be received for 1 time IV dose of HR control medication.

## 2024-12-21 NOTE — PROGRESS NOTES
Patient has been very confused and agitated all shift. Patient remains in Afib with RVR despite medications Active visual and auditory hallucinations are present in patient throughout shift. Sitter remains at bedside with constant involvement.

## 2024-12-22 LAB
ANION GAP SERPL CALCULATED.3IONS-SCNC: 17 MMOL/L (ref 7–16)
BUN SERPL-MCNC: 10 MG/DL (ref 6–23)
CALCIUM SERPL-MCNC: 8.6 MG/DL (ref 8.6–10.2)
CHLORIDE SERPL-SCNC: 93 MMOL/L (ref 98–107)
CO2 SERPL-SCNC: 25 MMOL/L (ref 22–29)
CREAT SERPL-MCNC: 0.7 MG/DL (ref 0.5–1)
GFR, ESTIMATED: 83 ML/MIN/1.73M2
GLUCOSE BLD-MCNC: 105 MG/DL (ref 74–99)
GLUCOSE BLD-MCNC: 123 MG/DL (ref 74–99)
GLUCOSE BLD-MCNC: 150 MG/DL (ref 74–99)
GLUCOSE SERPL-MCNC: 139 MG/DL (ref 74–99)
POTASSIUM SERPL-SCNC: 3 MMOL/L (ref 3.5–5)
SODIUM SERPL-SCNC: 135 MMOL/L (ref 132–146)

## 2024-12-22 PROCEDURE — 2060000000 HC ICU INTERMEDIATE R&B

## 2024-12-22 PROCEDURE — 2500000003 HC RX 250 WO HCPCS: Performed by: FAMILY MEDICINE

## 2024-12-22 PROCEDURE — 99232 SBSQ HOSP IP/OBS MODERATE 35: CPT | Performed by: INTERNAL MEDICINE

## 2024-12-22 PROCEDURE — 6370000000 HC RX 637 (ALT 250 FOR IP): Performed by: INTERNAL MEDICINE

## 2024-12-22 PROCEDURE — 6370000000 HC RX 637 (ALT 250 FOR IP): Performed by: FAMILY MEDICINE

## 2024-12-22 PROCEDURE — 36415 COLL VENOUS BLD VENIPUNCTURE: CPT

## 2024-12-22 PROCEDURE — 82947 ASSAY GLUCOSE BLOOD QUANT: CPT

## 2024-12-22 PROCEDURE — 2500000003 HC RX 250 WO HCPCS: Performed by: INTERNAL MEDICINE

## 2024-12-22 PROCEDURE — 80048 BASIC METABOLIC PNL TOTAL CA: CPT

## 2024-12-22 PROCEDURE — 6370000000 HC RX 637 (ALT 250 FOR IP)

## 2024-12-22 PROCEDURE — 6360000002 HC RX W HCPCS: Performed by: INTERNAL MEDICINE

## 2024-12-22 RX ORDER — METOPROLOL TARTRATE 1 MG/ML
2.5 INJECTION, SOLUTION INTRAVENOUS ONCE
Status: COMPLETED | OUTPATIENT
Start: 2024-12-22 | End: 2024-12-22

## 2024-12-22 RX ORDER — METOPROLOL SUCCINATE 100 MG/1
100 TABLET, EXTENDED RELEASE ORAL 2 TIMES DAILY
Status: DISCONTINUED | OUTPATIENT
Start: 2024-12-22 | End: 2024-12-23 | Stop reason: HOSPADM

## 2024-12-22 RX ORDER — METOPROLOL SUCCINATE 100 MG/1
100 TABLET, EXTENDED RELEASE ORAL 2 TIMES DAILY
Status: DISCONTINUED | OUTPATIENT
Start: 2024-12-22 | End: 2024-12-22

## 2024-12-22 RX ORDER — DILTIAZEM HYDROCHLORIDE 30 MG/1
30 TABLET, FILM COATED ORAL EVERY 6 HOURS SCHEDULED
Status: DISCONTINUED | OUTPATIENT
Start: 2024-12-22 | End: 2024-12-23 | Stop reason: HOSPADM

## 2024-12-22 RX ORDER — CEFUROXIME AXETIL 250 MG/1
250 TABLET ORAL EVERY 12 HOURS SCHEDULED
Status: DISCONTINUED | OUTPATIENT
Start: 2024-12-22 | End: 2024-12-23 | Stop reason: HOSPADM

## 2024-12-22 RX ADMIN — SODIUM CHLORIDE, PRESERVATIVE FREE 10 ML: 5 INJECTION INTRAVENOUS at 19:48

## 2024-12-22 RX ADMIN — METOPROLOL TARTRATE 2.5 MG: 5 INJECTION INTRAVENOUS at 19:48

## 2024-12-22 RX ADMIN — CEFUROXIME AXETIL 250 MG: 250 TABLET, FILM COATED ORAL at 19:48

## 2024-12-22 RX ADMIN — LORAZEPAM 1 MG: 2 INJECTION INTRAMUSCULAR; INTRAVENOUS at 03:53

## 2024-12-22 RX ADMIN — APIXABAN 2.5 MG: 2.5 TABLET, FILM COATED ORAL at 08:13

## 2024-12-22 RX ADMIN — METOPROLOL SUCCINATE 100 MG: 100 TABLET, EXTENDED RELEASE ORAL at 17:35

## 2024-12-22 RX ADMIN — SODIUM CHLORIDE, PRESERVATIVE FREE 10 ML: 5 INJECTION INTRAVENOUS at 08:14

## 2024-12-22 RX ADMIN — APIXABAN 2.5 MG: 2.5 TABLET, FILM COATED ORAL at 19:48

## 2024-12-22 RX ADMIN — ACETAMINOPHEN 650 MG: 325 TABLET ORAL at 08:13

## 2024-12-22 RX ADMIN — DILTIAZEM HYDROCHLORIDE 30 MG: 30 TABLET, FILM COATED ORAL at 20:12

## 2024-12-22 RX ADMIN — CEFUROXIME AXETIL 250 MG: 250 TABLET, FILM COATED ORAL at 12:32

## 2024-12-22 RX ADMIN — METOPROLOL SUCCINATE 75 MG: 25 TABLET, EXTENDED RELEASE ORAL at 08:13

## 2024-12-22 RX ADMIN — LORAZEPAM 1 MG: 2 INJECTION INTRAMUSCULAR; INTRAVENOUS at 21:14

## 2024-12-22 RX ADMIN — ACETAMINOPHEN 650 MG: 325 TABLET ORAL at 18:48

## 2024-12-22 ASSESSMENT — PAIN SCALES - GENERAL
PAINLEVEL_OUTOF10: 6
PAINLEVEL_OUTOF10: 4
PAINLEVEL_OUTOF10: 0

## 2024-12-22 ASSESSMENT — PAIN - FUNCTIONAL ASSESSMENT: PAIN_FUNCTIONAL_ASSESSMENT: PREVENTS OR INTERFERES SOME ACTIVE ACTIVITIES AND ADLS

## 2024-12-22 ASSESSMENT — PAIN DESCRIPTION - DESCRIPTORS
DESCRIPTORS: ACHING;DISCOMFORT
DESCRIPTORS: DISCOMFORT

## 2024-12-22 ASSESSMENT — PAIN DESCRIPTION - LOCATION
LOCATION: GENERALIZED
LOCATION: GENERALIZED

## 2024-12-22 NOTE — PROGRESS NOTES
This nurse spoke with spouse, who is at bedside and informed him of improvement, however, patient is currently sleeping, but easily arouses for short time periods. Aware of continued antibiotics and potential discharge tomorrow.

## 2024-12-22 NOTE — PROGRESS NOTES
HR sustaining in 120-130's while awake. This nurse discussed POC with pharmacy and initial dose of higher rate metoprolol given at this time. Patient did take medication with little difficulty while crushed in pudding.   Call light is in reach. Virtual  remains at bedside. Son and grandson at bedside at this time, and patient is awake and cooperative. Continues to have little to eat during meals with feeding.

## 2024-12-22 NOTE — PROGRESS NOTES
Patient is more alert to environment, and cooperative this am. Patient is able to be aroused and is having short conversations with this nurse. Patient states that she is hungry, and staff to assist her with breakfast. Patient is following commands this am, and is not exhibiting any combative behavior. Dr Bryant is aware.

## 2024-12-22 NOTE — PROGRESS NOTES
[facility]        CARDIOLOGY                 INPATIENT PROGRESS NOTE          PATIENT SEEN IN FOLLOW UP FOR: DELFINA [facility] Day: 4     Jackelyn Montes is a 89 year old patient known to Dr. Mccoy from initial consult      SUBJECTIVE: Sleepy, arousable. Not answering to questions, no distress. No family at bed side    ROS: Review of rest of 10 systems limited due to her overall mentation    OBJECTIVE: No acute distress.  See Assessment     Diagnostics:       Telemetry: Reviewed    Echo 12/20/24    Left Ventricle: The EF by visual approximation is 55 - 60%.    Right Ventricle: Right ventricle size is normal. Normal systolic function.    Aortic Valve: Mildly calcified cusps. Mild stenosis of the aortic valve. AV mean gradient is 4 mmHg. AV Velocity Ratio is 0.57. LVOT:AV VTI Index is 0.56. AV area by continuity VTI is 1.3 cm2. AV area by peak velocity is 1.3 cm2.    Mitral Valve: Mild regurgitation. MV mean gradient is 4 mmHg. MV area by PHT is 3.6 cm2.    Tricuspid Valve: Moderate regurgitation. The estimated RVSP is 41 mmHg.    Pulmonic Valve: Trace regurgitation.    Image quality is adequate. Technically difficult study.      Intake/Output Summary (Last 24 hours) at 12/22/2024 1339  Last data filed at 12/22/2024 0827  Gross per 24 hour   Intake 0 ml   Output --   Net 0 ml       Labs:   CBC:   Recent Labs     12/20/24  0415   WBC 13.8*   HGB 14.0   HCT 40.1        BMP:   No results for input(s): \"NA\", \"K\", \"CO2\", \"BUN\", \"CREATININE\", \"LABGLOM\", \"GLU\", \"CALCIUM\" in the last 72 hours.    Mag: No results for input(s): \"MG\" in the last 72 hours.  Phos: No results for input(s): \"PHOS\" in the last 72 hours.  TSH:   No results for input(s): \"TSH\" in the last 72 hours.    HgA1c:     BNP: No results for input(s): \"BNP\" in the last 72 hours.  PT/INR: No results for input(s): \"PROTIME\", \"INR\" in the last 72 hours.  APTT:No results for input(s): \"APTT\" in the last 72 hours.  CARDIAC  ENZYMES:  No results for input(s): \"CKTOTAL\", \"TROPHS\" in the last 72 hours.    FASTING LIPID PANEL:  Lab Results   Component Value Date/Time    CHOL 165 2022 04:40 PM    HDL 50 2022 04:40 PM    TRIG 100 2022 04:40 PM     LIVER PROFILE:  No results for input(s): \"AST\", \"ALT\", \"LABALBU\" in the last 72 hours.      Current Inpatient Medications:   cefUROXime  250 mg Oral 2 times per day    metoprolol succinate  75 mg Oral BID    sodium chloride flush  5-40 mL IntraVENous 2 times per day    apixaban  2.5 mg Oral BID    insulin lispro  0-4 Units SubCUTAneous 4x Daily AC & HS       IV Infusions (if any):   sodium chloride      dextrose           PHYSICAL EXAM:     CONSTITUTIONAL:   BP (!) 174/106   Pulse 93   Temp 98.4 °F (36.9 °C) (Axillary)   Resp 16   Ht 1.626 m (5' 4\")   Wt 68 kg (150 lb)   SpO2 99%   BMI 25.75 kg/m²   Pulse  Av.7  Min: 93  Max: 130  Systolic (24hrs), Av , Min:136 , Max:174    Diastolic (24hrs), Av, Min:72, Max:106        Appears stated age, awake, alert, agitated.   HEENT: Limited  Neck-  no stridor, no carotid bruit.    RESPIRATORY: No accessory muscle use.  Lung auscultation - Few rhonchi  CARDIOVASCULAR:     Heart Ausculation - Irregularly irregular rate and rhythm, 2/6 systolic murmur, No s3   No lower extremity edema, Distal pulses palpable   ABDOMEN:  Bowel sounds present.   : Deferred  Rectal Exam: Deferred  SKIN: warm and dry   NEURO / PSYCH: oriented to person, place      ASSESSMENT/PLAN:    Atrial fibrillation - Rates controlled with Metoprolol. Continue adjusted dose Eliquis for OAC.      Chronic HFpEF - Appears compensated. 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    Borderline troponin elevation - Not ACS. Likely demand ischemia, CKD    Encephalopathy - Per Primary          No family at bed side    D/w Primary -Dr Bryant    D/w her Nursing staff    Cardiology will sign off - Please

## 2024-12-22 NOTE — PLAN OF CARE
Problem: Chronic Conditions and Co-morbidities  Goal: Patient's chronic conditions and co-morbidity symptoms are monitored and maintained or improved  12/22/2024 1027 by Melody Lassiter RN  Outcome: Progressing  12/22/2024 0451 by Kentrell Park RN  Outcome: Progressing     Problem: Discharge Planning  Goal: Discharge to home or other facility with appropriate resources  12/22/2024 1027 by Melody Lassiter RN  Outcome: Progressing  12/22/2024 0451 by Kentrell Park RN  Outcome: Progressing     Problem: Safety - Adult  Goal: Free from fall injury  12/22/2024 1027 by Melody Lassiter RN  Outcome: Progressing  12/22/2024 0451 by Kentrell Park RN  Outcome: Progressing     Problem: Skin/Tissue Integrity  Goal: Absence of new skin breakdown  Description: 1.  Monitor for areas of redness and/or skin breakdown  2.  Assess vascular access sites hourly  3.  Every 4-6 hours minimum:  Change oxygen saturation probe site  4.  Every 4-6 hours:  If on nasal continuous positive airway pressure, respiratory therapy assess nares and determine need for appliance change or resting period.  12/22/2024 1027 by Melody Lassiter RN  Outcome: Progressing  12/22/2024 0451 by Kentrell Park RN  Outcome: Progressing     Problem: Pain  Goal: Verbalizes/displays adequate comfort level or baseline comfort level  12/22/2024 1027 by Melody Lassiter RN  Outcome: Progressing  Flowsheets (Taken 12/22/2024 0800)  Verbalizes/displays adequate comfort level or baseline comfort level:   Encourage patient to monitor pain and request assistance   Assess pain using appropriate pain scale   Administer analgesics based on type and severity of pain and evaluate response   Implement non-pharmacological measures as appropriate and evaluate response   Notify Licensed Independent Practitioner if interventions unsuccessful or patient reports new pain  12/22/2024 0451 by Kentrell Park RN  Outcome: Progressing  Flowsheets (Taken 12/21/2024 1553 by Melody Lassiter,  RN)  Verbalizes/displays adequate comfort level or baseline comfort level:   Encourage patient to monitor pain and request assistance   Assess pain using appropriate pain scale   Administer analgesics based on type and severity of pain and evaluate response   Implement non-pharmacological measures as appropriate and evaluate response   Notify Licensed Independent Practitioner if interventions unsuccessful or patient reports new pain     Problem: ABCDS Injury Assessment  Goal: Absence of physical injury  12/22/2024 1027 by Melody Lassiter, RN  Outcome: Progressing  12/22/2024 0451 by Kentrell Park, RN  Outcome: Progressing

## 2024-12-22 NOTE — PROGRESS NOTES
Admitting Date and Time: 12/19/2024  3:13 AM  Admit Dx: Altered mental status [R41.82]  Disorientation [R41.0]  Atrial fibrillation with rapid ventricular response (HCC) [I48.91]  Urinary tract infection without hematuria, site unspecified [N39.0]  Longstanding persistent atrial fibrillation (HCC) [I48.11]    Subjective:  No longer having Agitated and confused behavior.  Talked with rn nicely.  Complaint.  But sleeping for me    ROS: denies fever, chills, cp, sob, n/v, HA unless stated above.     cefTRIAXone (ROCEPHIN) IV  2,000 mg IntraVENous Q24H    metoprolol succinate  75 mg Oral BID    sodium chloride flush  5-40 mL IntraVENous 2 times per day    apixaban  2.5 mg Oral BID    insulin lispro  0-4 Units SubCUTAneous 4x Daily AC & HS     LORazepam, 1 mg, Q6H PRN  sodium chloride flush, 5-40 mL, PRN  sodium chloride, , PRN  potassium chloride, 40 mEq, PRN   Or  potassium alternative oral replacement, 40 mEq, PRN   Or  potassium chloride, 10 mEq, PRN  magnesium sulfate, 2,000 mg, PRN  ondansetron, 4 mg, Q8H PRN   Or  ondansetron, 4 mg, Q6H PRN  polyethylene glycol, 17 g, Daily PRN  acetaminophen, 650 mg, Q6H PRN   Or  acetaminophen, 650 mg, Q6H PRN  glucose, 4 tablet, PRN  dextrose bolus, 125 mL, PRN   Or  dextrose bolus, 250 mL, PRN  glucagon (rDNA), 1 mg, PRN  dextrose, , Continuous PRN         Objective:    BP (!) 141/91   Pulse (!) 130   Temp 97.1 °F (36.2 °C) (Axillary)   Resp 16   Ht 1.626 m (5' 4\")   Wt 68 kg (150 lb)   SpO2 100%   BMI 25.75 kg/m²   General Appearance: Agitated and confused with behavioral disturbance  Skin: warm and dry  Head: normocephalic and atraumatic  Eyes: pupils equal, round, and reactive to light, extraocular eye movements intact, conjunctivae normal  Neck: neck supple and non tender without mass   Pulmonary/Chest: clear to auscultation bilaterally- no wheezes, rales or rhonchi, normal air movement, no respiratory distress  Cardiovascular: normal rate, normal S1 and S2 and no  deterioration and behavioral disturbance    No change to outpatient treatment regimen for the existing stable combordities including: kidney stones chronic back pain dental caries GERD hearing loss hypertension multiple pulmonary nodules neuropathy osteoarthritis  Diabetes: Diabetic diet.  No oral diabetic meds here.  Sliding scale coverage.  Hypoglycemic protocol.  Code Status: Full  DVT prophylaxis: Eliquis  Disposition to be determined will get PT OT ordered on 12/20 but not yet done as of 12/20 2 AM.  On 12/21 she was too agitated to cooperate anyway.    NOTE: This report was transcribed using voice recognition software. Every effort was made to ensure accuracy; however, inadvertent computerized transcription errors may be present.     Electronically signed by ANNETTA MCPHERSON MD on 12/22/2024 at 9:48 AM

## 2024-12-22 NOTE — PLAN OF CARE
Problem: Chronic Conditions and Co-morbidities  Goal: Patient's chronic conditions and co-morbidity symptoms are monitored and maintained or improved  Outcome: Progressing     Problem: Discharge Planning  Goal: Discharge to home or other facility with appropriate resources  Outcome: Progressing     Problem: Safety - Adult  Goal: Free from fall injury  Outcome: Progressing     Problem: Skin/Tissue Integrity  Goal: Absence of new skin breakdown  Description: 1.  Monitor for areas of redness and/or skin breakdown  2.  Assess vascular access sites hourly  3.  Every 4-6 hours minimum:  Change oxygen saturation probe site  4.  Every 4-6 hours:  If on nasal continuous positive airway pressure, respiratory therapy assess nares and determine need for appliance change or resting period.  Outcome: Progressing     Problem: Pain  Goal: Verbalizes/displays adequate comfort level or baseline comfort level  Outcome: Progressing  Flowsheets (Taken 12/21/2024 1553 by Melody Lassiter RN)  Verbalizes/displays adequate comfort level or baseline comfort level:   Encourage patient to monitor pain and request assistance   Assess pain using appropriate pain scale   Administer analgesics based on type and severity of pain and evaluate response   Implement non-pharmacological measures as appropriate and evaluate response   Notify Licensed Independent Practitioner if interventions unsuccessful or patient reports new pain     Problem: ABCDS Injury Assessment  Goal: Absence of physical injury  Outcome: Progressing

## 2024-12-23 ENCOUNTER — TELEPHONE (OUTPATIENT)
Dept: CARDIOLOGY CLINIC | Age: 88
End: 2024-12-23

## 2024-12-23 ENCOUNTER — HOSPITAL ENCOUNTER (INPATIENT)
Age: 88
LOS: 10 days | Discharge: NURSING, REHAB OR LONG-TERM CARE | DRG: 871 | End: 2025-01-02
Attending: EMERGENCY MEDICINE | Admitting: INTERNAL MEDICINE
Payer: MEDICARE

## 2024-12-23 VITALS
TEMPERATURE: 97.3 F | RESPIRATION RATE: 20 BRPM | BODY MASS INDEX: 25.61 KG/M2 | HEART RATE: 104 BPM | OXYGEN SATURATION: 98 % | HEIGHT: 64 IN | WEIGHT: 150 LBS | DIASTOLIC BLOOD PRESSURE: 104 MMHG | SYSTOLIC BLOOD PRESSURE: 163 MMHG

## 2024-12-23 DIAGNOSIS — M16.10 ARTHRITIS OF HIP: ICD-10-CM

## 2024-12-23 DIAGNOSIS — M47.816 SPONDYLOSIS WITHOUT MYELOPATHY OR RADICULOPATHY, LUMBAR REGION: ICD-10-CM

## 2024-12-23 DIAGNOSIS — R62.7 FAILURE TO THRIVE IN ADULT: Primary | ICD-10-CM

## 2024-12-23 PROBLEM — R53.81 DEBILITY: Status: ACTIVE | Noted: 2024-12-23

## 2024-12-23 LAB
ANION GAP SERPL CALCULATED.3IONS-SCNC: 14 MMOL/L (ref 7–16)
ANION GAP SERPL CALCULATED.3IONS-SCNC: 16 MMOL/L (ref 7–16)
BUN SERPL-MCNC: 19 MG/DL (ref 6–23)
BUN SERPL-MCNC: 22 MG/DL (ref 6–23)
CALCIUM SERPL-MCNC: 8.8 MG/DL (ref 8.6–10.2)
CALCIUM SERPL-MCNC: 8.9 MG/DL (ref 8.6–10.2)
CHLORIDE SERPL-SCNC: 94 MMOL/L (ref 98–107)
CHLORIDE SERPL-SCNC: 94 MMOL/L (ref 98–107)
CO2 SERPL-SCNC: 28 MMOL/L (ref 22–29)
CO2 SERPL-SCNC: 30 MMOL/L (ref 22–29)
CREAT SERPL-MCNC: 0.9 MG/DL (ref 0.5–1)
CREAT SERPL-MCNC: 0.9 MG/DL (ref 0.5–1)
GFR, ESTIMATED: 58 ML/MIN/1.73M2
GFR, ESTIMATED: 64 ML/MIN/1.73M2
GLUCOSE BLD-MCNC: 119 MG/DL (ref 74–99)
GLUCOSE BLD-MCNC: 142 MG/DL (ref 74–99)
GLUCOSE SERPL-MCNC: 127 MG/DL (ref 74–99)
GLUCOSE SERPL-MCNC: 151 MG/DL (ref 74–99)
POTASSIUM SERPL-SCNC: 2.6 MMOL/L (ref 3.5–5)
POTASSIUM SERPL-SCNC: 3.2 MMOL/L (ref 3.5–5)
SODIUM SERPL-SCNC: 136 MMOL/L (ref 132–146)
SODIUM SERPL-SCNC: 140 MMOL/L (ref 132–146)

## 2024-12-23 PROCEDURE — 97161 PT EVAL LOW COMPLEX 20 MIN: CPT | Performed by: PHYSICAL THERAPIST

## 2024-12-23 PROCEDURE — 2500000003 HC RX 250 WO HCPCS: Performed by: INTERNAL MEDICINE

## 2024-12-23 PROCEDURE — 6370000000 HC RX 637 (ALT 250 FOR IP): Performed by: INTERNAL MEDICINE

## 2024-12-23 PROCEDURE — 97165 OT EVAL LOW COMPLEX 30 MIN: CPT

## 2024-12-23 PROCEDURE — 36415 COLL VENOUS BLD VENIPUNCTURE: CPT

## 2024-12-23 PROCEDURE — NBSRV NON-BILLABLE SERVICE: Performed by: INTERNAL MEDICINE

## 2024-12-23 PROCEDURE — 99232 SBSQ HOSP IP/OBS MODERATE 35: CPT | Performed by: INTERNAL MEDICINE

## 2024-12-23 PROCEDURE — 6370000000 HC RX 637 (ALT 250 FOR IP): Performed by: FAMILY MEDICINE

## 2024-12-23 PROCEDURE — 6370000000 HC RX 637 (ALT 250 FOR IP)

## 2024-12-23 PROCEDURE — 80048 BASIC METABOLIC PNL TOTAL CA: CPT

## 2024-12-23 PROCEDURE — 99285 EMERGENCY DEPT VISIT HI MDM: CPT

## 2024-12-23 PROCEDURE — 1200000000 HC SEMI PRIVATE

## 2024-12-23 PROCEDURE — 97535 SELF CARE MNGMENT TRAINING: CPT

## 2024-12-23 PROCEDURE — 82947 ASSAY GLUCOSE BLOOD QUANT: CPT

## 2024-12-23 RX ORDER — POLYETHYLENE GLYCOL 3350 17 G/17G
17 POWDER, FOR SOLUTION ORAL DAILY PRN
Status: DISCONTINUED | OUTPATIENT
Start: 2024-12-23 | End: 2025-01-02 | Stop reason: HOSPADM

## 2024-12-23 RX ORDER — SODIUM CHLORIDE 9 MG/ML
INJECTION, SOLUTION INTRAVENOUS PRN
Status: DISCONTINUED | OUTPATIENT
Start: 2024-12-23 | End: 2025-01-02 | Stop reason: HOSPADM

## 2024-12-23 RX ORDER — CEFUROXIME AXETIL 250 MG/1
250 TABLET ORAL EVERY 12 HOURS SCHEDULED
Qty: 7 TABLET | Refills: 0 | Status: ON HOLD | OUTPATIENT
Start: 2024-12-23 | End: 2024-12-24 | Stop reason: HOSPADM

## 2024-12-23 RX ORDER — ACETAMINOPHEN 325 MG/1
650 TABLET ORAL EVERY 6 HOURS PRN
Status: DISCONTINUED | OUTPATIENT
Start: 2024-12-23 | End: 2025-01-02 | Stop reason: HOSPADM

## 2024-12-23 RX ORDER — METOPROLOL SUCCINATE 50 MG/1
50 TABLET, EXTENDED RELEASE ORAL DAILY
Status: DISCONTINUED | OUTPATIENT
Start: 2024-12-24 | End: 2024-12-28

## 2024-12-23 RX ORDER — POTASSIUM CHLORIDE 1500 MG/1
40 TABLET, EXTENDED RELEASE ORAL PRN
Status: DISCONTINUED | OUTPATIENT
Start: 2024-12-23 | End: 2025-01-02 | Stop reason: HOSPADM

## 2024-12-23 RX ORDER — SODIUM CHLORIDE 0.9 % (FLUSH) 0.9 %
5-40 SYRINGE (ML) INJECTION PRN
Status: DISCONTINUED | OUTPATIENT
Start: 2024-12-23 | End: 2025-01-02 | Stop reason: HOSPADM

## 2024-12-23 RX ORDER — POTASSIUM CHLORIDE 1500 MG/1
40 TABLET, EXTENDED RELEASE ORAL 2 TIMES DAILY
Status: DISCONTINUED | OUTPATIENT
Start: 2024-12-23 | End: 2024-12-23 | Stop reason: HOSPADM

## 2024-12-23 RX ORDER — AMIODARONE HYDROCHLORIDE 200 MG/1
200 TABLET ORAL DAILY
Status: DISCONTINUED | OUTPATIENT
Start: 2024-12-24 | End: 2025-01-02 | Stop reason: HOSPADM

## 2024-12-23 RX ORDER — ONDANSETRON 2 MG/ML
4 INJECTION INTRAMUSCULAR; INTRAVENOUS EVERY 6 HOURS PRN
Status: DISCONTINUED | OUTPATIENT
Start: 2024-12-23 | End: 2025-01-02 | Stop reason: HOSPADM

## 2024-12-23 RX ORDER — MAGNESIUM SULFATE IN WATER 40 MG/ML
2000 INJECTION, SOLUTION INTRAVENOUS PRN
Status: DISCONTINUED | OUTPATIENT
Start: 2024-12-23 | End: 2025-01-02 | Stop reason: HOSPADM

## 2024-12-23 RX ORDER — ONDANSETRON 4 MG/1
4 TABLET, ORALLY DISINTEGRATING ORAL EVERY 8 HOURS PRN
Status: DISCONTINUED | OUTPATIENT
Start: 2024-12-23 | End: 2025-01-02 | Stop reason: HOSPADM

## 2024-12-23 RX ORDER — ACETAMINOPHEN 650 MG/1
650 SUPPOSITORY RECTAL EVERY 6 HOURS PRN
Status: DISCONTINUED | OUTPATIENT
Start: 2024-12-23 | End: 2025-01-02 | Stop reason: HOSPADM

## 2024-12-23 RX ORDER — POTASSIUM CHLORIDE 7.45 MG/ML
10 INJECTION INTRAVENOUS PRN
Status: DISCONTINUED | OUTPATIENT
Start: 2024-12-23 | End: 2025-01-02 | Stop reason: HOSPADM

## 2024-12-23 RX ORDER — CEFUROXIME AXETIL 250 MG/1
250 TABLET ORAL EVERY 12 HOURS SCHEDULED
Status: ACTIVE | OUTPATIENT
Start: 2024-12-23 | End: 2024-12-26

## 2024-12-23 RX ORDER — SODIUM CHLORIDE 0.9 % (FLUSH) 0.9 %
5-40 SYRINGE (ML) INJECTION EVERY 12 HOURS SCHEDULED
Status: DISCONTINUED | OUTPATIENT
Start: 2024-12-23 | End: 2025-01-02 | Stop reason: HOSPADM

## 2024-12-23 RX ADMIN — CEFUROXIME AXETIL 250 MG: 250 TABLET ORAL at 23:42

## 2024-12-23 RX ADMIN — ACETAMINOPHEN 650 MG: 325 TABLET ORAL at 08:45

## 2024-12-23 RX ADMIN — APIXABAN 2.5 MG: 2.5 TABLET, FILM COATED ORAL at 08:45

## 2024-12-23 RX ADMIN — DILTIAZEM HYDROCHLORIDE 30 MG: 30 TABLET, FILM COATED ORAL at 12:14

## 2024-12-23 RX ADMIN — DILTIAZEM HYDROCHLORIDE 30 MG: 30 TABLET, FILM COATED ORAL at 00:37

## 2024-12-23 RX ADMIN — METOPROLOL SUCCINATE 100 MG: 100 TABLET, EXTENDED RELEASE ORAL at 08:45

## 2024-12-23 RX ADMIN — Medication 10 ML: at 23:42

## 2024-12-23 RX ADMIN — POTASSIUM BICARBONATE 50 MEQ: 25 TABLET, EFFERVESCENT ORAL at 21:26

## 2024-12-23 RX ADMIN — CEFUROXIME AXETIL 250 MG: 250 TABLET, FILM COATED ORAL at 08:45

## 2024-12-23 RX ADMIN — SODIUM CHLORIDE, PRESERVATIVE FREE 10 ML: 5 INJECTION INTRAVENOUS at 08:45

## 2024-12-23 RX ADMIN — POTASSIUM CHLORIDE 40 MEQ: 1500 TABLET, EXTENDED RELEASE ORAL at 09:40

## 2024-12-23 RX ADMIN — APIXABAN 2.5 MG: 5 TABLET, FILM COATED ORAL at 22:39

## 2024-12-23 RX ADMIN — DILTIAZEM HYDROCHLORIDE 30 MG: 30 TABLET, FILM COATED ORAL at 06:03

## 2024-12-23 ASSESSMENT — LIFESTYLE VARIABLES
HOW OFTEN DO YOU HAVE A DRINK CONTAINING ALCOHOL: NEVER
HOW MANY STANDARD DRINKS CONTAINING ALCOHOL DO YOU HAVE ON A TYPICAL DAY: PATIENT DOES NOT DRINK

## 2024-12-23 ASSESSMENT — PAIN DESCRIPTION - LOCATION: LOCATION: GENERALIZED

## 2024-12-23 ASSESSMENT — PAIN SCALES - GENERAL
PAINLEVEL_OUTOF10: 5
PAINLEVEL_OUTOF10: 0

## 2024-12-23 ASSESSMENT — PAIN DESCRIPTION - DESCRIPTORS: DESCRIPTORS: DISCOMFORT

## 2024-12-23 ASSESSMENT — PAIN - FUNCTIONAL ASSESSMENT: PAIN_FUNCTIONAL_ASSESSMENT: PREVENTS OR INTERFERES SOME ACTIVE ACTIVITIES AND ADLS

## 2024-12-23 ASSESSMENT — PAIN SCALES - WONG BAKER
WONGBAKER_NUMERICALRESPONSE: NO HURT
WONGBAKER_NUMERICALRESPONSE: NO HURT

## 2024-12-23 NOTE — PLAN OF CARE

## 2024-12-23 NOTE — PROGRESS NOTES
CLINICAL PHARMACY NOTE: MEDS TO BEDS    Total # of Prescriptions Filled: 1   The following medications were delivered to the patient:  Cefuroxime axetil 250 mg    Additional Documentation:

## 2024-12-23 NOTE — CARE COORDINATION
SW went to see patient, Telesitter in room, PT was currently working with her at bedside.  No family present in room.  Call placed to , Jimmy.  He states patient lives with her in a 1 story home and normally uses a WW at all times at home but they have a WC if needed.  Informed  that patient was currently working with PT and  he stated if they were to recommend rehab and she needs it he would like [facility] as she [facility] been [facility] before.      While awaiting Therapy recommendation to move forward with DC plan.  Patients Melody STALLWORTH had called  as there was a DC order placed by  and asked  when he could  patient.  He did come to get her and she left with him before Therapy notes were complete or SW could follow up any further.

## 2024-12-23 NOTE — PROGRESS NOTES
Spiritual Health History and Assessment/Progress Note  [facility]    Initial Encounter, Spiritual/Emotional Needs,  ,  ,      Name: Jackelyn Montes MRN: [id number]    Age: 89 y.o.     Sex: female   Language: English   Gnosticist: Congregation   Altered mental status     Date: 12/23/2024                           Spiritual Assessment began in [facility] IMCU        Referral/Consult From: Rounding   Encounter Overview/Reason: Initial Encounter, Spiritual/Emotional Needs  Service Provided For: Patient    Tasha, Belief, Meaning:   Patient Other: Patient was unable to speak  Family/Friends No family/friends present      Importance and Influence:  Patient unable to assess at this time  Family/Friends No family/friends present    Community:  Patient Other:    Family/Friends No family/friends present    Assessment and Plan of Care:     Patient Interventions include: Other: Prayed over patient with patients nonverbal approval   Family/Friends Interventions include: No family/friends present    Patient Plan of Care: Spiritual Care available upon further referral  Family/Friends Plan of Care: No family/friends present    Electronically signed by Chaplain Samantha on 12/23/2024 at 12:41 PM

## 2024-12-23 NOTE — CARE COORDINATION
12/23/24 SS Note: Pt was just d/c'd about 2 hrs ago. Pt was admitted here 4 days for AMS. Pt lives with  in a 1 story home and normally uses a WW at all times at home but they have a WC if needed. Pt's  previously stated if pt needed rehab he would like [facility] - she has been there before.     Pt's  in room and notes he cannot care for pt. He was called to come get pt & drove her home but could not get her in house. He walks w/cane and is hunched over. He called 911 and pt was brought here. Pt has Medicare & has met 3 day in-pt stay. OT/PT notes are complete & recommend SNF.     1505  GRACIELA called [facility]. Referral completed & Karin notes they cannot accept.  SW updated pt's spouse. Pt is not oriented. He states next choice for SNF are: [facility], [facility] & [facility].     GRACIELA called [facility] & completed referral. She will review case.   GRACIELA called [facility] & completed referral to her as well.     1550  [facility] declines.    1605  [facility] calls & notes pt is [facility]- Dual Complete. Thus, needs auth but She will submit for Auth. If do not receive today- pt can be OBS until get auth back- which should be tomorrow.     Pt to go to [facility]- waiting for Auth which Khushboo submitted. NEED WENDY & PASRR initiated - ID: [id number].  Electronically signed by RENETTA Sainz on 12/23/2024 at 4:56 PM

## 2024-12-23 NOTE — PROGRESS NOTES
Physician Progress Note      PATIENT:               TRELL DURAN  CSN #:                  [id number]  :                       1935  ADMIT DATE:       2024 3:13 AM  DISCH DATE:  RESPONDING  PROVIDER #:        Kavon Bryant MD          QUERY TEXT:    Pt admitted with Afib RVR. Pt noted to have bacteremia. If possible, please   document in the progress notes and discharge summary if you are evaluating and   /or treating any of the following:  The medical record reflects the following:  Risk Factors: recent left leg cellulitis, UTI  Clinical Indicators: Pulse 133, RR 25, WBC 13.4  Metabolic Encephalopathy per   H&P. Per IM note - Bacteremia due to UTI empiric antibiotics of Rocephin   will change to ceftin for 5 more days.  urine culture growing e coli.sn   continue cefepime and cefotaxime blood culture test (+) for strept on biofire.    Strep on culture sensitive to cefotaxime.  I think this caused her clinical   deterioration and behavioral disturbance.  Treatment:  cefepime and cefotaxime  Options provided:  -- Sepsis, present on admission  -- UTI without Sepsis  -- Other - I will add my own diagnosis  -- Disagree - Not applicable / Not valid  -- Disagree - Clinically unable to determine / Unknown  -- Refer to Clinical Documentation Reviewer    PROVIDER RESPONSE TEXT:    This patient has sepsis which was present on admission.    Query created by: Mp Mitchell on 2024 12:41 PM      Electronically signed by:  Kavon Bryant MD 2024 1:01 PM

## 2024-12-23 NOTE — PROGRESS NOTES
Patient's mentation has improved since yesterday. Patient is able to state name,  today, and is very cooperative with care and medications at this time. Oral intake is improving. Call light is in reach. Virtual  remains in room.

## 2024-12-23 NOTE — PROGRESS NOTES
Physical Therapy Initial Evaluation/Plan of Care    Room #:  0629/0629-01  Patient Name: Jackelyn Montes  YOB: 1935  MRN: [id number]    Date of Service: 12/23/2024     Tentative placement recommendation: Subacute Rehab  Equipment recommendation: To be determined      Evaluating Physical Therapist: Neno Rivas, PT  #[id number]      Specific Provider Orders/Date/Referring Provider :     PT eval and treat  Start:  12/20/24 1130,   End:  12/20/24 1130,   ONE TIME,   Standing Count:  1 Occurrences,   R       Kavon Bryant MD    Admitting Diagnosis:   Altered mental status [R41.82]  Disorientation [R41.0]  Atrial fibrillation with rapid ventricular response (HCC) [I48.91]  Urinary tract infection without hematuria, site unspecified [N39.0]  Longstanding persistent atrial fibrillation (HCC) [I48.11]    Admitted with    altered mental status , fall, Left lower extremity cellulitis   Xrays neg for fracture  Surgery: none  Visit Diagnoses         Codes    Urinary tract infection without hematuria, site unspecified     N39.0    Longstanding persistent atrial fibrillation (HCC)     I48.11            Patient Active Problem List   Diagnosis    Primary osteoarthritis of both knees    Other contact with raccoon, sequela    Essential hypertension    Acute on chronic combined systolic and diastolic CHF (congestive heart failure) (HCC)    Atrial fibrillation with rapid ventricular response (HCC)    Bilateral lower extremity edema    Age-related osteoporosis without current pathological fracture    Senile hyperkeratosis    Arthritis of hip    Varicose veins of lower extremity    Synovial cyst of right knee    Spondylosis without myelopathy or radiculopathy, lumbar region    Spinal stenosis, lumbar region without neurogenic claudication    Stage 3 chronic kidney disease (HCC)    Nontoxic multinodular goiter    Macular degeneration    Long term (current) use of opiate analgesic    Hernia of anterior abdominal wall       -Positioning for skin integrity and comfort    PT long term treatment goals are located in below grid    Patient and or family understand(s) diagnosis, prognosis, and plan of care.    Frequency of treatments: Patient will be seen  daily.         Prior Level of Function: Patient ambulated independently    Rehab Potential: good   for baseline    Past medical history:   Past Medical History:   Diagnosis Date    Acute metabolic encephalopathy 12/05/2019    Acute respiratory failure with hypoxia 12/05/2019    was hospitalized for 2 months and then had to go to rehab  (states was not on a vent)    JOVANI (acute kidney injury) (Former[facility]) 12/05/2019    Anticoagulant long-term use     Atrial fibrillation ([facility])     on Eliquis    Calculus of kidney 05/19/2021    CHF (congestive heart failure) (Formerly [facility])     last hospitalized 2021   echo from march 2021  EF 30%    Chronic back pain     Community acquired pneumonia of left lung     COVID-19 03/2022    body aches sinus issues    Dental caries     Diabetes mellitus ([facility])     diet controlled    Elevated brain natriuretic peptide (BNP) level 12/05/2019    Gall bladder stones     GERD (gastroesophageal reflux disease)     Gram-positive cocci bacteremia     H/O cardiovascular stress test 03/30/2021    Lexiscan    Hearing loss     Hypertension     Multiple pulmonary nodules 05/19/2021    Neuropathy 05/26/2021    Osteoarthritis     Other contact with raccoon, sequela 08/10/2020    This Diagnosis was added to the Problem List based on transcribed orders from Dr. Es Fowler NP      Sepsis due to Streptococcus pneumoniae with acute hypoxic respiratory failure and septic shock ([facility]) 12/05/2019     Past Surgical History:   Procedure Laterality Date    APPENDECTOMY      HERNIA REPAIR         SUBJECTIVE:    Precautions: titrate/wean oxygen, falls, alarm, and confusion , hard of hearing     Social history: Patient lives with spouse in a split level home       To enter, curved staircase with 3 steps

## 2024-12-23 NOTE — PROGRESS NOTES
OCCUPATIONAL THERAPY INITIAL EVALUATION    [facility]  [address] [place]. [place]        Date:2024                                                  Patient Name: Jackelyn Montes    MRN: [id number]    : 1935    Room: [address] [place], [place] [id number]-      Evaluating OT: Юлия Michelle OTR/L [id number]     Referring Provider and Specific Provider Orders/Date:      24  OT eval and treat  Start:  24,   End:  24,   ONE TIME,   Standing Count:  1 Occurrences,   R         Kavon Bryant MD      Placement Recommendation: Subacute rehab      Diagnosis:   1. Atrial fibrillation with rapid ventricular response (HCC)    2. Disorientation    3. Urinary tract infection without hematuria, site unspecified    4. Longstanding persistent atrial fibrillation (HCC)         Surgery: none      Pertinent Medical History:       Past Medical History:   Diagnosis Date    Acute metabolic encephalopathy 2019    Acute respiratory failure with hypoxia 2019    was hospitalized for 2 months and then had to go to rehab  (states was not on a vent)    JOVANI (acute kidney injury) ([facility]) 2019    Anticoagulant long-term use     Atrial fibrillation (HCC)     on Eliquis    Calculus of kidney 2021    CHF (congestive heart failure) ([facility])     last hospitalized    echo from 2021  EF 30%    Chronic back pain     Community acquired pneumonia of left lung     COVID-19 2022    body aches sinus issues    Dental caries     Diabetes mellitus (HCC)     diet controlled    Elevated brain natriuretic peptide (BNP) level 2019    Gall bladder stones     GERD (gastroesophageal reflux disease)     Gram-positive cocci bacteremia     H/O cardiovascular stress test 2021    Lexiscan    Hearing loss     Hypertension     Multiple pulmonary nodules 2021    Neuropathy 2021    Osteoarthritis     Other contact with raccoon, sequela 08/10/2020

## 2024-12-23 NOTE — DISCHARGE SUMMARY
[facility] Hospitalist       Hospitalist Physician Discharge Summary       Rich Gudino MD  [address]  [facility] [address]  [phone]    Follow up  [facility] cardiology-- call for hospital follow up    primary care    Follow up  Call primary care doctor for hospital follow up      Activity level: Slowly increase as tolerated    Diet: ADULT DIET; Regular; 4 carb choices (60 gm/meal); Low Fat/Low Chol/High Fiber/2 gm Na; Low Sodium (2 gm); 1800 ml    Labs: None are pending at the discharge    Condition at discharge: Stable    Dispo: Return to home setting     Patient ID:  Jackelyn Montes  [id number]  89 y.o.  1935    Admit date: 12/19/2024    Discharge date and time:  12/23/2024  12:02 PM    Admission Diagnoses: Principal Problem:    Altered mental status  Resolved Problems:    * No resolved hospital problems. *      Discharge Diagnoses: Principal Problem:    Altered mental status  Resolved Problems:    * No resolved hospital problems. *      Consults:  IP CONSULT TO CARDIOLOGY    Procedures: None significant except if described in hospital course.    Hospital Course:  History of present illness from History and Physical:   89 y.o. female with a history of atrial fibrillation on oral anticoagulation congestive heart failure kidney stones chronic back pain dental caries diabetes GERD hearing loss hypertension multiple pulmonary nodules neuropathy osteoarthritis presents with increased confusion.  She was brought in with her  who is not currently here in fact the patient is sleeping comfortably.  He has said that she has recent treatment for left leg cellulitis.  Earlier the ER doctor said she was alert and oriented.   said that she was confused and weak in fact she fell.  She denied hitting her head.  She denies any other injury from the fall.  ED workup showed leukocytosis and UA positive for signs of infection blood culture sent  During her emergency room course her

## 2024-12-23 NOTE — H&P
Please see previous H&P and also the discharge summary that was written earlier today on the same calendar day.  The update is as follows.  Staff reports that throughout the morning  intermittently mentioned coming to  his wife to take her home placing her but finally mentioned again that he wished to take her home.  We did a clean discharge.  At that point patient was brought home by her  but she was too weak thus he brought her back to the emergency room so that she would be placed.  I spoke to the ER doctor as well as the ER .  They both work diligently but eventually was not able to place her from the ER.  The projection is that she will be successfully placed by tomorrow so I am readmitting her for debility without any other changes to her medical regimen see previous notes

## 2024-12-23 NOTE — ACP (ADVANCE CARE PLANNING)
Advance Care Planning   Healthcare Decision Maker:    Primary Decision Maker: Jimmy Montes - Spouse - [phone]    Click here to complete Healthcare Decision Makers including selection of the Healthcare Decision Maker Relationship (ie \"Primary\").  Today we documented Decision Maker(s) consistent with Legal Next of Kin hierarchy.     Electronically signed by RENETTA Sainz on 12/23/2024 at 4:58 PM

## 2024-12-23 NOTE — ED PROVIDER NOTES
[facility] EMERGENCY DEPARTMENT  EMERGENCY DEPARTMENT ENCOUNTER        Pt Name: Jackelyn Montes  MRN: [id number]  Birthdate 1935  Date of evaluation: 12/23/2024  Provider: Reynaldo Wylie DO  PCP: No primary care provider on file.  Note Started: 2:37 PM EST 12/23/24    CHIEF COMPLAINT       Chief Complaint   Patient presents with    Nursing home placement       HISTORY OF PRESENT ILLNESS: 1 or more Elements   History From: patient and EMS    Limitations to history : Dementia    Jackelyn Montes is a 89 y.o. female who presents to the ED for evaluation.  Patient had just been discharged from the hospital earlier today.  When EMS dropped her off she was unable to get out of the ambulance without assistance from multiple people.  She lives at home with her  who is also very decrepit and cannot take care of her.  She was essentially sent back to the ED for placement.  She denies any chest pain or shortness of breath.  No associated abdominal pain.  No nausea, vomiting, or diarrhea.    Nursing Notes were all reviewed and agreed with or any disagreements were addressed in the HPI.      REVIEW OF EXTERNAL NOTE :        REVIEW OF SYSTEMS :           Positives and Pertinent negatives as per HPI.     SURGICAL HISTORY     Past Surgical History:   Procedure Laterality Date    APPENDECTOMY      HERNIA REPAIR         CURRENTMEDICATIONS       Previous Medications    AMIODARONE (CORDARONE) 200 MG TABLET    Take 1 tablet by mouth daily    CEFUROXIME (CEFTIN) 250 MG TABLET    Take 1 tablet by mouth every 12 hours for 7 doses    ELIQUIS 2.5 MG TABS TABLET    TAKE 1 TABLET BY MOUTH TWICE DAILY    METOPROLOL SUCCINATE (TOPROL XL) 50 MG EXTENDED RELEASE TABLET    TAKE 1 TABLET BY MOUTH DAILY       ALLERGIES     Influenza vaccines, Alginate [alginic acid], Novocain [procaine], Tape [adhesive tape], and Clindamycin    FAMILYHISTORY       Family History   Problem Relation Age of Onset

## 2024-12-23 NOTE — TELEPHONE ENCOUNTER
Rich Gudino MD McGee, She[place], MA  Schedule post-hospital f/u office visit with Dr Hopkins 3-4 weeks    Rich Gudino MD

## 2024-12-24 LAB
ANION GAP SERPL CALCULATED.3IONS-SCNC: 18 MMOL/L (ref 7–16)
BASOPHILS # BLD: 0 K/UL (ref 0–0.2)
BASOPHILS NFR BLD: 0 % (ref 0–2)
BUN SERPL-MCNC: 21 MG/DL (ref 6–23)
CALCIUM SERPL-MCNC: 8.5 MG/DL (ref 8.6–10.2)
CHLORIDE SERPL-SCNC: 96 MMOL/L (ref 98–107)
CO2 SERPL-SCNC: 28 MMOL/L (ref 22–29)
CREAT SERPL-MCNC: 0.8 MG/DL (ref 0.5–1)
EOSINOPHIL # BLD: 0 K/UL (ref 0.05–0.5)
EOSINOPHILS RELATIVE PERCENT: 0 % (ref 0–6)
ERYTHROCYTE [DISTWIDTH] IN BLOOD BY AUTOMATED COUNT: 14.3 % (ref 11.5–15)
GFR, ESTIMATED: 70 ML/MIN/1.73M2
GLUCOSE SERPL-MCNC: 128 MG/DL (ref 74–99)
HCT VFR BLD AUTO: 48.7 % (ref 34–48)
HGB BLD-MCNC: 17 G/DL (ref 11.5–15.5)
LYMPHOCYTES NFR BLD: 0.23 K/UL (ref 1.5–4)
LYMPHOCYTES RELATIVE PERCENT: 2 % (ref 20–42)
MCH RBC QN AUTO: 38.9 PG (ref 26–35)
MCHC RBC AUTO-ENTMCNC: 34.9 G/DL (ref 32–34.5)
MCV RBC AUTO: 111.4 FL (ref 80–99.9)
MONOCYTES NFR BLD: 0.45 K/UL (ref 0.1–0.95)
MONOCYTES NFR BLD: 4 % (ref 2–12)
NEUTROPHILS NFR BLD: 95 % (ref 43–80)
NEUTS SEG NFR BLD: 12.12 K/UL (ref 1.8–7.3)
PLATELET # BLD AUTO: 218 K/UL (ref 130–450)
PMV BLD AUTO: 10.9 FL (ref 7–12)
POTASSIUM SERPL-SCNC: 3 MMOL/L (ref 3.5–5)
RBC # BLD AUTO: 4.37 M/UL (ref 3.5–5.5)
RBC # BLD: ABNORMAL 10*6/UL
SODIUM SERPL-SCNC: 142 MMOL/L (ref 132–146)
WBC # BLD: ABNORMAL 10*3/UL
WBC OTHER # BLD: 12.8 K/UL (ref 4.5–11.5)

## 2024-12-24 PROCEDURE — 80048 BASIC METABOLIC PNL TOTAL CA: CPT

## 2024-12-24 PROCEDURE — 6370000000 HC RX 637 (ALT 250 FOR IP): Performed by: INTERNAL MEDICINE

## 2024-12-24 PROCEDURE — 99239 HOSP IP/OBS DSCHRG MGMT >30: CPT | Performed by: INTERNAL MEDICINE

## 2024-12-24 PROCEDURE — 6360000002 HC RX W HCPCS: Performed by: INTERNAL MEDICINE

## 2024-12-24 PROCEDURE — 85025 COMPLETE CBC W/AUTO DIFF WBC: CPT

## 2024-12-24 PROCEDURE — 2500000003 HC RX 250 WO HCPCS: Performed by: INTERNAL MEDICINE

## 2024-12-24 PROCEDURE — 36415 COLL VENOUS BLD VENIPUNCTURE: CPT

## 2024-12-24 PROCEDURE — 1200000000 HC SEMI PRIVATE

## 2024-12-24 RX ORDER — CEFUROXIME AXETIL 250 MG/1
250 TABLET ORAL EVERY 12 HOURS SCHEDULED
Qty: 4 TABLET | Refills: 0 | Status: SHIPPED | OUTPATIENT
Start: 2024-12-24 | End: 2024-12-26

## 2024-12-24 RX ADMIN — POTASSIUM CHLORIDE 10 MEQ: 7.45 INJECTION INTRAVENOUS at 14:30

## 2024-12-24 RX ADMIN — POTASSIUM CHLORIDE 10 MEQ: 7.45 INJECTION INTRAVENOUS at 12:30

## 2024-12-24 RX ADMIN — CEFUROXIME AXETIL 250 MG: 250 TABLET ORAL at 10:09

## 2024-12-24 RX ADMIN — AMIODARONE HYDROCHLORIDE 200 MG: 200 TABLET ORAL at 10:09

## 2024-12-24 RX ADMIN — POTASSIUM CHLORIDE 10 MEQ: 7.45 INJECTION INTRAVENOUS at 21:35

## 2024-12-24 RX ADMIN — POTASSIUM CHLORIDE 10 MEQ: 7.45 INJECTION INTRAVENOUS at 19:30

## 2024-12-24 RX ADMIN — Medication 10 ML: at 10:12

## 2024-12-24 RX ADMIN — APIXABAN 2.5 MG: 5 TABLET, FILM COATED ORAL at 20:12

## 2024-12-24 RX ADMIN — POTASSIUM CHLORIDE 10 MEQ: 7.45 INJECTION INTRAVENOUS at 10:08

## 2024-12-24 RX ADMIN — METOPROLOL SUCCINATE 50 MG: 50 TABLET, EXTENDED RELEASE ORAL at 10:09

## 2024-12-24 RX ADMIN — APIXABAN 2.5 MG: 5 TABLET, FILM COATED ORAL at 10:09

## 2024-12-24 RX ADMIN — POTASSIUM CHLORIDE 10 MEQ: 7.45 INJECTION INTRAVENOUS at 16:30

## 2024-12-24 RX ADMIN — CEFUROXIME AXETIL 250 MG: 250 TABLET ORAL at 20:12

## 2024-12-24 NOTE — PROGRESS NOTES
4 Eyes Skin Assessment     NAME:  Jackelyn Montes  YOB: 1935  MEDICAL RECORD NUMBER:  [id number]    The patient is being assessed for  Admission    I agree that at least one RN has performed a thorough Head to Toe Skin Assessment on the patient. ALL assessment sites listed below have been assessed.      Areas assessed by both nurses:    Head, Face, Ears, Shoulders, Back, Chest, Arms, Elbows, Hands, Sacrum. Buttock, Coccyx, Ischium, Legs. Feet and Heels, and Under Medical Devices         Does the Patient have a Wound? No noted wound(s)     No open wounds. Generalized ecchymosis, scabs, and scratches. Redness on sacrum.  Ramu Prevention initiated by RN: Yes  Wound Care Orders initiated by RN: No    Pressure Injury (Stage 3,4, Unstageable, DTI, NWPT, and Complex wounds) if present, place Wound referral order by RN under : No    New Ostomies, if present place, Ostomy referral order under : No     Nurse 1 eSignature: Electronically signed by Linda Franklin RN on 12/24/24 at 12:34 AM EST    **SHARE this note so that the co-signing nurse can place an eSignature**    Nurse 2 eSignature: {Esignature:[id number]}

## 2024-12-24 NOTE — PROGRESS NOTES
Spiritual Health History and Assessment/Progress Note  [facility]    (P) Spiritual/Emotional Needs,  ,  ,      Name: Jackelyn Montes MRN: [id number]    Age: 89 y.o.     Sex: female   Language: English   Latter-day: Sikh   Debility     Date: 12/24/2024                           Spiritual Assessment began in [facility]        Referral/Consult From: (P) Rounding   Encounter Overview/Reason: (P) Spiritual/Emotional Needs  Service Provided For: (P) Patient and family together    Tasha, Belief, Meaning:   Patient is connected with a tasha tradition or spiritual practice  Family/Friends are connected with a tasha tradition or spiritual practice and have beliefs or practices that help with coping during difficult times      Importance and Influence:  Patient unable to assess at this time  Family/Friends have spiritual/personal beliefs that influence decisions regarding the patient's health    Community:  Patient is connected with a spiritual community  Family/Friends are connected with a spiritual community:    Assessment and Plan of Care:     Patient Interventions include: Facilitated expression of thoughts and feelings and Affirmed coping skills/support systems  Family/Friends Interventions include: Facilitated expression of thoughts and feelings and Affirmed coping skills/support systems    Patient Plan of Care: Spiritual Care available upon further referral  Family/Friends Plan of Care: Spiritual Care available upon further referral    Electronically signed by Chaplain Jamey on 12/24/2024 at 3:00 PM

## 2024-12-24 NOTE — CARE COORDINATION
SS Note:  Readmission Assessment  Number of Days since last admission?: 1-7 days  Previous Disposition: Home with Family  Who is being Interviewed: Caregiver (Spouse-Jimmy)  What was the patient's/caregiver's perception as to why they think they needed to return back to the hospital?: Did not realize care needs would be so extensive, Not enough help at home, Other (Comment) (Could not get her into house)  Did you visit your Primary Care Physician after you left the hospital, before you returned this time?: No  Why weren't you able to visit your PCP?: Did not have an appointment  Did you see a specialist, such as Cardiac, Pulmonary, Orthopedic Physician, etc. after you left the hospital?: No  Who advised the patient to return to the hospital?: Other (Comment) (Spouse)  Does the patient report anything that got in the way of taking their medications?: No  In our efforts to provide the best possible care to you and others like you, can you think of anything that we could have done to help you after you left the hospital the first time, so that you might not have needed to return so soon?: Other (Comment) (Nothing)    GRACIELA was notified [facility] may not be able to accept. GRACIELA has been diligently working w/[facility] for placement there, multiple calls/texts placed, waiting for auth & to confirm they can still accept pt since 1000.    1320  GRACIELA received call from [facility]. She notes they are accepting pt but still need to submit for auth. They need 7000 (HENS) & not 2399 Pasrr.   SW deleted Pasrr ID: [id number] & initiated 7000 HENS- ID: [id number]. Hopefully have auth today but not sure.   Electronically signed by RENETTA Sainz on 12/24/2024 at 1:31 PM

## 2024-12-24 NOTE — PLAN OF CARE
Problem: Chronic Conditions and Co-morbidities  Goal: Patient's chronic conditions and co-morbidity symptoms are monitored and maintained or improved  12/24/2024 1251 by Cristina Campzuano RN  Outcome: Progressing  12/24/2024 0031 by Linda Franklin RN  Outcome: Progressing     Problem: Discharge Planning  Goal: Discharge to home or other facility with appropriate resources  12/24/2024 1251 by Cristina Campuzano RN  Outcome: Progressing  12/24/2024 0031 by Linda Franklin RN  Outcome: Progressing     Problem: Skin/Tissue Integrity  Goal: Absence of new skin breakdown  Description: 1.  Monitor for areas of redness and/or skin breakdown  2.  Assess vascular access sites hourly  3.  Every 4-6 hours minimum:  Change oxygen saturation probe site  4.  Every 4-6 hours:  If on nasal continuous positive airway pressure, respiratory therapy assess nares and determine need for appliance change or resting period.  12/24/2024 1251 by Cristina Campuzano RN  Outcome: Progressing  12/24/2024 0031 by Linda Franklin RN  Outcome: Progressing     Problem: ABCDS Injury Assessment  Goal: Absence of physical injury  12/24/2024 1251 by Cristina Campuzano RN  Outcome: Progressing  12/24/2024 0031 by Linda Franklin RN  Outcome: Progressing     Problem: Safety - Adult  Goal: Free from fall injury  12/24/2024 1251 by Cristina Campuzano RN  Outcome: Progressing  12/24/2024 0031 by Linda Franklin RN  Outcome: Progressing

## 2024-12-25 PROCEDURE — 2060000000 HC ICU INTERMEDIATE R&B

## 2024-12-25 PROCEDURE — 6360000002 HC RX W HCPCS: Performed by: INTERNAL MEDICINE

## 2024-12-25 PROCEDURE — 2500000003 HC RX 250 WO HCPCS: Performed by: FAMILY MEDICINE

## 2024-12-25 PROCEDURE — 2500000003 HC RX 250 WO HCPCS: Performed by: INTERNAL MEDICINE

## 2024-12-25 PROCEDURE — 6370000000 HC RX 637 (ALT 250 FOR IP): Performed by: INTERNAL MEDICINE

## 2024-12-25 PROCEDURE — 2580000003 HC RX 258: Performed by: FAMILY MEDICINE

## 2024-12-25 PROCEDURE — 99232 SBSQ HOSP IP/OBS MODERATE 35: CPT | Performed by: INTERNAL MEDICINE

## 2024-12-25 RX ORDER — DILTIAZEM HYDROCHLORIDE 5 MG/ML
10 INJECTION INTRAVENOUS ONCE
Status: COMPLETED | OUTPATIENT
Start: 2024-12-25 | End: 2024-12-25

## 2024-12-25 RX ADMIN — POTASSIUM CHLORIDE 10 MEQ: 7.45 INJECTION INTRAVENOUS at 10:46

## 2024-12-25 RX ADMIN — CEFUROXIME AXETIL 250 MG: 250 TABLET ORAL at 09:50

## 2024-12-25 RX ADMIN — Medication 10 ML: at 21:08

## 2024-12-25 RX ADMIN — APIXABAN 2.5 MG: 5 TABLET, FILM COATED ORAL at 21:08

## 2024-12-25 RX ADMIN — POTASSIUM CHLORIDE 10 MEQ: 7.45 INJECTION INTRAVENOUS at 13:44

## 2024-12-25 RX ADMIN — AMIODARONE HYDROCHLORIDE 200 MG: 200 TABLET ORAL at 08:17

## 2024-12-25 RX ADMIN — APIXABAN 2.5 MG: 5 TABLET, FILM COATED ORAL at 08:17

## 2024-12-25 RX ADMIN — POTASSIUM CHLORIDE 10 MEQ: 7.45 INJECTION INTRAVENOUS at 08:15

## 2024-12-25 RX ADMIN — POTASSIUM CHLORIDE 10 MEQ: 7.45 INJECTION INTRAVENOUS at 09:30

## 2024-12-25 RX ADMIN — CEFUROXIME AXETIL 250 MG: 250 TABLET ORAL at 21:08

## 2024-12-25 RX ADMIN — METOPROLOL SUCCINATE 50 MG: 50 TABLET, EXTENDED RELEASE ORAL at 08:17

## 2024-12-25 RX ADMIN — Medication 10 ML: at 08:19

## 2024-12-25 RX ADMIN — POTASSIUM CHLORIDE 10 MEQ: 7.45 INJECTION INTRAVENOUS at 06:50

## 2024-12-25 RX ADMIN — ONDANSETRON 4 MG: 4 TABLET, ORALLY DISINTEGRATING ORAL at 12:40

## 2024-12-25 RX ADMIN — DILTIAZEM HYDROCHLORIDE 10 MG: 5 INJECTION, SOLUTION INTRAVENOUS at 08:10

## 2024-12-25 RX ADMIN — POTASSIUM CHLORIDE 10 MEQ: 7.45 INJECTION INTRAVENOUS at 12:40

## 2024-12-25 RX ADMIN — DILTIAZEM HYDROCHLORIDE 15 MG/HR: 5 INJECTION, SOLUTION INTRAVENOUS at 19:09

## 2024-12-25 RX ADMIN — DILTIAZEM HYDROCHLORIDE 5 MG/HR: 5 INJECTION, SOLUTION INTRAVENOUS at 08:32

## 2024-12-25 ASSESSMENT — LIFESTYLE VARIABLES
HOW MANY STANDARD DRINKS CONTAINING ALCOHOL DO YOU HAVE ON A TYPICAL DAY: PATIENT DOES NOT DRINK
HOW OFTEN DO YOU HAVE A DRINK CONTAINING ALCOHOL: NEVER

## 2024-12-25 NOTE — PROGRESS NOTES
4 Eyes Skin Assessment     NAME:  Jackelyn Montes  YOB: 1935  MEDICAL RECORD NUMBER:  [id number]    The patient is being assessed for  Admission    I agree that at least one RN has performed a thorough Head to Toe Skin Assessment on the patient. ALL assessment sites listed below have been assessed.      Areas assessed by both nurses:    Head, Face, Ears, Shoulders, Back, Chest, Arms, Elbows, Hands, Sacrum. Buttock, Coccyx, Ischium, and Legs. Feet and Heels        Does the Patient have a Wound? No noted wound(s) Redness to sacrum        Ramu Prevention initiated by RN: No  Wound Care Orders initiated by RN: No    Pressure Injury (Stage 3,4, Unstageable, DTI, NWPT, and Complex wounds) if present, place Wound referral order by RN under : No    New Ostomies, if present place, Ostomy referral order under : No     Nurse 1 eSignature: Electronically signed by Naveen Vera RN on 12/25/24 at 1:52 PM EST    **SHARE this note so that the co-signing nurse can place an eSignature**    Nurse 2 eSignature: {Esignature:[id number]}

## 2024-12-25 NOTE — PLAN OF CARE
Problem: Chronic Conditions and Co-morbidities  Goal: Patient's chronic conditions and co-morbidity symptoms are monitored and maintained or improved  Outcome: Progressing  Flowsheets (Taken 12/25/2024 0743)  Care Plan - Patient's Chronic Conditions and Co-Morbidity Symptoms are Monitored and Maintained or Improved: Monitor and assess patient's chronic conditions and comorbid symptoms for stability, deterioration, or improvement     Problem: Discharge Planning  Goal: Discharge to home or other facility with appropriate resources  Outcome: Progressing  Flowsheets  Taken 12/25/2024 0751  Discharge to home or other facility with appropriate resources: Identify barriers to discharge with patient and caregiver  Taken 12/25/2024 0743  Discharge to home or other facility with appropriate resources: Identify barriers to discharge with patient and caregiver     Problem: Skin/Tissue Integrity  Goal: Absence of new skin breakdown  Description: 1.  Monitor for areas of redness and/or skin breakdown  2.  Assess vascular access sites hourly  3.  Every 4-6 hours minimum:  Change oxygen saturation probe site  4.  Every 4-6 hours:  If on nasal continuous positive airway pressure, respiratory therapy assess nares and determine need for appliance change or resting period.  Outcome: Progressing     Problem: ABCDS Injury Assessment  Goal: Absence of physical injury  Outcome: Progressing  Flowsheets (Taken 12/25/2024 0747)  Absence of Physical Injury: Implement safety measures based on patient assessment     Problem: Safety - Adult  Goal: Free from fall injury  Outcome: Progressing

## 2024-12-25 NOTE — PROGRESS NOTES
Admitting Date and Time: 12/23/2024  2:24 PM  Admit Dx: Debility [R53.81]  Failure to thrive in adult [R62.7]    Subjective:    Poorly communicative due to his advanced dementia    ROS: denies fever, chills, cp, sob, n/v, HA unless stated above.     sodium chloride flush  5-40 mL IntraVENous 2 times per day    amiodarone  200 mg Oral Daily    cefUROXime  250 mg Oral 2 times per day    apixaban  2.5 mg Oral BID    metoprolol succinate  50 mg Oral Daily     sodium chloride flush, 5-40 mL, PRN  sodium chloride, , PRN  potassium chloride, 40 mEq, PRN   Or  potassium alternative oral replacement, 40 mEq, PRN   Or  potassium chloride, 10 mEq, PRN  magnesium sulfate, 2,000 mg, PRN  ondansetron, 4 mg, Q8H PRN   Or  ondansetron, 4 mg, Q6H PRN  polyethylene glycol, 17 g, Daily PRN  acetaminophen, 650 mg, Q6H PRN   Or  acetaminophen, 650 mg, Q6H PRN         Objective:    BP (!) 156/117   Pulse (!) 143   Temp 98.6 °F (37 °C) (Oral)   Resp 18   Ht 1.626 m (5' 4\")   Wt 54.4 kg (120 lb)   SpO2 96%   BMI 20.60 kg/m²   General Appearance: alert and oriented to person, place and time and in no acute distress  Skin: warm and dry  Head: normocephalic and atraumatic  Eyes: pupils equal, round, and reactive to light, extraocular eye movements intact, conjunctivae normal  Neck: neck supple and non tender without mass   Pulmonary/Chest: clear to auscultation bilaterally- no wheezes, rales or rhonchi, normal air movement, no respiratory distress  Cardiovascular: normal rate, normal S1 and S2 and no carotid bruits  Abdomen: soft, non-tender, non-distended, normal bowel sounds, no masses or organomegaly  Extremities: no cyanosis, no clubbing and no  edema  Neurologic: no cranial nerve deficit and speech normal  Memory impairment and hard of hearing    Recent Labs     12/23/24  0711 12/23/24  1856 12/24/24  0512    140 142   K 2.6* 3.2* 3.0*   CL 94* 94* 96*   CO2 28 30* 28   BUN 19 22 21   CREATININE 0.9 0.9 0.8   GLUCOSE 127*  combordities including: kidney stones chronic back pain dental caries GERD hearing loss hypertension multiple pulmonary nodules neuropathy osteoarthritis  Diabetes: Diabetic diet.  No oral diabetic meds here.  Sliding scale coverage.  Hypoglycemic protocol.  Resume home regimen upon discharge  Code Status: Full  DVT prophylaxis: Eliquis  Disposition placement.  She has severe debility weakness.  Although the  on the day of discharge had planned on agreeing to placement he temporarily changed his mind only to find that he could not get her into his house therefore she was admitted this admission on the same day that she was discharged so the patient could be placed.   Case management on 12/24 continue the process which I would not expect to be completed until after 12/25    NOTE: This report was transcribed using voice recognition software. Every effort was made to ensure accuracy; however, inadvertent computerized transcription errors may be present.     Electronically signed by ANNETTA MCPHERSON MD on 12/25/2024 at 9:29 AM

## 2024-12-25 NOTE — PROGRESS NOTES
Admitting Date and Time: 12/23/2024  2:24 PM  Admit Dx: Debility [R53.81]  Failure to thrive in adult [R62.7]    Subjective:    Poorly communicative due to his advanced dementia    ROS: denies fever, chills, cp, sob, n/v, HA unless stated above.     sodium chloride flush  5-40 mL IntraVENous 2 times per day    amiodarone  200 mg Oral Daily    cefUROXime  250 mg Oral 2 times per day    apixaban  2.5 mg Oral BID    metoprolol succinate  50 mg Oral Daily     sodium chloride flush, 5-40 mL, PRN  sodium chloride, , PRN  potassium chloride, 40 mEq, PRN   Or  potassium alternative oral replacement, 40 mEq, PRN   Or  potassium chloride, 10 mEq, PRN  magnesium sulfate, 2,000 mg, PRN  ondansetron, 4 mg, Q8H PRN   Or  ondansetron, 4 mg, Q6H PRN  polyethylene glycol, 17 g, Daily PRN  acetaminophen, 650 mg, Q6H PRN   Or  acetaminophen, 650 mg, Q6H PRN         Objective:    BP (!) 156/117   Pulse (!) 143   Temp 98.6 °F (37 °C) (Oral)   Resp 18   Ht 1.626 m (5' 4\")   Wt 54.4 kg (120 lb)   SpO2 96%   BMI 20.60 kg/m²   General Appearance: alert and oriented to person, place and time and in no acute distress  Skin: warm and dry  Head: normocephalic and atraumatic  Eyes: pupils equal, round, and reactive to light, extraocular eye movements intact, conjunctivae normal  Neck: neck supple and non tender without mass   Pulmonary/Chest: clear to auscultation bilaterally- no wheezes, rales or rhonchi, normal air movement, no respiratory distress  Cardiovascular: normal rate, normal S1 and S2 and no carotid bruits  Abdomen: soft, non-tender, non-distended, normal bowel sounds, no masses or organomegaly  Extremities: no cyanosis, no clubbing and no  edema  Neurologic: no cranial nerve deficit and speech normal  Memory impairment    Recent Labs     12/23/24  0711 12/23/24  1856 12/24/24  0512    140 142   K 2.6* 3.2* 3.0*   CL 94* 94* 96*   CO2 28 30* 28   BUN 19 22 21   CREATININE 0.9 0.9 0.8   GLUCOSE 127* 151* 128*   CALCIUM  kidney stones chronic back pain dental caries GERD hearing loss hypertension multiple pulmonary nodules neuropathy osteoarthritis  Diabetes: Diabetic diet.  No oral diabetic meds here.  Sliding scale coverage.  Hypoglycemic protocol.  Resume home regimen upon discharge  Code Status: Full  DVT prophylaxis: Eliquis  Disposition placement.  She has severe debility weakness.  Although the  on the day of discharge had planned on agreeing to placement he temporarily changed his mind only to find that he could not get her into his house therefore she was admitted this admission on the same day that she was discharged so the patient could be placed    NOTE: This report was transcribed using voice recognition software. Every effort was made to ensure accuracy; however, inadvertent computerized transcription errors may be present.     Electronically signed by ANNETTA MCPHERSON MD on 12/25/2024 at 9:28 AM

## 2024-12-26 PROBLEM — R62.7 FAILURE TO THRIVE IN ADULT: Status: ACTIVE | Noted: 2024-12-26

## 2024-12-26 LAB
EKG ATRIAL RATE: 90 BPM
EKG Q-T INTERVAL: 310 MS
EKG QRS DURATION: 82 MS
EKG QTC CALCULATION (BAZETT): 443 MS
EKG R AXIS: -78 DEGREES
EKG T AXIS: 71 DEGREES
EKG VENTRICULAR RATE: 123 BPM
GLUCOSE BLD-MCNC: 96 MG/DL (ref 74–99)

## 2024-12-26 PROCEDURE — 6370000000 HC RX 637 (ALT 250 FOR IP): Performed by: INTERNAL MEDICINE

## 2024-12-26 PROCEDURE — 6360000002 HC RX W HCPCS: Performed by: INTERNAL MEDICINE

## 2024-12-26 PROCEDURE — 99233 SBSQ HOSP IP/OBS HIGH 50: CPT | Performed by: INTERNAL MEDICINE

## 2024-12-26 PROCEDURE — 93010 ELECTROCARDIOGRAM REPORT: CPT | Performed by: INTERNAL MEDICINE

## 2024-12-26 PROCEDURE — 93005 ELECTROCARDIOGRAM TRACING: CPT | Performed by: NURSE PRACTITIONER

## 2024-12-26 PROCEDURE — 2580000003 HC RX 258: Performed by: FAMILY MEDICINE

## 2024-12-26 PROCEDURE — 82947 ASSAY GLUCOSE BLOOD QUANT: CPT

## 2024-12-26 PROCEDURE — APPSS180 APP SPLIT SHARED TIME > 60 MINUTES: Performed by: NURSE PRACTITIONER

## 2024-12-26 PROCEDURE — 2500000003 HC RX 250 WO HCPCS: Performed by: INTERNAL MEDICINE

## 2024-12-26 PROCEDURE — 2500000003 HC RX 250 WO HCPCS: Performed by: FAMILY MEDICINE

## 2024-12-26 PROCEDURE — 2060000000 HC ICU INTERMEDIATE R&B

## 2024-12-26 RX ORDER — DIGOXIN 0.25 MG/ML
250 INJECTION INTRAMUSCULAR; INTRAVENOUS ONCE
Status: COMPLETED | OUTPATIENT
Start: 2024-12-26 | End: 2024-12-26

## 2024-12-26 RX ORDER — LORAZEPAM 2 MG/ML
0.5 INJECTION INTRAMUSCULAR ONCE
Status: COMPLETED | OUTPATIENT
Start: 2024-12-26 | End: 2024-12-26

## 2024-12-26 RX ORDER — POTASSIUM CHLORIDE 7.45 MG/ML
10 INJECTION INTRAVENOUS
Status: COMPLETED | OUTPATIENT
Start: 2024-12-26 | End: 2024-12-26

## 2024-12-26 RX ORDER — DIGOXIN 0.25 MG/ML
500 INJECTION INTRAMUSCULAR; INTRAVENOUS ONCE
Status: COMPLETED | OUTPATIENT
Start: 2024-12-26 | End: 2024-12-26

## 2024-12-26 RX ORDER — POTASSIUM CHLORIDE 1500 MG/1
40 TABLET, EXTENDED RELEASE ORAL 2 TIMES DAILY
Status: DISCONTINUED | OUTPATIENT
Start: 2024-12-26 | End: 2024-12-26

## 2024-12-26 RX ORDER — DILTIAZEM HYDROCHLORIDE 5 MG/ML
10 INJECTION INTRAVENOUS ONCE
Status: COMPLETED | OUTPATIENT
Start: 2024-12-26 | End: 2024-12-26

## 2024-12-26 RX ADMIN — DILTIAZEM HYDROCHLORIDE 15 MG/HR: 5 INJECTION, SOLUTION INTRAVENOUS at 03:17

## 2024-12-26 RX ADMIN — DILTIAZEM HYDROCHLORIDE 10 MG: 5 INJECTION INTRAVENOUS at 03:17

## 2024-12-26 RX ADMIN — DILTIAZEM HYDROCHLORIDE 15 MG/HR: 5 INJECTION, SOLUTION INTRAVENOUS at 12:08

## 2024-12-26 RX ADMIN — DIGOXIN 500 MCG: 0.25 INJECTION INTRAMUSCULAR; INTRAVENOUS at 14:40

## 2024-12-26 RX ADMIN — Medication 10 ML: at 20:38

## 2024-12-26 RX ADMIN — LORAZEPAM 0.5 MG: 2 INJECTION INTRAMUSCULAR at 01:47

## 2024-12-26 RX ADMIN — POTASSIUM CHLORIDE 10 MEQ: 7.45 INJECTION INTRAVENOUS at 14:54

## 2024-12-26 RX ADMIN — DIGOXIN 250 MCG: 0.25 INJECTION INTRAMUSCULAR; INTRAVENOUS at 08:44

## 2024-12-26 RX ADMIN — DILTIAZEM HYDROCHLORIDE 15 MG/HR: 5 INJECTION, SOLUTION INTRAVENOUS at 23:17

## 2024-12-26 RX ADMIN — Medication 5 ML: at 10:44

## 2024-12-26 RX ADMIN — POTASSIUM CHLORIDE 10 MEQ: 7.45 INJECTION INTRAVENOUS at 16:02

## 2024-12-26 ASSESSMENT — PAIN SCALES - GENERAL: PAINLEVEL_OUTOF10: 0

## 2024-12-26 NOTE — PROGRESS NOTES
[facility] Hospitalist   Progress Note    Admitting Date and Time: 12/23/2024  2:24 PM  Admit Dx: Debility [R53.81]  Failure to thrive in adult [R62.7]    Subjective:    Pt feels ***  Per RN: ***    ROS: denies fever, chills, cp, sob, n/v, HA unless stated above.     sodium chloride flush  5-40 mL IntraVENous 2 times per day    amiodarone  200 mg Oral Daily    cefUROXime  250 mg Oral 2 times per day    apixaban  2.5 mg Oral BID    metoprolol succinate  50 mg Oral Daily     sodium chloride flush, 5-40 mL, PRN  sodium chloride, , PRN  potassium chloride, 40 mEq, PRN   Or  potassium alternative oral replacement, 40 mEq, PRN   Or  potassium chloride, 10 mEq, PRN  magnesium sulfate, 2,000 mg, PRN  ondansetron, 4 mg, Q8H PRN   Or  ondansetron, 4 mg, Q6H PRN  polyethylene glycol, 17 g, Daily PRN  acetaminophen, 650 mg, Q6H PRN   Or  acetaminophen, 650 mg, Q6H PRN         Objective:    BP (!) 142/82   Pulse (!) 120   Temp 97.5 °F (36.4 °C) (Oral)   Resp 18   Ht 1.626 m (5' 4\")   Wt 54.4 kg (120 lb)   SpO2 93%   BMI 20.60 kg/m²   General Appearance: alert and oriented to person, place and time and in no acute distress  Skin: warm and dry  Head: normocephalic and atraumatic  Eyes: pupils equal, round, and reactive to light, extraocular eye movements intact, conjunctivae normal  Neck: neck supple and non tender without mass   Pulmonary/Chest: clear to auscultation bilaterally- no wheezes, rales or rhonchi, normal air movement, no respiratory distress  Cardiovascular: normal rate, normal S1 and S2 and no carotid bruits  Abdomen: soft, non-tender, non-distended, normal bowel sounds, no masses or organomegaly  Extremities: no cyanosis, no clubbing and no edema  Neurologic: no cranial nerve deficit and speech normal      Recent Labs     12/23/24  1856 12/24/24  0512    142   K 3.2* 3.0*   CL 94* 96*   CO2 30* 28   BUN 22 21   CREATININE 0.9 0.8   GLUCOSE 151* 128*   CALCIUM 8.9 8.5*       No results

## 2024-12-26 NOTE — DISCHARGE INSTR - COC
Continuity of Care Form    Patient Name: Jackelyn Montes   :  1935  MRN:  [id number]    Admit date:  2024  Discharge date:  25    Code Status Order: Full Code   Advance Directives:   Advance Care Flowsheet Documentation             Admitting Physician:  Kavon Bryant MD  PCP: No primary care provider on file.    Discharging Nurse: BASIM Atkins  Discharging Hospital Unit/Room#: 0537/0537-02  Discharging Unit Phone Number: [phone]    Emergency Contact:   Extended Emergency Contact Information  Primary Emergency Contact: Jimmy Montes  Address: [address] [place] [id number]  Home Phone: [phone]  Relation: Spouse   needed? No  Secondary Emergency Contact: Yaneth Montes  Home Phone: [phone]  Mobile Phone: [phone]  Relation: Grandchild  Preferred language: English   needed? No    Past Surgical History:  Past Surgical History:   Procedure Laterality Date    APPENDECTOMY      HERNIA REPAIR         Immunization History:   Immunization History   Administered Date(s) Administered    COVID-19, MODERNA BLUE border, Primary or Immunocompromised, (age 12y+), IM, 100 mcg/0.5mL 2021    Influenza, FLUZONE High Dose, (age 65 y+), IM, Trivalent PF, 0.5mL 2017, 2018    Pneumococcal, PCV20, PREVNAR 20, (age 6w+), IM, 0.5mL 2022    Rabies IM Fibroblast Culture ([place]) 2020, 2020, 08/15/2020, 2020, 10/16/2020, 10/21/2020    Rabies Immune Globulin 2020       Active Problems:  Patient Active Problem List   Diagnosis Code    Primary osteoarthritis of both knees M17.0    Other contact with raccoon, sequela W55.59XS    Essential hypertension I10    Acute on chronic combined systolic and diastolic CHF (congestive heart failure) (HCC) I50.43    Atrial fibrillation with rapid ventricular response (HCC) I48.91    Bilateral lower extremity edema R60.0    Age-related osteoporosis without current pathological fracture  No  Bladder: Rodriguez catheter  Urinary Catheter: Insertion Date: 12/27/24 and Indication for Use of Catheter: Acute urinary retention/obstruction   Colostomy/Ileostomy/Ileal Conduit: No       Date of Last BM: 1/2/25  No intake or output data in the 24 hours ending 12/26/24 1132  No intake/output data recorded.    Safety Concerns:     History of Falls (last 30 days) and At Risk for Falls    Impairments/Disabilities:      None    Nutrition Therapy:  Current Nutrition Therapy:   - Regular adult diet  - Adult tube feeding:  Question Answer   Enteral Access: PEG   Formula Diabetic   Should patient be NPO or Oral Diet Oral Diet   Delivery Method Continuous   Continuous Initial Rate (mL/hr) 10   Continuous Advance Tube Feeding Yes   Advancement Volume (mL/hr) 10   Advancement Frequency Q 4 hours   Continuous Goal Rate (mL/hr) 40   Water Flush Volume (mL) 125   Water Flush Frequency Q 4 hours   Modulars or Additives Wound Healing   Number of Wound Healing Modular Doses 1 Dose   Frequency Wound Healing Modular BID   Delivery Method Comments For cyclic feedings, if infusion not started on time, still run tube feeding for prescribed duration of time         Routes of Feeding: Oral and Gastrostomy Tube  Liquids: No Restrictions  Daily Fluid Restriction: no  Last Modified Barium Swallow with Video (Video Swallowing Test): not done    Treatments at the Time of Hospital Discharge:   Respiratory Treatments: ***  Oxygen Therapy:  is not on home oxygen therapy.  Ventilator:    - No ventilator support    Rehab Therapies: Physical Therapy and Occupational Therapy  Weight Bearing Status/Restrictions: No weight bearing restrictions  Other Medical Equipment (for information only, NOT a DME order):  wheelchair, walker, bath bench, bedside commode, and hospital bed  Other Treatments: ***    RN SIGNATURE:  Electronically signed by Milly Macedo RN on 1/2/25 at 12:39 PM EST    CASE MANAGEMENT/SOCIAL WORK SECTION    Inpatient Status Date:

## 2024-12-26 NOTE — CONSULTS
Tachycardia-bradycardia syndrome: Monitor.     5. HTN: Observe.     6. CKD: Follow labs.     7. Change of MS: Per primary service.    8. Hearing loss    Available external charts reviewed.   Available imaging and evaluations independently reviewed.   Interviewed and discussed patient with available family.  Discussed case with referring service and non-cardiology consultants.     Mayi Michelle D.O.  Cardiologist  Cardiology, [facility]

## 2024-12-26 NOTE — CARE COORDINATION
12/26/24 GRACIELA note: Upon discharge pt to be admitted to [facility]. [facility]/SNF liaison received precert, which is valid through tomorrow 12/27, notified of status. If pt not discharged by 12/27 precert needs resubmitted. HENS completed. WENDY needs completed & signed by phys prior to d/c. IV meds need monitor, currently ordered IV diltiazem. Has cardio consult ordered. SW to follow. Electronically signed by RENETTA Bush on 12/26/2024 at 11:37 AM

## 2024-12-26 NOTE — PLAN OF CARE
Problem: Chronic Conditions and Co-morbidities  Goal: Patient's chronic conditions and co-morbidity symptoms are monitored and maintained or improved  12/26/2024 1758 by Breana Su RN  Outcome: Progressing  Flowsheets (Taken 12/26/2024 0830)  Care Plan - Patient's Chronic Conditions and Co-Morbidity Symptoms are Monitored and Maintained or Improved: Monitor and assess patient's chronic conditions and comorbid symptoms for stability, deterioration, or improvement  12/26/2024 0532 by Crystal Ramey RN  Outcome: Progressing     Problem: Discharge Planning  Goal: Discharge to home or other facility with appropriate resources  12/26/2024 1758 by Berana Su RN  Outcome: Progressing  Flowsheets (Taken 12/26/2024 0830)  Discharge to home or other facility with appropriate resources: Identify barriers to discharge with patient and caregiver  12/26/2024 0532 by Crystal Ramey RN  Outcome: Progressing     Problem: Skin/Tissue Integrity  Goal: Absence of new skin breakdown  Description: 1.  Monitor for areas of redness and/or skin breakdown  2.  Assess vascular access sites hourly  3.  Every 4-6 hours minimum:  Change oxygen saturation probe site  4.  Every 4-6 hours:  If on nasal continuous positive airway pressure, respiratory therapy assess nares and determine need for appliance change or resting period.  12/26/2024 1758 by Breana Su RN  Outcome: Progressing  12/26/2024 0532 by Crystal Ramey RN  Outcome: Progressing     Problem: ABCDS Injury Assessment  Goal: Absence of physical injury  Outcome: Progressing  Flowsheets (Taken 12/26/2024 1753)  Absence of Physical Injury: Implement safety measures based on patient assessment     Problem: Safety - Adult  Goal: Free from fall injury  Outcome: Progressing

## 2024-12-26 NOTE — CONSULTS
noted.   Micro-bubble contrast injected to enhance left ventricular visualization.    Normal left ventricular size.   LV systolic function is low normal.   Ejection fraction is visually estimated at 50% with [id number]-[id number] variability due to arrhythmia.   Abnormal diastolic function.   No regional wall motion abnormalities seen.   Mild left ventricular concentric hypertrophy noted.   Moderately dilated right ventricle with reduced function.   Biatrial dilation.   Mild mitral regurgitation.   Mild-moderate tricuspid regurgitation.     12/19/2024 TTE (Dr. Shah):    Left Ventricle: The EF by visual approximation is 55 - 60%.    Right Ventricle: Right ventricle size is normal. Normal systolic function.    Aortic Valve: Mildly calcified cusps. Mild stenosis of the aortic valve. AV mean gradient is 4 mmHg. AV Velocity Ratio is 0.57. LVOT:AV VTI Index is 0.56. AV area by continuity VTI is 1.3 cm2. AV area by peak velocity is 1.3 cm2.    Mitral Valve: Mild regurgitation. MV mean gradient is 4 mmHg. MV area by PHT is 3.6 cm2.    Tricuspid Valve: Moderate regurgitation. The estimated RVSP is 41 mmHg.    Pulmonic Valve: Trace regurgitation.    Image quality is adequate. Technically difficult study.    See accompanying documentation for full consult.    ASSESSMENT AND PLAN:  Patient Active Problem List   Diagnosis    Primary osteoarthritis of both knees    Other contact with raccoon, sequela    Essential hypertension    Acute on chronic combined systolic and diastolic CHF (congestive heart failure) (HCC)    Atrial fibrillation with rapid ventricular response (HCC)    Bilateral lower extremity edema    Age-related osteoporosis without current pathological fracture    Senile hyperkeratosis    Arthritis of hip    Varicose veins of lower extremity    Synovial cyst of right knee    Spondylosis without myelopathy or radiculopathy, lumbar region    Spinal stenosis, lumbar region without neurogenic claudication    Stage 3 chronic kidney  disease (HCC)    Nontoxic multinodular goiter    Macular degeneration    Long term (current) use of opiate analgesic    Hernia of anterior abdominal wall    Sensorineural hearing loss (SNHL) of both ears    Vitamin D deficiency    Neuropathy    Fall at home    Skin lesions    Thyroid lump    Numbness    Rib pain on left side    Acute on chronic congestive heart failure (HCC)    Atrial fibrillation with RVR (HCC)    NICM (nonischemic cardiomyopathy) (HCC)    Tachy-desmond syndrome (HCC)    HFrEF (heart failure with reduced ejection fraction) ([facility])    Other chronic pain    Hypertension    Altered mental status    Debility     1. Persistent Afib:     Chart/labs/EKG/monitor reviewed.     RVR despite IV cardizem. Not taking PO well. Dose IV dig.     Eliquis.     2. Chronic HFrEF:    Echo 12/19/2024 LVEF 55-60%, normal RV function, mild AS, mild MR, mod TR, RVSP 41 mmHg.     3. VHD:     Echo 12/19/2024 LVEF 55-60%, normal RV function, mild AS, mild MR, mod TR, RVSP 41 mmHg.    Medically manage.      4. Tachycardia-bradycardia syndrome: Monitor.     5. HTN: Observe.     6. CKD: Follow labs.     7. Change of MS: Per primary service.    8. Hearing loss    Available external charts reviewed.   Available imaging and evaluations independently reviewed.   Interviewed and discussed patient with available family.  Discussed case with referring service and non-cardiology consultants.     Mayi Michelle D.O.  Cardiologist  Cardiology, [facility]

## 2024-12-27 PROCEDURE — 2060000000 HC ICU INTERMEDIATE R&B

## 2024-12-27 PROCEDURE — 99233 SBSQ HOSP IP/OBS HIGH 50: CPT | Performed by: INTERNAL MEDICINE

## 2024-12-27 PROCEDURE — 6370000000 HC RX 637 (ALT 250 FOR IP): Performed by: INTERNAL MEDICINE

## 2024-12-27 PROCEDURE — 2500000003 HC RX 250 WO HCPCS: Performed by: INTERNAL MEDICINE

## 2024-12-27 PROCEDURE — 2580000003 HC RX 258: Performed by: FAMILY MEDICINE

## 2024-12-27 PROCEDURE — 51798 US URINE CAPACITY MEASURE: CPT

## 2024-12-27 PROCEDURE — 2500000003 HC RX 250 WO HCPCS: Performed by: FAMILY MEDICINE

## 2024-12-27 PROCEDURE — 87070 CULTURE OTHR SPECIMN AEROBIC: CPT

## 2024-12-27 PROCEDURE — 87205 SMEAR GRAM STAIN: CPT

## 2024-12-27 RX ADMIN — Medication 10 ML: at 21:21

## 2024-12-27 RX ADMIN — METOPROLOL SUCCINATE 50 MG: 50 TABLET, EXTENDED RELEASE ORAL at 17:23

## 2024-12-27 RX ADMIN — APIXABAN 2.5 MG: 5 TABLET, FILM COATED ORAL at 21:21

## 2024-12-27 RX ADMIN — DILTIAZEM HYDROCHLORIDE 15 MG/HR: 5 INJECTION, SOLUTION INTRAVENOUS at 08:23

## 2024-12-27 RX ADMIN — DILTIAZEM HYDROCHLORIDE 15 MG/HR: 5 INJECTION, SOLUTION INTRAVENOUS at 17:55

## 2024-12-27 RX ADMIN — AMIODARONE HYDROCHLORIDE 200 MG: 200 TABLET ORAL at 17:23

## 2024-12-27 RX ADMIN — APIXABAN 2.5 MG: 5 TABLET, FILM COATED ORAL at 17:23

## 2024-12-27 ASSESSMENT — PAIN SCALES - GENERAL: PAINLEVEL_OUTOF10: 0

## 2024-12-27 NOTE — PROGRESS NOTES
stress-induced ischemia or prior myocardial infarction.  3. Normal LV systolic function, EF 55% with septal and apical hypokinesis.  4. There is no transient ischemic dilation.  5. Low risk myocardial perfusion study.     8/31/2023 TTE (Dr. Woo):  Summary   Atrial fibrillation noted.   Micro-bubble contrast injected to enhance left ventricular visualization.    Normal left ventricular size.   LV systolic function is low normal.   Ejection fraction is visually estimated at 50% with [id number]-rh-[id number] variability due to arrhythmia.   Abnormal diastolic function.   No regional wall motion abnormalities seen.   Mild left ventricular concentric hypertrophy noted.   Moderately dilated right ventricle with reduced function.   Biatrial dilation.   Mild mitral regurgitation.   Mild-moderate tricuspid regurgitation.     12/19/2024 TTE (Dr. Shah):    Left Ventricle: The EF by visual approximation is 55 - 60%.    Right Ventricle: Right ventricle size is normal. Normal systolic function.    Aortic Valve: Mildly calcified cusps. Mild stenosis of the aortic valve. AV mean gradient is 4 mmHg. AV Velocity Ratio is 0.57. LVOT:AV VTI Index is 0.56. AV area by continuity VTI is 1.3 cm2. AV area by peak velocity is 1.3 cm2.    Mitral Valve: Mild regurgitation. MV mean gradient is 4 mmHg. MV area by PHT is 3.6 cm2.    Tricuspid Valve: Moderate regurgitation. The estimated RVSP is 41 mmHg.    Pulmonic Valve: Trace regurgitation.    Image quality is adequate. Technically difficult study.    ASSESSMENT AND PLAN:  Patient Active Problem List   Diagnosis    Primary osteoarthritis of both knees    Other contact with raccoon, sequela    Essential hypertension    Acute on chronic combined systolic and diastolic CHF (congestive heart failure) (HCC)    Atrial fibrillation with rapid ventricular response (HCC)    Bilateral lower extremity edema    Age-related osteoporosis without current pathological fracture    Senile hyperkeratosis    Arthritis of  Physicians

## 2024-12-27 NOTE — PROGRESS NOTES
12/27/24 1516   Encounter Summary   Encounter Overview/Reason Spiritual/Emotional Needs   Service Provided For Patient   Referral/Consult From Rounding   Last Encounter  12/27/24  (hd)   Complexity of Encounter Low   Spiritual/Emotional needs   Type Spiritual Support   Assessment/Intervention/Outcome   Assessment Unable to assess  (Patient was awake but did  not respond to questions.)   Intervention Prayer (assurance of)/[place];Sustaining Presence/Ministry of presence     Spiritual care is ongoing and as necessary.  mariluz Travis MPS   #([phone])   ----- Message from Raheem Obrien MA sent at 8/10/2023 11:42 AM CDT -----  Contact: Nelly  [phone]    ----- Message -----  From: Yudi Schuler  Sent: 8/10/2023  11:04 AM CDT  To: Juwan Reynaga Staff    Type: Needs Medical Advice  Who Called:  Pts daughter Nelly     Pharmacy name and phone #:    [facility] - [place], [place] - [address]  [address]  [facility] [address]  Phone: [phone] Fax: [phone]      Best Call Back Number: [phone]  Additional Information: [facility] is out of stock on pts tHYDROcodone-acetaminophen (NORCO) 7.5-325 mg per table   Nelly requesting to have rx sent to [facility]. Pls call back and advise

## 2024-12-27 NOTE — CARE COORDINATION
12/27/24 GRACIELA note: Upon discharge pt to be admitted to [facility]. [facility]boo/SNF liaison received precert, but only valid through today. Therapy to complete visit on 12/29/24 for PRECERT to be resubmitted on Monday 12/29/24. Per discussion nurse to inquire with physician about palliative consult. HENS completed. WENDY needs completed & signed by phys prior to d/c. IV meds need monitor, currently ordered IV diltiazem. Cardiology following. SW to follow. Electronically signed by RENETTA Bush on 12/27/2024 at 2:48 PM

## 2024-12-28 LAB
ALBUMIN SERPL-MCNC: 3 G/DL (ref 3.5–5.2)
ALP SERPL-CCNC: 110 U/L (ref 35–104)
ALT SERPL-CCNC: 11 U/L (ref 0–32)
ANION GAP SERPL CALCULATED.3IONS-SCNC: 10 MMOL/L (ref 7–16)
AST SERPL-CCNC: 17 U/L (ref 0–31)
BASOPHILS # BLD: 0 K/UL (ref 0–0.2)
BASOPHILS NFR BLD: 0 % (ref 0–2)
BILIRUB SERPL-MCNC: 1.2 MG/DL (ref 0–1.2)
BUN SERPL-MCNC: 12 MG/DL (ref 6–23)
CALCIUM SERPL-MCNC: 8.7 MG/DL (ref 8.6–10.2)
CHLORIDE SERPL-SCNC: 98 MMOL/L (ref 98–107)
CO2 SERPL-SCNC: 28 MMOL/L (ref 22–29)
CREAT SERPL-MCNC: 0.6 MG/DL (ref 0.5–1)
EOSINOPHIL # BLD: 0 K/UL (ref 0.05–0.5)
EOSINOPHILS RELATIVE PERCENT: 0 % (ref 0–6)
ERYTHROCYTE [DISTWIDTH] IN BLOOD BY AUTOMATED COUNT: 14.4 % (ref 11.5–15)
GFR, ESTIMATED: 85 ML/MIN/1.73M2
GLUCOSE SERPL-MCNC: 112 MG/DL (ref 74–99)
HCT VFR BLD AUTO: 45.6 % (ref 34–48)
HGB BLD-MCNC: 15.7 G/DL (ref 11.5–15.5)
LYMPHOCYTES NFR BLD: 0.13 K/UL (ref 1.5–4)
LYMPHOCYTES RELATIVE PERCENT: 1 % (ref 20–42)
MAGNESIUM SERPL-MCNC: 2.1 MG/DL (ref 1.6–2.6)
MCH RBC QN AUTO: 38.3 PG (ref 26–35)
MCHC RBC AUTO-ENTMCNC: 34.4 G/DL (ref 32–34.5)
MCV RBC AUTO: 111.2 FL (ref 80–99.9)
MICROORGANISM/AGENT SPEC: ABNORMAL
MONOCYTES NFR BLD: 0.8 K/UL (ref 0.1–0.95)
MONOCYTES NFR BLD: 5 % (ref 2–12)
NEUTROPHILS NFR BLD: 94 % (ref 43–80)
NEUTS SEG NFR BLD: 14.27 K/UL (ref 1.8–7.3)
PLATELET # BLD AUTO: 234 K/UL (ref 130–450)
PMV BLD AUTO: 10.5 FL (ref 7–12)
POTASSIUM SERPL-SCNC: 3.5 MMOL/L (ref 3.5–5)
PROT SERPL-MCNC: 6.1 G/DL (ref 6.4–8.3)
RBC # BLD AUTO: 4.1 M/UL (ref 3.5–5.5)
RBC # BLD: ABNORMAL 10*6/UL
SERVICE CMNT-IMP: ABNORMAL
SODIUM SERPL-SCNC: 136 MMOL/L (ref 132–146)
SPECIMEN DESCRIPTION: ABNORMAL
WBC OTHER # BLD: 15.2 K/UL (ref 4.5–11.5)

## 2024-12-28 PROCEDURE — 36415 COLL VENOUS BLD VENIPUNCTURE: CPT

## 2024-12-28 PROCEDURE — 6370000000 HC RX 637 (ALT 250 FOR IP): Performed by: INTERNAL MEDICINE

## 2024-12-28 PROCEDURE — 2500000003 HC RX 250 WO HCPCS: Performed by: FAMILY MEDICINE

## 2024-12-28 PROCEDURE — 2500000003 HC RX 250 WO HCPCS: Performed by: INTERNAL MEDICINE

## 2024-12-28 PROCEDURE — 80053 COMPREHEN METABOLIC PANEL: CPT

## 2024-12-28 PROCEDURE — 83735 ASSAY OF MAGNESIUM: CPT

## 2024-12-28 PROCEDURE — 2580000003 HC RX 258: Performed by: FAMILY MEDICINE

## 2024-12-28 PROCEDURE — 2060000000 HC ICU INTERMEDIATE R&B

## 2024-12-28 PROCEDURE — 99233 SBSQ HOSP IP/OBS HIGH 50: CPT | Performed by: INTERNAL MEDICINE

## 2024-12-28 PROCEDURE — 85025 COMPLETE CBC W/AUTO DIFF WBC: CPT

## 2024-12-28 RX ORDER — METOPROLOL SUCCINATE 50 MG/1
50 TABLET, EXTENDED RELEASE ORAL 2 TIMES DAILY
Status: DISCONTINUED | OUTPATIENT
Start: 2024-12-28 | End: 2024-12-29

## 2024-12-28 RX ADMIN — Medication 10 ML: at 09:17

## 2024-12-28 RX ADMIN — APIXABAN 2.5 MG: 5 TABLET, FILM COATED ORAL at 21:12

## 2024-12-28 RX ADMIN — ACETAMINOPHEN 650 MG: 325 TABLET ORAL at 16:53

## 2024-12-28 RX ADMIN — METOPROLOL SUCCINATE 50 MG: 50 TABLET, EXTENDED RELEASE ORAL at 21:12

## 2024-12-28 RX ADMIN — METOPROLOL SUCCINATE 50 MG: 50 TABLET, EXTENDED RELEASE ORAL at 09:17

## 2024-12-28 RX ADMIN — Medication 10 ML: at 21:20

## 2024-12-28 RX ADMIN — DILTIAZEM HYDROCHLORIDE 15 MG/HR: 5 INJECTION, SOLUTION INTRAVENOUS at 02:30

## 2024-12-28 RX ADMIN — APIXABAN 2.5 MG: 5 TABLET, FILM COATED ORAL at 09:17

## 2024-12-28 RX ADMIN — AMIODARONE HYDROCHLORIDE 200 MG: 200 TABLET ORAL at 09:17

## 2024-12-28 ASSESSMENT — PAIN SCALES - GENERAL
PAINLEVEL_OUTOF10: 3
PAINLEVEL_OUTOF10: 0
PAINLEVEL_OUTOF10: 0

## 2024-12-28 ASSESSMENT — PAIN DESCRIPTION - LOCATION: LOCATION: GENERALIZED

## 2024-12-28 NOTE — PLAN OF CARE
Problem: Chronic Conditions and Co-morbidities  Goal: Patient's chronic conditions and co-morbidity symptoms are monitored and maintained or improved  12/28/2024 1428 by Mari Alcocer RN  Outcome: Progressing  12/28/2024 0542 by Crystal Ramey RN  Outcome: Progressing     Problem: Discharge Planning  Goal: Discharge to home or other facility with appropriate resources  12/28/2024 1428 by Mari Alcocer RN  Outcome: Progressing  12/28/2024 0542 by Crystal Ramey RN  Outcome: Progressing     Problem: Skin/Tissue Integrity  Goal: Absence of new skin breakdown  Description: 1.  Monitor for areas of redness and/or skin breakdown  2.  Assess vascular access sites hourly  3.  Every 4-6 hours minimum:  Change oxygen saturation probe site  4.  Every 4-6 hours:  If on nasal continuous positive airway pressure, respiratory therapy assess nares and determine need for appliance change or resting period.  12/28/2024 1428 by Mari Alcocer RN  Outcome: Progressing  12/28/2024 0542 by Crystal Ramey RN  Outcome: Progressing     Problem: ABCDS Injury Assessment  Goal: Absence of physical injury  Outcome: Progressing     Problem: Safety - Adult  Goal: Free from fall injury  Outcome: Progressing

## 2024-12-28 NOTE — PROGRESS NOTES
[facility] Hospitalist   Progress Note    Admitting Date and Time: 12/23/2024  2:24 PM  Admit Dx: Debility [R53.81]  Failure to thrive in adult [R62.7]    Subjective:    Pt feels ***  Per RN: ***    ROS: denies fever, chills, cp, sob, n/v, HA unless stated above.     sodium chloride flush  5-40 mL IntraVENous 2 times per day    amiodarone  200 mg Oral Daily    apixaban  2.5 mg Oral BID    metoprolol succinate  50 mg Oral Daily     sodium chloride flush, 5-40 mL, PRN  sodium chloride, , PRN  potassium chloride, 40 mEq, PRN   Or  potassium alternative oral replacement, 40 mEq, PRN   Or  potassium chloride, 10 mEq, PRN  magnesium sulfate, 2,000 mg, PRN  ondansetron, 4 mg, Q8H PRN   Or  ondansetron, 4 mg, Q6H PRN  polyethylene glycol, 17 g, Daily PRN  acetaminophen, 650 mg, Q6H PRN   Or  acetaminophen, 650 mg, Q6H PRN         Objective:    /67   Pulse 85   Temp 97.2 °F (36.2 °C)   Resp 27   Ht 1.626 m (5' 4\")   Wt 54.4 kg (120 lb)   SpO2 94%   BMI 20.60 kg/m²   General Appearance: alert and oriented to person, place and time and in no acute distress  Skin: warm and dry  Head: normocephalic and atraumatic  Eyes: pupils equal, round, and reactive to light, extraocular eye movements intact, conjunctivae normal  Neck: neck supple and non tender without mass   Pulmonary/Chest: clear to auscultation bilaterally- no wheezes, rales or rhonchi, normal air movement, no respiratory distress  Cardiovascular: normal rate, normal S1 and S2 and no carotid bruits  Abdomen: soft, non-tender, non-distended, normal bowel sounds, no masses or organomegaly  Extremities: no cyanosis, no clubbing and no edema  Neurologic: no cranial nerve deficit and speech normal      No results for input(s): \"NA\", \"K\", \"CL\", \"CO2\", \"BUN\", \"CREATININE\", \"GLUCOSE\", \"CALCIUM\" in the last 72 hours.      No results for input(s): \"ALKPHOS\", \"LABALBU\", \"BILITOT\", \"AST\", \"ALT\" in the last 72 hours.    Invalid input(s): \"PROT\"    No

## 2024-12-28 NOTE — PROGRESS NOTES
Spiritual Health History and Assessment/Progress Note  [facility]    Spiritual/Emotional Needs,  ,  ,      Name: Jackelyn Montes MRN: [id number]    Age: 89 y.o.     Sex: female   Language: English   Rastafari: Amish   Debility     Date: 12/28/2024                           Spiritual Assessment continued in [facility] PIC/ICU        Referral/Consult From: Rounding   Encounter Overview/Reason: Spiritual/Emotional Needs  Service Provided For: Patient    Tasha, Belief, Meaning:   Patient identifies as spiritual patient seemed confused at time of  visit.  Family/Friends No family/friends present      Importance and Influence:  Patient has spiritual/personal beliefs that influence decisions regarding their health  Family/Friends No family/friends present    Community:  Patient feels well-supported. Support system includes: Spouse/Partner and Extended family  Family/Friends No family/friends present    Assessment and Plan of Care:     Patient Interventions include: Patient seemed confused at time of  visit.  Family/Friends Interventions include: No family/friends present    Patient Plan of Care: Spiritual Care available upon further referral  Family/Friends Plan of Care: No family/friends present    Electronically signed by CORTEZ Harris on 12/28/2024 at 10:16 AM

## 2024-12-28 NOTE — PROGRESS NOTES
[facility] Hospitalist   Progress Note    Admitting Date and Time: 12/23/2024  2:24 PM  Admit Dx: Debility [R53.81]  Failure to thrive in adult [R62.7]    Subjective:    Pt feels ***  Per RN: ***    ROS: denies fever, chills, cp, sob, n/v, HA unless stated above.     sodium chloride flush  5-40 mL IntraVENous 2 times per day    amiodarone  200 mg Oral Daily    apixaban  2.5 mg Oral BID    metoprolol succinate  50 mg Oral Daily     sodium chloride flush, 5-40 mL, PRN  sodium chloride, , PRN  potassium chloride, 40 mEq, PRN   Or  potassium alternative oral replacement, 40 mEq, PRN   Or  potassium chloride, 10 mEq, PRN  magnesium sulfate, 2,000 mg, PRN  ondansetron, 4 mg, Q8H PRN   Or  ondansetron, 4 mg, Q6H PRN  polyethylene glycol, 17 g, Daily PRN  acetaminophen, 650 mg, Q6H PRN   Or  acetaminophen, 650 mg, Q6H PRN         Objective:    /67   Pulse 85   Temp 97.2 °F (36.2 °C)   Resp 27   Ht 1.626 m (5' 4\")   Wt 54.4 kg (120 lb)   SpO2 94%   BMI 20.60 kg/m²   General Appearance: alert and oriented to person, place and time and in no acute distress  Skin: warm and dry  Head: normocephalic and atraumatic  Eyes: pupils equal, round, and reactive to light, extraocular eye movements intact, conjunctivae normal  Neck: neck supple and non tender without mass   Pulmonary/Chest: clear to auscultation bilaterally- no wheezes, rales or rhonchi, normal air movement, no respiratory distress  Cardiovascular: normal rate, normal S1 and S2 and no carotid bruits  Abdomen: soft, non-tender, non-distended, normal bowel sounds, no masses or organomegaly  Extremities: no cyanosis, no clubbing and no edema  Neurologic: no cranial nerve deficit and speech normal      No results for input(s): \"NA\", \"K\", \"CL\", \"CO2\", \"BUN\", \"CREATININE\", \"GLUCOSE\", \"CALCIUM\" in the last 72 hours.      No results for input(s): \"ALKPHOS\", \"LABALBU\", \"BILITOT\", \"AST\", \"ALT\" in the last 72 hours.    Invalid input(s): \"PROT\"    No

## 2024-12-28 NOTE — PROGRESS NOTES
At bedside spoke with primary physician regarding patient not eating or drinking, labs, and generalized pain. Patient given tylenol po for pain. Rn able to get patient to take medication in vanilla pudding, crushed and one pill at a time with small bites. Spoke with physician regarding patient coughing when drinking. Upon observation RN noticed that Patient is mainly coughing when drinking from a straw. Physician verbal order for speech consult.   At bedside spoke with cardiologist regarding stopping the Cardizem gtt, cardiologist states he will start po Cardizem. EKG obtained and placed on chart. EKG shows patient in afib around the 90's.

## 2024-12-29 PROCEDURE — 2060000000 HC ICU INTERMEDIATE R&B

## 2024-12-29 PROCEDURE — 2500000003 HC RX 250 WO HCPCS: Performed by: INTERNAL MEDICINE

## 2024-12-29 PROCEDURE — 99223 1ST HOSP IP/OBS HIGH 75: CPT | Performed by: SURGERY

## 2024-12-29 PROCEDURE — 6370000000 HC RX 637 (ALT 250 FOR IP): Performed by: INTERNAL MEDICINE

## 2024-12-29 PROCEDURE — 97161 PT EVAL LOW COMPLEX 20 MIN: CPT | Performed by: PHYSICAL THERAPIST

## 2024-12-29 PROCEDURE — 97165 OT EVAL LOW COMPLEX 30 MIN: CPT

## 2024-12-29 PROCEDURE — 99233 SBSQ HOSP IP/OBS HIGH 50: CPT | Performed by: INTERNAL MEDICINE

## 2024-12-29 RX ORDER — MECOBALAMIN 5000 MCG
5 TABLET,DISINTEGRATING ORAL NIGHTLY
Status: DISCONTINUED | OUTPATIENT
Start: 2024-12-29 | End: 2025-01-02 | Stop reason: HOSPADM

## 2024-12-29 RX ORDER — AMLODIPINE BESYLATE 5 MG/1
5 TABLET ORAL DAILY
Status: DISCONTINUED | OUTPATIENT
Start: 2024-12-29 | End: 2025-01-02 | Stop reason: HOSPADM

## 2024-12-29 RX ORDER — BISACODYL 10 MG
10 SUPPOSITORY, RECTAL RECTAL DAILY
Status: COMPLETED | OUTPATIENT
Start: 2024-12-29 | End: 2024-12-31

## 2024-12-29 RX ADMIN — BISACODYL 10 MG: 10 SUPPOSITORY RECTAL at 19:18

## 2024-12-29 RX ADMIN — AMIODARONE HYDROCHLORIDE 200 MG: 200 TABLET ORAL at 08:36

## 2024-12-29 RX ADMIN — AMLODIPINE BESYLATE 5 MG: 5 TABLET ORAL at 13:45

## 2024-12-29 RX ADMIN — METOPROLOL SUCCINATE 50 MG: 50 TABLET, EXTENDED RELEASE ORAL at 08:36

## 2024-12-29 RX ADMIN — Medication 5 MG: at 21:18

## 2024-12-29 RX ADMIN — ACETAMINOPHEN 650 MG: 325 TABLET ORAL at 21:18

## 2024-12-29 RX ADMIN — Medication 10 ML: at 08:49

## 2024-12-29 RX ADMIN — METOPROLOL SUCCINATE 75 MG: 25 TABLET, FILM COATED, EXTENDED RELEASE ORAL at 21:14

## 2024-12-29 RX ADMIN — Medication 10 ML: at 21:31

## 2024-12-29 ASSESSMENT — PAIN SCALES - GENERAL
PAINLEVEL_OUTOF10: 2
PAINLEVEL_OUTOF10: 0

## 2024-12-29 NOTE — PROGRESS NOTES
INPATIENT CARDIOLOGY FOLLOW-UP    Name: Jackelyn Montes    Age: 89 y.o.    Date of Admission: 12/23/2024  2:24 PM    Date of Service: 12/28/2024    Chief Complaint: Follow-up for AF and CHF    Interim History:  No new overnight cardiac complaints. C/p pain and not sure about the site of pain  Currently with no complaints of CP, SOB, palpitations, dizziness, or lightheadedness. AF on telemetry. Cardizem was weaned off.     Review of Systems:   Cardiac: As per HPI  General: No fever, chills  Pulmonary: As per HPI  HEENT: No visual disturbances, difficult swallowing  GI: No nausea, vomiting  Endocrine: No thyroid disease or DM  Musculoskeletal: JACKSON x 4, no focal motor deficits  Skin: Intact, no rashes  Neuro/Psych: No headache or seizures    Problem List:  Patient Active Problem List   Diagnosis    Primary osteoarthritis of both knees    Other contact with raccoon, sequela    Essential hypertension    Acute on chronic combined systolic and diastolic CHF (congestive heart failure) (HCC)    Atrial fibrillation with rapid ventricular response (HCC)    Bilateral lower extremity edema    Age-related osteoporosis without current pathological fracture    Senile hyperkeratosis    Arthritis of hip    Varicose veins of lower extremity    Synovial cyst of right knee    Spondylosis without myelopathy or radiculopathy, lumbar region    Spinal stenosis, lumbar region without neurogenic claudication    Stage 3 chronic kidney disease (HCC)    Nontoxic multinodular goiter    Macular degeneration    Long term (current) use of opiate analgesic    Hernia of anterior abdominal wall    Sensorineural hearing loss (SNHL) of both ears    Vitamin D deficiency    Neuropathy    Fall at home    Skin lesions    Thyroid lump    Numbness    Rib pain on left side    Acute on chronic congestive heart failure (HCC)    Atrial fibrillation with RVR ([facility])    NICM (nonischemic cardiomyopathy) ([facility])    Tachy-desmond syndrome (HCC)    HFrEF (heart failure

## 2024-12-29 NOTE — CONSULTS
General Surgery Consult    Patient's Name/Date of Birth: Jackelyn Montes / 1935    Date: December 29, 2024     Consulting Surgeon: James Pepper M.D.    PCP: No primary care provider on file.     Chief Complaint: failure to take adequate po, dysphagia    HPI:   Jackelyn Montes is a 89 y.o. female who presents for  evaluation of dysphagia and failure to take adequate po.       Past Medical History:   Diagnosis Date    Acute metabolic encephalopathy 12/05/2019    Acute respiratory failure with hypoxia 12/05/2019    was hospitalized for 2 months and then had to go to rehab  (states was not on a vent)    JOVANI (acute kidney injury) ([facility]) 12/05/2019    Anticoagulant long-term use     Atrial fibrillation ([facility])     on Eliquis    Calculus of kidney 05/19/2021    CHF (congestive heart failure) ([facility])     last hospitalized 2021   echo from march 2021  EF 30%    Chronic back pain     Community acquired pneumonia of left lung     COVID-19 03/2022    body aches sinus issues    Dental caries     Diabetes mellitus ([facility])     diet controlled    Elevated brain natriuretic peptide (BNP) level 12/05/2019    Gall bladder stones     GERD (gastroesophageal reflux disease)     Gram-positive cocci bacteremia     H/O cardiovascular stress test 03/30/2021    Lexiscan    Hearing loss     Hypertension     Multiple pulmonary nodules 05/19/2021    Neuropathy 05/26/2021    Osteoarthritis     Other contact with raccoon, sequela 08/10/2020    This Diagnosis was added to the Problem List based on transcribed orders from Dr. Es Fowler NP      Sepsis due to Streptococcus pneumoniae with acute hypoxic respiratory failure and septic shock ([facility]) 12/05/2019       Past Surgical History:   Procedure Laterality Date    APPENDECTOMY      HERNIA REPAIR         Current Facility-Administered Medications   Medication Dose Route Frequency Provider Last Rate Last Admin    metoprolol succinate (TOPROL XL) extended release tablet 50 mg  50 mg Oral BID Wil  issues, negative for reaction to anesthesia.    Social History     Socioeconomic History    Marital status:      Spouse name: Not on file    Number of children: Not on file    Years of education: Not on file    Highest education level: Not on file   Occupational History    Not on file   Tobacco Use    Smoking status: Never    Smokeless tobacco: Never   Vaping Use    Vaping status: Never Used   Substance and Sexual Activity    Alcohol use: Never    Drug use: Never    Sexual activity: Not Currently     Partners: Male   Other Topics Concern    Not on file   Social History Narrative    Not on file     Social Determinants of Health     Financial Resource Strain: Low Risk  (5/31/2022)    Overall Financial Resource Strain (CARDIA)     Difficulty of Paying Living Expenses: Not hard at all   Food Insecurity: No Food Insecurity (12/25/2024)    Hunger Vital Sign     Worried About Running Out of Food in the Last Year: Never true     Ran Out of Food in the Last Year: Never true   Transportation Needs: No Transportation Needs (12/25/2024)    PRAPARE - Transportation     Lack of Transportation (Medical): No     Lack of Transportation (Non-Medical): No   Physical Activity: Unknown (9/7/2022)    Exercise Vital Sign     Days of Exercise per Week: 0 days     Minutes of Exercise per Session: Not on file   Stress: Not on file   Social Connections: Not on file   Intimate Partner Violence: Not on file   Housing Stability: Low Risk  (12/25/2024)    Housing Stability Vital Sign     Unable to Pay for Housing in the Last Year: No     Number of Times Moved in the Last Year: 1     Homeless in the Last Year: No           Review of Systems  General ROS: negative for - chills, fatigue or fever  ENT ROS: negative for - headaches, hearing change or nasal congestion  Endocrine ROS: negative for - breast changes or galactorrhea, no polyuria  Breast ROS: negative for - new or changing breast lumps   Respiratory ROS: negative for - hemoptysis,

## 2024-12-29 NOTE — PROGRESS NOTES
Physical Therapy Initial Evaluation/Plan of Care    Room #:  0537/0537-02  Patient Name: Jackelyn Montes  YOB: 1935  MRN: [id number]    Date of Service: 12/29/2024     Tentative placement recommendation: Subacute Rehab  Equipment recommendation: To be determined      Evaluating Physical Therapist: Neno Rivas, PT  #[id number]      Specific Provider Orders/Date/Referring Provider :     PT eval and treat  Start:  12/20/24 1130,   End:  12/20/24 1130,   ONE TIME,   Standing Count:  1 Occurrences,   R       Kavon Bryant MD    Admitting Diagnosis:   Debility [R53.81]  Failure to thrive in adult [R62.7]    Re Admitted with  inability to thrive  Surgery: none        Patient Active Problem List   Diagnosis    Primary osteoarthritis of both knees    Other contact with raccoon, sequela    Essential hypertension    Acute on chronic combined systolic and diastolic CHF (congestive heart failure) ([facility])    Atrial fibrillation with rapid ventricular response ([facility])    Bilateral lower extremity edema    Age-related osteoporosis without current pathological fracture    Senile hyperkeratosis    Arthritis of hip    Varicose veins of lower extremity    Synovial cyst of right knee    Spondylosis without myelopathy or radiculopathy, lumbar region    Spinal stenosis, lumbar region without neurogenic claudication    Stage 3 chronic kidney disease (HCC)    Nontoxic multinodular goiter    Macular degeneration    Long term (current) use of opiate analgesic    Hernia of anterior abdominal wall    Sensorineural hearing loss (SNHL) of both ears    Vitamin D deficiency    Neuropathy    Fall at home    Skin lesions    Thyroid lump    Numbness    Rib pain on left side    Acute on chronic congestive heart failure (HCC)    Atrial fibrillation with RVR ([facility])    NICM (nonischemic cardiomyopathy) (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)    HFrEF (heart failure with reduced ejection fraction) ([facility])    Other chronic pain    Hypertension         -PAC Basic Mobility        AM-PAC Basic Mobility - Inpatient   How much help is needed turning from your back to your side while in a flat bed without using bedrails?: A Lot  How much help is needed moving from lying on your back to sitting on the side of a flat bed without using bedrails?: Total  How much help is needed moving to and from a bed to a chair?: Total  How much help is needed standing up from a chair using your arms?: Total  How much help is needed walking in hospital room?: Total  How much help is needed climbing 3-5 steps with a railing?: Total  AM-PAC Inpatient Mobility Raw Score : 7  AM-PAC Inpatient T-Scale Score : 26.42  Mobility Inpatient CMS 0-100% Score: 92.36  Mobility Inpatient CMS G-Code Modifier : CM    Nursing cleared patient for PT evaluation. The admitting diagnosis and active problem list as listed above have been reviewed prior to the initiation of this evaluation.    OBJECTIVE:   Initial Evaluation  Date: 12/29/2024 Treatment Date:     Short Term/ Long Term   Goals   Was pt agreeable to Eval/treatment? Yes  To be met in 3 days   Pain level   0/10        Bed Mobility  Using rails and head of bed elevated:     Rolling: Moderate assist of 1    Supine to sit: Not assessed     Sit to supine: Not assessed     Scooting: Maximal assist of  2    Rolling: Supervision     Supine to sit: Supervision     Sit to supine: Supervision     Scooting: Supervision      Transfers Sit to stand: Not assessed due to pt overall debility, decreased activity tolerance, balance deficits, safety and fall risk.       Sit to stand: Minimal assist of 1     Ambulation    not assessed     50 feet using  wheeled walker with Minimal assist of 1    ROM Within functional limits    Increase range of motion 10% of affected joints    Strength BUE:  refer to OT eval  RLE:  2/5  LLE:  2/5  Increase strength in affected mm groups by 1/3 grade   Balance Sitting EOB:  not assessed    Dynamic Standing:  not assessed

## 2024-12-29 NOTE — PROGRESS NOTES
Rounded with primary physician, informed physician of constipation, sleeping and not eating concerns.   Physician ordered suppository for constipation, order not verified by pharmacy yet. Provided this information to oncoming shift to give.    [

## 2024-12-29 NOTE — PROGRESS NOTES
OCCUPATIONAL THERAPY INITIAL EVALUATION    [facility]  [address] [place]        Date:2024                                                  Patient Name: Jackelyn Montes    MRN: [id number]    : 1935    Room: [address] [place], [place] [address]-      Evaluating OT: Юлия Michelle OTR/L [id number]     Referring Provider and Specific Provider Orders/Date:   24 104  OT eval and treat  Start:  24 104,   End:  24 1045,   ONE TIME,   Standing Count:  1 Occurrences,   R         Anselmo Coronado MD         Placement Recommendation: Subacute rehab      Diagnosis:   1. Failure to thrive in adult         Surgery: none      Pertinent Medical History:       Past Medical History:   Diagnosis Date    Acute metabolic encephalopathy 2019    Acute respiratory failure with hypoxia 2019    was hospitalized for 2 months and then had to go to rehab  (states was not on a vent)    JOVANI (acute kidney injury) ([facility]) 2019    Anticoagulant long-term use     Atrial fibrillation ([facility])     on Eliquis    Calculus of kidney 2021    CHF (congestive heart failure) ([facility])     last hospitalized    echo from 2021  EF 30%    Chronic back pain     Community acquired pneumonia of left lung     COVID-19 2022    body aches sinus issues    Dental caries     Diabetes mellitus ([facility])     diet controlled    Elevated brain natriuretic peptide (BNP) level 2019    Gall bladder stones     GERD (gastroesophageal reflux disease)     Gram-positive cocci bacteremia     H/O cardiovascular stress test 2021    Lexiscan    Hearing loss     Hypertension     Multiple pulmonary nodules 2021    Neuropathy 2021    Osteoarthritis     Other contact with raccoon, sequela 08/10/2020    This Diagnosis was added to the Problem List based on transcribed orders from Dr. Es Fowler NP      Sepsis due to Streptococcus pneumoniae with acute hypoxic  participation in functional tasks   LUE WFL 3+/5 Fair  and wfl FMC/dexterity noted during ADL tasks   Improve overall LUE strength  for participation in functional tasks     Resting BP: 173/92  Resting HR: 111  Resting O2: 94%    Hearing: [place]  Sensation:  No c/o numbness or tingling  Tone: WFL   Edema: none    Comments: Upon arrival the patient was seated EOB.  At end of session, patient was supine in bed with spouse present, alarm an call light and phone within reach, all lines and tubes intact.  Overall patient demonstrated decreased independence and safety during completion of ADL/functional transfer/mobility tasks.  Pt would benefit from continued skilled OT to increase safety and independence with completion of ADL/IADL tasks for functional independence and quality of life.    Treatment: OT treatment provided this date includes:   Instruction/training on safety and adapted techniques for completion of ADLs   Instruction/training on safe functional mobility/transfer techniques   Instruction/training on energy conservation/work simplification for completion of ADLs   Instruction/training on proper positioning/alignment to prevent contractures    Neuromuscular Reeducation to facilitate balance/righting reactions for increased function with ADLs tasks   Facilitation of Visual Perceptual Skills for increased safety and independence with ADLs    Rehab Potential: Good for established goals.      Patient / Family Goal: unknown      Patient and/or family were instructed on functional diagnosis, prognosis/goals and OT plan of care. Demonstrated good understanding.     Eval Complexity: Low    Time In: 10:38 am  Time Out: 10:54 am   Total Treatment Time: 0      Min Units   OT Eval Low 97165  X  1    OT Eval Medium 65645      OT Eval High 02681      OT Re-Eval 55882            ADL/Self Care 91430     Therapeutic Activities 78160       Therapeutic Ex 23618       Orthotic Management 19502       Manual 34298     Neuro

## 2024-12-29 NOTE — PLAN OF CARE
Problem: Chronic Conditions and Co-morbidities  Goal: Patient's chronic conditions and co-morbidity symptoms are monitored and maintained or improved  12/29/2024 0335 by Ryan Ayoub RN  Outcome: Progressing  Flowsheets (Taken 12/28/2024 2004)  Care Plan - Patient's Chronic Conditions and Co-Morbidity Symptoms are Monitored and Maintained or Improved: Monitor and assess patient's chronic conditions and comorbid symptoms for stability, deterioration, or improvement  12/28/2024 1428 by Mari Alcocer RN  Outcome: Progressing     Problem: Skin/Tissue Integrity  Goal: Absence of new skin breakdown  Description: 1.  Monitor for areas of redness and/or skin breakdown  2.  Assess vascular access sites hourly  3.  Every 4-6 hours minimum:  Change oxygen saturation probe site  4.  Every 4-6 hours:  If on nasal continuous positive airway pressure, respiratory therapy assess nares and determine need for appliance change or resting period.  12/29/2024 0335 by Ryan Ayoub, RN  Outcome: Progressing  12/28/2024 1428 by Mari Alcocer RN  Outcome: Progressing     Problem: Safety - Adult  Goal: Free from fall injury  12/29/2024 0335 by yRan Ayoub RN  Outcome: Progressing  Flowsheets (Taken 12/28/2024 2001)  Free From Fall Injury: Instruct family/caregiver on patient safety  12/28/2024 1428 by Mari Alcocer RN  Outcome: Progressing

## 2024-12-29 NOTE — PROGRESS NOTES
INPATIENT CARDIOLOGY FOLLOW-UP    Name: Jackelyn Montes    Age: 89 y.o.    Date of Admission: 12/23/2024  2:24 PM    Date of Service: 12/29/2024    Chief Complaint: Follow-up for AF and CHF    Interim History:  No new overnight cardiac complaints.  Today, feeling much better.  Currently with no complaints of CP, SOB, palpitations, dizziness, or lightheadedness. AF on telemetry. Cardizem was weaned off.     Review of Systems:   Cardiac: As per HPI  General: No fever, chills  Pulmonary: As per HPI  HEENT: No visual disturbances, difficult swallowing  GI: No nausea, vomiting  Endocrine: No thyroid disease or DM  Musculoskeletal: JACKSON x 4, no focal motor deficits  Skin: Intact, no rashes  Neuro/Psych: No headache or seizures    Problem List:  Patient Active Problem List   Diagnosis    Primary osteoarthritis of both knees    Other contact with raccoon, sequela    Essential hypertension    Acute on chronic combined systolic and diastolic CHF (congestive heart failure) ([facility])    Atrial fibrillation with rapid ventricular response ([facility])    Bilateral lower extremity edema    Age-related osteoporosis without current pathological fracture    Senile hyperkeratosis    Arthritis of hip    Varicose veins of lower extremity    Synovial cyst of right knee    Spondylosis without myelopathy or radiculopathy, lumbar region    Spinal stenosis, lumbar region without neurogenic claudication    Stage 3 chronic kidney disease ([facility])    Nontoxic multinodular goiter    Macular degeneration    Long term (current) use of opiate analgesic    Hernia of anterior abdominal wall    Sensorineural hearing loss (SNHL) of both ears    Vitamin D deficiency    Neuropathy    Fall at home    Skin lesions    Thyroid lump    Numbness    Rib pain on left side    Acute on chronic congestive heart failure (HCC)    Atrial fibrillation with RVR ([facility])    NICM (nonischemic cardiomyopathy) ([facility])    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

## 2024-12-29 NOTE — PROGRESS NOTES
[facility] Hospitalist   Progress Note    Admitting Date and Time: 12/23/2024  2:24 PM  Admit Dx: Debility [R53.81]  Failure to thrive in adult [R62.7]    Subjective:    Pt feels ***  Per RN: ***    ROS: denies fever, chills, cp, sob, n/v, HA unless stated above.     amLODIPine  5 mg Oral Daily    metoprolol succinate  75 mg Oral BID    sodium chloride flush  5-40 mL IntraVENous 2 times per day    amiodarone  200 mg Oral Daily    apixaban  2.5 mg Oral BID     sodium chloride flush, 5-40 mL, PRN  sodium chloride, , PRN  potassium chloride, 40 mEq, PRN   Or  potassium alternative oral replacement, 40 mEq, PRN   Or  potassium chloride, 10 mEq, PRN  magnesium sulfate, 2,000 mg, PRN  ondansetron, 4 mg, Q8H PRN   Or  ondansetron, 4 mg, Q6H PRN  polyethylene glycol, 17 g, Daily PRN  acetaminophen, 650 mg, Q6H PRN   Or  acetaminophen, 650 mg, Q6H PRN         Objective:    BP (!) 128/90   Pulse 92   Temp 98.1 °F (36.7 °C) (Oral)   Resp 22   Ht 1.626 m (5' 4\")   Wt 54.4 kg (120 lb)   SpO2 92%   BMI 20.60 kg/m²   General Appearance: alert and oriented to person, place and time and in no acute distress  Skin: warm and dry  Head: normocephalic and atraumatic  Eyes: pupils equal, round, and reactive to light, extraocular eye movements intact, conjunctivae normal  Neck: neck supple and non tender without mass   Pulmonary/Chest: clear to auscultation bilaterally- no wheezes, rales or rhonchi, normal air movement, no respiratory distress  Cardiovascular: normal rate, normal S1 and S2 and no carotid bruits  Abdomen: soft, non-tender, non-distended, normal bowel sounds, no masses or organomegaly  Extremities: no cyanosis, no clubbing and no edema  Neurologic: no cranial nerve deficit and speech normal      Recent Labs     12/28/24  1624      K 3.5   CL 98   CO2 28   BUN 12   CREATININE 0.6   GLUCOSE 112*   CALCIUM 8.7         Recent Labs     12/28/24  1624   ALKPHOS 110*   BILITOT 1.2   AST 17   ALT 11

## 2024-12-29 NOTE — PLAN OF CARE
Problem: Chronic Conditions and Co-morbidities  Goal: Patient's chronic conditions and co-morbidity symptoms are monitored and maintained or improved  12/29/2024 1454 by Mari Alcocer RN  Outcome: Progressing  12/29/2024 0335 by Ryan Ayoub RN  Outcome: Progressing  Flowsheets (Taken 12/28/2024 2004)  Care Plan - Patient's Chronic Conditions and Co-Morbidity Symptoms are Monitored and Maintained or Improved: Monitor and assess patient's chronic conditions and comorbid symptoms for stability, deterioration, or improvement     Problem: Discharge Planning  Goal: Discharge to home or other facility with appropriate resources  12/29/2024 1454 by Mari Alcocer RN  Outcome: Progressing  12/29/2024 0335 by Ryan Ayoub RN  Outcome: Progressing  Flowsheets (Taken 12/28/2024 2004)  Discharge to home or other facility with appropriate resources: Identify barriers to discharge with patient and caregiver     Problem: Skin/Tissue Integrity  Goal: Absence of new skin breakdown  Description: 1.  Monitor for areas of redness and/or skin breakdown  2.  Assess vascular access sites hourly  3.  Every 4-6 hours minimum:  Change oxygen saturation probe site  4.  Every 4-6 hours:  If on nasal continuous positive airway pressure, respiratory therapy assess nares and determine need for appliance change or resting period.  12/29/2024 1454 by Mari Alcocer RN  Outcome: Progressing  12/29/2024 0335 by Ryan Ayoub RN  Outcome: Progressing     Problem: ABCDS Injury Assessment  Goal: Absence of physical injury  12/29/2024 1454 by Mari Alcocer RN  Outcome: Progressing  12/29/2024 0335 by Ryan Ayoub RN  Outcome: Progressing  Flowsheets (Taken 12/28/2024 2001)  Absence of Physical Injury: Implement safety measures based on patient assessment     Problem: Safety - Adult  Goal: Free from fall injury  12/29/2024 1454 by Mari Alcocer RN  Outcome: Progressing  12/29/2024 0335 by Ryan Ayoub RN  Outcome:

## 2024-12-30 ENCOUNTER — ANESTHESIA EVENT (OUTPATIENT)
Dept: ENDOSCOPY | Age: 88
End: 2024-12-30
Payer: MEDICARE

## 2024-12-30 ENCOUNTER — ANESTHESIA (OUTPATIENT)
Dept: ENDOSCOPY | Age: 88
End: 2024-12-30
Payer: MEDICARE

## 2024-12-30 PROCEDURE — 6370000000 HC RX 637 (ALT 250 FOR IP): Performed by: INTERNAL MEDICINE

## 2024-12-30 PROCEDURE — 6360000002 HC RX W HCPCS: Performed by: SURGERY

## 2024-12-30 PROCEDURE — 2060000000 HC ICU INTERMEDIATE R&B

## 2024-12-30 PROCEDURE — 7100000011 HC PHASE II RECOVERY - ADDTL 15 MIN: Performed by: SURGERY

## 2024-12-30 PROCEDURE — 2500000003 HC RX 250 WO HCPCS: Performed by: SURGERY

## 2024-12-30 PROCEDURE — 2500000003 HC RX 250 WO HCPCS: Performed by: INTERNAL MEDICINE

## 2024-12-30 PROCEDURE — 6360000002 HC RX W HCPCS: Performed by: NURSE ANESTHETIST, CERTIFIED REGISTERED

## 2024-12-30 PROCEDURE — 3700000001 HC ADD 15 MINUTES (ANESTHESIA): Performed by: SURGERY

## 2024-12-30 PROCEDURE — 2709999900 HC NON-CHARGEABLE SUPPLY: Performed by: SURGERY

## 2024-12-30 PROCEDURE — 2580000003 HC RX 258: Performed by: NURSE ANESTHETIST, CERTIFIED REGISTERED

## 2024-12-30 PROCEDURE — 3700000000 HC ANESTHESIA ATTENDED CARE: Performed by: SURGERY

## 2024-12-30 PROCEDURE — 43246 EGD PLACE GASTROSTOMY TUBE: CPT | Performed by: SURGERY

## 2024-12-30 PROCEDURE — 0DH63UZ INSERTION OF FEEDING DEVICE INTO STOMACH, PERCUTANEOUS APPROACH: ICD-10-PCS | Performed by: SURGERY

## 2024-12-30 PROCEDURE — 3609013300 HC EGD TUBE PLACEMENT: Performed by: SURGERY

## 2024-12-30 PROCEDURE — 43762 RPLC GTUBE NO REVJ TRC: CPT

## 2024-12-30 PROCEDURE — 7100000010 HC PHASE II RECOVERY - FIRST 15 MIN: Performed by: SURGERY

## 2024-12-30 RX ORDER — SODIUM CHLORIDE 9 MG/ML
INJECTION, SOLUTION INTRAVENOUS
Status: DISCONTINUED | OUTPATIENT
Start: 2024-12-30 | End: 2024-12-30 | Stop reason: SDUPTHER

## 2024-12-30 RX ORDER — LIDOCAINE HYDROCHLORIDE 10 MG/ML
INJECTION, SOLUTION INFILTRATION; PERINEURAL PRN
Status: DISCONTINUED | OUTPATIENT
Start: 2024-12-30 | End: 2024-12-30 | Stop reason: ALTCHOICE

## 2024-12-30 RX ORDER — PROPOFOL 10 MG/ML
INJECTION, EMULSION INTRAVENOUS
Status: DISCONTINUED | OUTPATIENT
Start: 2024-12-30 | End: 2024-12-30 | Stop reason: SDUPTHER

## 2024-12-30 RX ADMIN — BISACODYL 10 MG: 10 SUPPOSITORY RECTAL at 09:10

## 2024-12-30 RX ADMIN — PROPOFOL 50 MG: 10 INJECTION, EMULSION INTRAVENOUS at 14:12

## 2024-12-30 RX ADMIN — Medication 10 ML: at 09:10

## 2024-12-30 RX ADMIN — AMIODARONE HYDROCHLORIDE 200 MG: 200 TABLET ORAL at 09:10

## 2024-12-30 RX ADMIN — WATER 1000 MG: 1 INJECTION INTRAMUSCULAR; INTRAVENOUS; SUBCUTANEOUS at 14:13

## 2024-12-30 RX ADMIN — ACETAMINOPHEN 650 MG: 325 TABLET ORAL at 09:24

## 2024-12-30 RX ADMIN — Medication 10 ML: at 21:01

## 2024-12-30 RX ADMIN — Medication 5 MG: at 20:59

## 2024-12-30 RX ADMIN — PROPOFOL 120 MCG/KG/MIN: 10 INJECTION, EMULSION INTRAVENOUS at 14:13

## 2024-12-30 RX ADMIN — SODIUM CHLORIDE: 9 INJECTION, SOLUTION INTRAVENOUS at 14:13

## 2024-12-30 RX ADMIN — METOPROLOL SUCCINATE 75 MG: 25 TABLET, FILM COATED, EXTENDED RELEASE ORAL at 20:59

## 2024-12-30 RX ADMIN — METOPROLOL SUCCINATE 75 MG: 25 TABLET, FILM COATED, EXTENDED RELEASE ORAL at 09:10

## 2024-12-30 RX ADMIN — AMLODIPINE BESYLATE 5 MG: 5 TABLET ORAL at 09:09

## 2024-12-30 ASSESSMENT — PAIN SCALES - GENERAL
PAINLEVEL_OUTOF10: 0
PAINLEVEL_OUTOF10: 6
PAINLEVEL_OUTOF10: 2

## 2024-12-30 ASSESSMENT — PAIN DESCRIPTION - LOCATION: LOCATION: BACK

## 2024-12-30 NOTE — ANESTHESIA PRE PROCEDURE
Department of Anesthesiology  Preprocedure Note       Name:  Jackelyn Montes   Age:  89 y.o.  :  1935                                          MRN:  [id number]         Date:  2024      Surgeon: Surgeon(s):  Kemar Franco MD    Procedure: Procedure(s):  ESOPHAGOGASTRODUODENOSCOPY PERCUTANEOUS ENDOSCOPIC GASTROSTOMY TUBE INSERTION    Medications prior to admission:   Prior to Admission medications    Medication Sig Start Date End Date Taking? Authorizing Provider   metoprolol succinate (TOPROL XL) 50 MG extended release tablet TAKE 1 TABLET BY MOUTH DAILY 24  Yes Marie Hopkins MD   ELIQUIS 2.5 MG TABS tablet TAKE 1 TABLET BY MOUTH TWICE DAILY 23  Yes Franklyn Turcios MD   amiodarone (CORDARONE) 200 MG tablet Take 1 tablet by mouth daily 10/30/23  Yes Marie Hopkins MD       Current medications:    Current Facility-Administered Medications   Medication Dose Route Frequency Provider Last Rate Last Admin   • ceFAZolin (ANCEF) 1,000 mg in sterile water 10 mL IV syringe  1,000 mg IntraVENous On Call to OR Kemar Franco MD       • amLODIPine (NORVASC) tablet 5 mg  5 mg Oral Daily Kameron De La Torre MD   5 mg at 24 09   • metoprolol succinate (TOPROL XL) extended release tablet 75 mg  75 mg Oral BID Kameron De La Torre MD   75 mg at 24   • bisacodyl (DULCOLAX) suppository 10 mg  10 mg Rectal Daily Anselmo Coronado MD   10 mg at 24   • melatonin disintegrating tablet 5 mg  5 mg Oral Nightly Anselmo Coronado MD   5 mg at 24   • sodium chloride flush 0.9 % injection 5-40 mL  5-40 mL IntraVENous 2 times per day Kavon Brynat MD   10 mL at 24 0910   • sodium chloride flush 0.9 % injection 5-40 mL  5-40 mL IntraVENous PRN Kavon Bryant MD       • 0.9 % sodium chloride infusion   IntraVENous PRN Kavon Bryant MD       • potassium chloride (KLOR-CON M) extended release tablet 40 mEq  40 mEq Oral PRN Kavon Bryant MD        Or   • potassium

## 2024-12-30 NOTE — OP NOTE
[facility]  OPERATIVE NOTE    12/30/2024  Jackelyn Montes  2:23 PM    PREOPERATIVE DIAGNOSES: Malnutrition, dysphagia.     POSTOPERATIVE DIAGNOSES: Malnutrition, dysphagia.     OPERATION: Percutaneous endoscopic gastrostomy tube placement with upper endoscopy.     SURGEON: Kemar Franco M.D.    ASSISTANT: CARI Angel     ANESTHESIA: mac     ESTIMATED BLOOD LOSS: Minimal.     COMPLICATIONS: None.     SPECIMEN: None.     DESCRIPTION OF PROCEDURE: PROCEDURE: The patient was taken to the endoscopy suite and placed on the endoscopy table in a supine position. LMAC anesthesia was administered. A bite block was inserted.   A lubricated gastroscope was inserted into the oropharynx and advanced under direct vision to the esophagus and then the stomach. The stomach was insufflated. The anterior abdominal wall was transilluminated. The light source was easily visualized. Indentation was observed with palpation of the abdominal wall.  This area was prepped and draped. Local anesthetic was injected. A #11 blade was used to make a transverse incision. A snare forceps was inserted through the gastroscope and deployed into the gastric lumen. An angiocatheter was inserted through the incision into the gastric lumen under direct vision. A wire was inserted through the anterior catheter and grasped   with the snare forceps. The gastroscope, snare, and wire were then pulled out through the patient's mouth. The gastrostomy tube was connected to the wire, and the wire was pulled through the stomach and out through the anterior abdominal wall. The gastroscope was reintroduced into the stomach. The PEG tube was seen in good position without evidence of bleeding. The gastroscope was removed. The PEG tube was cut to size and fit with a bumper and a feeding apparatus at 3 cm at the abdominal wall. The patient tolerated the procedure well and went to the recovery room in a good condition.  I  was present for the entire procedure. All instrument counts, lap counts, and needle counts were correct at the end of the procedure.    Kemar Franco MD  12/30/2024  2:23 PM

## 2024-12-30 NOTE — CARE COORDINATION
12/30/24 GRACIELA Note: Upon discharge pt to be admitted to [facility]. Per nursing pt to receive a feeding tube placement today. Cardizem drip d/c'd over the wknd. Followed up with [facility]/SNF liaison & resubmission of precert is to be held until feeding tube is placement. Monitor need for updated PT/OT notes for precert. PRECERT NEEDED. HENS completed, but will need updated prior to d/c per [facility]. WENDY needs completed & signed by phys prior to d/c. SW to follow. Electronically signed by RENETTA Bush on 12/30/2024 at 10:42 AM     11-Nov-2018 17:46

## 2024-12-30 NOTE — PLAN OF CARE
Problem: Skin/Tissue Integrity  Goal: Absence of new skin breakdown  Description: 1.  Monitor for areas of redness and/or skin breakdown  2.  Assess vascular access sites hourly  3.  Every 4-6 hours minimum:  Change oxygen saturation probe site  4.  Every 4-6 hours:  If on nasal continuous positive airway pressure, respiratory therapy assess nares and determine need for appliance change or resting period.  Outcome: Progressing     Problem: Chronic Conditions and Co-morbidities  Goal: Patient's chronic conditions and co-morbidity symptoms are monitored and maintained or improved  Outcome: Progressing  Flowsheets (Taken 12/29/2024 2005)  Care Plan - Patient's Chronic Conditions and Co-Morbidity Symptoms are Monitored and Maintained or Improved: Monitor and assess patient's chronic conditions and comorbid symptoms for stability, deterioration, or improvement

## 2024-12-30 NOTE — PROGRESS NOTES
[facility] Hospitalist   Progress Note    Admitting Date and Time: 12/23/2024  2:24 PM  Admit Dx: Debility [R53.81]  Failure to thrive in adult [R62.7]    Subjective:    Pt feels ***  Per RN: ***    ROS: denies fever, chills, cp, sob, n/v, HA unless stated above.     amLODIPine  5 mg Oral Daily    metoprolol succinate  75 mg Oral BID    bisacodyl  10 mg Rectal Daily    melatonin  5 mg Oral Nightly    sodium chloride flush  5-40 mL IntraVENous 2 times per day    amiodarone  200 mg Oral Daily    [Held by provider] apixaban  2.5 mg Oral BID     sodium chloride flush, 5-40 mL, PRN  sodium chloride, , PRN  potassium chloride, 40 mEq, PRN   Or  potassium alternative oral replacement, 40 mEq, PRN   Or  potassium chloride, 10 mEq, PRN  magnesium sulfate, 2,000 mg, PRN  ondansetron, 4 mg, Q8H PRN   Or  ondansetron, 4 mg, Q6H PRN  polyethylene glycol, 17 g, Daily PRN  acetaminophen, 650 mg, Q6H PRN   Or  acetaminophen, 650 mg, Q6H PRN         Objective:    BP (!) 157/107   Pulse (!) 108   Temp 97.3 °F (36.3 °C) (Oral)   Resp 21   Ht 1.626 m (5' 4.02\")   Wt 54.4 kg (120 lb)   SpO2 96%   BMI 20.59 kg/m²   General Appearance: alert and oriented to person, place and time and in no acute distress  Skin: warm and dry  Head: normocephalic and atraumatic  Eyes: pupils equal, round, and reactive to light, extraocular eye movements intact, conjunctivae normal  Neck: neck supple and non tender without mass   Pulmonary/Chest: clear to auscultation bilaterally- no wheezes, rales or rhonchi, normal air movement, no respiratory distress  Cardiovascular: normal rate, normal S1 and S2 and no carotid bruits  Abdomen: soft, non-tender, non-distended, normal bowel sounds, no masses or organomegaly  Extremities: no cyanosis, no clubbing and no edema  Neurologic: no cranial nerve deficit and speech normal      Recent Labs     12/28/24  1624      K 3.5   CL 98   CO2 28   BUN 12   CREATININE 0.6   GLUCOSE 112*

## 2024-12-30 NOTE — PLAN OF CARE
Problem: Chronic Conditions and Co-morbidities  Goal: Patient's chronic conditions and co-morbidity symptoms are monitored and maintained or improved  12/30/2024 1014 by Tiffani Rose RN  Outcome: Progressing  12/30/2024 0504 by Ryan Ayoub RN  Outcome: Progressing  Flowsheets (Taken 12/29/2024 2005)  Care Plan - Patient's Chronic Conditions and Co-Morbidity Symptoms are Monitored and Maintained or Improved: Monitor and assess patient's chronic conditions and comorbid symptoms for stability, deterioration, or improvement     Problem: Discharge Planning  Goal: Discharge to home or other facility with appropriate resources  12/30/2024 1014 by Tiffani Rose RN  Outcome: Progressing  12/30/2024 0504 by Ryan Ayoub RN  Outcome: Progressing  Flowsheets (Taken 12/29/2024 2005)  Discharge to home or other facility with appropriate resources: Identify barriers to discharge with patient and caregiver     Problem: Skin/Tissue Integrity  Goal: Absence of new skin breakdown  Description: 1.  Monitor for areas of redness and/or skin breakdown  2.  Assess vascular access sites hourly  3.  Every 4-6 hours minimum:  Change oxygen saturation probe site  4.  Every 4-6 hours:  If on nasal continuous positive airway pressure, respiratory therapy assess nares and determine need for appliance change or resting period.  12/30/2024 1014 by Tiffani Rose RN  Outcome: Progressing  12/30/2024 0504 by Ryan Ayoub RN  Outcome: Progressing     Problem: ABCDS Injury Assessment  Goal: Absence of physical injury  12/30/2024 1014 by Tiffani Rose RN  Outcome: Progressing  12/30/2024 0504 by Ryan Ayoub RN  Outcome: Progressing  Flowsheets (Taken 12/29/2024 1959)  Absence of Physical Injury: Implement safety measures based on patient assessment     Problem: Safety - Adult  Goal: Free from fall injury  12/30/2024 1014 by Tiffani Rose RN  Outcome: Progressing  12/30/2024 0504 by Ryan Ayoub RN  Outcome: Progressing  Flowsheets

## 2024-12-30 NOTE — ANESTHESIA POSTPROCEDURE EVALUATION
Department of Anesthesiology  Postprocedure Note    Patient: Jackelyn Montes  MRN: [id number]  YOB: 1935  Date of evaluation: 12/30/2024    Procedure Summary       Date: 12/30/24 Room / Location: [place] [address] / [facility]    Anesthesia Start: 1401 Anesthesia Stop: 1426    Procedure: ESOPHAGOGASTRODUODENOSCOPY PERCUTANEOUS ENDOSCOPIC GASTROSTOMY TUBE INSERTION Diagnosis:       Dysphagia, unspecified type      (Dysphagia, unspecified type [R13.10])    Surgeons: Kemar Franco MD Responsible Provider: Nicholas Paris MD    Anesthesia Type: MAC ASA Status: 3            Anesthesia Type: No value filed.    Chu Phase I:      Chu Phase II: Chu Score: 9    Anesthesia Post Evaluation    Patient location during evaluation: PACU  Patient participation: complete - patient participated  Level of consciousness: awake  Pain score: 0  Airway patency: patent  Nausea & Vomiting: no vomiting and no nausea  Cardiovascular status: hemodynamically stable  Respiratory status: acceptable  Hydration status: stable  Pain management: adequate        No notable events documented.

## 2024-12-30 NOTE — PROGRESS NOTES
Comprehensive Nutrition Assessment    Type and Reason for Visit:  Initial, LOS, Consult, Nutrition support    Nutrition Recommendations/Plan:   Continue NPO status   Provide tube feeding recommendations    Recommend Glucerna @ 40ml/h with 125ml flush q4h. Regimen to provide 960ml TV, 1440kcal, 79g protein and 1479ml water per day. TF regimen to meet 100% of estimated protein & energy needs at goal rate.        Malnutrition Assessment:  Malnutrition Status:  Mild malnutrition (12/30/24 1507)    Context:  Chronic Illness     Findings of the 6 clinical characteristics of malnutrition:  Energy Intake:  Mild decrease in energy intake  Weight Loss:  Mild weight loss (13.5% x 15 months)     Body Fat Loss:  Unable to assess     Muscle Mass Loss:  Unable to assess    Fluid Accumulation:  No fluid accumulation     Strength:  Not Performed    Nutrition Assessment:    Pt admit w/ debility, noted evaluation for dysphagia & inability to meet PO need s/p PEG 12/30. PMHx of Afib, dementia, CHF, DM, GERD, HTN, osteoarthritis. Pt meets criteria for mild malnutrition. Pt is on regular diet, PO intake 0% of all meals noted. Will provide tube feeding recommendations and monitor nutrition progression.    Nutrition Related Findings:    abd WDL, +1 edema, I/O's -2.6L since admit, A&Ox4, missing teeth, constipation Wound Type: None       Current Nutrition Intake & Therapies:    Average Meal Intake: 0%  Average Supplements Intake: None Ordered  ADULT DIET; Regular    Anthropometric Measures:  Height: 162.6 cm (5' 4.02\")  Ideal Body Weight (IBW): 120 lbs (55 kg)    Admission Body Weight: 54.4 kg (120 lb) (12/23 not specified)  Current Body Weight: 54.4 kg (120 lb) (12/23 not specified, UTO bed scale d/t pt in ENDO), 100 % IBW. Weight Source: Stated  Current BMI (kg/m2): 20.6  Usual Body Weight: 62.9 kg (138 lb 11 oz) (9/3/23 bed scale - most recent accurate wt)  % Weight Change (Calculated): -13.5  Weight Adjustment For: No  Adjustment  BMI Categories: Underweight (BMI less than 22) age over 65    Estimated Daily Nutrient Needs:  Energy Requirements Based On: Formula  Weight Used for Energy Requirements: Current  Energy (kcal/day): 1200-1500kcal/day  Weight Used for Protein Requirements: Current  Protein (g/day): 70-80g/day  Method Used for Fluid Requirements: 1 ml/kcal  Fluid (ml/day): 1200-1500ml/day    Nutrition Diagnosis:   Mild Malnutrition related to cognitive or neurological impairment as evidenced by criteria as identified in malnutrition assessment, BMI    Nutrition Interventions:   Food and/or Nutrient Delivery: Continue NPO, Start Tube Feeding (Recommend Glucerna @ 40ml/h with 125ml flush q4h)  Nutrition Education/Counseling: Education/Counseling not indicated  Coordination of Nutrition Care: Continue to monitor while inpatient     Goals:  Goals: Initiate nutrition support, Tolerate nutrition support at goal rate, by next RD assessment  Type of Goal: New goal  Previous Goal Met: New Goal    Nutrition Monitoring and Evaluation:   Behavioral-Environmental Outcomes: None Identified  Food/Nutrient Intake Outcomes: Enteral Nutrition Intake/Tolerance, Food and Nutrient Intake, Supplement Intake  Physical Signs/Symptoms Outcomes: Biochemical Data, Chewing or Swallowing, GI Status, Fluid Status or Edema, Nutrition Focused Physical Findings, Weight, Skin    Discharge Planning:    Continue current diet, Enteral Nutrition     Liz Villela RD, LD  Contact: [phone]

## 2024-12-31 LAB
ALBUMIN SERPL-MCNC: 2.9 G/DL (ref 3.5–5.2)
ALP SERPL-CCNC: 110 U/L (ref 35–104)
ALT SERPL-CCNC: 9 U/L (ref 0–32)
ANION GAP SERPL CALCULATED.3IONS-SCNC: 13 MMOL/L (ref 7–16)
AST SERPL-CCNC: 19 U/L (ref 0–31)
BILIRUB DIRECT SERPL-MCNC: 0.3 MG/DL (ref 0–0.3)
BILIRUB INDIRECT SERPL-MCNC: 0.7 MG/DL (ref 0–1)
BILIRUB SERPL-MCNC: 1 MG/DL (ref 0–1.2)
BUN SERPL-MCNC: 20 MG/DL (ref 6–23)
CALCIUM SERPL-MCNC: 8.7 MG/DL (ref 8.6–10.2)
CHLORIDE SERPL-SCNC: 96 MMOL/L (ref 98–107)
CO2 SERPL-SCNC: 28 MMOL/L (ref 22–29)
CREAT SERPL-MCNC: 0.7 MG/DL (ref 0.5–1)
ERYTHROCYTE [DISTWIDTH] IN BLOOD BY AUTOMATED COUNT: 13.7 % (ref 11.5–15)
GFR, ESTIMATED: 80 ML/MIN/1.73M2
GLUCOSE SERPL-MCNC: 96 MG/DL (ref 74–99)
HCT VFR BLD AUTO: 48.5 % (ref 34–48)
HGB BLD-MCNC: 16.6 G/DL (ref 11.5–15.5)
MAGNESIUM SERPL-MCNC: 2 MG/DL (ref 1.6–2.6)
MCH RBC QN AUTO: 38.2 PG (ref 26–35)
MCHC RBC AUTO-ENTMCNC: 34.2 G/DL (ref 32–34.5)
MCV RBC AUTO: 111.8 FL (ref 80–99.9)
PHOSPHATE SERPL-MCNC: 3.2 MG/DL (ref 2.5–4.5)
PLATELET # BLD AUTO: 295 K/UL (ref 130–450)
PMV BLD AUTO: 10.7 FL (ref 7–12)
POTASSIUM SERPL-SCNC: 3.2 MMOL/L (ref 3.5–5)
PROT SERPL-MCNC: 6.2 G/DL (ref 6.4–8.3)
RBC # BLD AUTO: 4.34 M/UL (ref 3.5–5.5)
SODIUM SERPL-SCNC: 137 MMOL/L (ref 132–146)
TRIGL SERPL-MCNC: 128 MG/DL
WBC OTHER # BLD: 13.9 K/UL (ref 4.5–11.5)

## 2024-12-31 PROCEDURE — 6370000000 HC RX 637 (ALT 250 FOR IP): Performed by: INTERNAL MEDICINE

## 2024-12-31 PROCEDURE — 2500000003 HC RX 250 WO HCPCS: Performed by: INTERNAL MEDICINE

## 2024-12-31 PROCEDURE — 99024 POSTOP FOLLOW-UP VISIT: CPT | Performed by: STUDENT IN AN ORGANIZED HEALTH CARE EDUCATION/TRAINING PROGRAM

## 2024-12-31 PROCEDURE — 99232 SBSQ HOSP IP/OBS MODERATE 35: CPT | Performed by: INTERNAL MEDICINE

## 2024-12-31 PROCEDURE — 97110 THERAPEUTIC EXERCISES: CPT

## 2024-12-31 PROCEDURE — 85027 COMPLETE CBC AUTOMATED: CPT

## 2024-12-31 PROCEDURE — 82248 BILIRUBIN DIRECT: CPT

## 2024-12-31 PROCEDURE — 97535 SELF CARE MNGMENT TRAINING: CPT

## 2024-12-31 PROCEDURE — 84478 ASSAY OF TRIGLYCERIDES: CPT

## 2024-12-31 PROCEDURE — 80053 COMPREHEN METABOLIC PANEL: CPT

## 2024-12-31 PROCEDURE — 83735 ASSAY OF MAGNESIUM: CPT

## 2024-12-31 PROCEDURE — 36415 COLL VENOUS BLD VENIPUNCTURE: CPT

## 2024-12-31 PROCEDURE — 2060000000 HC ICU INTERMEDIATE R&B

## 2024-12-31 PROCEDURE — 84100 ASSAY OF PHOSPHORUS: CPT

## 2024-12-31 RX ORDER — OXYCODONE AND ACETAMINOPHEN 5; 325 MG/1; MG/1
1 TABLET ORAL EVERY 6 HOURS PRN
Status: DISCONTINUED | OUTPATIENT
Start: 2024-12-31 | End: 2025-01-02 | Stop reason: HOSPADM

## 2024-12-31 RX ADMIN — METOPROLOL SUCCINATE 75 MG: 25 TABLET, FILM COATED, EXTENDED RELEASE ORAL at 09:27

## 2024-12-31 RX ADMIN — Medication 10 ML: at 09:28

## 2024-12-31 RX ADMIN — Medication 5 MG: at 21:55

## 2024-12-31 RX ADMIN — AMLODIPINE BESYLATE 5 MG: 5 TABLET ORAL at 09:27

## 2024-12-31 RX ADMIN — BISACODYL 10 MG: 10 SUPPOSITORY RECTAL at 09:28

## 2024-12-31 RX ADMIN — APIXABAN 2.5 MG: 5 TABLET, FILM COATED ORAL at 21:55

## 2024-12-31 RX ADMIN — Medication 10 ML: at 21:55

## 2024-12-31 RX ADMIN — METOPROLOL SUCCINATE 75 MG: 25 TABLET, FILM COATED, EXTENDED RELEASE ORAL at 21:55

## 2024-12-31 RX ADMIN — AMIODARONE HYDROCHLORIDE 200 MG: 200 TABLET ORAL at 09:27

## 2024-12-31 RX ADMIN — POTASSIUM BICARBONATE 40 MEQ: 782 TABLET, EFFERVESCENT ORAL at 13:03

## 2024-12-31 RX ADMIN — OXYCODONE AND ACETAMINOPHEN 1 TABLET: 5; 325 TABLET ORAL at 17:04

## 2024-12-31 ASSESSMENT — PAIN DESCRIPTION - LOCATION
LOCATION: GENERALIZED;COCCYX
LOCATION: BUTTOCKS

## 2024-12-31 ASSESSMENT — PAIN - FUNCTIONAL ASSESSMENT
PAIN_FUNCTIONAL_ASSESSMENT: ACTIVITIES ARE NOT PREVENTED
PAIN_FUNCTIONAL_ASSESSMENT: PREVENTS OR INTERFERES SOME ACTIVE ACTIVITIES AND ADLS

## 2024-12-31 ASSESSMENT — PAIN DESCRIPTION - PAIN TYPE: TYPE: CHRONIC PAIN;ACUTE PAIN

## 2024-12-31 ASSESSMENT — PAIN DESCRIPTION - ONSET: ONSET: ON-GOING

## 2024-12-31 ASSESSMENT — PAIN SCALES - GENERAL
PAINLEVEL_OUTOF10: 6
PAINLEVEL_OUTOF10: 6
PAINLEVEL_OUTOF10: 0

## 2024-12-31 ASSESSMENT — PAIN DESCRIPTION - DESCRIPTORS
DESCRIPTORS: ACHING;DISCOMFORT
DESCRIPTORS: ACHING;DISCOMFORT

## 2024-12-31 ASSESSMENT — PAIN DESCRIPTION - FREQUENCY: FREQUENCY: CONTINUOUS

## 2024-12-31 NOTE — PROGRESS NOTES
input(s): \"APTT\" in the last 72 hours.  CARDIAC ENZYMES:No results for input(s): \"CKTOTAL\", \"TROPHS\" in the last 72 hours.  FASTING LIPID PANEL:  Lab Results   Component Value Date/Time    CHOL 165 2022 04:40 PM    HDL 50 2022 04:40 PM    TRIG 128 2024 08:05 AM     LIVER PROFILE:  Recent Labs     24  1624 24  0805   AST 17 19   ALT 11 9       Current Inpatient Medications:   amLODIPine  5 mg Oral Daily    metoprolol succinate  75 mg Oral BID    melatonin  5 mg Oral Nightly    sodium chloride flush  5-40 mL IntraVENous 2 times per day    amiodarone  200 mg Oral Daily    [Held by provider] apixaban  2.5 mg Oral BID       IV Infusions (if any):   sodium chloride           PHYSICAL EXAM:     CONSTITUTIONAL:   BP (!) 137/91   Pulse 99   Temp 97.3 °F (36.3 °C) (Oral)   Resp 16   Ht 1.626 m (5' 4.02\")   Wt 54.4 kg (120 lb)   SpO2 96%   BMI 20.59 kg/m²   Pulse  Av.2  Min: 88  Max: 134  Systolic (24hrs), Av , Min:96 , Max:160    Diastolic (24hrs), Av, Min:65, Max:107    In general appears stated age. awake, no apparent distress  HEENT: eyes -conjunctivae pink,  Throat - Oral mucosa moist.   Neck-  no stridor, no jugular venous distention   RESPIRATORY: No accessory muscle use. Lung auscultation - few rhonchi  CARDIOVASCULAR:     Heart Ausculation - Irregular rate, 2/6 systolic murmur, No s3   No lower extremity edema, Distal pulses palpable  ABDOMEN: Bowel sounds present.   MS:n/a  : Deferred  Rectal Exam: Deferred  SKIN: warm and dry   NEURO / PSYCH: oriented to person, place        ASSESSMENT/PLAN:     Persistent A-fib with RVR, now rates controlled - Resume her adjusted dose Eliquis for OAC and Amiodarone      Chronic HFpEF    VHD: Mild AS mild MR     Hypertension - Controlled - Monitor BP    S/p PEG tube           Cardiology will sign off - please call if needed    F/u with Dr Hopkins after discharge    Above d/w her  D/w Dr Coronado    Electronically signed by  Rich Gudino MD on 12/31/2024 at 1:17 PM  [facility]

## 2024-12-31 NOTE — PROGRESS NOTES
Physical Therapy Treatment Note/Plan of Care    Room #:  0537/0537-02  Patient Name: Jackelyn Montes  YOB: 1935  MRN: [id number]    Date of Service: 12/31/2024     Tentative placement recommendation: Subacute Rehab  Equipment recommendation: To be determined      Evaluating Physical Therapist: Neno Rivas, PT  #[id number]      Specific Provider Orders/Date/Referring Provider :     PT eval and treat  Start:  12/20/24 1130,   End:  12/20/24 1130,   ONE TIME,   Standing Count:  1 Occurrences,   R       Kavon Bryant MD    Admitting Diagnosis:   Debility [R53.81]  Failure to thrive in adult [R62.7]    Re Admitted with  inability to thrive  Surgery: none        Patient Active Problem List   Diagnosis    Primary osteoarthritis of both knees    Other contact with raccoon, sequela    Essential hypertension    Acute on chronic combined systolic and diastolic CHF (congestive heart failure) ([facility])    Atrial fibrillation with rapid ventricular response (HCC)    Bilateral lower extremity edema    Age-related osteoporosis without current pathological fracture    Senile hyperkeratosis    Arthritis of hip    Varicose veins of lower extremity    Synovial cyst of right knee    Spondylosis without myelopathy or radiculopathy, lumbar region    Spinal stenosis, lumbar region without neurogenic claudication    Stage 3 chronic kidney disease (HCC)    Nontoxic multinodular goiter    Macular degeneration    Long term (current) use of opiate analgesic    Hernia of anterior abdominal wall    Sensorineural hearing loss (SNHL) of both ears    Vitamin D deficiency    Neuropathy    Fall at home    Skin lesions    Thyroid lump    Numbness    Rib pain on left side    Acute on chronic congestive heart failure (HCC)    Atrial fibrillation with RVR ([facility])    NICM (nonischemic cardiomyopathy) ([facility])    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

## 2024-12-31 NOTE — CONSULTS
Nutrition Note    Consult noted for TFO&M - Pt was assessed and recommendations were provided 12/30. Order is in place, no changes needed at this time. Will follow up as scheduled to assess tolerance.     Recommend Glucerna @ 40ml/h with 125ml flush q4h. Regimen to provide 960ml TV, 1440kcal, 79g protein and 1479ml water per day. TF regimen to meet 100% of estimated protein & energy needs at goal rate.     Electronically signed by Liz Villela RD, LD on 12/31/24 at 9:46 AM EST  Contact: [phone]

## 2024-12-31 NOTE — PROGRESS NOTES
General Surgery Progress Note  [facility]      Patient's Name/Date of Birth: Jackelyn Montes / 1935    Date: December 31, 2024     Subjective: Admits to pain at PEG tube insertion site. No Bowel function yet.    Objective:  BP (!) 160/77   Pulse (!) 118   Temp 97.2 °F (36.2 °C) (Temporal)   Resp 18   Ht 1.626 m (5' 4.02\")   Wt 54.4 kg (120 lb)   SpO2 97%   BMI 20.59 kg/m²   Labs:  Recent Labs     12/28/24  1624 12/31/24  0805   WBC 15.2* 13.9*   HGB 15.7* 16.6*   HCT 45.6 48.5*     Lab Results   Component Value Date    CREATININE 0.6 12/28/2024    BUN 12 12/28/2024     12/28/2024    K 3.5 12/28/2024    CL 98 12/28/2024    CO2 28 12/28/2024     No results for input(s): \"LIPASE\", \"AMYLASE\" in the last 72 hours.    General appearance: uncomfortable sitting up in bed, NAD  Head: normocephalic, atraumatic  Neck: supple, trachea midline  Lungs: unlabored breathing, equal chest rise bilaterally  Heart: Regular rate  Abdomen: soft, non distended, tolerating TF at 20cc/hr, no drainage around PEG tube site  Extremities: extremities normal, atraumatic, no cyanosis or edema    Assessment/Plan:  Jackelyn Montes is a 89 y.o. female status post PEG 12/30, tolerated procedure well    Advance TF to goal, nutrition is following  Okay to discharge from a surgery perspective  Surgery will sign, off, please call with questions.     Mynor Angel,   Bariatric and General Surgery  [facility]  12/31/2024  9:43 AM

## 2024-12-31 NOTE — PROGRESS NOTES
[facility] Hospitalist   Progress Note    Admitting Date and Time: 12/23/2024  2:24 PM  Admit Dx: Debility [R53.81]  Failure to thrive in adult [R62.7]    Subjective:    Patient sitting up in bed and stated that she would like something cold to drink.  She also stated that she was in pain and wanted to have something for that    Per RN:  informed nursing yesterday the patient is on Percocet at home 5/325 4 times     amLODIPine  5 mg Oral Daily    metoprolol succinate  75 mg Oral BID    melatonin  5 mg Oral Nightly    sodium chloride flush  5-40 mL IntraVENous 2 times per day    amiodarone  200 mg Oral Daily    apixaban  2.5 mg Oral BID     sodium chloride flush, 5-40 mL, PRN  sodium chloride, , PRN  potassium chloride, 40 mEq, PRN   Or  potassium alternative oral replacement, 40 mEq, PRN   Or  potassium chloride, 10 mEq, PRN  magnesium sulfate, 2,000 mg, PRN  ondansetron, 4 mg, Q8H PRN   Or  ondansetron, 4 mg, Q6H PRN  polyethylene glycol, 17 g, Daily PRN  acetaminophen, 650 mg, Q6H PRN   Or  acetaminophen, 650 mg, Q6H PRN         Objective:    BP (!) 137/91   Pulse 99   Temp 97.3 °F (36.3 °C) (Oral)   Resp 16   Ht 1.626 m (5' 4.02\")   Wt 54.4 kg (120 lb)   SpO2 96%   BMI 20.59 kg/m²   General Appearance: alert and oriented to person, place and time and in no acute distress  Skin: warm and dry  Head: normocephalic and atraumatic  Eyes: pupils equal, round, and reactive to light, extraocular eye movements intact, conjunctivae normal  Neck: neck supple and non tender without mass   Pulmonary/Chest: clear to auscultation bilaterally- no wheezes, rales or rhonchi, normal air movement, no respiratory distress  Cardiovascular: normal rate, normal S1 and S2 and no carotid bruits  Abdomen: soft, non-tender, non-distended, normal bowel sounds, no masses or organomegaly  Extremities: no cyanosis, no clubbing and no edema  Neurologic: no cranial nerve deficit and speech normal      Recent Labs

## 2024-12-31 NOTE — PROGRESS NOTES
with 3 steps rail+ 2 large steps to porch + 1 step into house.  8 steps/rail to main level (bed/bath/kitchen/ family room), 6 steps/rail to basement. walk in shower with shower chair      Equipment owned: Wheelchair, Cane, and Wheeled Walker      Prior Level of Function:  assists with ADLs and IADLs; ambulated with wheeled walker, with assist     Pain Level: no pain reported; Nursing notified.              Cognition: A&O: 1/4; Follows 1 step directions              Memory: poor              Sequencing: poor, required maximal visual and tactile cueing due to [place] and cognition              Problem solving: poor              Judgement/safety: poor     [facility]                 Functional Assessment:     Initial Eval Status  Date: 12/23/24 Treatment Status  Date: 12/31/24 STGs = LTGs  Time frame: 10-14 days   Feeding Moderate Assist to hold cup to drink from straw MAX A to bring hand to mouth with hand over hand  Supervision    Grooming Maximal Assist  MAX A to wash face pt requiring assist to bring hand to face with hand over hand assist  Minimal Assist    UB Dressing Maximal Assist Dep to [facility] gown at bed level  Minimal Assist    LB Dressing Dependent  Dep  Moderate Assist    Bathing Maximal Assist  MAX A to complete UE bathing at bed level  Minimal Assist    Toileting Dependent  N/t  Minimal Assist    Bed Mobility  Supine to sit/Sit to supine:   Not assessed  d/t pt overall debility, decreased activity tolerance, balance deficits, safety and fall risk.  Rolling:Moderate assist  Scooting: Maximal Assist of 2 N/t  Supine to sit: Supervision  Sit to supine: Supervision  Rolling:Supervision   Functional Transfers Not assessed  d/t pt overall debility, decreased activity tolerance, balance deficits, safety and fall risk. N/t  Moderate Assist    Functional Mobility Not assessed  d/t pt overall debility, decreased activity tolerance, balance deficits, safety and fall risk.    N/t  Moderate Assist    Balance  Sitting:     Static: poor    Dynamic: poor  Standing: N/T N/t   Sitting:     Static: fair     Dynamic: fair   Standing: fair  with wheeled walker   Activity Tolerance poor Poor  Fair   Visual/  Perceptual Glasses: none                       Hand Dominance: Right       AROM (PROM) Strength Additional Info:  Goal:   RUE  WFL 3+/5 Fair  and wfl FMC/dexterity noted during ADL tasks    Improve overall RUE strength  for participation in functional tasks   LUE WFL 3+/5 Fair  and wfl FMC/dexterity noted during ADL tasks    Improve overall LUE strength  for participation in functional tasks       Pt supine in bed, engaged in B UE ROM ex's, 1 x 10 reps in all planes focusing on strength/endurance to increase independence with ADL tasks;   Comments: Upon arrival pt supine in bed, agreeable to therapy session. Pt educated with regards to grooming tasks, UE bathing, UE bathing tasks, B UE ROM ex's. At end of session pt supine in bed,  all lines and tubes intact, call light within reach.     Pt has made limited  progress towards set goals.   Continue with current plan of care      Treatment Time In:1409            Treatment Time Out: 1432                Treatment Charges: Mins Units   Ther Ex  [id number] 10 1   Manual Therapy [id number]     Thera Activities 06430     ADL/Home Mgt [id number] 13 1   Neuro Re-ed [id number]     Group Therapy      Orthotic manage/training  [id number]     Non-Billable Time     Total Timed Treatment 23 2       Treatment Time additionally includes review of current medical information, gathering information on past medical history/social history and prior level of function, interpretation of standardized testing/informal observation of tasks, treatment for plan of care and goals.     Robby KHAN/BETO [id number]

## 2024-12-31 NOTE — PLAN OF CARE
Problem: Chronic Conditions and Co-morbidities  Goal: Patient's chronic conditions and co-morbidity symptoms are monitored and maintained or improved  Outcome: Progressing  Flowsheets (Taken 12/29/2024 2005 by Ryan Ayoub, RN)  Care Plan - Patient's Chronic Conditions and Co-Morbidity Symptoms are Monitored and Maintained or Improved: Monitor and assess patient's chronic conditions and comorbid symptoms for stability, deterioration, or improvement     Problem: Discharge Planning  Goal: Discharge to home or other facility with appropriate resources  Outcome: Progressing  Flowsheets (Taken 12/29/2024 2005 by Ryan Ayoub, RN)  Discharge to home or other facility with appropriate resources: Identify barriers to discharge with patient and caregiver     Problem: Skin/Tissue Integrity  Goal: Absence of new skin breakdown  Description: 1.  Monitor for areas of redness and/or skin breakdown  2.  Assess vascular access sites hourly  3.  Every 4-6 hours minimum:  Change oxygen saturation probe site  4.  Every 4-6 hours:  If on nasal continuous positive airway pressure, respiratory therapy assess nares and determine need for appliance change or resting period.  Outcome: Progressing     Problem: ABCDS Injury Assessment  Goal: Absence of physical injury  Outcome: Progressing  Flowsheets (Taken 12/30/2024 0800 by Tiffani Rose, RN)  Absence of Physical Injury: Implement safety measures based on patient assessment     Problem: Safety - Adult  Goal: Free from fall injury  Outcome: Progressing  Flowsheets (Taken 12/30/2024 0800 by Tiffani Rose, RN)  Free From Fall Injury: Instruct family/caregiver on patient safety     Problem: Nutrition Deficit:  Goal: Optimize nutritional status  12/31/2024 0333 by Willig, Samantha, RN  Outcome: Progressing  12/30/2024 1512 by Liz Villela RD, LD  Outcome: Progressing

## 2024-12-31 NOTE — CARE COORDINATION
12/31/24 Pt new feeding tube placement yesterday, nurse reported that pt is tolerating. Phys reported that pt is ready for d/c once precert is received for SNF placement at [place]. Khushboo/lianora confirmed precert submitted, awaiting approval. GURPREET completed. WENDY needs completed & signed by phys prior to d/c Updated pt and spouse. Ambulance transport will need to be arranged upon d/c. Electronically signed by RENETTA Bush on 12/31/2024 at 1:14 PM

## 2024-12-31 NOTE — PLAN OF CARE
Problem: Chronic Conditions and Co-morbidities  Goal: Patient's chronic conditions and co-morbidity symptoms are monitored and maintained or improved  Outcome: Progressing  Flowsheets (Taken 12/31/2024 0927 by Cristina Adame RN)  Care Plan - Patient's Chronic Conditions and Co-Morbidity Symptoms are Monitored and Maintained or Improved: Monitor and assess patient's chronic conditions and comorbid symptoms for stability, deterioration, or improvement     Problem: Discharge Planning  Goal: Discharge to home or other facility with appropriate resources  Outcome: Progressing  Flowsheets (Taken 12/31/2024 0927 by Cristina Adame RN)  Discharge to home or other facility with appropriate resources: Identify barriers to discharge with patient and caregiver     Problem: ABCDS Injury Assessment  Goal: Absence of physical injury  Outcome: Progressing  Flowsheets (Taken 12/31/2024 0927 by Cristina Adame, RN)  Absence of Physical Injury: Implement safety measures based on patient assessment     Problem: Safety - Adult  Goal: Free from fall injury  Outcome: Progressing  Flowsheets (Taken 12/31/2024 0927 by Cristina Adame, RN)  Free From Fall Injury: Instruct family/caregiver on patient safety     Problem: Nutrition Deficit:  Goal: Optimize nutritional status  Outcome: Progressing

## 2025-01-01 ENCOUNTER — APPOINTMENT (OUTPATIENT)
Dept: GENERAL RADIOLOGY | Age: 89
DRG: 871 | End: 2025-01-01
Payer: MEDICARE

## 2025-01-01 LAB
ALBUMIN SERPL-MCNC: 2.8 G/DL (ref 3.5–5.2)
ALP SERPL-CCNC: 127 U/L (ref 35–104)
ALT SERPL-CCNC: 7 U/L (ref 0–32)
ANION GAP SERPL CALCULATED.3IONS-SCNC: 9 MMOL/L (ref 7–16)
AST SERPL-CCNC: 16 U/L (ref 0–31)
BILIRUB DIRECT SERPL-MCNC: 0.3 MG/DL (ref 0–0.3)
BILIRUB INDIRECT SERPL-MCNC: 0.3 MG/DL (ref 0–1)
BILIRUB SERPL-MCNC: 0.6 MG/DL (ref 0–1.2)
BUN SERPL-MCNC: 27 MG/DL (ref 6–23)
CALCIUM SERPL-MCNC: 8.5 MG/DL (ref 8.6–10.2)
CHLORIDE SERPL-SCNC: 94 MMOL/L (ref 98–107)
CO2 SERPL-SCNC: 32 MMOL/L (ref 22–29)
CREAT SERPL-MCNC: 0.7 MG/DL (ref 0.5–1)
GFR, ESTIMATED: 84 ML/MIN/1.73M2
GLUCOSE SERPL-MCNC: 159 MG/DL (ref 74–99)
MAGNESIUM SERPL-MCNC: 2.3 MG/DL (ref 1.6–2.6)
PHOSPHATE SERPL-MCNC: 2 MG/DL (ref 2.5–4.5)
POTASSIUM SERPL-SCNC: 3.6 MMOL/L (ref 3.5–5)
PROT SERPL-MCNC: 5.9 G/DL (ref 6.4–8.3)
SODIUM SERPL-SCNC: 135 MMOL/L (ref 132–146)

## 2025-01-01 PROCEDURE — 6370000000 HC RX 637 (ALT 250 FOR IP): Performed by: INTERNAL MEDICINE

## 2025-01-01 PROCEDURE — 6370000000 HC RX 637 (ALT 250 FOR IP): Performed by: FAMILY MEDICINE

## 2025-01-01 PROCEDURE — 83735 ASSAY OF MAGNESIUM: CPT

## 2025-01-01 PROCEDURE — 74018 RADEX ABDOMEN 1 VIEW: CPT

## 2025-01-01 PROCEDURE — 36415 COLL VENOUS BLD VENIPUNCTURE: CPT

## 2025-01-01 PROCEDURE — 82248 BILIRUBIN DIRECT: CPT

## 2025-01-01 PROCEDURE — 99233 SBSQ HOSP IP/OBS HIGH 50: CPT | Performed by: INTERNAL MEDICINE

## 2025-01-01 PROCEDURE — 84100 ASSAY OF PHOSPHORUS: CPT

## 2025-01-01 PROCEDURE — 2500000003 HC RX 250 WO HCPCS: Performed by: INTERNAL MEDICINE

## 2025-01-01 PROCEDURE — 2060000000 HC ICU INTERMEDIATE R&B

## 2025-01-01 PROCEDURE — 80053 COMPREHEN METABOLIC PANEL: CPT

## 2025-01-01 RX ADMIN — POLYETHYLENE GLYCOL 3350 17 G: 17 POWDER, FOR SOLUTION ORAL at 16:21

## 2025-01-01 RX ADMIN — APIXABAN 2.5 MG: 5 TABLET, FILM COATED ORAL at 09:18

## 2025-01-01 RX ADMIN — OXYCODONE AND ACETAMINOPHEN 1 TABLET: 5; 325 TABLET ORAL at 14:53

## 2025-01-01 RX ADMIN — METOPROLOL SUCCINATE 75 MG: 25 TABLET, FILM COATED, EXTENDED RELEASE ORAL at 09:18

## 2025-01-01 RX ADMIN — Medication 10 ML: at 09:19

## 2025-01-01 RX ADMIN — AMIODARONE HYDROCHLORIDE 200 MG: 200 TABLET ORAL at 09:18

## 2025-01-01 RX ADMIN — Medication 10 ML: at 20:42

## 2025-01-01 RX ADMIN — ACETAMINOPHEN 650 MG: 325 TABLET ORAL at 09:18

## 2025-01-01 RX ADMIN — AMLODIPINE BESYLATE 5 MG: 5 TABLET ORAL at 09:18

## 2025-01-01 RX ADMIN — Medication 5 MG: at 20:42

## 2025-01-01 RX ADMIN — APIXABAN 2.5 MG: 5 TABLET, FILM COATED ORAL at 20:42

## 2025-01-01 ASSESSMENT — PAIN SCALES - GENERAL
PAINLEVEL_OUTOF10: 9
PAINLEVEL_OUTOF10: 2
PAINLEVEL_OUTOF10: 5
PAINLEVEL_OUTOF10: 0

## 2025-01-01 ASSESSMENT — PAIN DESCRIPTION - LOCATION
LOCATION: BACK
LOCATION: BACK;FOOT

## 2025-01-01 NOTE — PLAN OF CARE
Problem: Chronic Conditions and Co-morbidities  Goal: Patient's chronic conditions and co-morbidity symptoms are monitored and maintained or improved  1/1/2025 1127 by Tiffani Rose RN  Outcome: Progressing  1/1/2025 0636 by Med Mar RN  Outcome: Progressing  Flowsheets (Taken 12/31/2024 2000)  Care Plan - Patient's Chronic Conditions and Co-Morbidity Symptoms are Monitored and Maintained or Improved:   Monitor and assess patient's chronic conditions and comorbid symptoms for stability, deterioration, or improvement   Collaborate with multidisciplinary team to address chronic and comorbid conditions and prevent exacerbation or deterioration   Update acute care plan with appropriate goals if chronic or comorbid symptoms are exacerbated and prevent overall improvement and discharge     Problem: Discharge Planning  Goal: Discharge to home or other facility with appropriate resources  1/1/2025 1127 by Tiffani Rose RN  Outcome: Progressing  1/1/2025 0636 by Med Mar RN  Outcome: Progressing  Flowsheets (Taken 12/31/2024 2000)  Discharge to home or other facility with appropriate resources:   Identify barriers to discharge with patient and caregiver   Arrange for needed discharge resources and transportation as appropriate   Identify discharge learning needs (meds, wound care, etc)   Refer to discharge planning if patient needs post-hospital services based on physician order or complex needs related to functional status, cognitive ability or social support system     Problem: Skin/Tissue Integrity  Goal: Absence of new skin breakdown  Description: 1.  Monitor for areas of redness and/or skin breakdown  2.  Assess vascular access sites hourly  3.  Every 4-6 hours minimum:  Change oxygen saturation probe site  4.  Every 4-6 hours:  If on nasal continuous positive airway pressure, respiratory therapy assess nares and determine need for appliance change or resting period.  1/1/2025 1127 by Milton  BASIM Nixon  Outcome: Progressing  1/1/2025 0636 by Med Mar RN  Outcome: Progressing     Problem: ABCDS Injury Assessment  Goal: Absence of physical injury  1/1/2025 1127 by Tiffani Rose RN  Outcome: Progressing  1/1/2025 0636 by Med Mar RN  Outcome: Progressing  Flowsheets (Taken 12/31/2024 2000)  Absence of Physical Injury: Implement safety measures based on patient assessment     Problem: Safety - Adult  Goal: Free from fall injury  1/1/2025 1127 by Tiffani Rose RN  Outcome: Progressing  1/1/2025 0636 by Med Mar RN  Outcome: Progressing  Flowsheets (Taken 12/31/2024 2000)  Free From Fall Injury:   Instruct family/caregiver on patient safety   Based on caregiver fall risk screen, instruct family/caregiver to ask for assistance with transferring infant if caregiver noted to have fall risk factors     Problem: Nutrition Deficit:  Goal: Optimize nutritional status  1/1/2025 1127 by Tiffani Rose RN  Outcome: Progressing  1/1/2025 0636 by Med Mar RN  Outcome: Progressing     Problem: Pain  Goal: Verbalizes/displays adequate comfort level or baseline comfort level  1/1/2025 1127 by Tiffani Rose RN  Outcome: Progressing  1/1/2025 0636 by Med Mar RN  Outcome: Progressing  Flowsheets (Taken 12/31/2024 2157)  Verbalizes/displays adequate comfort level or baseline comfort level:   Encourage patient to monitor pain and request assistance   Assess pain using appropriate pain scale   Administer analgesics based on type and severity of pain and evaluate response   Implement non-pharmacological measures as appropriate and evaluate response   Notify Licensed Independent Practitioner if interventions unsuccessful or patient reports new pain

## 2025-01-01 NOTE — PLAN OF CARE
RN  Outcome: Progressing  Flowsheets (Taken 12/31/2024 4313)  Verbalizes/displays adequate comfort level or baseline comfort level:   Encourage patient to monitor pain and request assistance   Assess pain using appropriate pain scale   Administer analgesics based on type and severity of pain and evaluate response   Implement non-pharmacological measures as appropriate and evaluate response   Notify Licensed Independent Practitioner if interventions unsuccessful or patient reports new pain  12/31/2024 1759 by Jorge Andrews, RN  Outcome: Progressing

## 2025-01-01 NOTE — PROGRESS NOTES
[facility] Hospitalist   Progress Note    Admitting Date and Time: 12/23/2024  2:24 PM  Admit Dx: Debility [R53.81]  Failure to thrive in adult [R62.7]    Subjective:    12/31: Patient sitting up in bed and stated that she would like something cold to drink.  She also stated that she was in pain and wanted to have something for that  Per RN,  informed nursing yesterday the patient is on Percocet at home 5/325 4 times    1/1: Complaining of pain from groin but she was pulling on her Rodriguez. Stool     Per RN: ***     amLODIPine  5 mg Oral Daily    metoprolol succinate  75 mg Oral BID    melatonin  5 mg Oral Nightly    sodium chloride flush  5-40 mL IntraVENous 2 times per day    amiodarone  200 mg Oral Daily    apixaban  2.5 mg Oral BID     oxyCODONE-acetaminophen, 1 tablet, Q6H PRN  sodium chloride flush, 5-40 mL, PRN  sodium chloride, , PRN  potassium chloride, 40 mEq, PRN   Or  potassium alternative oral replacement, 40 mEq, PRN   Or  potassium chloride, 10 mEq, PRN  magnesium sulfate, 2,000 mg, PRN  ondansetron, 4 mg, Q8H PRN   Or  ondansetron, 4 mg, Q6H PRN  polyethylene glycol, 17 g, Daily PRN  acetaminophen, 650 mg, Q6H PRN   Or  acetaminophen, 650 mg, Q6H PRN         Objective:    /64   Pulse 76   Temp 97.7 °F (36.5 °C) (Temporal)   Resp 18   Ht 1.626 m (5' 4.02\")   Wt 54.4 kg (120 lb)   SpO2 97%   BMI 20.59 kg/m²   General Appearance: alert and oriented to person, place and time and in no acute distress  Skin: warm and dry  Head: normocephalic and atraumatic  Eyes: pupils equal, round, and reactive to light, extraocular eye movements intact, conjunctivae normal  Neck: neck supple and non tender without mass   Pulmonary/Chest: clear to auscultation bilaterally- no wheezes, rales or rhonchi, normal air movement, no respiratory distress  Cardiovascular: normal rate, normal S1 and S2 and no carotid bruits  Abdomen: soft, non-tender, non-distended, normal bowel sounds, no  masses or organomegaly  Extremities: no cyanosis, no clubbing and no edema  Neurologic: no cranial nerve deficit and speech normal      Recent Labs     12/31/24  0805 01/01/25  0733    135   K 3.2* 3.6   CL 96* 94*   CO2 28 32*   BUN 20 27*   CREATININE 0.7 0.7   GLUCOSE 96 159*   CALCIUM 8.7 8.5*         Recent Labs     12/31/24  0805 01/01/25  0733   ALKPHOS 110* 127*   BILITOT 1.0 0.6   AST 19 16   ALT 9 7       Recent Labs     12/31/24  0805   WBC 13.9*   RBC 4.34   HGB 16.6*   HCT 48.5*   .8*   MCH 38.2*   MCHC 34.2   RDW 13.7      MPV 10.7         {LABS:[id number]}     Radiology:   XR ABDOMEN (KUB) (SINGLE AP VIEW)   Final Result   1. Moderate stool in the colon which could reflect constipation in the proper   clinical setting.  Stool burden is increased compared to 12/19/2024.   2. Gastrostomy tube projected over the stomach body.   3. Blunting of the left costophrenic angle compatible with probable left   effusion and or atelectasis.             Assessment:  Principal Problem:    Debility  Active Problems:    Failure to thrive in adult  Resolved Problems:    * No resolved hospital problems. *      Plan:    1.  A-fib RVR history of heart failure during her connected stay she had Cardizem drip seen by [facility] cardiology workup showed normal TSH.  Given Eliquis medications adjusted throughout her stay then discharged on home regimen of carvedilol and amiodarone.  2.  Metabolic encephalopathy during her last admission which has greatly improved but she still has dementia  3.  Status post bacteremia due to UTI strep was positive on the bio fire from a blood culture sensitive to cefotaxime finishing antibiotic course with Ceftin  No change to outpatient treatment regimen for the existing stable combordities including: kidney stones chronic back pain dental caries GERD hearing loss hypertension multiple pulmonary nodules neuropathy osteoarthritis  Diabetes: Diabetic diet.  No oral diabetic meds

## 2025-01-01 NOTE — PROGRESS NOTES
INPATIENT CARDIOLOGY FOLLOW-UP    Name: Jackelyn Montes    Age: 89 y.o.    Date of Admission: 12/23/2024  2:24 PM    Date of Service: 1/1/2025    Chief Complaint: Follow-up for AF and CHF    Interim History:  No new overnight cardiac complaints.  Today, patient is crying and complaining of severe abdominal pain and this started after she was initiated on PEG tube feeds.  Patient nurse at the bedside told me that she has been having diarrhea after she was initiated PEG tube feedings..  Currently with no complaints of CP, SOB, palpitations, dizziness, or lightheadedness. AF on telemetry. Cardizem was weaned off.     Review of Systems:   Cardiac: As per HPI  General: No fever, chills  Pulmonary: As per HPI  HEENT: No visual disturbances, difficult swallowing  GI: No nausea, vomiting  Endocrine: No thyroid disease or DM  Musculoskeletal: JACKSON x 4, no focal motor deficits  Skin: Intact, no rashes  Neuro/Psych: No headache or seizures    Problem List:  Patient Active Problem List   Diagnosis    Primary osteoarthritis of both knees    Other contact with raccoon, sequela    Essential hypertension    Acute on chronic combined systolic and diastolic CHF (congestive heart failure) (HCC)    Atrial fibrillation with rapid ventricular response (HCC)    Bilateral lower extremity edema    Age-related osteoporosis without current pathological fracture    Senile hyperkeratosis    Arthritis of hip    Varicose veins of lower extremity    Synovial cyst of right knee    Spondylosis without myelopathy or radiculopathy, lumbar region    Spinal stenosis, lumbar region without neurogenic claudication    Stage 3 chronic kidney disease (HCC)    Nontoxic multinodular goiter    Macular degeneration    Long term (current) use of opiate analgesic    Hernia of anterior abdominal wall    Sensorineural hearing loss (SNHL) of both ears    Vitamin D deficiency    Neuropathy    Fall at home    Skin lesions    Thyroid lump    Numbness    Rib pain on  left side    Acute on chronic congestive heart failure (HCC)    Atrial fibrillation with RVR ([facility])    NICM (nonischemic cardiomyopathy) ([facility])    Tachy-desmond syndrome (HCC)    HFrEF (heart failure with reduced ejection fraction) ([facility])    Other chronic pain    Hypertension    Altered mental status    Debility    Failure to thrive in adult       Allergies:  Allergies   Allergen Reactions    Influenza Vaccines Other (See Comments)     Patient states had paralytic experience    Alginate [Alginic Acid]     Novocain [Procaine] Other (See Comments)     \"Rapid Heart Beat\"     Tape [Adhesive Tape] Other (See Comments)     \"Sensitive Skin\"     Clindamycin Itching and Rash       Current Medications:  Current Facility-Administered Medications   Medication Dose Route Frequency Provider Last Rate Last Admin    oxyCODONE-acetaminophen (PERCOCET) 5-325 MG per tablet 1 tablet  1 tablet Oral Q6H PRN Anselmo Coronado MD   1 tablet at 12/31/24 1704    amLODIPine (NORVASC) tablet 5 mg  5 mg Oral Daily Kameron De La Torre MD   5 mg at 01/01/25 0918    metoprolol succinate (TOPROL XL) extended release tablet 75 mg  75 mg Oral BID Kameron De La Torre MD   75 mg at 01/01/25 0918    melatonin disintegrating tablet 5 mg  5 mg Oral Nightly Anselmo Coronado MD   5 mg at 12/31/24 2155    sodium chloride flush 0.9 % injection 5-40 mL  5-40 mL IntraVENous 2 times per day Kavon Bryant MD   10 mL at 01/01/25 0919    sodium chloride flush 0.9 % injection 5-40 mL  5-40 mL IntraVENous PRN Kavon Bryant MD        0.9 % sodium chloride infusion   IntraVENous PRN Kavon Bryant MD        potassium chloride (KLOR-CON M) extended release tablet 40 mEq  40 mEq Oral PRN Kavon Bryant MD        Or    potassium bicarb-citric acid (EFFER-K) effervescent tablet 40 mEq  40 mEq Oral PRN Kavon Bryant MD   40 mEq at 12/31/24 1303    Or    potassium chloride 10 mEq/100 mL IVPB (Peripheral Line)  10 mEq IntraVENous PRN Kavon Bryant  mL/hr at

## 2025-01-02 VITALS
WEIGHT: 120 LBS | SYSTOLIC BLOOD PRESSURE: 119 MMHG | HEART RATE: 91 BPM | HEIGHT: 64 IN | TEMPERATURE: 97.3 F | DIASTOLIC BLOOD PRESSURE: 77 MMHG | RESPIRATION RATE: 16 BRPM | BODY MASS INDEX: 20.49 KG/M2 | OXYGEN SATURATION: 97 %

## 2025-01-02 PROBLEM — Z51.5 PALLIATIVE CARE ENCOUNTER: Status: ACTIVE | Noted: 2025-01-02

## 2025-01-02 LAB
ANION GAP SERPL CALCULATED.3IONS-SCNC: 10 MMOL/L (ref 7–16)
BUN SERPL-MCNC: 25 MG/DL (ref 6–23)
CALCIUM SERPL-MCNC: 8.8 MG/DL (ref 8.6–10.2)
CHLORIDE SERPL-SCNC: 93 MMOL/L (ref 98–107)
CO2 SERPL-SCNC: 33 MMOL/L (ref 22–29)
CREAT SERPL-MCNC: 0.7 MG/DL (ref 0.5–1)
GFR, ESTIMATED: 84 ML/MIN/1.73M2
GLUCOSE SERPL-MCNC: 144 MG/DL (ref 74–99)
MAGNESIUM SERPL-MCNC: 2 MG/DL (ref 1.6–2.6)
PHOSPHATE SERPL-MCNC: 2.7 MG/DL (ref 2.5–4.5)
POTASSIUM SERPL-SCNC: 3.9 MMOL/L (ref 3.5–5)
SODIUM SERPL-SCNC: 136 MMOL/L (ref 132–146)

## 2025-01-02 PROCEDURE — 83735 ASSAY OF MAGNESIUM: CPT

## 2025-01-02 PROCEDURE — 6370000000 HC RX 637 (ALT 250 FOR IP): Performed by: SURGERY

## 2025-01-02 PROCEDURE — 2500000003 HC RX 250 WO HCPCS: Performed by: SURGERY

## 2025-01-02 PROCEDURE — 6370000000 HC RX 637 (ALT 250 FOR IP): Performed by: INTERNAL MEDICINE

## 2025-01-02 PROCEDURE — 84100 ASSAY OF PHOSPHORUS: CPT

## 2025-01-02 PROCEDURE — 6370000000 HC RX 637 (ALT 250 FOR IP): Performed by: FAMILY MEDICINE

## 2025-01-02 PROCEDURE — 36415 COLL VENOUS BLD VENIPUNCTURE: CPT

## 2025-01-02 PROCEDURE — 99222 1ST HOSP IP/OBS MODERATE 55: CPT | Performed by: NURSE PRACTITIONER

## 2025-01-02 PROCEDURE — 80048 BASIC METABOLIC PNL TOTAL CA: CPT

## 2025-01-02 RX ORDER — AMLODIPINE BESYLATE 5 MG/1
5 TABLET ORAL DAILY
DISCHARGE
Start: 2025-01-03

## 2025-01-02 RX ORDER — PETROLATUM 420 MG/G
0.5 OINTMENT TOPICAL 2 TIMES DAILY
DISCHARGE
Start: 2025-01-02

## 2025-01-02 RX ORDER — PETROLATUM 420 MG/G
OINTMENT TOPICAL 2 TIMES DAILY
Status: DISCONTINUED | OUTPATIENT
Start: 2025-01-02 | End: 2025-01-02 | Stop reason: HOSPADM

## 2025-01-02 RX ORDER — OXYCODONE AND ACETAMINOPHEN 5; 325 MG/1; MG/1
1 TABLET ORAL EVERY 6 HOURS PRN
Qty: 12 TABLET | Refills: 0 | Status: SHIPPED | OUTPATIENT
Start: 2025-01-02 | End: 2025-01-05

## 2025-01-02 RX ORDER — METOPROLOL TARTRATE 75 MG/1
75 TABLET ORAL 2 TIMES DAILY
DISCHARGE
Start: 2025-01-02

## 2025-01-02 RX ORDER — MECOBALAMIN 5000 MCG
5 TABLET,DISINTEGRATING ORAL NIGHTLY
DISCHARGE
Start: 2025-01-02

## 2025-01-02 RX ADMIN — AMLODIPINE BESYLATE 5 MG: 5 TABLET ORAL at 08:11

## 2025-01-02 RX ADMIN — APIXABAN 2.5 MG: 5 TABLET, FILM COATED ORAL at 08:11

## 2025-01-02 RX ADMIN — Medication 10 ML: at 08:06

## 2025-01-02 RX ADMIN — ACETAMINOPHEN 650 MG: 325 TABLET ORAL at 08:10

## 2025-01-02 RX ADMIN — METOPROLOL TARTRATE 75 MG: 50 TABLET, FILM COATED ORAL at 08:11

## 2025-01-02 RX ADMIN — OXYCODONE AND ACETAMINOPHEN 1 TABLET: 5; 325 TABLET ORAL at 09:20

## 2025-01-02 RX ADMIN — AMIODARONE HYDROCHLORIDE 200 MG: 200 TABLET ORAL at 08:11

## 2025-01-02 ASSESSMENT — PAIN SCALES - PAIN ASSESSMENT IN ADVANCED DEMENTIA (PAINAD)
CONSOLABILITY: DISTRACTED OR REASSURED BY VOICE/TOUCH
TOTALSCORE: 1
NEGVOCALIZATION: REPEATED TROUBLED CALLING OUT, LOUD MOANING/GROANING, CRYING
BREATHING: NORMAL
FACIALEXPRESSION: SMILING OR INEXPRESSIVE
BREATHING: NORMAL
TOTALSCORE: 4
BODYLANGUAGE: RELAXED
TOTALSCORE: 6
CONSOLABILITY: DISTRACTED OR REASSURED BY VOICE/TOUCH
BODYLANGUAGE: TENSE, DISTRESSED PACING, FIDGETING
FACIALEXPRESSION: SAD, FRIGHTENED, FROWN
BREATHING: NORMAL
BODYLANGUAGE: TENSE, DISTRESSED PACING, FIDGETING
BREATHING: NORMAL
NEGVOCALIZATION: REPEATED TROUBLED CALLING OUT, LOUD MOANING/GROANING, CRYING
TOTALSCORE: 3
BREATHING: NORMAL
TOTALSCORE: 1
NEGVOCALIZATION: OCCASIONAL MOAN/GROAN, LOW SPEECH, NEGATIVE/DISAPPROVING QUALITY
BODYLANGUAGE: TENSE, DISTRESSED PACING, FIDGETING
FACIALEXPRESSION: SMILING OR INEXPRESSIVE
FACIALEXPRESSION: SMILING OR INEXPRESSIVE
CONSOLABILITY: DISTRACTED OR REASSURED BY VOICE/TOUCH
CONSOLABILITY: UNABLE TO CONSOLE, DISTRACT OR REASSURE
BODYLANGUAGE: RELAXED
CONSOLABILITY: DISTRACTED OR REASSURED BY VOICE/TOUCH
FACIALEXPRESSION: SMILING OR INEXPRESSIVE

## 2025-01-02 ASSESSMENT — PAIN - FUNCTIONAL ASSESSMENT
PAIN_FUNCTIONAL_ASSESSMENT: PREVENTS OR INTERFERES SOME ACTIVE ACTIVITIES AND ADLS
PAIN_FUNCTIONAL_ASSESSMENT: PREVENTS OR INTERFERES SOME ACTIVE ACTIVITIES AND ADLS

## 2025-01-02 ASSESSMENT — PAIN DESCRIPTION - ONSET
ONSET: ON-GOING
ONSET: GRADUAL

## 2025-01-02 ASSESSMENT — PAIN DESCRIPTION - LOCATION
LOCATION: ABDOMEN
LOCATION: HIP

## 2025-01-02 ASSESSMENT — PAIN DESCRIPTION - FREQUENCY
FREQUENCY: INTERMITTENT
FREQUENCY: INTERMITTENT

## 2025-01-02 ASSESSMENT — PAIN DESCRIPTION - PAIN TYPE
TYPE: CHRONIC PAIN
TYPE: ACUTE PAIN

## 2025-01-02 ASSESSMENT — PAIN SCALES - GENERAL
PAINLEVEL_OUTOF10: 1
PAINLEVEL_OUTOF10: 3
PAINLEVEL_OUTOF10: 6
PAINLEVEL_OUTOF10: 1

## 2025-01-02 ASSESSMENT — PAIN DESCRIPTION - DESCRIPTORS: DESCRIPTORS: ACHING;DISCOMFORT;SORE

## 2025-01-02 ASSESSMENT — PAIN DESCRIPTION - ORIENTATION: ORIENTATION: RIGHT

## 2025-01-02 NOTE — DISCHARGE SUMMARY
[facility] Hospitalist       Hospitalist Physician Discharge Summary       [facility]  [address] [address] [phone]  Go on 1/2/2025  Rehabilitation      Activity level: ***    Diet: ADULT DIET; Regular  ADULT TUBE FEEDING; PEG; Diabetic; Continuous; 10; Yes; 10; Q 4 hours; 40; 125; Q 4 hours    Labs:***    Condition at discharge: ***    Dispo:***    Continue supplemental oxygen via nasal canula @ 2 LPM round-the-clock.    Continue CPAP / BiPAP during sleep as prior to admission.    Patient ID:  Jackelyn Montes  [id number]  89 y.o.  1935    Admit date: 12/23/2024    Discharge date and time:  1/2/2025  12:21 PM    Admission Diagnoses: Principal Problem:    Debility  Active Problems:    Failure to thrive in adult  Resolved Problems:    * No resolved hospital problems. *      Discharge Diagnoses: Principal Problem:    Debility  Active Problems:    Failure to thrive in adult  Resolved Problems:    * No resolved hospital problems. *      Consults:  IP CONSULT TO CARDIOLOGY  IP CONSULT TO CARDIOLOGY  IP CONSULT TO GENERAL SURGERY  IP CONSULT TO DIETITIAN  IP CONSULT TO DIETITIAN  IP CONSULT TO PALLIATIVE CARE  IP CONSULT TO DIETITIAN    Procedures: ***    Hospital Course: ***    Discharge Exam:  Vitals:    01/02/25 0910 01/02/25 0920 01/02/25 0950 01/02/25 1126   BP:    119/77   Pulse:    91   Resp: 16 17 16 16   Temp:    97.3 °F (36.3 °C)   TempSrc:    Temporal   SpO2:    97%   Weight:       Height:           {GENERAL PHYSICAL EXAM:19990}  I/O last 3 completed shifts:  In: 455 [NG/GT:455]  Out: 700 [Urine:450; Emesis/NG output:250]  I/O this shift:  In: 120 [P.O.:30; NG/GT:90]  Out: -       LABS:  Recent Labs     12/31/24  0805 01/01/25  0733 01/02/25  0755    135 136   K 3.2* 3.6 3.9   CL 96* 94* 93*   CO2 28 32* 33*   BUN 20 27* 25*   CREATININE 0.7 0.7 0.7   GLUCOSE 96 159* 144*   CALCIUM 8.7 8.5* 8.8       Recent Labs     12/31/24  0805   WBC 13.9*   RBC  present.     Signed:  Electronically signed by Anselmo Coronado MD on 1/2/2025 at 12:21 PM

## 2025-01-02 NOTE — CONSULTS
Palliative Care Department  [phone]  Palliative Care Initial Consult  Provider Zhane Hill, APRN - CNP     Jackelyn Montes  [id number]  Hospital Day: 11  Date of Initial Consult: 1/1/2025  Referring Provider: Anselmo Coronado MD  Palliative Medicine was consulted for assistance with: Goals of Care    HPI:   Jackelyn Montes is a 89 y.o. with a medical history of atrial fibrillation, CHF, chronic back pain, diabetes, GERD, hypertension, pulmonary nodules, neuropathy, osteoarthritis, who was admitted on 12/23/2024 from home with a CHIEF COMPLAINT of increased confusion.  The patient was recently hospitalized due to confusion and fall.  During that time, she was treated for cellulitis of the left lower leg and noted to be in A-fib with RVR.  The patient was sent home on 12/23/2024 but was unable to get out of the ambulance without assistance and was sent back to the ED for placement.  During this hospitalization patient was found to have dysphagia and malnutrition.  General surgery was consulted for PEG tube placement which she had placed 12/30.  Cardiology following.  Palliative medicine consulted for further assistance.    ASSESSMENT/PLAN:     Pertinent Hospital Diagnoses     A-fib RVR  Metabolic encephalopathy  Dementia  Dysphagia s/p PEG tube insertion 12/30      Palliative Care Encounter / Counseling Regarding Goals of Care  Please see detailed goals of care discussion as below  At this time, Jackelyn Montes, Does Not have capacity for medical decision-making.  Capacity is time limited and situation/question specific  During encounter Yaneth was surrogate medical decision-maker  Outcome of goals of care meeting:   Continue current medical management  Discussed CODE STATUS options  Code status Full Code  Advanced Directives: no POA or living will in [facility]  Surrogate/Legal NOK:  Jimmy Simon (spouse) [phone]  Yaneth Montes (grandchild) [phone]    Spiritual assessment: no spiritual distress

## 2025-01-02 NOTE — CARE COORDINATION
SW Note: Phys reported pt ready for discharge early afternoon to [facility] for Skilled Rehab placement. Khushboo/SNF liaison confirmed auth received. HENS completed. WENDY needs completed & signed by phys. Spoke with Sulema @ [facility], ambulance scheduled for  @ 1pm. Ambulance CMN complete & put in soft chart. Phys, spouse, nurse, & SNF liaison updated. Electronically signed by RENETTA Bush on 1/2/2025 at 10:17 AM

## 2025-01-02 NOTE — PROGRESS NOTES
Nurse handoff called to Lashawn Hanna. All questions answered. Hard copy of AVS given to [facility] for transport to facility.    Electronically signed by Milly Macedo RN on 1/2/2025 at 1:12 PM

## 2025-01-02 NOTE — PLAN OF CARE
Problem: Chronic Conditions and Co-morbidities  Goal: Patient's chronic conditions and co-morbidity symptoms are monitored and maintained or improved  Outcome: Progressing  Flowsheets (Taken 1/1/2025 2000)  Care Plan - Patient's Chronic Conditions and Co-Morbidity Symptoms are Monitored and Maintained or Improved:   Monitor and assess patient's chronic conditions and comorbid symptoms for stability, deterioration, or improvement   Collaborate with multidisciplinary team to address chronic and comorbid conditions and prevent exacerbation or deterioration   Update acute care plan with appropriate goals if chronic or comorbid symptoms are exacerbated and prevent overall improvement and discharge     Problem: Discharge Planning  Goal: Discharge to home or other facility with appropriate resources  Outcome: Progressing  Flowsheets (Taken 1/1/2025 2000)  Discharge to home or other facility with appropriate resources:   Identify barriers to discharge with patient and caregiver   Arrange for needed discharge resources and transportation as appropriate   Identify discharge learning needs (meds, wound care, etc)   Refer to discharge planning if patient needs post-hospital services based on physician order or complex needs related to functional status, cognitive ability or social support system     Problem: Skin/Tissue Integrity  Goal: Absence of new skin breakdown  Description: 1.  Monitor for areas of redness and/or skin breakdown  2.  Assess vascular access sites hourly  3.  Every 4-6 hours minimum:  Change oxygen saturation probe site  4.  Every 4-6 hours:  If on nasal continuous positive airway pressure, respiratory therapy assess nares and determine need for appliance change or resting period.  Outcome: Progressing     Problem: ABCDS Injury Assessment  Goal: Absence of physical injury  Outcome: Progressing  Flowsheets (Taken 1/1/2025 2000)  Absence of Physical Injury: Implement safety measures based on patient  assessment     Problem: Safety - Adult  Goal: Free from fall injury  Outcome: Progressing  Flowsheets (Taken 1/1/2025 2000)  Free From Fall Injury:   Based on caregiver fall risk screen, instruct family/caregiver to ask for assistance with transferring infant if caregiver noted to have fall risk factors   Instruct family/caregiver on patient safety     Problem: Nutrition Deficit:  Goal: Optimize nutritional status  Outcome: Progressing     Problem: Pain  Goal: Verbalizes/displays adequate comfort level or baseline comfort level  Outcome: Progressing

## 2025-01-02 NOTE — PROGRESS NOTES
ENDO), 100 % IBW. Weight Source: Stated  Current BMI (kg/m2): 20.6  Usual Body Weight: 62.9 kg (138 lb 11 oz) (9/3/23 bed scale - most recent accurate wt)  % Weight Change (Calculated): -13.5  Weight Adjustment For: No Adjustment  BMI Categories: Underweight (BMI less than 22) age over 65    Estimated Daily Nutrient Needs:  Energy Requirements Based On: Formula  Weight Used for Energy Requirements: Current  Energy (kcal/day): 1200-1500kcal/day  Weight Used for Protein Requirements: Current  Protein (g/day): 70-80g/day  Method Used for Fluid Requirements: 1 ml/kcal  Fluid (ml/day): 1200-1500ml/day    Nutrition Diagnosis:   Other (Mild Malnutrition) related to cognitive or neurological impairment as evidenced by criteria as identified in malnutrition assessment, BMI    Nutrition Interventions:   Food and/or Nutrient Delivery: Continue Current Diet, Continue Current Tube Feeding (Continue Glucerna @ 40ml/h with 125ml flush q4h; add Meng BID to aid in wound healing)  Nutrition Education/Counseling: Education/Counseling not indicated  Coordination of Nutrition Care: Continue to monitor while inpatient     Goals:  Goals: Tolerate nutrition support at goal rate  Type of Goal: Continue current goal  Previous Goal Met: Progressing toward Goal(s)    Nutrition Monitoring and Evaluation:   Behavioral-Environmental Outcomes: None Identified  Food/Nutrient Intake Outcomes: Food and Nutrient Intake, Supplement Intake, Enteral Nutrition Intake/Tolerance  Physical Signs/Symptoms Outcomes: Biochemical Data, Chewing or Swallowing, GI Status, Fluid Status or Edema, Nutrition Focused Physical Findings, Weight, Skin    Discharge Planning:    Continue current diet, Enteral Nutrition     Liz Villela RD, LD  Contact: [phone]

## 2025-01-05 LAB
EKG ATRIAL RATE: 87 BPM
EKG Q-T INTERVAL: 342 MS
EKG QRS DURATION: 96 MS
EKG QTC CALCULATION (BAZETT): 418 MS
EKG R AXIS: -76 DEGREES
EKG T AXIS: 123 DEGREES
EKG VENTRICULAR RATE: 90 BPM

## 2025-01-12 ENCOUNTER — APPOINTMENT (OUTPATIENT)
Dept: GENERAL RADIOLOGY | Age: 89
End: 2025-01-12
Payer: MEDICARE

## 2025-01-12 ENCOUNTER — HOSPITAL ENCOUNTER (EMERGENCY)
Age: 89
Discharge: HOME | End: 2025-01-12
Attending: EMERGENCY MEDICINE
Payer: MEDICARE

## 2025-01-12 ENCOUNTER — APPOINTMENT (OUTPATIENT)
Dept: CT IMAGING | Age: 89
End: 2025-01-12
Payer: MEDICARE

## 2025-01-12 VITALS
TEMPERATURE: 97.6 F | HEART RATE: 150 BPM | RESPIRATION RATE: 27 BRPM | SYSTOLIC BLOOD PRESSURE: 138 MMHG | DIASTOLIC BLOOD PRESSURE: 108 MMHG | OXYGEN SATURATION: 93 %

## 2025-01-12 DIAGNOSIS — T85.528A GASTROJEJUNOSTOMY TUBE DISLODGEMENT: ICD-10-CM
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
## 2025-01-12 DIAGNOSIS — S00.03XA HEMATOMA OF SCALP, INITIAL ENCOUNTER: ICD-10-CM

## 2025-01-12 LAB
ANION GAP SERPL CALCULATED.3IONS-SCNC: 10 MMOL/L (ref 7–16)
BNP SERPL-MCNC: 5183 PG/ML (ref 0–450)
BUN SERPL-MCNC: 46 MG/DL (ref 6–23)
CALCIUM SERPL-MCNC: 9.1 MG/DL (ref 8.6–10.2)
CHLORIDE SERPL-SCNC: 96 MMOL/L (ref 98–107)
CO2 SERPL-SCNC: 31 MMOL/L (ref 22–29)
CREAT SERPL-MCNC: 0.6 MG/DL (ref 0.5–1)
EKG ATRIAL RATE: 153 BPM
EKG Q-T INTERVAL: 338 MS
EKG QRS DURATION: 112 MS
EKG QTC CALCULATION (BAZETT): 537 MS
EKG R AXIS: -126 DEGREES
EKG T AXIS: 67 DEGREES
EKG VENTRICULAR RATE: 152 BPM
ERYTHROCYTE [DISTWIDTH] IN BLOOD BY AUTOMATED COUNT: 14 % (ref 11.5–15)
GFR, ESTIMATED: 85 ML/MIN/1.73M2
GLUCOSE SERPL-MCNC: 116 MG/DL (ref 74–99)
HCT VFR BLD AUTO: 41.7 % (ref 34–48)
HGB BLD-MCNC: 14.1 G/DL (ref 11.5–15.5)
MCH RBC QN AUTO: 38.1 PG (ref 26–35)
MCHC RBC AUTO-ENTMCNC: 33.8 G/DL (ref 32–34.5)
MCV RBC AUTO: 112.7 FL (ref 80–99.9)
PLATELET # BLD AUTO: 265 K/UL (ref 130–450)
PMV BLD AUTO: 11.2 FL (ref 7–12)
POTASSIUM SERPL-SCNC: 4.4 MMOL/L (ref 3.5–5)
RBC # BLD AUTO: 3.7 M/UL (ref 3.5–5.5)
SODIUM SERPL-SCNC: 137 MMOL/L (ref 132–146)
TROPONIN I SERPL HS-MCNC: 28 NG/L (ref 0–9)
TROPONIN I SERPL HS-MCNC: 29 NG/L (ref 0–9)
WBC OTHER # BLD: 11.8 K/UL (ref 4.5–11.5)

## 2025-01-12 PROCEDURE — 93010 ELECTROCARDIOGRAM REPORT: CPT | Performed by: INTERNAL MEDICINE

## 2025-01-12 PROCEDURE — 70450 CT HEAD/BRAIN W/O DYE: CPT

## 2025-01-12 PROCEDURE — 85027 COMPLETE CBC AUTOMATED: CPT

## 2025-01-12 PROCEDURE — 72125 CT NECK SPINE W/O DYE: CPT

## 2025-01-12 PROCEDURE — 2500000003 HC RX 250 WO HCPCS

## 2025-01-12 PROCEDURE — 73030 X-RAY EXAM OF SHOULDER: CPT

## 2025-01-12 PROCEDURE — 80048 BASIC METABOLIC PNL TOTAL CA: CPT

## 2025-01-12 PROCEDURE — 84484 ASSAY OF TROPONIN QUANT: CPT

## 2025-01-12 PROCEDURE — 2500000003 HC RX 250 WO HCPCS: Performed by: EMERGENCY MEDICINE

## 2025-01-12 PROCEDURE — 2580000003 HC RX 258

## 2025-01-12 PROCEDURE — 83880 ASSAY OF NATRIURETIC PEPTIDE: CPT

## 2025-01-12 PROCEDURE — 93005 ELECTROCARDIOGRAM TRACING: CPT

## 2025-01-12 PROCEDURE — 43762 RPLC GTUBE NO REVJ TRC: CPT

## 2025-01-12 PROCEDURE — 82941 ASSAY OF GASTRIN: CPT

## 2025-01-12 PROCEDURE — 96374 THER/PROPH/DIAG INJ IV PUSH: CPT

## 2025-01-12 PROCEDURE — 74018 RADEX ABDOMEN 1 VIEW: CPT

## 2025-01-12 PROCEDURE — 6360000002 HC RX W HCPCS

## 2025-01-12 PROCEDURE — 99285 EMERGENCY DEPT VISIT HI MDM: CPT

## 2025-01-12 PROCEDURE — 96361 HYDRATE IV INFUSION ADD-ON: CPT

## 2025-01-12 PROCEDURE — 96375 TX/PRO/DX INJ NEW DRUG ADDON: CPT

## 2025-01-12 PROCEDURE — 71045 X-RAY EXAM CHEST 1 VIEW: CPT

## 2025-01-12 RX ORDER — FENTANYL CITRATE 0.05 MG/ML
50 INJECTION, SOLUTION INTRAMUSCULAR; INTRAVENOUS ONCE
Status: COMPLETED | OUTPATIENT
Start: 2025-01-12 | End: 2025-01-12

## 2025-01-12 RX ORDER — DILTIAZEM HYDROCHLORIDE 5 MG/ML
10 INJECTION INTRAVENOUS ONCE
Status: COMPLETED | OUTPATIENT
Start: 2025-01-12 | End: 2025-01-12

## 2025-01-12 RX ORDER — METOPROLOL TARTRATE 1 MG/ML
5 INJECTION, SOLUTION INTRAVENOUS ONCE
Status: COMPLETED | OUTPATIENT
Start: 2025-01-12 | End: 2025-01-12

## 2025-01-12 RX ORDER — 0.9 % SODIUM CHLORIDE 0.9 %
1000 INTRAVENOUS SOLUTION INTRAVENOUS ONCE
Status: COMPLETED | OUTPATIENT
Start: 2025-01-12 | End: 2025-01-12

## 2025-01-12 RX ADMIN — DILTIAZEM HYDROCHLORIDE 10 MG: 5 INJECTION, SOLUTION INTRAVENOUS at 05:35

## 2025-01-12 RX ADMIN — METOPROLOL TARTRATE 5 MG: 5 INJECTION INTRAVENOUS at 03:43

## 2025-01-12 RX ADMIN — SODIUM CHLORIDE 1000 ML: 9 INJECTION, SOLUTION INTRAVENOUS at 03:51

## 2025-01-12 RX ADMIN — FENTANYL CITRATE 50 MCG: 0.05 INJECTION, SOLUTION INTRAMUSCULAR; INTRAVENOUS at 03:47

## 2025-01-12 NOTE — ED PROVIDER NOTES
commands but unable to provide history.  No unilateral weaknesses.  Patient with gastrostomy site and epigastric abdomen with no surrounding erythema, no drainage.  Patient found to be in A-fib RVR with a heart rate in the 150s.  Patient also with hematoma to her right frontal forehead.  Patient is listed as a DNR CC.  Gastrostomy tube was replaced, placement confirmed with Gastrografin contrast study.  Patient given Lopressor, fentanyl for pain that she is complaining of right shoulder pain after fall.  Patient with persistent A-fib RVR, given bolus of Cardizem for A-fib.  Workup showing baseline troponin, electrolytes within normal limits, blood work showing minimal leukocytosis, no anemia, no thrombocytopenia, proBNP is elevated with no prior to compare to.  Imaging showing no acute bony injuries.    Spoke with patient's  who is listed in her chart, confirms DNR CC status, agreeable to plan for discharge back to patient's nursing home.  Patient discharged stable condition      CONSULTS:   None        PROCEDURES   Unless otherwise noted below, none     PROCEDURE  1/12/25       Time: 0200    FEEDING TUBE REPLACEMENT  Risks, benefits and alternatives (for applicable procedures below) described.   Performed By: Israel Broussard II, DO and EM Attending Physician.    Indication: Tube fell out.   Informed consent: Consent unable to be obtained due to the emergent nature of this procedure..  Local Anesthesia:  not required/indicated.  Procedure: A feeding tube was replaced using an 18 Mongolian gastrostomy tube and the bulb was inflated using 20 cc of normal saline.   Tube was secured to skin pre-attached rubber skin bumper device                                                          .  Post-Procedure: Tube position confirmed by x-ray with contrast injection.  Patient tolerated the procedure well.  Complications:  None.         CRITICAL CARE TIME (.cct)           I am the Primary Clinician of Record.    FINAL

## 2025-01-15 LAB — GASTRIN SERPL-MCNC: 152 PG/ML (ref 0–100)

## 2025-01-16 ENCOUNTER — APPOINTMENT (OUTPATIENT)
Dept: PAIN MEDICINE | Facility: CLINIC | Age: OVER 89
End: 2025-01-16
Payer: MEDICARE

## (undated) DEVICE — KIT BEDSIDE REVITAL OX 500ML

## (undated) DEVICE — ADAPTER CLEANING PORPOISE CLEANING

## (undated) DEVICE — BINDER ABD M/L H12IN FOR 46-62IN WHT 4 SLD PNL DSGN HOOP

## (undated) DEVICE — GOWN ISOLATN REG YEL M WT MULTIPLY SIDETIE LEV 2

## (undated) DEVICE — Device: Brand: DEFENDO VALVE AND CONNECTOR KIT

## (undated) DEVICE — 6 X 9  1.75MIL 4-WALL LABGUARD: Brand: MINIGRIP COMMERCIAL LLC

## (undated) DEVICE — MEDI-VAC YANKAUER SUCTION HANDLE W/BULBOUS TIP: Brand: CARDINAL HEALTH

## (undated) DEVICE — BLOCK BITE 60FR CAREGUARD

## (undated) DEVICE — ENDOVIVE SFT PEG KIT PULL WENFIT 20F BX2

## (undated) DEVICE — CONTAINER SPEC COLL 960ML POLYPR TRIANG GRAD INTAKE/OUTPUT

## (undated) DEVICE — SPONGE GZ 4IN 4IN 4 PLY N WVN AVANT

## (undated) DEVICE — 4-PORT MANIFOLD: Brand: NEPTUNE 2

## (undated) DEVICE — MEDI-VAC NON-CONDUCTIVE SUCTION TUBING: Brand: CARDINAL HEALTH

## (undated) DEVICE — KENDALL 450 SERIES MONITORING FOAM ELECTRODE - RECTANGULAR SHAPE ( 3/PK): Brand: KENDALL

## (undated) DEVICE — JELLY,LUBE,STERILE,FLIP TOP,TUBE,4-OZ: Brand: MEDLINE